# Patient Record
Sex: FEMALE | Race: WHITE | NOT HISPANIC OR LATINO | Employment: OTHER | URBAN - METROPOLITAN AREA
[De-identification: names, ages, dates, MRNs, and addresses within clinical notes are randomized per-mention and may not be internally consistent; named-entity substitution may affect disease eponyms.]

---

## 2017-02-08 ENCOUNTER — RADIANT APPOINTMENT (OUTPATIENT)
Dept: GENERAL RADIOLOGY | Facility: CLINIC | Age: 82
End: 2017-02-08
Attending: INTERNAL MEDICINE
Payer: COMMERCIAL

## 2017-02-08 ENCOUNTER — TELEPHONE (OUTPATIENT)
Dept: PEDIATRICS | Facility: CLINIC | Age: 82
End: 2017-02-08

## 2017-02-08 ENCOUNTER — OFFICE VISIT (OUTPATIENT)
Dept: PEDIATRICS | Facility: CLINIC | Age: 82
End: 2017-02-08
Payer: COMMERCIAL

## 2017-02-08 VITALS
HEART RATE: 62 BPM | WEIGHT: 232 LBS | HEIGHT: 60 IN | OXYGEN SATURATION: 92 % | SYSTOLIC BLOOD PRESSURE: 122 MMHG | BODY MASS INDEX: 45.55 KG/M2 | TEMPERATURE: 98.3 F | DIASTOLIC BLOOD PRESSURE: 64 MMHG

## 2017-02-08 DIAGNOSIS — R10.32 LLQ ABDOMINAL PAIN: ICD-10-CM

## 2017-02-08 DIAGNOSIS — S22.32XK CLOSED FRACTURE OF ONE RIB OF LEFT SIDE WITH NONUNION, SUBSEQUENT ENCOUNTER: ICD-10-CM

## 2017-02-08 DIAGNOSIS — G89.29 CHRONIC MIDLINE THORACIC BACK PAIN: ICD-10-CM

## 2017-02-08 DIAGNOSIS — M54.6 CHRONIC MIDLINE THORACIC BACK PAIN: ICD-10-CM

## 2017-02-08 DIAGNOSIS — R10.11 ABDOMINAL PAIN, RIGHT UPPER QUADRANT: Primary | ICD-10-CM

## 2017-02-08 LAB
ALBUMIN UR-MCNC: NEGATIVE MG/DL
APPEARANCE UR: ABNORMAL
BACTERIA #/AREA URNS HPF: ABNORMAL /HPF
BASOPHILS # BLD AUTO: 0 10E9/L (ref 0–0.2)
BASOPHILS NFR BLD AUTO: 0.5 %
BILIRUB UR QL STRIP: NEGATIVE
COLOR UR AUTO: YELLOW
DIFFERENTIAL METHOD BLD: NORMAL
EOSINOPHIL # BLD AUTO: 0.3 10E9/L (ref 0–0.7)
EOSINOPHIL NFR BLD AUTO: 3.4 %
ERYTHROCYTE [DISTWIDTH] IN BLOOD BY AUTOMATED COUNT: 12.9 % (ref 10–15)
GLUCOSE UR STRIP-MCNC: NEGATIVE MG/DL
HCT VFR BLD AUTO: 46.2 % (ref 35–47)
HGB BLD-MCNC: 14.7 G/DL (ref 11.7–15.7)
HGB UR QL STRIP: ABNORMAL
KETONES UR STRIP-MCNC: NEGATIVE MG/DL
LEUKOCYTE ESTERASE UR QL STRIP: ABNORMAL
LIPASE SERPL-CCNC: 102 U/L (ref 73–393)
LYMPHOCYTES # BLD AUTO: 1.7 10E9/L (ref 0.8–5.3)
LYMPHOCYTES NFR BLD AUTO: 19.4 %
MCH RBC QN AUTO: 30.8 PG (ref 26.5–33)
MCHC RBC AUTO-ENTMCNC: 31.8 G/DL (ref 31.5–36.5)
MCV RBC AUTO: 97 FL (ref 78–100)
MONOCYTES # BLD AUTO: 1 10E9/L (ref 0–1.3)
MONOCYTES NFR BLD AUTO: 11 %
NEUTROPHILS # BLD AUTO: 5.8 10E9/L (ref 1.6–8.3)
NEUTROPHILS NFR BLD AUTO: 65.7 %
NITRATE UR QL: NEGATIVE
NON-SQ EPI CELLS #/AREA URNS LPF: ABNORMAL /LPF
PH UR STRIP: 5.5 PH (ref 5–7)
PLATELET # BLD AUTO: 233 10E9/L (ref 150–450)
RBC # BLD AUTO: 4.77 10E12/L (ref 3.8–5.2)
RBC #/AREA URNS AUTO: ABNORMAL /HPF (ref 0–2)
SP GR UR STRIP: 1.02 (ref 1–1.03)
URN SPEC COLLECT METH UR: ABNORMAL
UROBILINOGEN UR STRIP-ACNC: 0.2 EU/DL (ref 0.2–1)
WBC # BLD AUTO: 8.8 10E9/L (ref 4–11)
WBC #/AREA URNS AUTO: ABNORMAL /HPF (ref 0–2)

## 2017-02-08 PROCEDURE — 36415 COLL VENOUS BLD VENIPUNCTURE: CPT | Performed by: INTERNAL MEDICINE

## 2017-02-08 PROCEDURE — 85025 COMPLETE CBC W/AUTO DIFF WBC: CPT | Performed by: INTERNAL MEDICINE

## 2017-02-08 PROCEDURE — 81001 URINALYSIS AUTO W/SCOPE: CPT | Performed by: INTERNAL MEDICINE

## 2017-02-08 PROCEDURE — 99214 OFFICE O/P EST MOD 30 MIN: CPT | Performed by: INTERNAL MEDICINE

## 2017-02-08 PROCEDURE — 72070 X-RAY EXAM THORAC SPINE 2VWS: CPT

## 2017-02-08 PROCEDURE — 87086 URINE CULTURE/COLONY COUNT: CPT | Performed by: INTERNAL MEDICINE

## 2017-02-08 PROCEDURE — 83690 ASSAY OF LIPASE: CPT | Performed by: INTERNAL MEDICINE

## 2017-02-08 PROCEDURE — 80053 COMPREHEN METABOLIC PANEL: CPT | Performed by: INTERNAL MEDICINE

## 2017-02-08 RX ORDER — TRAZODONE HYDROCHLORIDE 50 MG/1
TABLET, FILM COATED ORAL
COMMUNITY
Start: 2014-05-03 | End: 2017-10-11

## 2017-02-08 RX ORDER — LIDOCAINE 50 MG/G
1 PATCH TOPICAL EVERY 24 HOURS
Qty: 30 PATCH | Refills: 1 | Status: SHIPPED | OUTPATIENT
Start: 2017-02-08 | End: 2017-04-28

## 2017-02-08 NOTE — MR AVS SNAPSHOT
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 We can use lidocaine gel if needed.    2) Xray of the back today to evaluate for worsening fracture    3) CT scan of the abdomen area to be done at [facility], they will call to set this up    4) Labs today looking at white count, liver counts, and pancreatic markers today as well. Urine too    Ayaz Fernandez MD        Follow-ups after your visit        Future tests that were ordered for you today     Open Future Orders        Priority Expected Expires Ordered    CT Abdomen Pelvis w Contrast Routine  2/8/2018 2/8/2017            Who to contact     If you have questions or need follow up information about today's clinic visit or your schedule please contact [facility] directly at [phone].  Normal or non-critical lab and imaging results will be communicated to you by MyChart, letter or phone within 4 business days after the clinic has received the results. If you do not hear from us within 7 days, please contact the clinic through MyChart or phone. If you have a critical or abnormal lab result, we will notify you by phone as soon as possible.  Submit refill requests through [employer] or call your pharmacy and they will forward the refill request to us. Please allow 3 business days for your refill to be completed.          Additional Information About Your Visit        MyChart Information     [employer] gives you secure access to your  electronic health record. If you see a primary care provider, you can also send messages to your care team and make appointments. If you have questions, please call your primary care clinic.  If you do not have a primary care provider, please call [phone] and they will assist you.        Care EveryWhere ID     This is your Care EveryWhere ID. This could be used by other organizations to access your [place] medical records  [id number]        Your Vitals Were     Pulse Temperature Height BMI (Body Mass Index) Pulse Oximetry       62 98.3  F (36.8  C) (Oral) 5' (1.524 m) 45.31 kg/m2 92%        Blood Pressure from Last 3 Encounters:   02/08/17 122/64   10/21/16 145/69   10/07/16 130/78    Weight from Last 3 Encounters:   02/08/17 232 lb (105.235 kg)   10/07/16 233 lb (105.688 kg)   09/28/16 232 lb 11.2 oz (105.552 kg)              We Performed the Following     *UA reflex to Microscopic and Culture ([facility] [place] [facility] (except [place] and [place])     CBC with platelets differential     Comprehensive metabolic panel     Lipase          Today's Medication Changes          These changes are accurate as of: 2/8/17 11:54 AM.  If you have any questions, ask your nurse or doctor.               Start taking these medicines.        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        Dose/Directions    cholecalciferol 1000 UNIT tablet   Commonly known as:  vitamin D   This may have changed:  when to take this   Used for:  Unspecified vitamin D deficiency        Dose:  1000 Units   Take 1 tablet (1,000  Units) by mouth daily   Quantity:  100 tablet   Refills:  3            Where to get your medicines      These medications were sent to [id number] - [place] - [address] AT [facility] [address]  [address] [address]     Phone:  [phone]    - lidocaine 5 % Patch             Primary Care Provider Office Phone # Fax #    Ayaz Fernandez [phone]       [facility] [place] [address] [place] [id number]        Goals        Patient-Stated    Functional     Goal Comments - Note edited  1/27/2015 12:43 PM by Erica Bryant RN    Will have a one time in home fall risk assessment done to help in preventing further fall.     Fall risk assessment done. Home care now in home and providing services.         Thank you!     Thank you for choosing [facility]  for your care. Our goal is always to provide you with excellent care. Hearing back from our patients is one way we can continue to improve our services. Please take a few minutes to complete the written survey that you may receive in the mail after your visit with us. Thank you!             Your Updated Medication List - Protect others around you: Learn how to safely use, store and throw away your medicines at www.disposemymeds.org.          This list is accurate as of: 2/8/17 11:54 AM.  Always use your most recent med list.                   Brand Name Dispense Instructions for use    * albuterol 108 (90 BASE) MCG/ACT Inhaler   Generic drug:  albuterol      Inhale 2 puffs into the lungs every 6 hours       * albuterol 1.25 MG/3ML nebulizer solution    ACCUNEB    30 vial    Take 1 vial (1.25 mg) by nebulization every 4 hours as needed for shortness of breath / dyspnea or wheezing       alendronate 70 MG tablet    FOSAMAX    12 tablet    Take 1 tablet (70 mg) by mouth every 7 days Take with over 8 ounces water and stay upright for at least 30 minutes after dose.  Take at least 60 minutes  before breakfast       azelastine 0.1 % spray    ASTELIN    1 Bottle    Spray 1-2 sprays into both nostrils 2 times daily       cetirizine 10 MG tablet    zyrTEC    30 tablet    Take 1 tablet (10 mg) by mouth every evening       cholecalciferol 1000 UNIT tablet    vitamin D    100 tablet    Take 1 tablet (1,000 Units) by mouth daily       citalopram 20 MG tablet    celeXA    90 tablet    Take 1 tablet (20 mg) by mouth daily Take 20 mg daily for 10 days then increase to 40 mg daily       ferrous sulfate 325 (65 FE) MG tablet    IRON     Take 325 mg by mouth daily (with breakfast)       FLONASE 50 MCG/ACT spray   Generic drug:  fluticasone     3 Package    Spray 1 spray into both nostrils daily       furosemide 20 MG tablet    LASIX    30 tablet    Take 1 tablet (20 mg) by mouth daily as needed       lidocaine 5 % Patch    LIDODERM    30 patch    Place 1 patch onto the skin every 24 hours Sub if not covered for topical lidocaine gel       losartan 100 MG tablet    COZAAR    90 tablet    Take 1 tablet (100 mg) by mouth daily       metoprolol 25 MG 24 hr tablet    TOPROL-XL    45 tablet    Take 0.5 tablets (12.5 mg) by mouth daily       PREVACID PO      Take 30 mg by mouth every evening       SYMBICORT IN          traZODone 50 MG tablet    DESYREL         * Notice:  This list has 2 medication(s) that are the same as other medications prescribed for you. Read the directions carefully, and ask your doctor or other care provider to review them with you.

## 2017-02-08 NOTE — TELEPHONE ENCOUNTER
Pharmacy notifies that lidocaine 5 % ointment is covered by insurance, will dispense ointment for now.     Tiera, RN  Triage Nurse

## 2017-02-08 NOTE — PATIENT INSTRUCTIONS
1) Use lidocaine patches, if covered for the left side pain. We can use lidocaine gel if needed.    2) Xray of the back today to evaluate for worsening fracture    3) CT scan of the abdomen area to be done at [facility], they will call to set this up    4) Labs today looking at white count, liver counts, and pancreatic markers today as well. Urine too    Ayaz Fernandez MD

## 2017-02-08 NOTE — PROGRESS NOTES
SUBJECTIVE:                                                    Tatyana Metcalf is a 84 year old female who presents to clinic today for the following health issues:      Back Pain      Duration: Years, but worsening in the last 6 months or so.         Specific cause: Spinal Stenosis, but wondering why the pain has worsened.     Description:   Location of pain: All over the back.   Character of pain: dull ache  Pain radiation:none  New numbness or weakness in legs, not attributed to pain:  YES- sometimes, especially after sitting too long.     Intensity: moderate    History:   Pain interferes with job: Not applicable  History of back problems: previous spinal stenosis -   Any previous MRI or X-rays: Yes--at CT done at [facility].  Date 12/27/2016  Sees a specialist for back pain:  No  Therapies tried without relief: Aleve PM at night.    Alleviating factors:   Improved by: Aleve PM at night and asper cream on her sides.       Precipitating factors:  Worsened by: Sitting    Functional and Psychosocial Screen ([facility] Back):      Not performed today  Patient has also been experiencing Side pain with her back pain, mainly on the right side with nausea. She had an attack on 1/14/17, hasn't had one since however it has remained painful since but not has bad.      Accompanying Signs & Symptoms:  Risk of Fracture:  Age >64  Risk of Cauda Equina:  None  Risk of Infection:  None  Risk of Cancer:  History of cancer  Risk of Ankylosing Spondylitis:  Onset at age <35, male, AND morning back stiffness.                  Agree with medical student note.    Abdominal pain: Has been having RUQ abdominal pain following eating. She had cholecystitis in the past. This pain feels less significant than that pain but still fairly sharp. She also notes pain long the left lateral rib areas. No fevers. No falls, no other trauma.    She has a history of thoracic compression fracture as well as old rib fractures on the left side. No  rashes or fevers. No diarrhea. Does not feel constipated. No hematuria, hematochezia, melena. No urinary frequency or dysuria. History of endometrial cancer s/p therapy in the past.       Problem list and histories reviewed & adjusted, as indicated.  Additional history: as documented    Problem list, Medication list, Allergies, and Medical/Social/Surgical histories reviewed in [facility] and updated as appropriate.    ROS:  Constitutional, HEENT, cardiovascular, pulmonary, GI, , musculoskeletal, neuro, skin, endocrine and psych systems are negative, except as otherwise noted.    OBJECTIVE:                                                    /64 mmHg  Pulse 62  Temp(Src) 98.3  F (36.8  C) (Oral)  Ht 5' (1.524 m)  Wt 232 lb (105.235 kg)  BMI 45.31 kg/m2  SpO2 92%  Body mass index is 45.31 kg/(m^2).  GENERAL: healthy, alert and no distress  NECK: no adenopathy, no asymmetry, masses, or scars and thyroid normal to palpation  RESP: lungs clear to auscultation - no rales, rhonchi or wheezes  CV: regular rate and rhythm, normal S1 S2, no S3 or S4, no murmur, click or rub, no peripheral edema and peripheral pulses strong  ABDOMEN: tender to palpation along the LLQ into the LUQ area along the left lateral abdominal area- no rashes over this area, mild tenderness in RUQ area, no CVA tenderness, no masses  MS: no gross musculoskeletal defects noted, no edema  SKIN: no suspicious lesions or rashes  NEURO: walking well with walker, normal lower extremity sensation, normal great toe strength, no new focal deficit  PSYCH: mentation appears normal, affect normal/bright    Diagnostic Test Results:  Results for orders placed or performed in visit on 02/08/17   Comprehensive metabolic panel   Result Value Ref Range    Sodium 143 133 - 144 mmol/L    Potassium 4.3 3.4 - 5.3 mmol/L    Chloride 107 94 - 109 mmol/L    Carbon Dioxide 28 20 - 32 mmol/L    Anion Gap 8 3 - 14 mmol/L    Glucose 102 (H) 70 - 99 mg/dL    Urea Nitrogen 18 7  - 30 mg/dL    Creatinine 0.65 0.52 - 1.04 mg/dL    GFR Estimate 87 >60 mL/min/1.7m2    GFR Estimate If Black >90   GFR Calc   >60 mL/min/1.7m2    Calcium 9.7 8.5 - 10.1 mg/dL    Bilirubin Total 0.3 0.2 - 1.3 mg/dL    Albumin 4.0 3.4 - 5.0 g/dL    Protein Total 7.2 6.8 - 8.8 g/dL    Alkaline Phosphatase 88 40 - 150 U/L    ALT 16 0 - 50 U/L    AST 14 0 - 45 U/L   CBC with platelets differential   Result Value Ref Range    WBC 8.8 4.0 - 11.0 10e9/L    RBC Count 4.77 3.8 - 5.2 10e12/L    Hemoglobin 14.7 11.7 - 15.7 g/dL    Hematocrit 46.2 35.0 - 47.0 %    MCV 97 78 - 100 fl    MCH 30.8 26.5 - 33.0 pg    MCHC 31.8 31.5 - 36.5 g/dL    RDW 12.9 10.0 - 15.0 %    Platelet Count 233 150 - 450 10e9/L    Diff Method Automated Method     % Neutrophils 65.7 %    % Lymphocytes 19.4 %    % Monocytes 11.0 %    % Eosinophils 3.4 %    % Basophils 0.5 %    Absolute Neutrophil 5.8 1.6 - 8.3 10e9/L    Absolute Lymphocytes 1.7 0.8 - 5.3 10e9/L    Absolute Monocytes 1.0 0.0 - 1.3 10e9/L    Absolute Eosinophils 0.3 0.0 - 0.7 10e9/L    Absolute Basophils 0.0 0.0 - 0.2 10e9/L   Lipase   Result Value Ref Range    Lipase 102 73 - 393 U/L   *UA reflex to Microscopic and Culture ([facility] (except [place] and [place])   Result Value Ref Range    Color Urine Yellow     Appearance Urine Slightly Cloudy     Glucose Urine Negative NEG mg/dL    Bilirubin Urine Negative NEG    Ketones Urine Negative NEG mg/dL    Specific Gravity Urine 1.025 1.003 - 1.035    Blood Urine Small (A) NEG    pH Urine 5.5 5.0 - 7.0 pH    Protein Albumin Urine Negative NEG mg/dL    Urobilinogen Urine 0.2 0.2 - 1.0 EU/dL    Nitrite Urine Negative NEG    Leukocyte Esterase Urine Trace (A) NEG    Source Midstream Urine    Urine Microscopic   Result Value Ref Range    WBC Urine 2-5 (A) 0 - 2 /HPF    RBC Urine O - 2 0 - 2 /HPF    Squamous Epithelial /LPF Urine Many (A) FEW /LPF    Bacteria Urine Moderate (A) NEG /HPF   Urine Culture  Aerobic Bacterial   Result Value Ref Range    Specimen Description Midstream Urine     Culture Micro       10,000 to 50,000 colonies/mL mixed urogenital richardson    Micro Report Status FINAL 02/09/2017         ASSESSMENT/PLAN:                                                    1. Abdominal pain, right upper quadrant  No signs of pathology in this area on CT scan, sent mychart that will consider RUQ US to evaluation for gallstones if ongoing, may even consider starting H2 or PPI trial if still of concern  - Comprehensive metabolic panel  - CBC with platelets differential  - CT Abdomen Pelvis w Contrast; Future  - Lipase  - Urine Microscopic  - Urine Culture Aerobic Bacterial    2. LLQ abdominal pain  Significant constipation on CT scan, will start with Miralax and consider use of magnesium citrate if needed, consider repeat UA if still having symptoms as UA was not completely clear but not consistent with infection  - CT Abdomen Pelvis w Contrast; Future  - Lipase  - *UA reflex to Microscopic and Culture ([facility] (except [place] and [place])  - Urine Culture Aerobic Bacterial    3. Chronic midline thoracic back pain  No signs of new or worsening thoracic fracture on exam  - XR Thoracic Spine 2 Views; Future  - lidocaine (LIDODERM) 5 % Patch; Place 1 patch onto the skin every 24 hours Sub if not covered for topical lidocaine gel  Dispense: 30 patch; Refill: 1  - *UA reflex to Microscopic and Culture ([facility] (except [place] and [place])    4. 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 We can use  lidocaine gel if needed.    2) Xray of the back today to evaluate for worsening fracture    3) CT scan of the abdomen area to be done at [facility], they will call to set this up    4) Labs today looking at white count, liver counts, and pancreatic markers today as well. Urine too    MD Ayaz Alfaro MD, MD  [facility] [place]

## 2017-02-08 NOTE — TELEPHONE ENCOUNTER
PA requested for lidocaine patches, submitted via covermymeds. Await decision.    Tatyana Metcalf (Key: [id number]) - [id number]  Lidocaine 5% patches  Status: PA Request  Created: February 8th, 2017 [phone]  Sent: February 8th, 2017      Marilin Hyde MA

## 2017-02-08 NOTE — NURSING NOTE
Chief Complaint   Patient presents with     Back Pain       Initial /64 mmHg  Pulse 62  Temp(Src) 98.3  F (36.8  C) (Oral)  Ht 5' (1.524 m)  Wt 232 lb (105.235 kg)  BMI 45.31 kg/m2  SpO2 92% Estimated body mass index is 45.31 kg/(m^2) as calculated from the following:    Height as of this encounter: 5' (1.524 m).    Weight as of this encounter: 232 lb (105.235 kg).  Medication Reconciliation: complete   Marilin Hyde MA

## 2017-02-08 NOTE — PROGRESS NOTES
Progress Note  2/8/17    Reason for visit: back and RUQ pain    Subjective:  Tatyana Metcalf is a 84 year old female with a spinal stenosis with neurogenic claudication, history of metastatic endometrial cancer s/p KEELY-BSO, compression fractures, chronic rib fracture, COPD, and obesity who presents with worsening chronic left lumbar back pain and new RUQ pain for the past month. Back sometimes radiates towards groin and she cannot sit on her left side for long periods of time. She denies any tingling or numbness in her legs, no changes in bowel movements or urination. She denies any fever, unintentional weight loss, or associated rashes. She does not use any oral pain meds, but does like to use Aspercreme with minimal relief.     Her RUQ pain began about a month ago after eating a fatty meal. She has been on a low fat diet per suggestion of daughter (who is a nurse) and felt that has significantly helped the pain. Pain is in between sharp and dull and associated with decreased appetite. Has had gallbladder issues in the past, but does not feel as bad then. She has some intermittent nausea, no vomiting, no blood or mucus in stools.    Past Medical History   Diagnosis Date     Endometrial cancer (H) 2000     treated surgically, did not have chemo or XRT     Fox's esophagus 10/3/2014     last EGD in 2011     SNHL (sensorineural hearing loss) 9/15/2015     Objective:  /64 mmHg  Pulse 62  Temp(Src) 98.3  F (36.8  C) (Oral)  Ht 5' (1.524 m)  Wt 232 lb (105.235 kg)  BMI 45.31 kg/m2  SpO2 92%  General: Alert, oriented, no acute distress  HEENT: Anicteric sclera, EOMI, TM nonbulging, nares without drainage  Resp: CTAB  Cardiac: RRR, S1 S2+. No murmurs, gallops, or rubs.  Abd: Soft, tender at LUQ and RUQ, obese abdomen. No masses or hepatosplenomegaly. No guarding or rebound.  Skin: No rashes, bruises, or other lesions.  MSK: Tender at left lumbar paraspinal area. Symmetric spine without midline  tenderness  : No suprapubic pain.  Psych: Normal affect.    Assessment and Plan:  Tatyana Metcalf is a 84 year old female with a spinal stenosis with neurogenic claudication, history of metastatic endometrial cancer s/p KEELY-BSO, compression fractures, chronic rib fracture, COPD, and obesity who presents with worsening chronic left lumbar back pain and new RUQ pain for the past month.     1. Acute on chronic lumbar back pain  History significant for metastatic uterine cancer, osteoporotic compression fractures, prior rib fractures, spinal stenosis, and hypertension. There could be multiple causes for her worsening pain, discussed with patient that it would be best to do a CT of the abdomen and pelvis and a plain xray given multiple risk factors.    - CT A/P with contrast  - thoracic spine X-ray  - CBC, CMP, lipase, UA with reflex micro and culture  - lidocaine patch 5% ordered, can continue with Aspercreme    2. RUQ pain  History consistent with biliary colic or symptomatic cholelithiasis. Hard to completely distinguish from chronic back pain issues.    - continue low fat diet  - work up as above    3. COPD  Following with pulmonology, no acute issues today.    - continue with Symbicort daily, albuterol as needed    Written by Markos Lyon, MS4  Seen and discussed with Dr. Fernandez.

## 2017-02-09 LAB
ALBUMIN SERPL-MCNC: 4 G/DL (ref 3.4–5)
ALP SERPL-CCNC: 88 U/L (ref 40–150)
ALT SERPL W P-5'-P-CCNC: 16 U/L (ref 0–50)
ANION GAP SERPL CALCULATED.3IONS-SCNC: 8 MMOL/L (ref 3–14)
AST SERPL W P-5'-P-CCNC: 14 U/L (ref 0–45)
BACTERIA SPEC CULT: NORMAL
BILIRUB SERPL-MCNC: 0.3 MG/DL (ref 0.2–1.3)
BUN SERPL-MCNC: 18 MG/DL (ref 7–30)
CALCIUM SERPL-MCNC: 9.7 MG/DL (ref 8.5–10.1)
CHLORIDE SERPL-SCNC: 107 MMOL/L (ref 94–109)
CO2 SERPL-SCNC: 28 MMOL/L (ref 20–32)
CREAT SERPL-MCNC: 0.65 MG/DL (ref 0.52–1.04)
GFR SERPL CREATININE-BSD FRML MDRD: 87 ML/MIN/1.7M2
GLUCOSE SERPL-MCNC: 102 MG/DL (ref 70–99)
MICRO REPORT STATUS: NORMAL
POTASSIUM SERPL-SCNC: 4.3 MMOL/L (ref 3.4–5.3)
PROT SERPL-MCNC: 7.2 G/DL (ref 6.8–8.8)
SODIUM SERPL-SCNC: 143 MMOL/L (ref 133–144)
SPECIMEN SOURCE: NORMAL

## 2017-02-10 ENCOUNTER — HOSPITAL ENCOUNTER (OUTPATIENT)
Dept: CT IMAGING | Facility: CLINIC | Age: 82
Discharge: HOME | End: 2017-02-10
Attending: INTERNAL MEDICINE | Admitting: INTERNAL MEDICINE
Payer: COMMERCIAL

## 2017-02-10 DIAGNOSIS — R10.32 LLQ ABDOMINAL PAIN: ICD-10-CM

## 2017-02-10 DIAGNOSIS — R10.11 ABDOMINAL PAIN, RIGHT UPPER QUADRANT: ICD-10-CM

## 2017-02-10 DIAGNOSIS — K59.01 SLOW TRANSIT CONSTIPATION: Primary | ICD-10-CM

## 2017-02-10 PROCEDURE — 25500064 ZZH RX 255 OP 636: Performed by: RADIOLOGY

## 2017-02-10 PROCEDURE — 74177 CT ABD & PELVIS W/CONTRAST: CPT

## 2017-02-10 PROCEDURE — 25000128 H RX IP 250 OP 636: Performed by: RADIOLOGY

## 2017-02-10 RX ORDER — IOPAMIDOL 755 MG/ML
100 INJECTION, SOLUTION INTRAVASCULAR ONCE
Status: COMPLETED | OUTPATIENT
Start: 2017-02-10 | End: 2017-02-10

## 2017-02-10 RX ORDER — POLYETHYLENE GLYCOL 3350 17 G/17G
1 POWDER, FOR SOLUTION ORAL DAILY
Qty: 510 G | Refills: 1 | Status: SHIPPED | OUTPATIENT
Start: 2017-02-10 | End: 2017-04-28

## 2017-02-10 RX ORDER — IOPAMIDOL 755 MG/ML
500 INJECTION, SOLUTION INTRAVASCULAR ONCE
Status: DISCONTINUED | OUTPATIENT
Start: 2017-02-10 | End: 2017-02-10

## 2017-02-10 RX ADMIN — IOPAMIDOL 99 ML: 755 INJECTION, SOLUTION INTRAVENOUS at 15:02

## 2017-02-10 RX ADMIN — SODIUM CHLORIDE 55 ML: 9 INJECTION, SOLUTION INTRAVENOUS at 15:02

## 2017-02-13 ENCOUNTER — MYC MEDICAL ADVICE (OUTPATIENT)
Dept: PEDIATRICS | Facility: CLINIC | Age: 82
End: 2017-02-13

## 2017-02-14 ENCOUNTER — TRANSFERRED RECORDS (OUTPATIENT)
Dept: HEALTH INFORMATION MANAGEMENT | Facility: CLINIC | Age: 82
End: 2017-02-14

## 2017-03-20 DIAGNOSIS — H65.92 MIDDLE EAR EFFUSION, LEFT: ICD-10-CM

## 2017-03-20 DIAGNOSIS — J31.0 CHRONIC RHINITIS: ICD-10-CM

## 2017-03-20 NOTE — TELEPHONE ENCOUNTER
AZELSTINE 0.1% NASAL SP      Last Written Prescription Date: 2/27/2016  Last Fill Quantity: 1,  # refills: 1   Last Office Visit with G, [facility] or [facility] prescribing provider: 2/8/2017

## 2017-03-22 RX ORDER — AZELASTINE 1 MG/ML
1-2 SPRAY, METERED NASAL 2 TIMES DAILY
Qty: 3 BOTTLE | Refills: 3 | Status: SHIPPED | OUTPATIENT
Start: 2017-03-22 | End: 2018-11-13

## 2017-03-22 NOTE — TELEPHONE ENCOUNTER
Prescription approved per [facility] Refill Protocol.  Angelica Solo, RN  Triage Nurse

## 2017-04-04 DIAGNOSIS — F33.1 MODERATE RECURRENT MAJOR DEPRESSION (H): ICD-10-CM

## 2017-04-04 NOTE — TELEPHONE ENCOUNTER
Citalopram 20MG Tablets     Last Written Prescription Date: 09/28/2016  Last Fill Quantity: 90, # refills: 3  Last Office Visit with [facility] primary care provider:  02/08/2017        Last PHQ-9 score on record=   PHQ-9 SCORE 9/29/2016   Total Score -   Total Score 9

## 2017-04-06 RX ORDER — CITALOPRAM HYDROBROMIDE 20 MG/1
40 TABLET ORAL DAILY
Qty: 60 TABLET | Refills: 0 | Status: SHIPPED | OUTPATIENT
Start: 2017-04-06 | End: 2017-04-28

## 2017-04-06 NOTE — TELEPHONE ENCOUNTER
One month given.    She is due for follow up with Dr. Fernandez for depression, please help her schedule.    Jill Briscoe MD  Internal Medicine/Pediatrics  [facility]

## 2017-04-06 NOTE — TELEPHONE ENCOUNTER
Routing refill request to provider for review/approval because:  phq9 > 4, will forward to Dr. Briscoe as Dr. Fernandez is out of the office, also needs phq9 updated     Will forward to the station also, please call the pt to update her phq9

## 2017-04-10 ENCOUNTER — TRANSFERRED RECORDS (OUTPATIENT)
Dept: HEALTH INFORMATION MANAGEMENT | Facility: CLINIC | Age: 82
End: 2017-04-10

## 2017-04-13 NOTE — TELEPHONE ENCOUNTER
Patient scheduled:    Next 5 appointments (look out 90 days)     Apr 28, 2017  2:30 PM CDT   [facility]    [address] [place] [id number] [phone]                Nancy Dixon RN  Message handled by Nurse Triage.

## 2017-04-28 ENCOUNTER — OFFICE VISIT (OUTPATIENT)
Dept: PEDIATRICS | Facility: CLINIC | Age: 82
End: 2017-04-28
Payer: COMMERCIAL

## 2017-04-28 VITALS
OXYGEN SATURATION: 94 % | BODY MASS INDEX: 46.92 KG/M2 | HEART RATE: 55 BPM | TEMPERATURE: 98.2 F | HEIGHT: 60 IN | DIASTOLIC BLOOD PRESSURE: 70 MMHG | SYSTOLIC BLOOD PRESSURE: 126 MMHG | WEIGHT: 239 LBS

## 2017-04-28 DIAGNOSIS — R10.2 SUPRAPUBIC PAIN: ICD-10-CM

## 2017-04-28 DIAGNOSIS — J44.9 MODERATE CHRONIC OBSTRUCTIVE PULMONARY DISEASE (H): ICD-10-CM

## 2017-04-28 DIAGNOSIS — K59.01 SLOW TRANSIT CONSTIPATION: ICD-10-CM

## 2017-04-28 DIAGNOSIS — Z23 NEED FOR PNEUMOCOCCAL VACCINE: ICD-10-CM

## 2017-04-28 DIAGNOSIS — F33.1 MODERATE RECURRENT MAJOR DEPRESSION (H): Primary | ICD-10-CM

## 2017-04-28 LAB
ALBUMIN UR-MCNC: NEGATIVE MG/DL
APPEARANCE UR: ABNORMAL
BACTERIA #/AREA URNS HPF: ABNORMAL /HPF
BILIRUB UR QL STRIP: ABNORMAL
CAOX CRY #/AREA URNS HPF: ABNORMAL /HPF
COLOR UR AUTO: YELLOW
GLUCOSE UR STRIP-MCNC: NEGATIVE MG/DL
HGB UR QL STRIP: NEGATIVE
KETONES UR STRIP-MCNC: ABNORMAL MG/DL
LEUKOCYTE ESTERASE UR QL STRIP: ABNORMAL
MUCOUS THREADS #/AREA URNS LPF: PRESENT /LPF
NITRATE UR QL: NEGATIVE
NON-SQ EPI CELLS #/AREA URNS LPF: ABNORMAL /LPF
PH UR STRIP: 5 PH (ref 5–7)
RBC #/AREA URNS AUTO: ABNORMAL /HPF (ref 0–2)
SP GR UR STRIP: >1.03 (ref 1–1.03)
URN SPEC COLLECT METH UR: ABNORMAL
UROBILINOGEN UR STRIP-ACNC: 1 EU/DL (ref 0.2–1)
WBC #/AREA URNS AUTO: ABNORMAL /HPF (ref 0–2)

## 2017-04-28 PROCEDURE — 90670 PCV13 VACCINE IM: CPT | Performed by: INTERNAL MEDICINE

## 2017-04-28 PROCEDURE — 99214 OFFICE O/P EST MOD 30 MIN: CPT | Mod: 25 | Performed by: INTERNAL MEDICINE

## 2017-04-28 PROCEDURE — G0009 ADMIN PNEUMOCOCCAL VACCINE: HCPCS | Performed by: INTERNAL MEDICINE

## 2017-04-28 PROCEDURE — 81001 URINALYSIS AUTO W/SCOPE: CPT | Performed by: INTERNAL MEDICINE

## 2017-04-28 PROCEDURE — 87086 URINE CULTURE/COLONY COUNT: CPT | Performed by: INTERNAL MEDICINE

## 2017-04-28 RX ORDER — CITALOPRAM HYDROBROMIDE 20 MG/1
40 TABLET ORAL DAILY
Qty: 180 TABLET | Refills: 3 | Status: ON HOLD | OUTPATIENT
Start: 2017-04-28 | End: 2017-12-24

## 2017-04-28 RX ORDER — POLYETHYLENE GLYCOL 3350 17 G/17G
1 POWDER, FOR SOLUTION ORAL 2 TIMES DAILY
Qty: 510 G | Refills: 11 | Status: SHIPPED | OUTPATIENT
Start: 2017-04-28 | End: 2017-10-11

## 2017-04-28 RX ORDER — PREDNISONE 20 MG/1
40 TABLET ORAL DAILY
Qty: 10 TABLET | Refills: 0 | Status: SHIPPED | OUTPATIENT
Start: 2017-04-28 | End: 2017-05-03

## 2017-04-28 RX ORDER — ALBUTEROL SULFATE 90 UG/1
POWDER, METERED RESPIRATORY (INHALATION)
Refills: 12 | COMMUNITY
Start: 2017-02-18 | End: 2022-05-31

## 2017-04-28 ASSESSMENT — ANXIETY QUESTIONNAIRES
3. WORRYING TOO MUCH ABOUT DIFFERENT THINGS: NOT AT ALL
5. BEING SO RESTLESS THAT IT IS HARD TO SIT STILL: SEVERAL DAYS
IF YOU CHECKED OFF ANY PROBLEMS ON THIS QUESTIONNAIRE, HOW DIFFICULT HAVE THESE PROBLEMS MADE IT FOR YOU TO DO YOUR WORK, TAKE CARE OF THINGS AT HOME, OR GET ALONG WITH OTHER PEOPLE: NOT DIFFICULT AT ALL
7. FEELING AFRAID AS IF SOMETHING AWFUL MIGHT HAPPEN: NOT AT ALL
GAD7 TOTAL SCORE: 4
2. NOT BEING ABLE TO STOP OR CONTROL WORRYING: NOT AT ALL
6. BECOMING EASILY ANNOYED OR IRRITABLE: SEVERAL DAYS
1. FEELING NERVOUS, ANXIOUS, OR ON EDGE: SEVERAL DAYS

## 2017-04-28 ASSESSMENT — PATIENT HEALTH QUESTIONNAIRE - PHQ9: 5. POOR APPETITE OR OVEREATING: SEVERAL DAYS

## 2017-04-28 NOTE — MR AVS SNAPSHOT
After Visit Summary   4/28/2017    Tatyana Metcalf    MRN: [id number]           Patient Information     Date Of Birth          3/19/1932        Visit Information        Provider Department      4/28/2017 2:30 PM Ayaz Fernandez MD [facility]        Today's Diagnoses     Suprapubic pain    -  1    Slow transit constipation        Moderate recurrent major depression (H)        Moderate chronic obstructive pulmonary disease (H)          Care Instructions    1) Restart the furosemide, take this every day    2) Start the steroid- 40 mg per day for 5 days    3) Use the pro-air inhaler as well every 4-6 hours to help with breathing as well    4) We will check a urine today to rule out infection    5) Pneumonia shot today    Let me know if breathing getting worse. If it gets really bad- go to the ER    Ayaz Fernandez MD        Follow-ups after your visit        Who to contact     If you have questions or need follow up information about today's clinic visit or your schedule please contact [facility] directly at [phone].  Normal or non-critical lab and imaging results will be communicated to you by Jareehart, letter or phone within 4 business days after the clinic has received the results. If you do not hear from us within 7 days, please contact the clinic through SquaredOut or phone. If you have a critical or abnormal lab result, we will notify you by phone as soon as possible.  Submit refill requests through SquaredOut or call your pharmacy and they will forward the refill request to us. Please allow 3 business days for your refill to be completed.          Additional Information About Your Visit        JareeharVendorShop Information     SquaredOut gives you secure access to your electronic health record. If you see a primary care provider, you can also send messages to your care team and make appointments. If you have questions, please call your primary care clinic.  If you do not have a primary  care provider, please call [phone] and they will assist you.        Care EveryWhere ID     This is your Care EveryWhere ID. This could be used by other organizations to access your [facility] records  [id number]        Your Vitals Were     Pulse Temperature Height Pulse Oximetry BMI (Body Mass Index)       55 98.2  F (36.8  C) (Oral) 5' (1.524 m) 94% 46.68 kg/m2        Blood Pressure from Last 3 Encounters:   04/28/17 126/70   02/08/17 122/64   10/21/16 145/69    Weight from Last 3 Encounters:   04/28/17 239 lb (108.4 kg)   02/08/17 232 lb (105.2 kg)   10/07/16 233 lb (105.7 kg)              We Performed the Following     *UA reflex to Microscopic and Culture ([place] [facility] (except [place] and [place])          Today's Medication Changes          These changes are accurate as of: 4/28/17  3:14 PM.  If you have any questions, ask your nurse or doctor.               Start taking these medicines.        Dose/Directions    predniSONE 20 MG tablet   Commonly known as:  DELTASONE   Used for:  Moderate chronic obstructive pulmonary disease (H)   Started by:  Ayaz Fernandez MD        Dose:  40 mg   Take 2 tablets (40 mg) by mouth daily for 5 days   Quantity:  10 tablet   Refills:  0         These medicines have changed or have updated prescriptions.        Dose/Directions    cholecalciferol 1000 UNIT tablet   Commonly known as:  vitamin D   This may have changed:  when to take this   Used for:  Unspecified vitamin D deficiency        Dose:  1000 Units   Take 1 tablet (1,000 Units) by mouth daily   Quantity:  100 tablet   Refills:  3       polyethylene glycol powder   Commonly known as:  MIRALAX   This may have changed:  when to take this   Used for:  Slow transit constipation   Changed by:  Ayaz Fernandez MD        Dose:  1 capful   Take 17 g (1 capful) by mouth 2 times daily   Quantity:  510 g   Refills:  11         Stop taking these medicines if you haven't already. Please contact  your care team if you have questions.     lidocaine 5 % Patch   Commonly known as:  LIDODERM   Stopped by:  Ayaz Fernandez MD                Where to get your medicines      These medications were sent to [id number] - [place] - [address] AT [facility] [address]  [address] [address]     Phone:  [phone]     citalopram 20 MG tablet    polyethylene glycol powder    predniSONE 20 MG tablet                Primary Care Provider Office Phone # Fax #    Ayaz Fernandez [phone]       [facility] [place] [address] [place] [id number]        Goals        General    Functional (pt-stated)     Notes - Note edited  1/27/2015 12:43 PM by Erica Bryant RN    Will have a one time in home fall risk assessment done to help in preventing further fall.     Fall risk assessment done. Home care now in home and providing services.         Thank you!     Thank you for choosing [facility]  for your care. Our goal is always to provide you with excellent care. Hearing back from our patients is one way we can continue to improve our services. Please take a few minutes to complete the written survey that you may receive in the mail after your visit with us. Thank you!             Your Updated Medication List - Protect others around you: Learn how to safely use, store and throw away your medicines at www.disposemymeds.org.          This list is accurate as of: 4/28/17  3:14 PM.  Always use your most recent med list.                   Brand Name Dispense Instructions for use    * albuterol 108 (90 BASE) MCG/ACT Inhaler   Generic drug:  albuterol      Inhale 2 puffs into the lungs every 6 hours       * albuterol 1.25 MG/3ML nebulizer solution    ACCUNEB    30 vial    Take 1 vial (1.25 mg) by nebulization every 4 hours as needed for shortness of breath / dyspnea or wheezing       * PROAIR RESPICLICK 108 (90 BASE) MCG/ACT Aepb   Generic  drug:  Albuterol Sulfate      INL 2 PUFFS PO Q 4 TO 6 H PRN       alendronate 70 MG tablet    FOSAMAX    12 tablet    Take 1 tablet (70 mg) by mouth every 7 days Take with over 8 ounces water and stay upright for at least 30 minutes after dose.  Take at least 60 minutes before breakfast       azelastine 0.1 % spray    ASTELIN    3 Bottle    Spray 1-2 sprays into both nostrils 2 times daily       cetirizine 10 MG tablet    zyrTEC    30 tablet    Take 1 tablet (10 mg) by mouth every evening       cholecalciferol 1000 UNIT tablet    vitamin D    100 tablet    Take 1 tablet (1,000 Units) by mouth daily       citalopram 20 MG tablet    celeXA    180 tablet    Take 2 tablets (40 mg) by mouth daily       ferrous sulfate 325 (65 FE) MG tablet    IRON     Take 325 mg by mouth daily (with breakfast)       FLONASE 50 MCG/ACT spray   Generic drug:  fluticasone     3 Package    Spray 1 spray into both nostrils daily       furosemide 20 MG tablet    LASIX    30 tablet    Take 1 tablet (20 mg) by mouth daily as needed       losartan 100 MG tablet    COZAAR    90 tablet    Take 1 tablet (100 mg) by mouth daily       metoprolol 25 MG 24 hr tablet    TOPROL-XL    45 tablet    Take 0.5 tablets (12.5 mg) by mouth daily       polyethylene glycol powder    MIRALAX    510 g    Take 17 g (1 capful) by mouth 2 times daily       predniSONE 20 MG tablet    DELTASONE    10 tablet    Take 2 tablets (40 mg) by mouth daily for 5 days       PREVACID PO      Take 30 mg by mouth every evening       SYMBICORT IN          traZODone 50 MG tablet    DESYREL         * Notice:  This list has 3 medication(s) that are the same as other medications prescribed for you. Read the directions carefully, and ask your doctor or other care provider to review them with you.

## 2017-04-28 NOTE — PATIENT INSTRUCTIONS
1) Restart the furosemide, take this every day    2) Start the steroid- 40 mg per day for 5 days    3) Use the pro-air inhaler as well every 4-6 hours to help with breathing as well    4) We will check a urine today to rule out infection    5) Pneumonia shot today    Let me know if breathing getting worse. If it gets really bad- go to the ER    Ayaz Fernandez MD

## 2017-04-28 NOTE — NURSING NOTE
Chief Complaint   Patient presents with     Depression       Initial /70 (BP Location: Right arm, Cuff Size: Adult Large)  Pulse 55  Temp 98.2  F (36.8  C) (Oral)  Ht 5' (1.524 m)  Wt 239 lb (108.4 kg)  SpO2 94%  BMI 46.68 kg/m2 Estimated body mass index is 46.68 kg/(m^2) as calculated from the following:    Height as of this encounter: 5' (1.524 m).    Weight as of this encounter: 239 lb (108.4 kg).  Medication Reconciliation: complete   Marilin Hyde MA

## 2017-04-28 NOTE — PROGRESS NOTES
SUBJECTIVE:                                                    Tatyana Metcalf is a 85 year old female who presents to clinic today for the following health issues:      Depression Followup    Status since last visit: Improved    See PHQ-9 for current symptoms.  Other associated symptoms: None    Complicating factors:   Significant life event:  Yes-  Wedding Events   Current substance abuse:  None  Anxiety or Panic symptoms:  No    PHQ-9  English PHQ-9   Any Language        Citalopram 40 mg per day, working well for her without side effects.    Abdominal pain: worse with eating some spicy or greasy foods in the RLQ, did have improvement in LLQ pain with use of stool softener. No fevers. No dysuria, does have some urinary frequency.   No nausea or emesis. Ongoing without acute changes.    Dyspnea: has felt more short of breath over the last week or so, feels similar to when had to go to hospital before. Using controller medication BID, not using albuterol. Feels as though may need steroids again. No fevers. Having some clear sputum production. No orthopnea, PND, or LE edema. Has not been using diuretics as has been making urinate more      Amount of exercise or physical activity: 6-7 days/week for an average of 15-30 minutes    Problems taking medications regularly: No    Medication side effects: none    Diet: regular (no restrictions)      Problem list and histories reviewed & adjusted, as indicated.  Additional history: as documented    Patient Active Problem List   Diagnosis     Secondary malignant neoplasm of lung (H)     Benign essential hypertension     Vestibular neuronitis of both ears     Spinal stenosis of lumbar region with neurogenic claudication     Hiatal hernia     Esophageal reflux     Obstructive sleep apnea syndrome     Moderate recurrent major depression (H)     Chronic rhinitis     Iron deficiency anemia     Dermatographism     Bronchiectasis (H)     Vitamin D deficiency     Rib pain     Rib  fractures     Endometrial carcinoma (H)     Advanced directives, counseling/discussion     Osteoporosis     Neuropathy (H)     SNHL (sensorineural hearing loss)     Respiratory failure (H)     Lung cancer (H)     Moderate chronic obstructive pulmonary disease (H)     Hypoxia     Past Surgical History:   Procedure Laterality Date     APPENDECTOMY  1952     HYSTERECTOMY TOTAL ABDOMINAL, BILATERAL SALPINGO-OOPHORECTOMY, COMBINED  2000       Social History   Substance Use Topics     Smoking status: Never Smoker     Smokeless tobacco: Never Used     Alcohol use 0.0 oz/week     0 Standard drinks or equivalent per week      Comment: holidays     Family History   Problem Relation Age of Onset     HEART DISEASE Mother      HEART DISEASE Father            Reviewed and updated as needed this visit by clinical staff  Tobacco  Allergies  Meds  Med Hx  Surg Hx  Fam Hx  Soc Hx      Reviewed and updated as needed this visit by Provider         ROS:  Constitutional, HEENT, cardiovascular, pulmonary, GI, , musculoskeletal, neuro, skin, endocrine and psych systems are negative, except as otherwise noted.    OBJECTIVE:                                                    /70 (BP Location: Right arm, Cuff Size: Adult Large)  Pulse 55  Temp 98.2  F (36.8  C) (Oral)  Ht 5' (1.524 m)  Wt 239 lb (108.4 kg)  SpO2 94%  BMI 46.68 kg/m2  Body mass index is 46.68 kg/(m^2).  GENERAL: healthy, alert and no distress  NECK: no adenopathy, no asymmetry, masses, or scars and thyroid normal to palpation  RESP:diminished slightly at lower bases, no overt wheezing or crackles  CV: regular rate and rhythm, normal S1 S2, no S3 or S4, no murmur, click or rub, +1 peripheral edema and peripheral pulses strong  ABDOMEN: soft, slightly tender at lower suprapubic area, no guarding or rebounding, no hepatosplenomegaly, no masses and bowel sounds normal  MS: no gross musculoskeletal defects noted, no edema  SKIN: no suspicious lesions or  rashes  NEURO: walking with walker Normal strength and tone, mentation intact and speech normal  Psych: appropriate mood and affect    Diagnostic Test Results:  Results for orders placed or performed in visit on 04/28/17   *UA reflex to Microscopic and Culture ([place] and [facility] (except [place] and [place])   Result Value Ref Range    Color Urine Yellow     Appearance Urine Slightly Cloudy     Glucose Urine Negative NEG mg/dL    Bilirubin Urine (A) NEG     Small  This is an unconfirmed screening test result. A positive result may be false.      Ketones Urine Trace (A) NEG mg/dL    Specific Gravity Urine >1.030 1.003 - 1.035    Blood Urine Negative NEG    pH Urine 5.0 5.0 - 7.0 pH    Protein Albumin Urine Negative NEG mg/dL    Urobilinogen Urine 1.0 0.2 - 1.0 EU/dL    Nitrite Urine Negative NEG    Leukocyte Esterase Urine Small (A) NEG    Source Midstream Urine    Urine Microscopic   Result Value Ref Range    WBC Urine 5-10 (A) 0 - 2 /HPF    RBC Urine O - 2 0 - 2 /HPF    Squamous Epithelial /LPF Urine Few FEW /LPF    Bacteria Urine Few (A) NEG /HPF    Calcium Oxalate Moderate (A) NEG /HPF    Mucous Urine Present (A) NEG /LPF   Urine Culture Aerobic Bacterial   Result Value Ref Range    Specimen Description Midstream Urine     Culture Micro       50,000 to 100,000 colonies/mL mixed urogenital richardson Susceptibility testing not   routinely done      Micro Report Status FINAL 04/29/2017         ASSESSMENT/PLAN:                                                    1. Slow transit constipation  Will conintue therapy with miralax  - polyethylene glycol (MIRALAX) powder; Take 17 g (1 capful) by mouth 2 times daily  Dispense: 510 g; Refill: 11    2. Moderate recurrent major depression (H)  Well controlled, continue current therapy  - citalopram (CELEXA) 20 MG tablet; Take 2 tablets (40 mg) by mouth daily  Dispense: 180 tablet; Refill: 3    3. Suprapubic pain  Checking for UTI today, awaiting culture  - *UA reflex to  Microscopic and Culture ([place] and [facility] (except [place] and [place])  - Urine Microscopic  - Urine Culture Aerobic Bacterial    4. Moderate chronic obstructive pulmonary disease (H)  Will cover with steroid burst now, will also start diuretic therapy, stress test ok in 2016 but may consider repeat if having ongoing symptoms  - predniSONE (DELTASONE) 20 MG tablet; Take 2 tablets (40 mg) by mouth daily for 5 days  Dispense: 10 tablet; Refill: 0    5. Need for pneumococcal vaccine  - Pneumococcal vaccine 13 valent PCV13 IM (Prevnar) [11710]  - ADMIN: Vaccine, Initial (14555)      Patient Instructions   1) Restart the furosemide, take this every day    2) Start the steroid- 40 mg per day for 5 days    3) Use the pro-air inhaler as well every 4-6 hours to help with breathing as well    4) We will check a urine today to rule out infection    5) Pneumonia shot today    Let me know if breathing getting worse. If it gets really bad- go to the ER    MD Ayaz Alfaro MD, MD  [facility] [place]  Screening Questionnaire for Adult Immunization    Are you sick today?   No   Do you have allergies to medications, food, a vaccine component or latex?   No   Have you ever had a serious reaction after receiving a vaccination?   No   Do you have a long-term health problem with heart disease, lung disease, asthma, kidney disease, metabolic disease (e.g. diabetes), anemia, or other blood disorder?   No   Do you have cancer, leukemia, HIV/AIDS, or any other immune system problem?   No   In the past 3 months, have you taken medications that affect  your immune system, such as prednisone, other steroids, or anticancer drugs; drugs for the treatment of rheumatoid arthritis, Crohn s disease, or psoriasis; or have you had radiation treatments?   No   Have you had a seizure, or a brain or other nervous system problem?   No   During the past year, have you received a transfusion of blood or blood      products, or been given immune (gamma) globulin or antiviral drug?   No   For women: Are you pregnant or is there a chance you could become        pregnant during the next month?   No   Have you received any vaccinations in the past 4 weeks?   No     Immunization questionnaire answers were all negative.      MNVFC doesn't apply on this patient       Screening performed by Adele Álvarez on 4/28/2017 at 3:36 PM.

## 2017-04-29 LAB
BACTERIA SPEC CULT: NORMAL
MICRO REPORT STATUS: NORMAL
SPECIMEN SOURCE: NORMAL

## 2017-04-29 ASSESSMENT — PATIENT HEALTH QUESTIONNAIRE - PHQ9: SUM OF ALL RESPONSES TO PHQ QUESTIONS 1-9: 7

## 2017-04-29 ASSESSMENT — ANXIETY QUESTIONNAIRES: GAD7 TOTAL SCORE: 4

## 2017-04-30 DIAGNOSIS — I10 BENIGN ESSENTIAL HYPERTENSION: ICD-10-CM

## 2017-05-01 ENCOUNTER — AMBULATORY - HEALTHEAST (OUTPATIENT)
Dept: ONCOLOGY | Facility: CLINIC | Age: 82
End: 2017-05-01

## 2017-05-01 ENCOUNTER — TELEPHONE (OUTPATIENT)
Dept: PHARMACY | Facility: CLINIC | Age: 82
End: 2017-05-01

## 2017-05-01 ENCOUNTER — COMMUNICATION - HEALTHEAST (OUTPATIENT)
Dept: ONCOLOGY | Facility: CLINIC | Age: 82
End: 2017-05-01

## 2017-05-01 RX ORDER — METOPROLOL SUCCINATE 25 MG/1
TABLET, EXTENDED RELEASE ORAL
Qty: 45 TABLET | Refills: 3 | Status: ON HOLD | OUTPATIENT
Start: 2017-05-01 | End: 2017-12-24

## 2017-05-01 NOTE — TELEPHONE ENCOUNTER
METOPROLOL 25MG      Last Written Prescription Date: 11/15/2016  Last Fill Quantity: 45, # refills: 1    Last Office Visit with G, [facility] or [facility] prescribing provider:  4/28/2017   Future Office Visit:        BP Readings from Last 3 Encounters:   04/28/17 126/70   02/08/17 122/64   10/21/16 145/69

## 2017-05-01 NOTE — TELEPHONE ENCOUNTER
Prescription approved per [facility] Refill Protocol.  Angelica Solo, RN  Triage Nurse

## 2017-05-01 NOTE — TELEPHONE ENCOUNTER
This patient is due for MTM follow-up. I called the patient to schedule an appointment.     She is interested in scheduling an appointment, but is unsure what day she can get a ride. Provided with clinic number to call and schedule an appointment when she knows what day will work for her.     Prudence Auguste, PharmD  Medication Therapy Management Resident  Pager: [phone]

## 2017-05-08 ENCOUNTER — TRANSFERRED RECORDS (OUTPATIENT)
Dept: HEALTH INFORMATION MANAGEMENT | Facility: CLINIC | Age: 82
End: 2017-05-08

## 2017-07-05 ENCOUNTER — TELEPHONE (OUTPATIENT)
Dept: PHARMACY | Facility: CLINIC | Age: 82
End: 2017-07-05

## 2017-07-05 NOTE — PATIENT INSTRUCTIONS
We have attempted to contact this patient three times to set up a MTM follow up appointment and were unsuccessful. Contact attempts were made via phone, mychart, letter. We will no longer continue to contact this patient to schedule a visit at this time. Please refer back to MTM if you believe this patient would continue to benefit from our services.     Thank you!    Bonnie Gillespie, PharmD [facility]  Medication Therapy Management Practitioner   #[phone]

## 2017-09-18 ENCOUNTER — TELEPHONE (OUTPATIENT)
Dept: PEDIATRICS | Facility: CLINIC | Age: 82
End: 2017-09-18

## 2017-09-19 ENCOUNTER — TRANSFERRED RECORDS (OUTPATIENT)
Dept: HEALTH INFORMATION MANAGEMENT | Facility: CLINIC | Age: 82
End: 2017-09-19

## 2017-09-29 DIAGNOSIS — I10 BENIGN ESSENTIAL HYPERTENSION: ICD-10-CM

## 2017-09-30 NOTE — TELEPHONE ENCOUNTER
losartan (COZAAR) 100 MG tablet      Last Written Prescription Date: 12/29/2016  Last Fill Quantity: 90, # refills: 2  Last Office Visit with G, P or [facility] prescribing provider: 4/28/2017  Next 5 appointments (look out 90 days)     Oct 11, 2017 10:50 AM CDT   SHORT with Ayaz Fernandez MD   [facility]    [address]  [address]  [place] [address]   [phone]            Oct 11, 2017 11:00 AM CDT   [facility]    [address]  [address]  [place] [address] [phone]                   Potassium   Date Value Ref Range Status   02/08/2017 4.3 3.4 - 5.3 mmol/L Final     Creatinine   Date Value Ref Range Status   02/08/2017 0.65 0.52 - 1.04 mg/dL Final     BP Readings from Last 3 Encounters:   04/28/17 126/70   02/08/17 122/64   10/21/16 145/69

## 2017-10-02 RX ORDER — LOSARTAN POTASSIUM 100 MG/1
100 TABLET ORAL DAILY
Qty: 90 TABLET | Refills: 1 | Status: ON HOLD | OUTPATIENT
Start: 2017-10-02 | End: 2017-12-24

## 2017-10-10 ENCOUNTER — DOCUMENTATION ONLY (OUTPATIENT)
Dept: PEDIATRICS | Facility: CLINIC | Age: 82
End: 2017-10-10

## 2017-10-10 DIAGNOSIS — Z71.89 ADVANCED DIRECTIVES, COUNSELING/DISCUSSION: Primary | ICD-10-CM

## 2017-10-11 ENCOUNTER — OFFICE VISIT (OUTPATIENT)
Dept: PEDIATRICS | Facility: CLINIC | Age: 82
End: 2017-10-11
Payer: COMMERCIAL

## 2017-10-11 ENCOUNTER — OFFICE VISIT (OUTPATIENT)
Dept: PHARMACY | Facility: CLINIC | Age: 82
End: 2017-10-11
Payer: COMMERCIAL

## 2017-10-11 VITALS
OXYGEN SATURATION: 92 % | HEART RATE: 62 BPM | TEMPERATURE: 98.1 F | SYSTOLIC BLOOD PRESSURE: 110 MMHG | WEIGHT: 233 LBS | DIASTOLIC BLOOD PRESSURE: 62 MMHG | BODY MASS INDEX: 45.75 KG/M2 | HEIGHT: 60 IN

## 2017-10-11 DIAGNOSIS — R42 VERTIGO: ICD-10-CM

## 2017-10-11 DIAGNOSIS — F33.1 MODERATE RECURRENT MAJOR DEPRESSION (H): ICD-10-CM

## 2017-10-11 DIAGNOSIS — G47.00 INSOMNIA, UNSPECIFIED TYPE: ICD-10-CM

## 2017-10-11 DIAGNOSIS — K21.9 GASTROESOPHAGEAL REFLUX DISEASE, ESOPHAGITIS PRESENCE NOT SPECIFIED: ICD-10-CM

## 2017-10-11 DIAGNOSIS — I10 BENIGN ESSENTIAL HYPERTENSION: ICD-10-CM

## 2017-10-11 DIAGNOSIS — J30.89 CHRONIC NONSEASONAL ALLERGIC RHINITIS DUE TO OTHER ALLERGEN: ICD-10-CM

## 2017-10-11 DIAGNOSIS — R60.0 LOWER EXTREMITY EDEMA: ICD-10-CM

## 2017-10-11 DIAGNOSIS — R21 RASH: ICD-10-CM

## 2017-10-11 DIAGNOSIS — J44.9 MODERATE CHRONIC OBSTRUCTIVE PULMONARY DISEASE (H): Primary | ICD-10-CM

## 2017-10-11 DIAGNOSIS — K59.00 CONSTIPATION, UNSPECIFIED CONSTIPATION TYPE: ICD-10-CM

## 2017-10-11 DIAGNOSIS — Z23 NEED FOR PROPHYLACTIC VACCINATION AND INOCULATION AGAINST INFLUENZA: ICD-10-CM

## 2017-10-11 DIAGNOSIS — Z12.31 ENCOUNTER FOR SCREENING MAMMOGRAM FOR BREAST CANCER: ICD-10-CM

## 2017-10-11 DIAGNOSIS — K59.01 SLOW TRANSIT CONSTIPATION: ICD-10-CM

## 2017-10-11 DIAGNOSIS — D50.8 OTHER IRON DEFICIENCY ANEMIA: ICD-10-CM

## 2017-10-11 DIAGNOSIS — M48.062 SPINAL STENOSIS OF LUMBAR REGION WITH NEUROGENIC CLAUDICATION: ICD-10-CM

## 2017-10-11 DIAGNOSIS — R52 GENERALIZED PAIN: ICD-10-CM

## 2017-10-11 DIAGNOSIS — K44.9 HIATAL HERNIA: ICD-10-CM

## 2017-10-11 DIAGNOSIS — Z23 ENCOUNTER FOR IMMUNIZATION: ICD-10-CM

## 2017-10-11 DIAGNOSIS — M85.80 OSTEOPENIA, UNSPECIFIED LOCATION: ICD-10-CM

## 2017-10-11 DIAGNOSIS — B36.9 FUNGAL INFECTION OF SKIN: ICD-10-CM

## 2017-10-11 LAB
ERYTHROCYTE [DISTWIDTH] IN BLOOD BY AUTOMATED COUNT: 13.7 % (ref 10–15)
HCT VFR BLD AUTO: 41.8 % (ref 35–47)
HGB BLD-MCNC: 13.3 G/DL (ref 11.7–15.7)
MCH RBC QN AUTO: 31.3 PG (ref 26.5–33)
MCHC RBC AUTO-ENTMCNC: 31.8 G/DL (ref 31.5–36.5)
MCV RBC AUTO: 98 FL (ref 78–100)
PLATELET # BLD AUTO: 283 10E9/L (ref 150–450)
RBC # BLD AUTO: 4.25 10E12/L (ref 3.8–5.2)
WBC # BLD AUTO: 8.2 10E9/L (ref 4–11)

## 2017-10-11 PROCEDURE — G0008 ADMIN INFLUENZA VIRUS VAC: HCPCS | Performed by: INTERNAL MEDICINE

## 2017-10-11 PROCEDURE — 90662 IIV NO PRSV INCREASED AG IM: CPT | Performed by: INTERNAL MEDICINE

## 2017-10-11 PROCEDURE — 99607 MTMS BY PHARM ADDL 15 MIN: CPT | Performed by: PHARMACIST

## 2017-10-11 PROCEDURE — 85027 COMPLETE CBC AUTOMATED: CPT | Performed by: INTERNAL MEDICINE

## 2017-10-11 PROCEDURE — 80053 COMPREHEN METABOLIC PANEL: CPT | Performed by: INTERNAL MEDICINE

## 2017-10-11 PROCEDURE — 36415 COLL VENOUS BLD VENIPUNCTURE: CPT | Performed by: INTERNAL MEDICINE

## 2017-10-11 PROCEDURE — 99214 OFFICE O/P EST MOD 30 MIN: CPT | Mod: 25 | Performed by: INTERNAL MEDICINE

## 2017-10-11 PROCEDURE — 99605 MTMS BY PHARM NP 15 MIN: CPT | Performed by: PHARMACIST

## 2017-10-11 PROCEDURE — 99207 C PAF COMPLETED  NO CHARGE: CPT | Performed by: INTERNAL MEDICINE

## 2017-10-11 RX ORDER — POLYETHYLENE GLYCOL 3350 17 G/17G
1 POWDER, FOR SOLUTION ORAL 2 TIMES DAILY
Qty: 1020 G | Refills: 11 | Status: SHIPPED | OUTPATIENT
Start: 2017-10-11 | End: 2017-10-11

## 2017-10-11 RX ORDER — POLYETHYLENE GLYCOL 3350 17 G/17G
1 POWDER, FOR SOLUTION ORAL DAILY
Qty: 1020 G | Refills: 11 | Status: ON HOLD
Start: 2017-10-11 | End: 2017-12-24

## 2017-10-11 RX ORDER — NYSTATIN 100000 [USP'U]/G
POWDER TOPICAL 3 TIMES DAILY PRN
Qty: 60 G | Refills: 1 | Status: SHIPPED | OUTPATIENT
Start: 2017-10-11 | End: 2019-11-25

## 2017-10-11 RX ORDER — NAPROXEN SODIUM 220 MG
220 TABLET ORAL AT BEDTIME
Qty: 60 TABLET | Status: ON HOLD
Start: 2017-10-11 | End: 2017-12-24

## 2017-10-11 RX ORDER — LIDOCAINE 40 MG/G
CREAM TOPICAL
COMMUNITY
End: 2019-11-25

## 2017-10-11 RX ORDER — BUDESONIDE AND FORMOTEROL FUMARATE DIHYDRATE 160; 4.5 UG/1; UG/1
2 AEROSOL RESPIRATORY (INHALATION) 2 TIMES DAILY
COMMUNITY
End: 2022-05-31

## 2017-10-11 RX ORDER — BENZOCAINE/MENTHOL 6 MG-10 MG
LOZENGE MUCOUS MEMBRANE DAILY PRN
COMMUNITY
End: 2017-10-11

## 2017-10-11 ASSESSMENT — ANXIETY QUESTIONNAIRES
7. FEELING AFRAID AS IF SOMETHING AWFUL MIGHT HAPPEN: NOT AT ALL
3. WORRYING TOO MUCH ABOUT DIFFERENT THINGS: NOT AT ALL
1. FEELING NERVOUS, ANXIOUS, OR ON EDGE: NOT AT ALL
2. NOT BEING ABLE TO STOP OR CONTROL WORRYING: NOT AT ALL
IF YOU CHECKED OFF ANY PROBLEMS ON THIS QUESTIONNAIRE, HOW DIFFICULT HAVE THESE PROBLEMS MADE IT FOR YOU TO DO YOUR WORK, TAKE CARE OF THINGS AT HOME, OR GET ALONG WITH OTHER PEOPLE: NOT DIFFICULT AT ALL
5. BEING SO RESTLESS THAT IT IS HARD TO SIT STILL: SEVERAL DAYS
GAD7 TOTAL SCORE: 2
6. BECOMING EASILY ANNOYED OR IRRITABLE: SEVERAL DAYS

## 2017-10-11 ASSESSMENT — PATIENT HEALTH QUESTIONNAIRE - PHQ9
SUM OF ALL RESPONSES TO PHQ QUESTIONS 1-9: 7
5. POOR APPETITE OR OVEREATING: NOT AT ALL

## 2017-10-11 NOTE — PATIENT INSTRUCTIONS
Recommendations from today's MTM visit:                                                    MTM (medication therapy management) is a service provided by a clinical pharmacist designed to help you get the most of out of your medicines.     Check labs today    Rash:  Stop hydrocortisone cream and start nystatin powder    Pain:  Stop the Aleve PM.  Start Aleve/naproxen 220mg 1 tablet at night.  Continue Aspercreme lidocaine cream at night.    Sleep:  Can try the melatonin 3-6mg 1 hour before bedtime.  You can buy this over the counter    Constipation:  Decrease the Miralax/polytheylene glycol to 1 capful every day.    Mammogram-schedule for this    Billing for today service:  [facility] may send you a denial of benefits for this service today. This visit was covered today, but charges were submitted to [facility] in order for [employer] to then write them off.  You will not be charged for this visit.        Next MTM/pharmacist visit: 6 months See Dr. Fernandez in 6 months    To schedule another MTM appointment, please call the clinic directly or you may call the MTM scheduling line at [phone] or toll-free at [phone].     My Clinical Pharmacist's contact information:                                                      It was a pleasure seeing you today!  Please feel free to contact me with any questions or concerns you have.      Bonnie Gillespie, PharmD [facility]  Medication Therapy Management Practitioner   #[phone]    Daphne Simpson PharmD  Pharmaceutical Care Resident   Pager: [phone]    You may receive a survey about the MTM services you received.  I would appreciate your feedback to help me serve you better in the future. Please fill it out and return it when you can. Your comments will be anonymous.      And Dr. Fernandez      High-Fiber Diet  Fiber is in fruits, vegetables, cereals, and grains. Fiber passes through your body undigested. A high-fiber diet helps food move through your intestinal tract.  The added bulk is helpful in preventing constipation. In people with diverticulosis, fiber helps clean out the pouches along the colon wall. It also prevents new pouches from forming. A high-fiber diet reduces the risk of colon cancer. It also lowers blood cholesterol and prevents high blood sugar in people with diabetes.    The fiber-rich foods listed below should be part of your diet. If you are not used to high-fiber foods, start with 1 or 2 foods from this list. Every 3 to 4 days add a new one to your diet. Do this until you are eating 4 high-fiber foods per day. This should give you 20 to 35 grams of fiber a day. It is also important to drink a lot of water when you are on this diet. You should have 6 to 8 glasses of water a day. Water makes the fiber swell and increases the benefit.  Foods high in dietary fiber  The following foods are high in dietary fiber:    Breads. Breads made with 100% whole-wheat flour; devon, wheat, or rye crackers; whole-grain tortillas, bran muffins.    Cereals. Whole-grain and bran cereals with bran (shredded wheat, wheat flakes, raisin bran, corn bran); oatmeal, rolled oats, granola, and brown rice.    Fruits. Fresh fruits and their edible skins (pears, prunes, raisins, berries, apples, and apricots); bananas, citrus fruit, mangoes, pineapple; and prune juice.    Nuts. Any nuts and seeds.    Vegetables. Best served raw or lightly cooked. All types, especially: green peas, celery, eggplant, potatoes, spinach, broccoli, [place] sprouts, winter squash, carrots, cauliflower, soybeans, lentils, and fresh and dried beans of all kinds.    Other. Popcorn, any spices.  Date Last Reviewed: 8/1/2016 2000-2017 [employer]. [address] [place] [address], [place] [address]. All rights reserved. This information is not intended as a substitute for professional medical care. Always follow your healthcare professional's instructions.

## 2017-10-11 NOTE — PROGRESS NOTES
SUBJECTIVE/OBJECTIVE:                           Tatyana Metcalf is a 85 year old female coming in for an initial visit for Medication Therapy Management.  She was referred to me from Dr. Fernandez and seen as co-visit.     Chief Complaint: Rash and curious about which of her medications can cause constipation. Wants to ask Dr. Fernandez about a mammogram in future.     Personal Healthcare Goals: weight component, working on weight loss eating much heallthier and eliminating junk food, patient says she has lost 14 lbs - which is great!    Allergies/ADRs: Reviewed in Epic  Tobacco: No tobacco use  Alcohol: Less than 1 beverages / week  Activity: Uses a rolling walker to get around, really likes the stability it provides.   PMH: Reviewed in Epic    Medication Adherence: no issues reported Take medications at night. Skilled nursing helps to set up medications.    COPD: Current medications include Symbicort 160/4.5 2 puffs BID, Proair respiclik with activity and albuterol nebulizer as needed (Pt stated Pulmonologist wants her using 3x/day, but she generally uses 2x/day. The patient rinses out her mouth after using the steroid.  Uses her albuterol nebulizer QID at 8AM, 12PM, 4PM and 8PM. During the summer she found the humidity was causing breathing problems, but doing better now.  Saw Pulmonologist Dr. Mancini on 9/19/17 and at that visit patient's pulmonary function test was better than the previous encounter. Increase in symptoms and fatigued with grocery shopping, now gets [place] delivered. Has previously completed pulmonary rehab and continues to do the exercises she learned, which has helped her breathing a lot. Due for COPD Action Plan, did not get from Pulmonologist.     Hypertension: Current medications include metoprolol ER 12.5mg daily and losartan 100 mg daily.  Patient does self-monitor BP.  Patient reports current medication side effects: vertigo in the past, not using meclizine. Nurse comes to house and getting  same BP as she got today in clinic.    Edema:  Furosemide 20mg 2-3 times a week.  She has urinary frequency when she takes it and therefore does not take when has appointments or leaves the house. She is finding her edema under control when she fuses furosemide.   Potassium   Date Value Ref Range Status   02/08/2017 4.3 3.4 - 5.3 mmol/L Final     Vertigo: Current medications include meclizine 25mg as needed. She has not been using lately, takes every once in awhile.     Allergies: Current medications include cetirizine 10 mg in the evening, Azelastine 1-2 sprays in both nostrils BID. Primary triggers are dust mites. She has a lot of nasal symptoms - runny nose. Pt feels that current therapy is effective. She feels symptoms are controlled.     GERD/Hiatal Hernia: Current medications include: Prevacid (lansoprazole) 30 mg in AM. Patient feels that current regimen is effective. When she has stopped previously she has had symptom recurrence.      Depression:  Current medications include: Citalopram 40mg once daily. Pt reports that depression symptoms are stable. PHQ9 Score of 7 today. No side effects. October 2016 EKG  QTc 447 (patient was on 40 mg dose at that time).  PHQ-9 SCORE 9/29/2016 4/28/2017 10/11/2017   Total Score - - -   Total Score 9 7 7     KYRIE-7 SCORE 10/6/2016 4/28/2017 10/11/2017   Total Score 3 (minimal anxiety) - -   Total Score - 4 2     Osteopenia with hx of fracture: Current therapy includes:  Vitamin D 1000 units QD and alendronate (Fosamax) 70mg weekly (Pt has been on current therapy for 2.5 years). She reports taking the alendronate one empty stomach, with full glass of water, and remaining upright for one hour after taking it. Pt reports no side effects.  Pt is getting the following sources of dietary calcium: 2-3 glasses of milk, yogurt, cheese, cottage cheese every day  Last vitamin D level:         Lab Results   Component Value Date     VITDT 28* 02/09/2015     VITDT 16* 10/03/2014     DEXA  History: 6/22/15  FINDINGS from 6/22/15: Lumbar Spine (L1 and L4) T-score: 0.8, marked degenerative changes present, most marked at L2,3, so only L1 and 4 are evaluated.   Left Femoral Neck T-score: -1.6  Right Femoral Neck T-score: -1.6  Lumbar (L1 and L4) BMD: 1.283  Total Hip Mean BMD: 0.824   IMPRESSION: Osteopenia., Degenerative changes of the lumbar spine which may falsely elevate results.    Risk factors: post-menopausal; recent fracture ribs; chronic PPI use     Pain/Insomnia: Current medications include Aleve PM 1 tablet almost nightly and Aspercream at night. Back pain on right side.  Tylenol was not effective in the past. She started taking Aleve PM because she had issues sleeping and was having pain from spinal stenosis mostly at night. Off trazodone. She has not tried melatonin.      Immunizations: Influenza vaccine administered this morning before our visit.    Constipation:  Current medication is Miralax 17 grams BID.  Reports normal BM--2-3 times a day, but loose stool.  Having to go to the pharmacy every 2 weeks to get this filled which is inconvenient. In the past has had several issues with constipation such as stomach pain.     Anemia: Current medication is ferrous sulfate 325mg QD.  Recently moved to AM administration. She states that she so far has not had issues remembering to take in AM even though most other medications are taken in evening. CBC from today WNL.   Hemoglobin   Date Value Ref Range Status   10/11/2017 13.3 11.7 - 15.7 g/dL Final     Rash: Current medication is hydrocortisone cream as needed.  In the folds underneath her breast and at panty lining. She finds the rash to be very red and hot, not itchy. She uses the hydrocortisone cream, found OTC, which helps for a short period of time but then returns.     Current labs include:  BP Readings from Last 3 Encounters:   10/11/17 110/62   04/28/17 126/70   02/08/17 122/64     Today's Vitals: /62 (BP Location: Right arm, Cuff  Size: Adult Large)  Pulse 62  Temp 98.1  F (36.7  C) (Oral)  Ht 5' (1.524 m)  Wt 233 lb (105.7 kg)  SpO2 92%  BMI 45.5 kg/m2  No results found for: A1C.  Lab Results   Component Value Date    CHOL 110 03/05/2015     Lab Results   Component Value Date    TRIG 81 03/05/2015     Lab Results   Component Value Date    HDL 38 03/05/2015     Lab Results   Component Value Date    LDL 56 03/05/2015     Liver Function Studies -   Recent Labs   Lab Test  02/08/17   1157   PROTTOTAL  7.2   ALBUMIN  4.0   BILITOTAL  0.3   ALKPHOS  88   AST  14   ALT  16     Last Basic Metabolic Panel:  Lab Results   Component Value Date     02/08/2017      Lab Results   Component Value Date    POTASSIUM 4.3 02/08/2017     Lab Results   Component Value Date    CHLORIDE 107 02/08/2017     Lab Results   Component Value Date    BUN 18 02/08/2017     Lab Results   Component Value Date    CR 0.65 02/08/2017     GFR Estimate   Date Value Ref Range Status   02/08/2017 87 >60 mL/min/1.7m2 Final     Comment:     Non  GFR Calc   09/28/2016 >90  Non  GFR Calc   >60 mL/min/1.7m2 Final   03/18/2016 69 ml/min/1.73m2 Final     TSH   Date Value Ref Range Status   09/28/2016 2.44 0.40 - 4.00 mU/L Final     Most Recent Immunizations   Administered Date(s) Administered     Influenza (High Dose) 3 valent vaccine 09/28/2016     Pneumococcal (PCV 13) 04/28/2017     Pneumococcal 23 valent 03/03/2016     TD (ADULT, 7+) 10/31/2012     Zoster vaccine, live 07/03/2015     ASSESSMENT:                             Current medications were reviewed today.     Medication Adherence: no issues identified    COPD: Stable. Patient would benefit from no changes at this time to medications. Completed COPD Action Plan.    Hypertension: Stable. Patient is meeting BP goal of < 140/90mmHg.     Edema: Stable.    Vertigo: Stable     Allergies: Stable.     GERD/Hiatal Hernia: Stable.  Current treatment is effective.    Depression:  Stable.    Osteopenia with hx of fracture: Stable. Pt is meeting RDI of calcium 1200mg/day. Pt is meeting RDI of Vitamin D 1000 IU/day. Last DEXA was >2 years ago, could discuss repeat in future.      Pain/Insomnia: Stable. Although pain/sleep is being managed effectively with AlevePM (Naproxen + Diphenhydramine), due to pt's age and diphenhydramine on [employer], would suggest discontinuation of AlevePM. Would recommend melatonin as an alternative to help with sleep. Since AlevePM was doing a sufficient job alleviating pain at night, would recommend solely OTC Aleve to take nightly as needed. Pt to check CMP today to verify Scr WNL.     Immunizations: Stable, Immunizations up-to-date.       Constipation: Needs improvement, due to increased loose stools, would recommend decreasing Miralax to 17 grams daily.     Anemia: Stable.    Rash: Needs improvement. Pt would benefit from stopping hydrocortisone and trialing Nystatin powder to alleviate rash underneath breast and panty line.      PLAN:                            Checking labs (CBC and CMP) today per Dr. Fernandez.     Rash:  Pt to stop hydrocortisone cream and start nystatin powder per Dr. Mack recommendation    Pain:  Pt to stop the Aleve PM, start Aleve 220mg 1 tablet at night as needed for pain, and continue Aspercreme lidocaine cream at night.    Insomnia:  Pt to trial melatonin 3-6mg 1 hour before bedtime, starting at 3 mg nightly.    Constipation:  Pt to decrease Miralax to 1 capful every day to alleviate loose stools.     COPD:  COPD action plan completed and given to patient.    Next MTM/pharmacist visit: 6 months See Dr. Fernandez in 6 months    I spent 60 minutes with this patient today. All changes were made via verbal approval with Ayaz Fernandez. A copy of the visit note was provided to the patient's primary care provider.    The patient was given a summary of these recommendations as an after visit summary.     Bonnie Gillespie, PharmD BCPS  Medication  Therapy Management Practitioner   #[phone]    Daphne Simpson, PharmD  Pharmaceutical Care Resident   Pager: [phone]

## 2017-10-11 NOTE — LETTER
My COPD Action Plan     Name: Tatyana Metcalf    YOB: 1932   Date: 10/11/2017    My doctor: Ayaz Fernandez MD, MD   My clinic: [address]  [address]  [facility] [address] [phone]  My Controller Medicine: Formoterol/Budesonide (Symbicort)   Dose: 2 puffs twice daily     My Rescue Medicine: Albuterol (Proair/Ventolin/Proventil) inhaler and nebulizer   Dose: 1 neb or 2 puffs four times a day     My COPD Severity: Moderate = FeV1 < 79% -50%      Use of Oxygen: Oxygen Not Prescribed      Make sure you've had your pneumonia   vaccines.          GREEN ZONE       Doing well today      Usual level of activity and exercise    Usual amount of cough and mucus    No shortness of breath    Usual level of health (thinking clearly, sleeping well, feel like eating) Actions:      Take daily medicines    Use oxygen as prescribed    Follow regular exercise and diet plan    Avoid cigarette smoke and other irritants that harm the lungs           YELLOW ZONE          Having a bad day or flare up      Short of breath more than usual    A lot more sputum (mucus) than usual    Sputum looks yellow, green, tan, brown or bloody    More coughing or wheezing    Fever or chills    Less energy; trouble completing activities    Trouble thinking or focusing    Using quick relief inhaler or nebulizer more often    Poor sleep; symptoms wake me up    Do not feel like eating Actions:      Get plenty of rest    Take daily medicines    Use quick relief inhaler every 4 hours    If you use oxygen, call you doctor to see if you should adjust your oxygen    Do breathing exercises or other things to help you relax    Let a loved one, friend or neighbor know you are feeling worse    Call your care team if you have 2 or more symptoms.  Start taking steroids or antibiotics if directed by your care team           RED ZONE       Need medical care now      Severe shortness of breath (feel you  can't breathe)    Fever, chills    Not enough breath to do any activity    Trouble coughing up mucus, walking or talking    Blood in mucus    Frequent coughing   Rescue medicines are not working    Not able to sleep because of breathing    Feel confused or drowsy    Chest pain    Actions:      Call your health care team.  If you cannot reach your care team, call 911 or go to the emergency room.        Electronically signed by: Bonnie Gillespie, October 11, 2017  Annual Reminders:  Meet with Care Team, Flu Shot every Fall  [id number] [place] - [address] AT [facility]

## 2017-10-11 NOTE — NURSING NOTE
Chief Complaint   Patient presents with     Medication Therapy Management     RECHECK       Initial /62 (BP Location: Right arm, Cuff Size: Adult Large)  Pulse 62  Temp 98.1  F (36.7  C) (Oral)  Ht 5' (1.524 m)  Wt 233 lb (105.7 kg)  SpO2 92%  BMI 45.5 kg/m2 Estimated body mass index is 45.5 kg/(m^2) as calculated from the following:    Height as of this encounter: 5' (1.524 m).    Weight as of this encounter: 233 lb (105.7 kg).  Medication Reconciliation: complete   Marilin Hyde MA

## 2017-10-11 NOTE — MR AVS SNAPSHOT
After Visit Summary   10/11/2017    Tatyana Metcalf    MRN: [id number]           Patient Information     Date Of Birth          3/19/1932        Visit Information        Provider Department      10/11/2017 10:50 AM Ayaz Fernandez MD [facility]        Today's Diagnoses     Other iron deficiency anemia    -  1    Need for prophylactic vaccination and inoculation against influenza        Slow transit constipation        Spinal stenosis of lumbar region with neurogenic claudication        Fungal infection of skin        Benign essential hypertension          Care Instructions    Recommendations from today's MTM visit:                                                    MTM (medication therapy management) is a service provided by a clinical pharmacist designed to help you get the most of out of your medicines.     Check labs today    Rash:  Stop hydrocortisone cream and start nystatin powder    Pain:  Stop the Aleve PM.  Start Aleve/naproxen 220mg 1 tablet at night.  Continue Aspercreme lidocaine cream at night.    Sleep:  Can try the melatonin 3-6mg 1 hour before bedtime.  You can buy this over the counter    Constipation:  Decrease the Miralax/polytheylene glycol to 1 capful every day.    Mammogram-schedule for this    Billing for today service:  [facility] may send you a denial of benefits for this service today. This visit was covered today, but charges were submitted to [facility] in order for [facility] to then write them off.  You will not be charged for this visit.        Next MTM/pharmacist visit: 6 months See Dr. Fernandez in 6 months    To schedule another MTM appointment, please call the clinic directly or you may call the MTM scheduling line at [phone] or toll-free at [phone].     My Clinical Pharmacist's contact information:                                                      It was a pleasure seeing you today!  Please feel free to contact me with any questions or  concerns you have.      Bonnie Gillespie, PharmD BCPS  Medication Therapy Management Practitioner   #[phone]    Daphne Simpson, Zak  Pharmaceutical Care Resident   Pager: [phone]    You may receive a survey about the [facility] services you received.  I would appreciate your feedback to help me serve you better in the future. Please fill it out and return it when you can. Your comments will be anonymous.      And Dr. Fernandez      High-Fiber Diet  Fiber is in fruits, vegetables, cereals, and grains. Fiber passes through your body undigested. A high-fiber diet helps food move through your intestinal tract. The added bulk is helpful in preventing constipation. In people with diverticulosis, fiber helps clean out the pouches along the colon wall. It also prevents new pouches from forming. A high-fiber diet reduces the risk of colon cancer. It also lowers blood cholesterol and prevents high blood sugar in people with diabetes.    The fiber-rich foods listed below should be part of your diet. If you are not used to high-fiber foods, start with 1 or 2 foods from this list. Every 3 to 4 days add a new one to your diet. Do this until you are eating 4 high-fiber foods per day. This should give you 20 to 35 grams of fiber a day. It is also important to drink a lot of water when you are on this diet. You should have 6 to 8 glasses of water a day. Water makes the fiber swell and increases the benefit.  Foods high in dietary fiber  The following foods are high in dietary fiber:    Breads. Breads made with 100% whole-wheat flour; devon, wheat, or rye crackers; whole-grain tortillas, bran muffins.    Cereals. Whole-grain and bran cereals with bran (shredded wheat, wheat flakes, raisin bran, corn bran); oatmeal, rolled oats, granola, and brown rice.    Fruits. Fresh fruits and their edible skins (pears, prunes, raisins, berries, apples, and apricots); bananas, citrus fruit, mangoes, pineapple; and prune juice.    Nuts. Any  nuts and seeds.    Vegetables. Best served raw or lightly cooked. All types, especially: green peas, celery, eggplant, potatoes, spinach, broccoli, [place] sprouts, winter squash, carrots, cauliflower, soybeans, lentils, and fresh and dried beans of all kinds.    Other. Popcorn, any spices.  Date Last Reviewed: 8/1/2016 2000-2017 [employer]. [address] [place] [address]. All rights reserved. This information is not intended as a substitute for professional medical care. Always follow your healthcare professional's instructions.                Follow-ups after your visit        Who to contact     If you have questions or need follow up information about today's clinic visit or your schedule please contact [facility] directly at [phone].  Normal or non-critical lab and imaging results will be communicated to you by MyChart, letter or phone within 4 business days after the clinic has received the results. If you do not hear from us within 7 days, please contact the clinic through [email or web address] or phone. If you have a critical or abnormal lab result, we will notify you by phone as soon as possible.  Submit refill requests through [employer] or call your pharmacy and they will forward the refill request to us. Please allow 3 business days for your refill to be completed.          Additional Information About Your Visit        MyChart Information     [employer] gives you secure access to your electronic health record. If you see a primary care provider, you can also send messages to your care team and make appointments. If you have questions, please call your primary care clinic.  If you do not have a primary care provider, please call [phone] and they will assist you.        Care EveryWhere ID     This is your Care EveryWhere ID. This could be used by other organizations to access your [place] medical records  [id number]        Your Vitals Were     Pulse Temperature Height  Pulse Oximetry BMI (Body Mass Index)       62 98.1  F (36.7  C) (Oral) 5' (1.524 m) 92% 45.5 kg/m2        Blood Pressure from Last 3 Encounters:   10/11/17 110/62   04/28/17 126/70   02/08/17 122/64    Weight from Last 3 Encounters:   10/11/17 233 lb (105.7 kg)   04/28/17 239 lb (108.4 kg)   02/08/17 232 lb (105.2 kg)              We Performed the Following     CBC with platelets     Comprehensive metabolic panel     DEPRESSION ACTION PLAN (DAP)     FLU VACCINE, INCREASED ANTIGEN, PRESV FREE, AGE 65+ [40488]     Vaccine Administration, Initial [28566]          Today's Medication Changes          These changes are accurate as of: 10/11/17 11:52 AM.  If you have any questions, ask your nurse or doctor.               Start taking these medicines.        Dose/Directions    naproxen sodium 220 MG tablet   Commonly known as:  ANAPROX   Used for:  Spinal stenosis of lumbar region with neurogenic claudication   Started by:  Ayaz Fernandez MD        Dose:  220 mg   Take 1 tablet (220 mg) by mouth At Bedtime   Quantity:  60 tablet   Refills:  0       nystatin 610631 UNIT/GM Powd   Commonly known as:  MYCOSTATIN   Used for:  Fungal infection of skin   Started by:  Ayaz Fernandez MD        Apply topically 3 times daily as needed   Quantity:  60 g   Refills:  1       polyethylene glycol powder   Commonly known as:  MIRALAX   Used for:  Slow transit constipation   Started by:  Ayaz Fernandez MD        Dose:  1 capful   Take 17 g (1 capful) by mouth daily   Quantity:  1020 g   Refills:  11         These medicines have changed or have updated prescriptions.        Dose/Directions    cholecalciferol 1000 UNIT tablet   Commonly known as:  vitamin D   This may have changed:  when to take this   Used for:  Unspecified vitamin D deficiency        Dose:  1000 Units   Take 1 tablet (1,000 Units) by mouth daily   Quantity:  100 tablet   Refills:  3         Stop taking these medicines if you haven't already. Please contact  your care team if you have questions.     ALEVE -25 MG Tabs   Generic drug:  Naproxen Sod-Diphenhydramine   Stopped by:  Ayaz Fernandez MD                Where to get your medicines      These medications were sent to [facility] [address] - [address] AT [facility] [address]  [address] [place] [address]     Phone:  [phone]     nystatin 983473 UNIT/GM Powd         Some of these will need a paper prescription and others can be bought over the counter.  Ask your nurse if you have questions.     You don't need a prescription for these medications     naproxen sodium 220 MG tablet    polyethylene glycol powder                Primary Care Provider Office Phone # Fax #    Ayaz Fernandez [phone]       [address] [place] [address]        Goals        General    Functional (pt-stated)     Notes - Note edited  1/27/2015 12:43 PM by Erica Bryant RN    Will have a one time in home fall risk assessment done to help in preventing further fall.     Fall risk assessment done. Home care now in home and providing services.         Equal Access to Services     [facility] AH: Hadii alvina alexander hadasho Sostanley, waaxda luqadaha, qaybta kaalmada salvatore, elisa cuevas . So [facility] [phone].    ATENCIÓN: Si habla español, tiene a malhotra disposición servicios gratuitos de asistencia lingüística. [facility] [phone].    We comply with applicable federal civil rights laws and [place] laws. We do not discriminate on the basis of race, color, national origin, age, disability, sex, sexual orientation, or gender identity.            Thank you!     Thank you for choosing [facility]  for your care. Our goal is always to provide you with excellent care. Hearing back from our patients is one way we can continue to improve our services. Please take a few minutes to complete the written survey that you may receive in  the mail after your visit with us. Thank you!             Your Updated Medication List - Protect others around you: Learn how to safely use, store and throw away your medicines at www.disposemymeds.org.          This list is accurate as of: 10/11/17 11:52 AM.  Always use your most recent med list.                   Brand Name Dispense Instructions for use Diagnosis    * albuterol 1.25 MG/3ML nebulizer solution    ACCUNEB    30 vial    Take 1 vial (1.25 mg) by nebulization every 4 hours as needed for shortness of breath / dyspnea or wheezing    Bronchiectasis with acute exacerbation (H)       * PROAIR RESPICLICK 108 (90 BASE) MCG/ACT Aepb   Generic drug:  Albuterol Sulfate      INL 2 PUFFS PO Q 4 TO 6 H PRN        alendronate 70 MG tablet    FOSAMAX    12 tablet    Take 1 tablet (70 mg) by mouth every 7 days Take with over 8 ounces water and stay upright for at least 30 minutes after dose.  Take at least 60 minutes before breakfast    Osteoporosis       ASPERCREME W/LIDOCAINE 4 % Crea cream   Generic drug:  lidocaine      Apply topically once as needed for mild pain        azelastine 0.1 % spray    ASTELIN    3 Bottle    Spray 1-2 sprays into both nostrils 2 times daily    Middle ear effusion, left, Chronic rhinitis       budesonide-formoterol 160-4.5 MCG/ACT Inhaler    SYMBICORT     Inhale 2 puffs into the lungs 2 times daily        cetirizine 10 MG tablet    zyrTEC    30 tablet    Take 1 tablet (10 mg) by mouth every evening        cholecalciferol 1000 UNIT tablet    vitamin D    100 tablet    Take 1 tablet (1,000 Units) by mouth daily    Unspecified vitamin D deficiency       citalopram 20 MG tablet    celeXA    180 tablet    Take 2 tablets (40 mg) by mouth daily    Moderate recurrent major depression (H)       ferrous sulfate 325 (65 FE) MG tablet    IRON     Take 325 mg by mouth daily (with breakfast)        furosemide 20 MG tablet    LASIX    30 tablet    Take 1 tablet (20 mg) by mouth daily as needed     Bilateral lower extremity edema       hydrocortisone 1 % cream    CORTAID     Apply topically daily as needed        losartan 100 MG tablet    COZAAR    90 tablet    Take 1 tablet (100 mg) by mouth daily    Benign essential hypertension       MECLIZINE HCL PO      Take 25 mg by mouth 2 times daily as needed for dizziness        metoprolol 25 MG 24 hr tablet    TOPROL-XL    45 tablet    TAKE 1/2 TABLET(12.5 MG) BY MOUTH DAILY    Benign essential hypertension       naproxen sodium 220 MG tablet    ANAPROX    60 tablet    Take 1 tablet (220 mg) by mouth At Bedtime    Spinal stenosis of lumbar region with neurogenic claudication       nystatin 199390 UNIT/GM Powd    MYCOSTATIN    60 g    Apply topically 3 times daily as needed    Fungal infection of skin       polyethylene glycol powder    MIRALAX    1020 g    Take 17 g (1 capful) by mouth daily    Slow transit constipation       PREVACID PO      Take 30 mg by mouth every evening        * Notice:  This list has 2 medication(s) that are the same as other medications prescribed for you. Read the directions carefully, and ask your doctor or other care provider to review them with you.

## 2017-10-11 NOTE — LETTER
My Depression Action Plan  Name: Tatyana Metcalf   Date of Birth 3/19/1932  Date: 10/11/2017    My doctor: Ayaz Fernandez   My clinic: [address]  [address]  [facility] [address] [phone]          GREEN    ZONE   Good Control    What it looks like:     Things are going generally well. You have normal up s and down s. You may even feel depressed from time to time, but bad moods usually last less than a day.   What you need to do:  1. Continue to care for yourself (see self care plan)  2. Check your depression survival kit and update it as needed  3. Follow your physician s recommendations including any medication.  4. Do not stop taking medication unless you consult with your physician first.           YELLOW         ZONE Getting Worse    What it looks like:     Depression is starting to interfere with your life.     It may be hard to get out of bed; you may be starting to isolate yourself from others.    Symptoms of depression are starting to last most all day and this has happened for several days.     You may have suicidal thoughts but they are not constant.   What you need to do:     1. Call your care team, your response to treatment will improve if you keep your care team informed of your progress. Yellow periods are signs an adjustment may need to be made.     2. Continue your self-care, even if you have to fake it!    3. Talk to someone in your support network    4. Open up your depression survival kit           RED    ZONE Medical Alert - Get Help    What it looks like:     Depression is seriously interfering with your life.     You may experience these or other symptoms: You can t get out of bed most days, can t work or engage in other necessary activities, you have trouble taking care of basic hygiene, or basic responsibilities, thoughts of suicide or death that will not go away, self-injurious behavior.     What you need to do:  1. Call  your care team and request a same-day appointment. If they are not available (weekends or after hours) call your local crisis line, emergency room or 911.      Electronically signed by: Marilin Hyde, October 11, 2017    Depression Self Care Plan / Survival Kit    Self-Care for Depression  Here s the deal. Your body and mind are really not as separate as most people think.  What you do and think affects how you feel and how you feel influences what you do and think. This means if you do things that people who feel good do, it will help you feel better.  Sometimes this is all it takes.  There is also a place for medication and therapy depending on how severe your depression is, so be sure to consult with your medical provider and/ or Behavioral Health Consultant if your symptoms are worsening or not improving.     In order to better manage my stress, I will:    Exercise  Get some form of exercise, every day. This will help reduce pain and release endorphins, the  feel good  chemicals in your brain. This is almost as good as taking antidepressants!  This is not the same as joining a gym and then never going! (they count on that by the way ) It can be as simple as just going for a walk or doing some gardening, anything that will get you moving.      Hygiene   Maintain good hygiene (Get out of bed in the morning, Make your bed, Brush your teeth, Take a shower, and Get dressed like you were going to work, even if you are unemployed).  If your clothes don't fit try to get ones that do.    Diet  I will strive to eat foods that are good for me, drink plenty of water, and avoid excessive sugar, caffeine, alcohol, and other mood-altering substances.  Some foods that are helpful in depression are: complex carbohydrates, B vitamins, flaxseed, fish or fish oil, fresh fruits and vegetables.    Psychotherapy  I agree to participate in Individual Therapy (if recommended).    Medication  If prescribed medications, I agree to  take them.  Missing doses can result in serious side effects.  I understand that drinking alcohol, or other illicit drug use, may cause potential side effects.  I will not stop my medication abruptly without first discussing it with my provider.    Staying Connected With Others  I will stay in touch with my friends, family members, and my primary care provider/team.    Use your imagination  Be creative.  We all have a creative side; it doesn t matter if it s oil painting, sand castles, or mud pies! This will also kick up the endorphins.    Witness Beauty  (AKA stop and smell the roses) Take a look outside, even in mid-winter. Notice colors, textures. Watch the squirrels and birds.     Service to others  Be of service to others.  There is always someone else in need.  By helping others we can  get out of ourselves  and remember the really important things.  This also provides opportunities for practicing all the other parts of the program.    Humor  Laugh and be silly!  Adjust your TV habits for less news and crime-drama and more comedy.    Control your stress  Try breathing deep, massage therapy, biofeedback, and meditation. Find time to relax each day.     My support system    Clinic Contact:  Phone number:    Contact 1:  Phone number:    Contact 2:  Phone number:    Shinto/:  Phone number:    Therapist:  Phone number:    Local crisis center:    Phone number:    Other community support:  Phone number:

## 2017-10-11 NOTE — PROGRESS NOTES
SUBJECTIVE:   Tatyana Metcalf is a 85 year old female who presents to clinic today for the following health issues:      MTM Co-Visit     Abdominal pain: much improved after using miralax to help with stools. She is having some looser stools and has been using miralax twice per day. No nausea or vomiting. Feels better.    Mood: has been going well. No concerns. QTc ok.   PHQ-9 SCORE 9/29/2016 4/28/2017 10/11/2017   Total Score - - -   Total Score 9 7 7     KYRIE-7 SCORE 10/6/2016 4/28/2017 10/11/2017   Total Score 3 (minimal anxiety) - -   Total Score - 4 2       HCM: patient would like to have a mammogram and understands what this means for if area found, feels that this is still beneficial.     Other health concerns reviewed and documented in MTM note.      Problem list and histories reviewed & adjusted, as indicated.  Additional history: as documented    Patient Active Problem List   Diagnosis     Secondary malignant neoplasm of lung (H)     Benign essential hypertension     Vestibular neuronitis of both ears     Spinal stenosis of lumbar region with neurogenic claudication     Hiatal hernia     Esophageal reflux     Obstructive sleep apnea syndrome     Moderate recurrent major depression (H)     Chronic rhinitis     Iron deficiency anemia     Dermatographism     Bronchiectasis (H)     Vitamin D deficiency     Rib pain     Rib fractures     Endometrial carcinoma (H)     Advanced directives, counseling/discussion     Osteoporosis     Neuropathy     SNHL (sensorineural hearing loss)     Respiratory failure (H)     Lung cancer (H)     Moderate chronic obstructive pulmonary disease (H)     Hypoxia     Past Surgical History:   Procedure Laterality Date     APPENDECTOMY  1952     HYSTERECTOMY TOTAL ABDOMINAL, BILATERAL SALPINGO-OOPHORECTOMY, COMBINED  2000       Social History   Substance Use Topics     Smoking status: Never Smoker     Smokeless tobacco: Never Used     Alcohol use 0.0 oz/week     0 Standard drinks or  equivalent per week      Comment: holidays     Family History   Problem Relation Age of Onset     HEART DISEASE Mother      HEART DISEASE Father              Reviewed and updated as needed this visit by clinical staffTobacco  Allergies  Meds  Med Hx  Surg Hx  Fam Hx  Soc Hx      Reviewed and updated as needed this visit by Provider         ROS:  Constitutional, HEENT, cardiovascular, pulmonary, GI, , musculoskeletal, neuro, skin, endocrine and psych systems are negative, except as otherwise noted.      OBJECTIVE:   /62 (BP Location: Right arm, Cuff Size: Adult Large)  Pulse 62  Temp 98.1  F (36.7  C) (Oral)  Ht 5' (1.524 m)  Wt 233 lb (105.7 kg)  SpO2 92%  BMI 45.5 kg/m2  Body mass index is 45.5 kg/(m^2).  GENERAL: healthy, alert and no distress  NECK: no adenopathy, no asymmetry, masses, or scars and thyroid normal to palpation  RESP: lungs clear to auscultation - no rales, rhonchi or wheezes  CV: regular rate and rhythm, normal S1 S2, no S3 or S4, no murmur, click or rub, no peripheral edema and peripheral pulses strong  ABDOMEN: soft, nontender, no hepatosplenomegaly, no masses and bowel sounds normal  MS: walking with walker  SKIN: noted pink satellite papules under bilateral breasts  PSYCH: mentation appears normal, affect normal/bright    Diagnostic Test Results:  Results for orders placed or performed in visit on 10/11/17   CBC with platelets   Result Value Ref Range    WBC 8.2 4.0 - 11.0 10e9/L    RBC Count 4.25 3.8 - 5.2 10e12/L    Hemoglobin 13.3 11.7 - 15.7 g/dL    Hematocrit 41.8 35.0 - 47.0 %    MCV 98 78 - 100 fl    MCH 31.3 26.5 - 33.0 pg    MCHC 31.8 31.5 - 36.5 g/dL    RDW 13.7 10.0 - 15.0 %    Platelet Count 283 150 - 450 10e9/L       ASSESSMENT/PLAN:     1. BMI 45.0-49.9, adult (H)  Patient has been having improvement in weight, has been cutting back on diet, doing better since off of steroids for lung issues    2. Slow transit constipation  Will decrease to daily can increase  to BID if worsening again  - polyethylene glycol (MIRALAX) powder; Take 17 g (1 capful) by mouth daily  Dispense: 1020 g; Refill: 11    3. Spinal stenosis of lumbar region with neurogenic claudication  Will continue with naproxen once per day, continue on PPI  - naproxen sodium (ANAPROX) 220 MG tablet; Take 1 tablet (220 mg) by mouth At Bedtime  Dispense: 60 tablet  - Comprehensive metabolic panel  - CBC with platelets    4. Fungal infection of skin  Use nystatin for treatment  - nystatin (MYCOSTATIN) 326222 UNIT/GM POWD; Apply topically 3 times daily as needed  Dispense: 60 g; Refill: 1    5. Benign essential hypertension  Well controlled  - CBC with platelets    6. Other iron deficiency anemia  Due for repeat labs  - CBC with platelets    7. Encounter for screening mammogram for breast cancer  Ordered and discussed risks and benefits  - *MA Screening Digital Bilateral; Future    8. Need for prophylactic vaccination and inoculation against influenza  - FLU VACCINE, INCREASED ANTIGEN, PRESV FREE, AGE 65+ [32074]  - Vaccine Administration, Initial [17145]    9. Depression: stable, continue current therapy.    Ayaz Fernandez MD, MD  [facility]  Injectable Influenza Immunization Documentation    1.  Is the person to be vaccinated sick today?   No    2. Does the person to be vaccinated have an allergy to a component   of the vaccine?   No    3. Has the person to be vaccinated ever had a serious reaction   to influenza vaccine in the past?   No    4. Has the person to be vaccinated ever had Guillain-Barré syndrome?   No    Form completed by Marilin Hyde MA

## 2017-10-11 NOTE — PROGRESS NOTES
SUBJECTIVE/OBJECTIVE:                           Tatyana Metcalf is a 85 year old female coming in for an initial visit for Medication Therapy Management.  She was referred to me from Dr. Fernandez and seen as co-visit.     Chief Complaint: Rash and curious about which of her medications can cause constipation. Wants to ask Dr. Fernandez about a mammogram in future.     Personal Healthcare Goals: weight component, working on weight loss eating much heallthier and eliminating junk food, patient says she has lost 14 lbs - which is great!    Allergies/ADRs: Reviewed in Epic  Tobacco: No tobacco use  Alcohol: Less than 1 beverages / week  Activity: Uses a rolling walker to get around, really likes the stability it provides.   PMH: Reviewed in Epic    Medication Adherence: no issues reported Take medications at night. Skilled nursing helps to set up medications.    COPD: Current medications include Symbicort 160/4.5 2 puffs BID, Proair respiclik with activity and albuterol nebulizer as needed (Pt stated Pulmonologist wants her using 3x/day, but she generally uses 2x/day. The patient rinses out her mouth after using the steroid.  Uses her albuterol nebulizer QID at 8AM, 12PM, 4PM and 8PM. During the summer she found the humidity was causing breathing problems, but doing better now.  Saw Pulmonologist Dr. Mancini on 9/19/17 and at that visit patient's pulmonary function test was better than the previous encounter. Increase in symptoms and fatigued with grocery shopping, now gets [place] delivered. Has previously completed pulmonary rehab and continues to do the exercises she learned, which has helped her breathing a lot. Due for COPD Action Plan, did not get from Pulmonologist.     Hypertension: Current medications include metoprolol ER 12.5mg daily and losartan 100 mg daily.  Patient does self-monitor BP.  Patient reports current medication side effects: vertigo in the past, not using meclizine. Nurse comes to house and getting  same BP as she got today in clinic.    Edema:  Furosemide 20mg 2-3 times a week.  She has urinary frequency when she takes it and therefore does not take when has appointments or leaves the house. She is finding her edema under control when she fuses furosemide.   Potassium   Date Value Ref Range Status   02/08/2017 4.3 3.4 - 5.3 mmol/L Final     Vertigo: Current medications include meclizine 25mg as needed. She has not been using lately, takes every once in awhile.     Allergies: Current medications include cetirizine 10 mg in the evening, Azelastine 1-2 sprays in both nostrils BID. Primary triggers are dust mites. She has a lot of nasal symptoms - runny nose. Pt feels that current therapy is effective. She feels symptoms are controlled.     GERD/Hiatal Hernia: Current medications include: Prevacid (lansoprazole) 30 mg in AM. Patient feels that current regimen is effective. When she has stopped previously she has had symptom recurrence.      Depression:  Current medications include: Citalopram 40mg once daily. Pt reports that depression symptoms are stable. PHQ9 Score of 7 today. No side effects. October 2016 EKG  QTc 447 (patient was on 40 mg dose at that time).  PHQ-9 SCORE 9/29/2016 4/28/2017 10/11/2017   Total Score - - -   Total Score 9 7 7     KYRIE-7 SCORE 10/6/2016 4/28/2017 10/11/2017   Total Score 3 (minimal anxiety) - -   Total Score - 4 2     Osteopenia with hx of fracture: Current therapy includes:  Vitamin D 1000 units QD and alendronate (Fosamax) 70mg weekly (Pt has been on current therapy for 2.5 years). She reports taking the alendronate one empty stomach, with full glass of water, and remaining upright for one hour after taking it. Pt reports no side effects.  Pt is getting the following sources of dietary calcium: 2-3 glasses of milk, yogurt, cheese, cottage cheese every day  Last vitamin D level:         Lab Results   Component Value Date     VITDT 28* 02/09/2015     VITDT 16* 10/03/2014     DEXA  History: 6/22/15  FINDINGS from 6/22/15: Lumbar Spine (L1 and L4) T-score: 0.8, marked degenerative changes present, most marked at L2,3, so only L1 and 4 are evaluated.   Left Femoral Neck T-score: -1.6  Right Femoral Neck T-score: -1.6  Lumbar (L1 and L4) BMD: 1.283  Total Hip Mean BMD: 0.824   IMPRESSION: Osteopenia., Degenerative changes of the lumbar spine which may falsely elevate results.    Risk factors: post-menopausal; recent fracture ribs; chronic PPI use     Pain/Insomnia: Current medications include Aleve PM 1 tablet almost nightly and Aspercream at night. Back pain on right side.  Tylenol was not effective in the past. She started taking Aleve PM because she had issues sleeping and was having pain from spinal stenosis mostly at night. Off trazodone. She has not tried melatonin.      Immunizations: Influenza vaccine administered this morning before our visit.    Constipation:  Current medication is Miralax 17 grams BID.  Reports normal BM--2-3 times a day, but loose stool.  Having to go to the pharmacy every 2 weeks to get this filled which is inconvenient. In the past has had several issues with constipation such as stomach pain.     Anemia: Current medication is ferrous sulfate 325mg QD.  Recently moved to AM administration. She states that she so far has not had issues remembering to take in AM even though most other medications are taken in evening. CBC from today WNL.   Hemoglobin   Date Value Ref Range Status   10/11/2017 13.3 11.7 - 15.7 g/dL Final     Rash: Current medication is hydrocortisone cream as needed.  In the folds underneath her breast and at panty lining. She finds the rash to be very red and hot, not itchy. She uses the hydrocortisone cream, found OTC, which helps for a short period of time but then returns.     Current labs include:  BP Readings from Last 3 Encounters:   10/11/17 110/62   04/28/17 126/70   02/08/17 122/64     Today's Vitals: /62 (BP Location: Right arm, Cuff  Size: Adult Large)  Pulse 62  Temp 98.1  F (36.7  C) (Oral)  Ht 5' (1.524 m)  Wt 233 lb (105.7 kg)  SpO2 92%  BMI 45.5 kg/m2  No results found for: A1C.  Lab Results   Component Value Date    CHOL 110 03/05/2015     Lab Results   Component Value Date    TRIG 81 03/05/2015     Lab Results   Component Value Date    HDL 38 03/05/2015     Lab Results   Component Value Date    LDL 56 03/05/2015     Liver Function Studies -   Recent Labs   Lab Test  02/08/17   1157   PROTTOTAL  7.2   ALBUMIN  4.0   BILITOTAL  0.3   ALKPHOS  88   AST  14   ALT  16     Last Basic Metabolic Panel:  Lab Results   Component Value Date     02/08/2017      Lab Results   Component Value Date    POTASSIUM 4.3 02/08/2017     Lab Results   Component Value Date    CHLORIDE 107 02/08/2017     Lab Results   Component Value Date    BUN 18 02/08/2017     Lab Results   Component Value Date    CR 0.65 02/08/2017     GFR Estimate   Date Value Ref Range Status   02/08/2017 87 >60 mL/min/1.7m2 Final     Comment:     Non  GFR Calc   09/28/2016 >90  Non  GFR Calc   >60 mL/min/1.7m2 Final   03/18/2016 69 ml/min/1.73m2 Final     TSH   Date Value Ref Range Status   09/28/2016 2.44 0.40 - 4.00 mU/L Final     Most Recent Immunizations   Administered Date(s) Administered     Influenza (High Dose) 3 valent vaccine 09/28/2016     Pneumococcal (PCV 13) 04/28/2017     Pneumococcal 23 valent 03/03/2016     TD (ADULT, 7+) 10/31/2012     Zoster vaccine, live 07/03/2015     ASSESSMENT:                             Current medications were reviewed today.     Medication Adherence: no issues identified    COPD: Stable. Patient would benefit from no changes at this time to medications. Completed COPD Action Plan.    Hypertension: Stable. Patient is meeting BP goal of < 140/90mmHg.     Edema: Stable.    Vertigo: Stable     Allergies: Stable.     GERD/Hiatal Hernia: Stable.  Current treatment is effective.    Depression:  Stable.    Osteopenia with hx of fracture: Stable. Pt is meeting RDI of calcium 1200mg/day. Pt is meeting RDI of Vitamin D 1000 IU/day. Last DEXA was >2 years ago, could discuss repeat in future.      Pain/Insomnia: Stable. Although pain/sleep is being managed effectively with AlevePM (Naproxen + Diphenhydramine), due to pt's age and diphenhydramine on [place] [place], would suggest discontinuation of AlevePM. Would recommend melatonin as an alternative to help with sleep. Since AlevePM was doing a sufficient job alleviating pain at night, would recommend solely OTC Aleve to take nightly as needed. Pt to check CMP today to verify Scr WNL.     Immunizations: Stable, Immunizations up-to-date.       Constipation: Needs improvement, due to increased loose stools, would recommend decreasing Miralax to 17 grams daily.     Anemia: Stable.    Rash: Needs improvement. Pt would benefit from stopping hydrocortisone and trialing Nystatin powder to alleviate rash underneath breast and panty line.      PLAN:                            Checking labs (CBC and CMP) today per Dr. Fernandez.     Rash:  Pt to stop hydrocortisone cream and start nystatin powder per Dr. Fernandez recommendation    Pain:  Pt to stop the Aleve PM, start Aleve 220mg 1 tablet at night as needed for pain, and continue Aspercreme lidocaine cream at night.    Insomnia:  Pt to trial melatonin 3-6mg 1 hour before bedtime, starting at 3 mg nightly.    Constipation:  Pt to decrease Miralax to 1 capful every day to alleviate loose stools.     COPD:  COPD action plan completed and given to patient.    Next MTM/pharmacist visit: 6 months See Dr. Fernandez in 6 months    I spent 60 minutes with this patient today. All changes were made via verbal approval with Ayaz Fernandez. A copy of the visit note was provided to the patient's primary care provider.    The patient was given a summary of these recommendations as an after visit summary.     Bonnie Gillespie, PharmD BCPS  Medication  Therapy Management Practitioner   #[phone]    Daphne Simpson, PharmD  Pharmaceutical Care Resident   Pager: [phone]

## 2017-10-11 NOTE — MR AVS SNAPSHOT
After Visit Summary   10/11/2017    Tatyana Metcalf    MRN: [id number]           Patient Information     Date Of Birth          3/19/1932        Visit Information        Provider Department      10/11/2017 11:00 AM Bonnie Gillespie [facility]        Today's Diagnoses     Moderate chronic obstructive pulmonary disease (H)    -  1    Benign essential hypertension        Lower extremity edema        Vertigo        Chronic nonseasonal allergic rhinitis due to other allergen        Gastroesophageal reflux disease, esophagitis presence not specified        Hiatal hernia        Moderate recurrent major depression (H)        Osteopenia, unspecified location        Generalized pain        Insomnia, unspecified type        Encounter for immunization        Constipation, unspecified constipation type        Other iron deficiency anemia        Rash          Care Instructions    Recommendations from today's MTM visit:                                                    MTM (medication therapy management) is a service provided by a clinical pharmacist designed to help you get the most of out of your medicines.     Check labs today    Rash:  Stop hydrocortisone cream and start nystatin powder    Pain:  Stop the Aleve PM.  Start Aleve/naproxen 220mg 1 tablet at night.  Continue Aspercreme lidocaine cream at night.    Sleep:  Can try the melatonin 3-6mg 1 hour before bedtime.  You can buy this over the counter    Constipation:  Decrease the Miralax/polytheylene glycol to 1 capful every day.    Mammogram-schedule for this    Billing for today service:  [facility] may send you a denial of benefits for this service today. This visit was covered today, but charges were submitted to [facility] in order for [facility] to then write them off.  You will not be charged for this visit.        Next MTM/pharmacist visit: 6 months See Dr. Fernandez in 6 months    To schedule another MTM appointment, please  call the clinic directly or you may call the [facility] scheduling line at [phone] or toll-free at [phone].     My Clinical Pharmacist's contact information:                                                      It was a pleasure seeing you today!  Please feel free to contact me with any questions or concerns you have.      Bonnie Gillespie, PharmD BCPS  Medication Therapy Management Practitioner   #[phone]    Daphne Simpson PharmD  Pharmaceutical Care Resident   Pager: [phone]    You may receive a survey about the [facility] services you received.  I would appreciate your feedback to help me serve you better in the future. Please fill it out and return it when you can. Your comments will be anonymous.      And Dr. Fernandez      High-Fiber Diet  Fiber is in fruits, vegetables, cereals, and grains. Fiber passes through your body undigested. A high-fiber diet helps food move through your intestinal tract. The added bulk is helpful in preventing constipation. In people with diverticulosis, fiber helps clean out the pouches along the colon wall. It also prevents new pouches from forming. A high-fiber diet reduces the risk of colon cancer. It also lowers blood cholesterol and prevents high blood sugar in people with diabetes.    The fiber-rich foods listed below should be part of your diet. If you are not used to high-fiber foods, start with 1 or 2 foods from this list. Every 3 to 4 days add a new one to your diet. Do this until you are eating 4 high-fiber foods per day. This should give you 20 to 35 grams of fiber a day. It is also important to drink a lot of water when you are on this diet. You should have 6 to 8 glasses of water a day. Water makes the fiber swell and increases the benefit.  Foods high in dietary fiber  The following foods are high in dietary fiber:    Breads. Breads made with 100% whole-wheat flour; devon, wheat, or rye crackers; whole-grain tortillas, bran muffins.    Cereals. Whole-grain and  bran cereals with bran (shredded wheat, wheat flakes, raisin bran, corn bran); oatmeal, rolled oats, granola, and brown rice.    Fruits. Fresh fruits and their edible skins (pears, prunes, raisins, berries, apples, and apricots); bananas, citrus fruit, mangoes, pineapple; and prune juice.    Nuts. Any nuts and seeds.    Vegetables. Best served raw or lightly cooked. All types, especially: green peas, celery, eggplant, potatoes, spinach, broccoli, [place] sprouts, winter squash, carrots, cauliflower, soybeans, lentils, and fresh and dried beans of all kinds.    Other. Popcorn, any spices.  Date Last Reviewed: 8/1/2016 2000-2017 [employer]. [address] [place] [address]. All rights reserved. This information is not intended as a substitute for professional medical care. Always follow your healthcare professional's instructions.                  Follow-ups after your visit        Your next 10 appointments already scheduled     Oct 12, 2017  1:45 PM CDT   MAMMOGRAM with EAMA1   [facility] ([facility]    [address] ,[address]  [place] [address] [phone]           Do not wear any body powder, lotions, deodorant or perfume the day of the exam. Bring a list of all medications, especially hormones.  If your last mammogram was not done at [place], please bring your mammogram films. We will need the name of your MD/PA to send a copy of your report.              Future tests that were ordered for you today     Open Future Orders        Priority Expected Expires Ordered    *MA Screening Digital Bilateral Routine  10/11/2018 10/11/2017            Who to contact     If you have questions or need follow up information about today's clinic visit or your schedule please contact [facility] directly at [phone].  Normal or non-critical lab and imaging results will be communicated to you by MyChart, letter or phone within 4 business  days after the clinic has received the results. If you do not hear from us within 7 days, please contact the clinic through [employer] or phone. If you have a critical or abnormal lab result, we will notify you by phone as soon as possible.  Submit refill requests through [employer] or call your pharmacy and they will forward the refill request to us. Please allow 3 business days for your refill to be completed.          Additional Information About Your Visit        [employer] Information     [employer] gives you secure access to your electronic health record. If you see a primary care provider, you can also send messages to your care team and make appointments. If you have questions, please call your primary care clinic.  If you do not have a primary care provider, please call [phone] and they will assist you.        Care EveryWhere ID     This is your Care EveryWhere ID. This could be used by other organizations to access your [place] medical records  [id number]         Blood Pressure from Last 3 Encounters:   10/11/17 110/62   04/28/17 126/70   02/08/17 122/64    Weight from Last 3 Encounters:   10/11/17 233 lb (105.7 kg)   04/28/17 239 lb (108.4 kg)   02/08/17 232 lb (105.2 kg)              Today, you had the following     No orders found for display         Today's Medication Changes          These changes are accurate as of: 10/11/17  2:25 PM.  If you have any questions, ask your nurse or doctor.               Start taking these medicines.        Dose/Directions    naproxen sodium 220 MG tablet   Commonly known as:  ANAPROX   Used for:  Spinal stenosis of lumbar region with neurogenic claudication   Started by:  Ayaz Fernandez MD        Dose:  220 mg   Take 1 tablet (220 mg) by mouth At Bedtime   Quantity:  60 tablet   Refills:  0       nystatin 311489 UNIT/GM Powd   Commonly known as:  MYCOSTATIN   Used for:  Fungal infection of skin   Started by:  Ayaz Fernandez MD        Apply topically 3 times daily  as needed   Quantity:  60 g   Refills:  1       polyethylene glycol powder   Commonly known as:  MIRALAX   Used for:  Slow transit constipation   Started by:  Ayaz Fernandez MD        Dose:  1 capful   Take 17 g (1 capful) by mouth daily   Quantity:  1020 g   Refills:  11         These medicines have changed or have updated prescriptions.        Dose/Directions    cholecalciferol 1000 UNIT tablet   Commonly known as:  vitamin D   This may have changed:  when to take this   Used for:  Unspecified vitamin D deficiency        Dose:  1000 Units   Take 1 tablet (1,000 Units) by mouth daily   Quantity:  100 tablet   Refills:  3         Stop taking these medicines if you haven't already. Please contact your care team if you have questions.     ALEVE -25 MG Tabs   Generic drug:  Naproxen Sod-Diphenhydramine   Stopped by:  Ayaz Fernandez MD                Where to get your medicines      These medications were sent to [id number]  [facility] - [address] [facility]  [address] [address]     Phone:  [phone]     nystatin 375460 UNIT/GM Powd         Some of these will need a paper prescription and others can be bought over the counter.  Ask your nurse if you have questions.     You don't need a prescription for these medications     naproxen sodium 220 MG tablet    polyethylene glycol powder                Primary Care Provider Office Phone # Fax #    Ayaz Fernandez [phone] [address] [place] [id number]        Goals        General    Functional (pt-stated)     Notes - Note edited  1/27/2015 12:43 PM by Erica Bryant, RN    Will have a one time in home fall risk assessment done to help in preventing further fall.     Fall risk assessment done. Home care now in home and providing services.         Equal Access to Services     [facility]: robert Gutierrez qaybta kaalmada  elisa chasechristine branchaayesica ah. [facility] [place] [phone].    ATENCIÓN: Si roseyla enrico, tiene a malhotra disposición servicios gratuitos de asistencia lingüística. Dinesh al [phone].    We comply with applicable federal civil rights laws and [place] laws. We do not discriminate on the basis of race, color, national origin, age, disability, sex, sexual orientation, or gender identity.            Thank you!     Thank you for choosing [facility]  for your care. Our goal is always to provide you with excellent care. Hearing back from our patients is one way we can continue to improve our services. Please take a few minutes to complete the written survey that you may receive in the mail after your visit with us. Thank you!             Your Updated Medication List - Protect others around you: Learn how to safely use, store and throw away your medicines at www.disposemymeds.org.          This list is accurate as of: 10/11/17  2:25 PM.  Always use your most recent med list.                   Brand Name Dispense Instructions for use Diagnosis    * albuterol 1.25 MG/3ML nebulizer solution    ACCUNEB    30 vial    Take 1 vial (1.25 mg) by nebulization every 4 hours as needed for shortness of breath / dyspnea or wheezing    Bronchiectasis with acute exacerbation (H)       * PROAIR RESPICLICK 108 (90 BASE) MCG/ACT Aepb   Generic drug:  Albuterol Sulfate      INL 2 PUFFS PO Q 4 TO 6 H PRN        alendronate 70 MG tablet    FOSAMAX    12 tablet    Take 1 tablet (70 mg) by mouth every 7 days Take with over 8 ounces water and stay upright for at least 30 minutes after dose.  Take at least 60 minutes before breakfast    Osteoporosis       ASPERCREME W/LIDOCAINE 4 % Crea cream   Generic drug:  lidocaine      Apply topically once as needed for mild pain        azelastine 0.1 % spray    ASTELIN    3 Bottle    Spray 1-2 sprays into both nostrils 2 times daily    Middle ear effusion, left, Chronic  rhinitis       budesonide-formoterol 160-4.5 MCG/ACT Inhaler    SYMBICORT     Inhale 2 puffs into the lungs 2 times daily        cetirizine 10 MG tablet    zyrTEC    30 tablet    Take 1 tablet (10 mg) by mouth every evening        cholecalciferol 1000 UNIT tablet    vitamin D    100 tablet    Take 1 tablet (1,000 Units) by mouth daily    Unspecified vitamin D deficiency       citalopram 20 MG tablet    celeXA    180 tablet    Take 2 tablets (40 mg) by mouth daily    Moderate recurrent major depression (H)       ferrous sulfate 325 (65 FE) MG tablet    IRON     Take 325 mg by mouth daily (with breakfast)        furosemide 20 MG tablet    LASIX    30 tablet    Take 1 tablet (20 mg) by mouth daily as needed    Bilateral lower extremity edema       losartan 100 MG tablet    COZAAR    90 tablet    Take 1 tablet (100 mg) by mouth daily    Benign essential hypertension       MECLIZINE HCL PO      Take 25 mg by mouth 2 times daily as needed for dizziness        metoprolol 25 MG 24 hr tablet    TOPROL-XL    45 tablet    TAKE 1/2 TABLET(12.5 MG) BY MOUTH DAILY    Benign essential hypertension       naproxen sodium 220 MG tablet    ANAPROX    60 tablet    Take 1 tablet (220 mg) by mouth At Bedtime    Spinal stenosis of lumbar region with neurogenic claudication       nystatin [id number] UNIT/GM Powd    MYCOSTATIN    60 g    Apply topically 3 times daily as needed    Fungal infection of skin       polyethylene glycol powder    MIRALAX    1020 g    Take 17 g (1 capful) by mouth daily    Slow transit constipation       PREVACID PO      Take 30 mg by mouth every evening        * Notice:  This list has 2 medication(s) that are the same as other medications prescribed for you. Read the directions carefully, and ask your doctor or other care provider to review them with you.

## 2017-10-12 DIAGNOSIS — Z12.31 ENCOUNTER FOR SCREENING MAMMOGRAM FOR BREAST CANCER: ICD-10-CM

## 2017-10-12 LAB
ALBUMIN SERPL-MCNC: 4 G/DL (ref 3.4–5)
ALP SERPL-CCNC: 89 U/L (ref 40–150)
ALT SERPL W P-5'-P-CCNC: 17 U/L (ref 0–50)
ANION GAP SERPL CALCULATED.3IONS-SCNC: 9 MMOL/L (ref 3–14)
AST SERPL W P-5'-P-CCNC: 7 U/L (ref 0–45)
BILIRUB SERPL-MCNC: 0.3 MG/DL (ref 0.2–1.3)
BUN SERPL-MCNC: 23 MG/DL (ref 7–30)
CALCIUM SERPL-MCNC: 9.5 MG/DL (ref 8.5–10.1)
CHLORIDE SERPL-SCNC: 105 MMOL/L (ref 94–109)
CO2 SERPL-SCNC: 27 MMOL/L (ref 20–32)
CREAT SERPL-MCNC: 0.64 MG/DL (ref 0.52–1.04)
GFR SERPL CREATININE-BSD FRML MDRD: 88 ML/MIN/1.7M2
GLUCOSE SERPL-MCNC: 95 MG/DL (ref 70–99)
POTASSIUM SERPL-SCNC: 3.9 MMOL/L (ref 3.4–5.3)
PROT SERPL-MCNC: 7.4 G/DL (ref 6.8–8.8)
SODIUM SERPL-SCNC: 141 MMOL/L (ref 133–144)

## 2017-10-12 PROCEDURE — G0202 SCR MAMMO BI INCL CAD: HCPCS | Mod: TC

## 2017-10-12 ASSESSMENT — ANXIETY QUESTIONNAIRES: GAD7 TOTAL SCORE: 2

## 2017-11-15 ENCOUNTER — COMMUNICATION - HEALTHEAST (OUTPATIENT)
Dept: RADIATION ONCOLOGY | Age: 82
End: 2017-11-15

## 2017-12-05 DIAGNOSIS — Z53.9 DIAGNOSIS NOT YET DEFINED: Primary | ICD-10-CM

## 2017-12-05 PROCEDURE — G0179 MD RECERTIFICATION HHA PT: HCPCS | Performed by: PEDIATRICS

## 2017-12-24 ENCOUNTER — APPOINTMENT (OUTPATIENT)
Dept: GENERAL RADIOLOGY | Facility: CLINIC | Age: 82
DRG: 189 | End: 2017-12-24
Attending: EMERGENCY MEDICINE
Payer: COMMERCIAL

## 2017-12-24 ENCOUNTER — APPOINTMENT (OUTPATIENT)
Dept: ULTRASOUND IMAGING | Facility: CLINIC | Age: 82
DRG: 189 | End: 2017-12-24
Attending: EMERGENCY MEDICINE
Payer: COMMERCIAL

## 2017-12-24 ENCOUNTER — HOSPITAL ENCOUNTER (INPATIENT)
Facility: CLINIC | Age: 82
LOS: 2 days | Discharge: HOME HEALTH | DRG: 189 | End: 2017-12-27
Attending: EMERGENCY MEDICINE | Admitting: INTERNAL MEDICINE
Payer: COMMERCIAL

## 2017-12-24 DIAGNOSIS — R26.2 INABILITY TO AMBULATE DUE TO KNEE: ICD-10-CM

## 2017-12-24 DIAGNOSIS — M25.562 ACUTE PAIN OF LEFT KNEE: ICD-10-CM

## 2017-12-24 DIAGNOSIS — J96.01 ACUTE RESPIRATORY FAILURE WITH HYPOXIA (H): Primary | ICD-10-CM

## 2017-12-24 PROBLEM — M79.605 LEFT LEG PAIN: Status: ACTIVE | Noted: 2017-12-24

## 2017-12-24 LAB
ANION GAP SERPL CALCULATED.3IONS-SCNC: 3 MMOL/L (ref 3–14)
APTT PPP: 34 SEC (ref 22–37)
BASOPHILS # BLD AUTO: 0.1 10E9/L (ref 0–0.2)
BASOPHILS NFR BLD AUTO: 0.8 %
BUN SERPL-MCNC: 18 MG/DL (ref 7–30)
CALCIUM SERPL-MCNC: 9.3 MG/DL (ref 8.5–10.1)
CHLORIDE SERPL-SCNC: 104 MMOL/L (ref 94–109)
CO2 SERPL-SCNC: 33 MMOL/L (ref 20–32)
CREAT SERPL-MCNC: 0.63 MG/DL (ref 0.52–1.04)
DIFFERENTIAL METHOD BLD: NORMAL
EOSINOPHIL # BLD AUTO: 0.2 10E9/L (ref 0–0.7)
EOSINOPHIL NFR BLD AUTO: 3.1 %
ERYTHROCYTE [DISTWIDTH] IN BLOOD BY AUTOMATED COUNT: 13.4 % (ref 10–15)
GFR SERPL CREATININE-BSD FRML MDRD: 90 ML/MIN/1.7M2
GLUCOSE SERPL-MCNC: 103 MG/DL (ref 70–99)
HCT VFR BLD AUTO: 43.1 % (ref 35–47)
HGB BLD-MCNC: 13.8 G/DL (ref 11.7–15.7)
IMM GRANULOCYTES # BLD: 0 10E9/L (ref 0–0.4)
IMM GRANULOCYTES NFR BLD: 0.4 %
INR PPP: 1.05 (ref 0.86–1.14)
LYMPHOCYTES # BLD AUTO: 1.4 10E9/L (ref 0.8–5.3)
LYMPHOCYTES NFR BLD AUTO: 17.5 %
MCH RBC QN AUTO: 30.8 PG (ref 26.5–33)
MCHC RBC AUTO-ENTMCNC: 32 G/DL (ref 31.5–36.5)
MCV RBC AUTO: 96 FL (ref 78–100)
MONOCYTES # BLD AUTO: 0.7 10E9/L (ref 0–1.3)
MONOCYTES NFR BLD AUTO: 8.8 %
NEUTROPHILS # BLD AUTO: 5.4 10E9/L (ref 1.6–8.3)
NEUTROPHILS NFR BLD AUTO: 69.4 %
NRBC # BLD AUTO: 0 10*3/UL
NRBC BLD AUTO-RTO: 0 /100
PLATELET # BLD AUTO: 264 10E9/L (ref 150–450)
POTASSIUM SERPL-SCNC: 4.2 MMOL/L (ref 3.4–5.3)
RBC # BLD AUTO: 4.48 10E12/L (ref 3.8–5.2)
SODIUM SERPL-SCNC: 140 MMOL/L (ref 133–144)
WBC # BLD AUTO: 7.8 10E9/L (ref 4–11)

## 2017-12-24 PROCEDURE — 99285 EMERGENCY DEPT VISIT HI MDM: CPT | Mod: 25

## 2017-12-24 PROCEDURE — 99220 ZZC INITIAL OBSERVATION CARE,LEVL III: CPT | Performed by: PHYSICIAN ASSISTANT

## 2017-12-24 PROCEDURE — 96375 TX/PRO/DX INJ NEW DRUG ADDON: CPT

## 2017-12-24 PROCEDURE — 96374 THER/PROPH/DIAG INJ IV PUSH: CPT

## 2017-12-24 PROCEDURE — 25000128 H RX IP 250 OP 636: Performed by: EMERGENCY MEDICINE

## 2017-12-24 PROCEDURE — 73552 X-RAY EXAM OF FEMUR 2/>: CPT | Mod: LT

## 2017-12-24 PROCEDURE — 85730 THROMBOPLASTIN TIME PARTIAL: CPT | Performed by: EMERGENCY MEDICINE

## 2017-12-24 PROCEDURE — 12000000 ZZH R&B MED SURG/OB

## 2017-12-24 PROCEDURE — 25000132 ZZH RX MED GY IP 250 OP 250 PS 637: Performed by: PHYSICIAN ASSISTANT

## 2017-12-24 PROCEDURE — 25000132 ZZH RX MED GY IP 250 OP 250 PS 637: Performed by: HOSPITALIST

## 2017-12-24 PROCEDURE — 85025 COMPLETE CBC W/AUTO DIFF WBC: CPT | Performed by: EMERGENCY MEDICINE

## 2017-12-24 PROCEDURE — 93971 EXTREMITY STUDY: CPT | Mod: LT

## 2017-12-24 PROCEDURE — 80048 BASIC METABOLIC PNL TOTAL CA: CPT | Performed by: EMERGENCY MEDICINE

## 2017-12-24 PROCEDURE — 85610 PROTHROMBIN TIME: CPT | Performed by: EMERGENCY MEDICINE

## 2017-12-24 PROCEDURE — G0378 HOSPITAL OBSERVATION PER HR: HCPCS

## 2017-12-24 PROCEDURE — 29505 APPLICATION LONG LEG SPLINT: CPT

## 2017-12-24 PROCEDURE — 73590 X-RAY EXAM OF LOWER LEG: CPT | Mod: LT

## 2017-12-24 RX ORDER — CETIRIZINE HYDROCHLORIDE 10 MG/1
10 TABLET ORAL EVERY EVENING
Status: DISCONTINUED | OUTPATIENT
Start: 2017-12-24 | End: 2017-12-27 | Stop reason: HOSPADM

## 2017-12-24 RX ORDER — HYDROXYZINE HYDROCHLORIDE 25 MG/1
25 TABLET, FILM COATED ORAL EVERY 6 HOURS PRN
Status: DISCONTINUED | OUTPATIENT
Start: 2017-12-24 | End: 2017-12-25

## 2017-12-24 RX ORDER — IBUPROFEN 400 MG/1
400 TABLET, FILM COATED ORAL EVERY 6 HOURS
Status: DISCONTINUED | OUTPATIENT
Start: 2017-12-24 | End: 2017-12-26

## 2017-12-24 RX ORDER — AMOXICILLIN 250 MG
2 CAPSULE ORAL 2 TIMES DAILY PRN
Status: DISCONTINUED | OUTPATIENT
Start: 2017-12-24 | End: 2017-12-27 | Stop reason: HOSPADM

## 2017-12-24 RX ORDER — POLYETHYLENE GLYCOL 3350 17 G/17G
17 POWDER, FOR SOLUTION ORAL DAILY PRN
Status: DISCONTINUED | OUTPATIENT
Start: 2017-12-24 | End: 2017-12-27 | Stop reason: HOSPADM

## 2017-12-24 RX ORDER — FLUTICASONE PROPIONATE 50 MCG
1 SPRAY, SUSPENSION (ML) NASAL DAILY
COMMUNITY
End: 2018-11-13

## 2017-12-24 RX ORDER — ONDANSETRON 4 MG/1
4 TABLET, ORALLY DISINTEGRATING ORAL EVERY 6 HOURS PRN
Status: DISCONTINUED | OUTPATIENT
Start: 2017-12-24 | End: 2017-12-27 | Stop reason: HOSPADM

## 2017-12-24 RX ORDER — NALOXONE HYDROCHLORIDE 0.4 MG/ML
.1-.4 INJECTION, SOLUTION INTRAMUSCULAR; INTRAVENOUS; SUBCUTANEOUS
Status: DISCONTINUED | OUTPATIENT
Start: 2017-12-24 | End: 2017-12-27 | Stop reason: HOSPADM

## 2017-12-24 RX ORDER — LIDOCAINE 40 MG/G
CREAM TOPICAL
Status: DISCONTINUED | OUTPATIENT
Start: 2017-12-24 | End: 2017-12-27 | Stop reason: HOSPADM

## 2017-12-24 RX ORDER — METOPROLOL SUCCINATE 25 MG/1
12.5 TABLET, EXTENDED RELEASE ORAL EVERY EVENING
COMMUNITY
End: 2018-11-13

## 2017-12-24 RX ORDER — DEXAMETHASONE SODIUM PHOSPHATE 10 MG/ML
10 INJECTION, SOLUTION INTRAMUSCULAR; INTRAVENOUS ONCE
Status: COMPLETED | OUTPATIENT
Start: 2017-12-24 | End: 2017-12-24

## 2017-12-24 RX ORDER — CITALOPRAM HYDROBROMIDE 20 MG/1
40 TABLET ORAL AT BEDTIME
Status: ON HOLD | COMMUNITY
End: 2017-12-27

## 2017-12-24 RX ORDER — POLYETHYLENE GLYCOL 3350 17 G/17G
17 POWDER, FOR SOLUTION ORAL 2 TIMES DAILY
COMMUNITY
End: 2019-11-25

## 2017-12-24 RX ORDER — OXYCODONE HYDROCHLORIDE 5 MG/1
5 TABLET ORAL
Status: DISCONTINUED | OUTPATIENT
Start: 2017-12-24 | End: 2017-12-26

## 2017-12-24 RX ORDER — NAPROXEN SODIUM 220 MG
220 TABLET ORAL DAILY PRN
Status: ON HOLD | COMMUNITY
End: 2017-12-27

## 2017-12-24 RX ORDER — ALBUTEROL SULFATE 0.83 MG/ML
1.25 SOLUTION RESPIRATORY (INHALATION) EVERY 4 HOURS PRN
Status: DISCONTINUED | OUTPATIENT
Start: 2017-12-24 | End: 2017-12-27 | Stop reason: HOSPADM

## 2017-12-24 RX ORDER — ACETAMINOPHEN 325 MG/1
975 TABLET ORAL EVERY 6 HOURS
Status: DISCONTINUED | OUTPATIENT
Start: 2017-12-24 | End: 2017-12-27 | Stop reason: HOSPADM

## 2017-12-24 RX ORDER — HYDROXYZINE HYDROCHLORIDE 25 MG/1
50 TABLET, FILM COATED ORAL EVERY 6 HOURS PRN
Status: DISCONTINUED | OUTPATIENT
Start: 2017-12-24 | End: 2017-12-25

## 2017-12-24 RX ORDER — CITALOPRAM HYDROBROMIDE 20 MG/1
40 TABLET ORAL DAILY
Status: DISCONTINUED | OUTPATIENT
Start: 2017-12-24 | End: 2017-12-27 | Stop reason: HOSPADM

## 2017-12-24 RX ORDER — ONDANSETRON 2 MG/ML
4 INJECTION INTRAMUSCULAR; INTRAVENOUS EVERY 6 HOURS PRN
Status: DISCONTINUED | OUTPATIENT
Start: 2017-12-24 | End: 2017-12-27 | Stop reason: HOSPADM

## 2017-12-24 RX ORDER — KETOROLAC TROMETHAMINE 15 MG/ML
15 INJECTION, SOLUTION INTRAMUSCULAR; INTRAVENOUS ONCE
Status: COMPLETED | OUTPATIENT
Start: 2017-12-24 | End: 2017-12-24

## 2017-12-24 RX ORDER — HYDROMORPHONE HYDROCHLORIDE 1 MG/ML
0.5 INJECTION, SOLUTION INTRAMUSCULAR; INTRAVENOUS; SUBCUTANEOUS
Status: COMPLETED | OUTPATIENT
Start: 2017-12-24 | End: 2017-12-24

## 2017-12-24 RX ORDER — HYDROMORPHONE HCL/0.9% NACL/PF 0.2MG/0.2
0.2 SYRINGE (ML) INTRAVENOUS
Status: DISCONTINUED | OUTPATIENT
Start: 2017-12-24 | End: 2017-12-27 | Stop reason: HOSPADM

## 2017-12-24 RX ORDER — LOSARTAN POTASSIUM 100 MG/1
100 TABLET ORAL EVERY EVENING
COMMUNITY
End: 2018-11-13

## 2017-12-24 RX ORDER — AMOXICILLIN 250 MG
1 CAPSULE ORAL 2 TIMES DAILY PRN
Status: DISCONTINUED | OUTPATIENT
Start: 2017-12-24 | End: 2017-12-27 | Stop reason: HOSPADM

## 2017-12-24 RX ORDER — LOSARTAN POTASSIUM 25 MG/1
100 TABLET ORAL DAILY
Status: DISCONTINUED | OUTPATIENT
Start: 2017-12-25 | End: 2017-12-27 | Stop reason: HOSPADM

## 2017-12-24 RX ORDER — ACETAMINOPHEN 650 MG/1
650 SUPPOSITORY RECTAL EVERY 4 HOURS PRN
Status: DISCONTINUED | OUTPATIENT
Start: 2017-12-24 | End: 2017-12-27 | Stop reason: HOSPADM

## 2017-12-24 RX ADMIN — Medication 0.5 MG: at 16:02

## 2017-12-24 RX ADMIN — CETIRIZINE HYDROCHLORIDE 10 MG: 10 TABLET, FILM COATED ORAL at 21:10

## 2017-12-24 RX ADMIN — OXYCODONE HYDROCHLORIDE 5 MG: 5 TABLET ORAL at 21:10

## 2017-12-24 RX ADMIN — METOPROLOL SUCCINATE 12.5 MG: 25 TABLET, EXTENDED RELEASE ORAL at 21:29

## 2017-12-24 RX ADMIN — IBUPROFEN 400 MG: 400 TABLET ORAL at 21:10

## 2017-12-24 RX ADMIN — ACETAMINOPHEN 975 MG: 325 TABLET, FILM COATED ORAL at 21:10

## 2017-12-24 RX ADMIN — KETOROLAC TROMETHAMINE 15 MG: 15 INJECTION, SOLUTION INTRAMUSCULAR; INTRAVENOUS at 16:02

## 2017-12-24 RX ADMIN — CITALOPRAM HYDROBROMIDE 40 MG: 20 TABLET ORAL at 21:10

## 2017-12-24 RX ADMIN — DEXAMETHASONE SODIUM PHOSPHATE 10 MG: 10 INJECTION, SOLUTION INTRAMUSCULAR; INTRAVENOUS at 17:38

## 2017-12-24 ASSESSMENT — PAIN DESCRIPTION - DESCRIPTORS: DESCRIPTORS: THROBBING

## 2017-12-24 ASSESSMENT — ENCOUNTER SYMPTOMS: NUMBNESS: 1

## 2017-12-24 NOTE — ED NOTES
Bed: ED02  Expected date: 12/24/17  Expected time: 2:43 PM  Means of arrival: Ambulance  Comments:  [facility]

## 2017-12-24 NOTE — IP AVS SNAPSHOT
[facility] Observation Department    [address]    [facility] [address]    Phone:  [phone]                                       After Visit Summary   12/24/2017    Tatyana Metcalf    MRN: [id number]           After Visit Summary Signature Page     I have received my discharge instructions, and my questions have been answered. I have discussed any challenges I see with this plan with the nurse or doctor.    ..........................................................................................................................................  Patient/Patient Representative Signature      ..........................................................................................................................................  Patient Representative Print Name and Relationship to Patient    ..................................................               ................................................  Date                                            Time    ..........................................................................................................................................  Reviewed by Signature/Title    ...................................................              ..............................................  Date                                                            Time

## 2017-12-24 NOTE — IP AVS SNAPSHOT
MRN:[id number]                      After Visit Summary   12/24/2017    Tatyana Metcalf    MRN: [id number]           Thank you!     Thank you for choosing [facility] for your care. Our goal is always to provide you with excellent care. Hearing back from our patients is one way we can continue to improve our services. Please take a few minutes to complete the written survey that you may receive in the mail after you visit. If you would like to speak to someone directly about your visit please contact Patient Relations at [phone]. Thank you!          Patient Information     Date Of Birth          3/19/1932        About your hospital stay     You were admitted on:  December 24, 2017 You last received care in the:  [facility] Observation Department    You were discharged on:  December 27, 2017       Who to Call     For medical emergencies, please call 911.  For non-urgent questions about your medical care, please call your primary care provider or clinic, [phone]          Attending Provider     Provider Specialty    Eb Martin MD Emergency Medicine    [facility]       Primary Care Provider Office Phone # Fax #    Ayaz Fernandez [phone]      After Care Instructions     Activity       Your activity upon discharge: activity as tolerated            Diet       Follow this diet upon discharge: Regular                  Follow-up Appointments     Follow-up and recommended labs and tests        Follow up with primary care provider, Ayaz Fernandez MD, within 5 days for hospital follow- up.  The following labs/tests are recommended: Basic metabolic panel in 5 days.  Chest X ray in 4 weeks.                  Additional Services     Home Care OT Referral for Hospital Discharge       OT to eval and treat    Your provider has ordered home care - occupational therapy. If you have not been contacted within 2  days of your discharge please call the department phone number listed on the top of this document.            Home Care PT Referral for Hospital Discharge       PT to eval and treat    Your provider has ordered home care - physical therapy. If you have not been contacted within 2 days of your discharge please call the department phone number listed on the top of this document.            Home care nursing referral       RN skilled nursing visit. RN to assess vital signs and weight.    Your provider has ordered home care nursing services. If you have not been contacted within 2 days of your discharge please call the inpatient department phone number at [phone] .                             Further instructions from your care team       Opioid Medication Information    You have been given a prescription for an opioid (narcotic) pain medicine and/or have received a pain medicine while here in the hospital. These medicines can make you drowsy or impaired. You must not drive, operate dangerous equipment, or engage in any other dangerous activities while taking these medications. If you drive while taking these medications, you could be arrested for DUI, or driving under the influence. Do not drink any alcohol while you are taking these medications.   Opioid pain medications can cause addiction. If you have a history of chemical dependency of any type, you are at a higher risk of becoming addicted to pain medications.  Only take these prescribed medications to treat your pain when all other options have been tried. Take it for as short a time and as few doses as possible. Store your pain pills in a secure place, as they are frequently stolen and provide a dangerous opportunity for children or visitors in your house to start abusing these powerful medications. We will not replace any lost or stolen medicine.  As soon as your pain is better, you should flush all your remaining medication.   Many prescription pain  medications contain Tylenol  (acetaminophen), including Vicodin , Tylenol #3 , Norco , Lortab , and Percocet .  You should not take any extra pills of Tylenol  if you are using these prescription medications or you can get very sick.  Do not ever take more than 4000 mg of acetaminophen in any 24 hour period.  All opioids tend to cause constipation. Drink plenty of water and eat foods that have a lot of fiber, such as fruits, vegetables, prune juice, apple juice and high fiber cereal.  Take a laxative if you don t move your bowels at least every other day. Miralax , Milk of Magnesia, Colace , or Senna  can be used to keep you regular.          Pending Results     Date and Time Order Name Status Description    12/25/2017 1818 Sputum Culture Aerobic Bacterial In process     12/25/2017 1050 MR Knee Left w/o Contrast Preliminary             Statement of Approval     Ordered          12/27/17 3464  I have reviewed and agree with all the recommendations and orders detailed in this document.  EFFECTIVE NOW     Approved and electronically signed by:  Jaylen Woods DO             Admission Information     Date & Time Provider Department Dept. Phone    12/24/2017 Shay Bauer MD [facility] Observation Department [phone]      Your Vitals Were     Blood Pressure Pulse Temperature Respirations Height Weight    153/63 (BP Location: Right arm) 66 97.6  F (36.4  C) (Oral) 16 1.524 m (5') 108.3 kg (238 lb 11.2 oz)    Pulse Oximetry BMI (Body Mass Index)                93% 46.62 kg/m2          [employer] Information     [employer] gives you secure access to your electronic health record. If you see a primary care provider, you can also send messages to your care team and make appointments. If you have questions, please call your primary care clinic.  If you do not have a primary care provider, please call [phone] and they will assist you.        Care EveryWhere ID     This is your Care EveryWhere ID.  This could be used by other organizations to access your [facility] medical records  [id number]        Equal Access to Services     JILLIAN MALDONADO : Hadii alvina Norton, robert herrera, parminderfredi britosussynilesh chase, elisa abbasipratimaerasto claire. So [facility] [phone].    ATENCIÓN: Si habla español, tiene a malhotra disposición servicios gratuitos de asistencia lingüística. Llame al [phone].    We comply with applicable federal civil rights laws and [place] laws. We do not discriminate on the basis of race, color, national origin, age, disability, sex, sexual orientation, or gender identity.               Review of your medicines      START taking        Dose / Directions    levofloxacin 750 MG tablet   Commonly known as:  LEVAQUIN        Dose:  750 mg   Take 1 tablet (750 mg) by mouth daily   Quantity:  5 tablet   Refills:  0       oxyCODONE IR 5 MG tablet   Commonly known as:  ROXICODONE   Used for:  Acute pain of left knee        Dose:  2.5-5 mg   Take 0.5-1 tablets (2.5-5 mg) by mouth every 3 hours as needed for moderate to severe pain   Quantity:  10 tablet   Refills:  0         CONTINUE these medicines which have NOT CHANGED        Dose / Directions    * albuterol 1.25 MG/3ML nebulizer solution   Commonly known as:  ACCUNEB   Used for:  Bronchiectasis with acute exacerbation (H)        Dose:  1 vial   Take 1 vial (1.25 mg) by nebulization every 4 hours as needed for shortness of breath / dyspnea or wheezing   Quantity:  30 vial   Refills:  11       * PROAIR RESPICLICK 108 (90 BASE) MCG/ACT Aepb   Generic drug:  Albuterol Sulfate        INL 2 PUFFS PO Q 4 TO 6 H PRN   Refills:  12       alendronate 70 MG tablet   Commonly known as:  FOSAMAX   Used for:  Osteoporosis        Dose:  70 mg   Take 1 tablet (70 mg) by mouth every 7 days Take with over 8 ounces water and stay upright for at least 30 minutes after dose.  Take at least 60 minutes before breakfast   Quantity:  12 tablet   Refills:  2        ASPERCREME W/LIDOCAINE 4 % Crea cream   Generic drug:  lidocaine        Apply topically once as needed for mild pain   Refills:  0       azelastine 0.1 % spray   Commonly known as:  ASTELIN   Used for:  Middle ear effusion, left, Chronic rhinitis        Dose:  1-2 spray   Spray 1-2 sprays into both nostrils 2 times daily   Quantity:  3 Bottle   Refills:  3       budesonide-formoterol 160-4.5 MCG/ACT Inhaler   Commonly known as:  SYMBICORT        Dose:  2 puff   Inhale 2 puffs into the lungs 2 times daily   Refills:  0       cetirizine 10 MG tablet   Commonly known as:  zyrTEC        Dose:  10 mg   Take 1 tablet (10 mg) by mouth every evening   Quantity:  30 tablet   Refills:  1       cholecalciferol 1000 UNIT tablet   Commonly known as:  vitamin D3        Dose:  1000 Units   Take 1,000 Units by mouth every evening   Refills:  0       ferrous sulfate 325 (65 FE) MG tablet   Commonly known as:  IRON        Dose:  325 mg   Take 325 mg by mouth every evening   Refills:  0       fluticasone 50 MCG/ACT spray   Commonly known as:  FLONASE        Dose:  1 spray   Spray 1 spray into both nostrils daily   Refills:  0       furosemide 20 MG tablet   Commonly known as:  LASIX   Used for:  Bilateral lower extremity edema        Dose:  20 mg   Take 1 tablet (20 mg) by mouth daily as needed   Quantity:  30 tablet   Refills:  3       losartan 100 MG tablet   Commonly known as:  COZAAR        Dose:  100 mg   Take 100 mg by mouth every evening   Refills:  0       metoprolol 25 MG 24 hr tablet   Commonly known as:  TOPROL-XL        Dose:  12.5 mg   Take 12.5 mg by mouth every evening   Refills:  0       nystatin 252431 UNIT/GM Powd   Commonly known as:  MYCOSTATIN   Used for:  Fungal infection of skin        Apply topically 3 times daily as needed   Quantity:  60 g   Refills:  1       polyethylene glycol powder   Commonly known as:  MIRALAX/GLYCOLAX        Dose:  17 g   Take 17 g by mouth 2 times daily   Refills:  0       PREVACID  PO        Dose:  30 mg   Take 30 mg by mouth every evening   Refills:  0       * Notice:  This list has 2 medication(s) that are the same as other medications prescribed for you. Read the directions carefully, and ask your doctor or other care provider to review them with you.      STOP taking     ALEVE 220 MG tablet   Generic drug:  naproxen sodium           celeXA 20 MG tablet   Generic drug:  citalopram                Where to get your medicines      These medications were sent to [place], [place] - [address] [facility]  [address] [facility] [address]     Phone:  [phone]     levofloxacin 750 MG tablet         Some of these will need a paper prescription and others can be bought over the counter. Ask your nurse if you have questions.     Bring a paper prescription for each of these medications     oxyCODONE IR 5 MG tablet               ANTIBIOTIC INSTRUCTION     You've Been Prescribed an Antibiotic - Now What?  Your healthcare team thinks that you or your loved one might have an infection. Some infections can be treated with antibiotics, which are powerful, life-saving drugs. Like all medications, antibiotics have side effects and should only be used when necessary. There are some important things you should know about your antibiotic treatment.      Your healthcare team may run tests before you start taking an antibiotic.    Your team may take samples (e.g., from your blood, urine or other areas) to run tests to look for bacteria. These test can be important to determine if you need an antibiotic at all and, if you do, which antibiotic will work best.      Within a few days, your healthcare team might change or even stop your antibiotic.    Your team may start you on an antibiotic while they are working to find out what is making you sick.    Your team might change your antibiotic because test results show that a different antibiotic would be better to treat your  infection.    In some cases, once your team has more information, they learn that you do not need an antibiotic at all. They may find out that you don't have an infection, or that the antibiotic you're taking won't work against your infection. For example, an infection caused by a virus can't be treated with antibiotics. Staying on an antibiotic when you don't need it is more likely to be harmful than helpful.      You may experience side effects from your antibiotic.    Like all medications, antibiotics have side effects. Some of these can be serious.    Let you healthcare team know if you have any known allergies when you are admitted to the hospital.    One significant side effect of nearly all antibiotics is the risk of severe and sometimes deadly diarrhea caused by Clostridium difficile (C. Difficile). This occurs when a person takes antibiotics because some good germs are destroyed. Antibiotic use allows C. diificile to take over, putting patients at high risk for this serious infection.    As a patient or caregiver, it is important to understand your or your loved one's antibiotic treatment. It is especially important for caregivers to speak up when patients can't speak for themselves. Here are some important questions to ask your healthcare team.    What infection is this antibiotic treating and how do you know I have that infection?    What side effects might occur from this antibiotic?    How long will I need to take this antibiotic?    Is it safe to take this antibiotic with other medications or supplements (e.g., vitamins) that I am taking?     Are there any special directions I need to know about taking this antibiotic? For example, should I take it with food?    How will I be monitored to know whether my infection is responding to the antibiotic?    What tests may help to make sure the right antibiotic is prescribed for me?      Information provided by:  www.cdc.gov/getsmart  U.S. Department of Health  and Human Services  Centers for disease Control and Prevention  National Center for Emerging and Zoonotic Infectious Diseases  Division of Healthcare Quality Promotion         Protect others around you: Learn how to safely use, store and throw away your medicines at www.disposemymeds.org.             Medication List: This is a list of all your medications and when to take them. Check marks below indicate your daily home schedule. Keep this list as a reference.      Medications           Morning Afternoon Evening Bedtime As Needed    * albuterol 1.25 MG/3ML nebulizer solution   Commonly known as:  ACCUNEB   Take 1 vial (1.25 mg) by nebulization every 4 hours as needed for shortness of breath / dyspnea or wheezing                                * PROAIR RESPICLICK 108 (90 BASE) MCG/ACT Aepb   INL 2 PUFFS PO Q 4 TO 6 H PRN   Generic drug:  Albuterol Sulfate                                alendronate 70 MG tablet   Commonly known as:  FOSAMAX   Take 1 tablet (70 mg) by mouth every 7 days Take with over 8 ounces water and stay upright for at least 30 minutes after dose.  Take at least 60 minutes before breakfast                                ASPERCREME W/LIDOCAINE 4 % Crea cream   Apply topically once as needed for mild pain   Generic drug:  lidocaine                                azelastine 0.1 % spray   Commonly known as:  ASTELIN   Spray 1-2 sprays into both nostrils 2 times daily                                budesonide-formoterol 160-4.5 MCG/ACT Inhaler   Commonly known as:  SYMBICORT   Inhale 2 puffs into the lungs 2 times daily   Last time this was given:  2 puffs on 12/27/2017  7:15 AM                                cetirizine 10 MG tablet   Commonly known as:  zyrTEC   Take 1 tablet (10 mg) by mouth every evening   Last time this was given:  10 mg on 12/26/2017  9:06 PM                                cholecalciferol 1000 UNIT tablet   Commonly known as:  vitamin D3   Take 1,000 Units by mouth every evening                                 ferrous sulfate 325 (65 FE) MG tablet   Commonly known as:  IRON   Take 325 mg by mouth every evening   Last time this was given:  325 mg on 12/26/2017  9:05 PM                                fluticasone 50 MCG/ACT spray   Commonly known as:  FLONASE   Spray 1 spray into both nostrils daily                                furosemide 20 MG tablet   Commonly known as:  LASIX   Take 1 tablet (20 mg) by mouth daily as needed                                levofloxacin 750 MG tablet   Commonly known as:  LEVAQUIN   Take 1 tablet (750 mg) by mouth daily                                losartan 100 MG tablet   Commonly known as:  COZAAR   Take 100 mg by mouth every evening   Last time this was given:  100 mg on 12/27/2017  8:22 AM                                metoprolol 25 MG 24 hr tablet   Commonly known as:  TOPROL-XL   Take 12.5 mg by mouth every evening   Last time this was given:  12.5 mg on 12/26/2017  9:06 PM                                nystatin [id number] UNIT/GM Powd   Commonly known as:  MYCOSTATIN   Apply topically 3 times daily as needed                                oxyCODONE IR 5 MG tablet   Commonly known as:  ROXICODONE   Take 0.5-1 tablets (2.5-5 mg) by mouth every 3 hours as needed for moderate to severe pain   Last time this was given:  5 mg on 12/25/2017 11:48 AM                                polyethylene glycol powder   Commonly known as:  MIRALAX/GLYCOLAX   Take 17 g by mouth 2 times daily                                PREVACID PO   Take 30 mg by mouth every evening   Last time this was given:  30 mg on 12/27/2017  8:22 AM                                * Notice:  This list has 2 medication(s) that are the same as other medications prescribed for you. Read the directions carefully, and ask your doctor or other care provider to review them with you.

## 2017-12-24 NOTE — ED PROVIDER NOTES
History     Chief Complaint:  Left leg pain    HPI   Tatyana Metcalf is a 85 year old female with a history of spinal stenosis, endometrial cancer, Fox's esophagus, and hearing loss who presents to the emergency department with her family for evaluation of left leg pain. Of note, the patient has a history of spinal stenosis with chronic low back pain. Approximately 5 days ago, the patient reports the onset of slight left leg pain. Then today, the patient reports she was feeling fine when she woke up and was able to ambulate normally. Then this afternoon when trying to get up out of her chair, she reports a sudden, significant increase in this left leg pain that went from the upper thigh to the toes. This pain prompted the patient to seek evaluation here in the emergency department. Here, the patient also reports tinging in her left toes. She denies a history of blood clots.       Allergies:  Penicillins  Sulfa drugs     Medications:    budesonide-formoterol   lidocaine  MECLIZINE HCL  polyethylene glycol   naproxen  nystatin  losartan   metoprolol   citalopram   azelastine   furosemide   alendronate   albuterol   Lansoprazole   cetirizine     Past Medical History:    Fox's esophagus   Endometrial cancer   SNHL   Neuropathy  Spinal stenosis    Past Surgical History:    Appendectomy  Hysterectomy  Bilateral salpingo-oophorectomy    Family History:    Heart disease - mother, father    Social History:  Smoking status: Negative  Alcohol use: Negative  Patient presents with her family  Marital Status:        Review of Systems   Musculoskeletal:        Positive for left leg pain   Neurological: Positive for numbness.   All other systems reviewed and are negative.    Physical Exam   First Vitals:  BP: 166/76  Pulse: 66  Temp: 97.8  F (36.6  C)  Resp: 18  Weight: 105.7 kg (233 lb 0.4 oz)  SpO2: 98 %    Physical Exam   Constitutional: She is oriented to person, place, and time. She appears well-developed.    HENT:   Head: Normocephalic and atraumatic.   Right Ear: External ear normal.   Mouth/Throat: Oropharynx is clear and moist.   Eyes: Conjunctivae and EOM are normal. Pupils are equal, round, and reactive to light.   Neck: Normal range of motion. Neck supple. No JVD present.   Cardiovascular: Normal rate, regular rhythm and normal heart sounds.    Pulmonary/Chest: Effort normal and breath sounds normal.   Abdominal: Soft. Bowel sounds are normal. She exhibits no distension. There is no tenderness. There is no rebound.   Musculoskeletal: Normal range of motion.        Left knee: She exhibits no effusion, no ecchymosis, no deformity and no erythema. Tenderness found. Medial joint line and lateral joint line tenderness noted.        Left ankle: Normal.        Feet:    Lymphadenopathy:     She has no cervical adenopathy.   Neurological: She is alert and oriented to person, place, and time. She displays normal reflexes. No cranial nerve deficit. She exhibits normal muscle tone. Coordination normal.   Skin: Skin is warm and dry.   Psychiatric: She has a normal mood and affect. Her behavior is normal. Judgment normal.   Nursing note and vitals reviewed.      Emergency Department Course   Imaging:  Radiographic findings were communicated with the patient who voiced understanding of the findings.    XR Tib/Fib, left, G/E 3 views:   Two views of the left tibia and fibula are performed.  Medial, lateral and patellofemoral joint compartment osteoarthritis is  noted in the left knee. No fracture or dislocation is otherwise  appreciated. As per radiology.     XR femur, left, G/E 3 views:   Two views of the left hip demonstrate no fracture or  dislocation. Note that the bones are osteopenic and study is slightly  limited due to patient body habitus. If there is high clinical  suspicion for hip fracture a follow-up MRI could be performed for  further assessment. As per radiology.     US Lower Extremity Venous Duplex, Left,  limited:  No evidence of deep venous thrombosis. As per radiology.     Laboratory:  CBC: WBC: 7.8, HGB: 13.8, PLT: 264  BMP: Glucose 103 (H), Carbon Dioxide 33 (H), o/w WNL (Creatinine: 0.63)  INR: 1.05  PTT: 34    Interventions:  1602 Toradol 15 mg IV   Dilaudid, 0.5 mg, IV injection  1738 Decadron 10 mg IV    Emergency Department Course:  1545  Nursing notes and vitals reviewed.  I performed an exam of the patient as documented above.     IV inserted. Medicine administered as documented above. Blood drawn. This was sent to the lab for further testing, results above.    The patient was sent for a tib/fib xray, femur xray, and lower extremity ultrasound while in the emergency department, findings above.     1733 I rechecked the patient and discussed the results of her workup thus far. The patient reports she is still in significant pain and is unable to lift her left leg secondary to the pain.    1809  I consulted with VICTORIA Denny accepts admit for Dr Bauer  of the hospitalist services. They are in agreement to accept the patient for admission.    Findings and plan explained to the Patient who consents to admission. Discussed the patient with VICTORIA Denny accepts admit for Dr Bauer, who will admit the patient to a observation bed for further monitoring, evaluation, and treatment.  Impression & Plan    Medical Decision Making:  Tatyana Metcalf is a 85 year old female who presents with leg pain. There is no signs of DVT by ultrasound and her pulse in tact and palpable. There is no clinical concern for ischemia. There is no history of trauma and xrays are normal. Pain seems to be focused around the knee with palpation, though there is no large effusion. Patient continues to be unable to bear weight of lift her leg without pain. Ultimately, the decision was made to admit her on observation due to inability to disposition home. I suspect osteoarthritis in the knee. I cannot rule out occult nontraumatic fracture from  osteopenia but doubt this and her hip is not pain. Plan to admit to medical observations.     Disposition:  1. Intractable left knee pain with inability to ambulate.    Diagnosis:    ICD-10-CM    1. Acute pain of left knee M25.562    2. Inability to ambulate due to knee R26.2        Disposition:  Admitted to [facility] accepts admit for Dr Juancarlos WESTBROOK, Torrie Nino, am serving as a scribe on 12/24/2017 at 4:07 PM to personally document services performed by Eb Martin MD based on my observations and the provider's statements to me.     Torrie Nino  12/24/2017   [facility] EMERGENCY DEPARTMENT       Eb Martin MD  12/29/17 0746

## 2017-12-24 NOTE — ED NOTES
Sudden onset of left leg pain from the upper thigh to the toes.  Started when trying to get up from a chair.  Was feeling fine when she got up, did not have any pain at that time.  Was able to ambulate around the house before the pain started.  No falls.  History of spinal stenosis with chronic low back pain for a few years.  Toes tingling.  ABCDs intact.

## 2017-12-25 ENCOUNTER — APPOINTMENT (OUTPATIENT)
Dept: MRI IMAGING | Facility: CLINIC | Age: 82
DRG: 189 | End: 2017-12-25
Attending: ORTHOPAEDIC SURGERY
Payer: COMMERCIAL

## 2017-12-25 ENCOUNTER — APPOINTMENT (OUTPATIENT)
Dept: GENERAL RADIOLOGY | Facility: CLINIC | Age: 82
DRG: 189 | End: 2017-12-25
Attending: PHYSICIAN ASSISTANT
Payer: COMMERCIAL

## 2017-12-25 ENCOUNTER — APPOINTMENT (OUTPATIENT)
Dept: ULTRASOUND IMAGING | Facility: CLINIC | Age: 82
DRG: 189 | End: 2017-12-25
Attending: PHYSICIAN ASSISTANT
Payer: COMMERCIAL

## 2017-12-25 PROBLEM — J96.91 RESPIRATORY FAILURE WITH HYPOXIA (H): Status: ACTIVE | Noted: 2017-12-25

## 2017-12-25 LAB
ALBUMIN SERPL-MCNC: 3.4 G/DL (ref 3.4–5)
ALBUMIN UR-MCNC: NEGATIVE MG/DL
ALP SERPL-CCNC: 89 U/L (ref 40–150)
ALT SERPL W P-5'-P-CCNC: 15 U/L (ref 0–50)
ANION GAP SERPL CALCULATED.3IONS-SCNC: 5 MMOL/L (ref 3–14)
APPEARANCE UR: ABNORMAL
AST SERPL W P-5'-P-CCNC: 13 U/L (ref 0–45)
BACTERIA #/AREA URNS HPF: ABNORMAL /HPF
BILIRUB DIRECT SERPL-MCNC: <0.1 MG/DL (ref 0–0.2)
BILIRUB SERPL-MCNC: 0.3 MG/DL (ref 0.2–1.3)
BILIRUB UR QL STRIP: NEGATIVE
BUN SERPL-MCNC: 26 MG/DL (ref 7–30)
CALCIUM SERPL-MCNC: 8.8 MG/DL (ref 8.5–10.1)
CHLORIDE SERPL-SCNC: 105 MMOL/L (ref 94–109)
CO2 SERPL-SCNC: 28 MMOL/L (ref 20–32)
COLOR UR AUTO: YELLOW
CREAT SERPL-MCNC: 0.67 MG/DL (ref 0.52–1.04)
ERYTHROCYTE [DISTWIDTH] IN BLOOD BY AUTOMATED COUNT: 13.5 % (ref 10–15)
GFR SERPL CREATININE-BSD FRML MDRD: 84 ML/MIN/1.7M2
GLUCOSE SERPL-MCNC: 179 MG/DL (ref 70–99)
GLUCOSE UR STRIP-MCNC: NEGATIVE MG/DL
HCT VFR BLD AUTO: 40.5 % (ref 35–47)
HGB BLD-MCNC: 12.9 G/DL (ref 11.7–15.7)
HGB UR QL STRIP: NEGATIVE
KETONES UR STRIP-MCNC: NEGATIVE MG/DL
LEUKOCYTE ESTERASE UR QL STRIP: ABNORMAL
LIPASE SERPL-CCNC: 68 U/L (ref 73–393)
MCH RBC QN AUTO: 30.4 PG (ref 26.5–33)
MCHC RBC AUTO-ENTMCNC: 31.9 G/DL (ref 31.5–36.5)
MCV RBC AUTO: 96 FL (ref 78–100)
MUCOUS THREADS #/AREA URNS LPF: PRESENT /LPF
NITRATE UR QL: NEGATIVE
NT-PROBNP SERPL-MCNC: 314 PG/ML (ref 0–1800)
PH UR STRIP: 5 PH (ref 5–7)
PLATELET # BLD AUTO: 254 10E9/L (ref 150–450)
POTASSIUM SERPL-SCNC: 4.4 MMOL/L (ref 3.4–5.3)
PROT SERPL-MCNC: 6.9 G/DL (ref 6.8–8.8)
RBC # BLD AUTO: 4.24 10E12/L (ref 3.8–5.2)
RBC #/AREA URNS AUTO: 6 /HPF (ref 0–2)
SODIUM SERPL-SCNC: 138 MMOL/L (ref 133–144)
SOURCE: ABNORMAL
SP GR UR STRIP: 1.03 (ref 1–1.03)
SQUAMOUS #/AREA URNS AUTO: 26 /HPF (ref 0–1)
UROBILINOGEN UR STRIP-MCNC: 2 MG/DL (ref 0–2)
WBC # BLD AUTO: 7.3 10E9/L (ref 4–11)
WBC #/AREA URNS AUTO: 5 /HPF (ref 0–2)

## 2017-12-25 PROCEDURE — 81001 URINALYSIS AUTO W/SCOPE: CPT | Performed by: PHYSICIAN ASSISTANT

## 2017-12-25 PROCEDURE — 40000275 ZZH STATISTIC RCP TIME EA 10 MIN

## 2017-12-25 PROCEDURE — 76705 ECHO EXAM OF ABDOMEN: CPT

## 2017-12-25 PROCEDURE — 85027 COMPLETE CBC AUTOMATED: CPT | Performed by: PHYSICIAN ASSISTANT

## 2017-12-25 PROCEDURE — 96376 TX/PRO/DX INJ SAME DRUG ADON: CPT

## 2017-12-25 PROCEDURE — 71020 XR CHEST 2 VW: CPT

## 2017-12-25 PROCEDURE — 80048 BASIC METABOLIC PNL TOTAL CA: CPT | Performed by: PHYSICIAN ASSISTANT

## 2017-12-25 PROCEDURE — 80076 HEPATIC FUNCTION PANEL: CPT | Performed by: PHYSICIAN ASSISTANT

## 2017-12-25 PROCEDURE — 87086 URINE CULTURE/COLONY COUNT: CPT | Performed by: PHYSICIAN ASSISTANT

## 2017-12-25 PROCEDURE — 83690 ASSAY OF LIPASE: CPT | Performed by: PHYSICIAN ASSISTANT

## 2017-12-25 PROCEDURE — G0378 HOSPITAL OBSERVATION PER HR: HCPCS

## 2017-12-25 PROCEDURE — 25000132 ZZH RX MED GY IP 250 OP 250 PS 637: Performed by: HOSPITALIST

## 2017-12-25 PROCEDURE — 40000274 ZZH STATISTIC RCP CONSULT EA 30 MIN

## 2017-12-25 PROCEDURE — 99233 SBSQ HOSP IP/OBS HIGH 50: CPT | Performed by: PHYSICIAN ASSISTANT

## 2017-12-25 PROCEDURE — 25000128 H RX IP 250 OP 636: Performed by: PHYSICIAN ASSISTANT

## 2017-12-25 PROCEDURE — 73721 MRI JNT OF LWR EXTRE W/O DYE: CPT | Mod: LT

## 2017-12-25 PROCEDURE — 25000132 ZZH RX MED GY IP 250 OP 250 PS 637: Performed by: PHYSICIAN ASSISTANT

## 2017-12-25 PROCEDURE — 12000000 ZZH R&B MED SURG/OB

## 2017-12-25 PROCEDURE — 25000125 ZZHC RX 250: Performed by: PHYSICIAN ASSISTANT

## 2017-12-25 PROCEDURE — 94640 AIRWAY INHALATION TREATMENT: CPT

## 2017-12-25 PROCEDURE — 94640 AIRWAY INHALATION TREATMENT: CPT | Mod: 76

## 2017-12-25 PROCEDURE — 36415 COLL VENOUS BLD VENIPUNCTURE: CPT | Performed by: PHYSICIAN ASSISTANT

## 2017-12-25 PROCEDURE — 83880 ASSAY OF NATRIURETIC PEPTIDE: CPT | Performed by: PHYSICIAN ASSISTANT

## 2017-12-25 RX ORDER — IPRATROPIUM BROMIDE AND ALBUTEROL SULFATE 2.5; .5 MG/3ML; MG/3ML
3 SOLUTION RESPIRATORY (INHALATION) 4 TIMES DAILY
Status: DISCONTINUED | OUTPATIENT
Start: 2017-12-25 | End: 2017-12-27 | Stop reason: HOSPADM

## 2017-12-25 RX ORDER — BUDESONIDE AND FORMOTEROL FUMARATE DIHYDRATE 160; 4.5 UG/1; UG/1
2 AEROSOL RESPIRATORY (INHALATION) 2 TIMES DAILY
Status: DISCONTINUED | OUTPATIENT
Start: 2017-12-25 | End: 2017-12-27 | Stop reason: HOSPADM

## 2017-12-25 RX ORDER — MECOBALAMIN 5000 MCG
30 TABLET,DISINTEGRATING ORAL EVERY EVENING
Status: DISCONTINUED | OUTPATIENT
Start: 2017-12-26 | End: 2017-12-27 | Stop reason: HOSPADM

## 2017-12-25 RX ORDER — ALUMINA, MAGNESIA, AND SIMETHICONE 2400; 2400; 240 MG/30ML; MG/30ML; MG/30ML
30 SUSPENSION ORAL EVERY 4 HOURS PRN
Status: DISCONTINUED | OUTPATIENT
Start: 2017-12-25 | End: 2017-12-27 | Stop reason: HOSPADM

## 2017-12-25 RX ORDER — DOXYCYCLINE 100 MG/10ML
100 INJECTION, POWDER, LYOPHILIZED, FOR SOLUTION INTRAVENOUS EVERY 12 HOURS
Status: DISCONTINUED | OUTPATIENT
Start: 2017-12-25 | End: 2017-12-26

## 2017-12-25 RX ORDER — POLYETHYLENE GLYCOL 3350 17 G/17G
17 POWDER, FOR SOLUTION ORAL 2 TIMES DAILY
Status: DISCONTINUED | OUTPATIENT
Start: 2017-12-25 | End: 2017-12-27 | Stop reason: HOSPADM

## 2017-12-25 RX ORDER — IPRATROPIUM BROMIDE AND ALBUTEROL SULFATE 2.5; .5 MG/3ML; MG/3ML
3 SOLUTION RESPIRATORY (INHALATION) EVERY 4 HOURS PRN
Status: DISCONTINUED | OUTPATIENT
Start: 2017-12-25 | End: 2017-12-25

## 2017-12-25 RX ORDER — FERROUS SULFATE 325(65) MG
325 TABLET ORAL EVERY EVENING
Status: DISCONTINUED | OUTPATIENT
Start: 2017-12-25 | End: 2017-12-27 | Stop reason: HOSPADM

## 2017-12-25 RX ADMIN — LANSOPRAZOLE 30 MG: 15 CAPSULE, DELAYED RELEASE ORAL at 14:27

## 2017-12-25 RX ADMIN — OXYCODONE HYDROCHLORIDE 5 MG: 5 TABLET ORAL at 00:12

## 2017-12-25 RX ADMIN — ACETAMINOPHEN 975 MG: 325 TABLET, FILM COATED ORAL at 08:27

## 2017-12-25 RX ADMIN — LOSARTAN POTASSIUM 100 MG: 25 TABLET, FILM COATED ORAL at 20:47

## 2017-12-25 RX ADMIN — ONDANSETRON 4 MG: 2 INJECTION INTRAMUSCULAR; INTRAVENOUS at 11:07

## 2017-12-25 RX ADMIN — OXYCODONE HYDROCHLORIDE 5 MG: 5 TABLET ORAL at 11:48

## 2017-12-25 RX ADMIN — BUDESONIDE AND FORMOTEROL FUMARATE DIHYDRATE 2 PUFF: 160; 4.5 AEROSOL RESPIRATORY (INHALATION) at 20:33

## 2017-12-25 RX ADMIN — IPRATROPIUM BROMIDE AND ALBUTEROL SULFATE 3 ML: .5; 3 SOLUTION RESPIRATORY (INHALATION) at 08:37

## 2017-12-25 RX ADMIN — IPRATROPIUM BROMIDE AND ALBUTEROL SULFATE 3 ML: .5; 3 SOLUTION RESPIRATORY (INHALATION) at 20:33

## 2017-12-25 RX ADMIN — FERROUS SULFATE TAB 325 MG (65 MG ELEMENTAL FE) 325 MG: 325 (65 FE) TAB at 20:23

## 2017-12-25 RX ADMIN — IBUPROFEN 400 MG: 400 TABLET ORAL at 16:59

## 2017-12-25 RX ADMIN — IBUPROFEN 400 MG: 400 TABLET ORAL at 09:58

## 2017-12-25 RX ADMIN — HYDROXYZINE HYDROCHLORIDE 25 MG: 25 TABLET ORAL at 00:13

## 2017-12-25 RX ADMIN — CETIRIZINE HYDROCHLORIDE 10 MG: 10 TABLET, FILM COATED ORAL at 20:24

## 2017-12-25 RX ADMIN — DOXYCYCLINE 100 MG: 100 INJECTION, POWDER, LYOPHILIZED, FOR SOLUTION INTRAVENOUS at 20:24

## 2017-12-25 RX ADMIN — Medication 1 MG: at 23:47

## 2017-12-25 RX ADMIN — ACETAMINOPHEN 975 MG: 325 TABLET, FILM COATED ORAL at 20:23

## 2017-12-25 RX ADMIN — ACETAMINOPHEN 975 MG: 325 TABLET, FILM COATED ORAL at 14:27

## 2017-12-25 RX ADMIN — IPRATROPIUM BROMIDE AND ALBUTEROL SULFATE 3 ML: .5; 3 SOLUTION RESPIRATORY (INHALATION) at 16:39

## 2017-12-25 RX ADMIN — CEFTRIAXONE 2 G: 2 INJECTION, POWDER, FOR SOLUTION INTRAMUSCULAR; INTRAVENOUS at 20:22

## 2017-12-25 RX ADMIN — METOPROLOL SUCCINATE 12.5 MG: 25 TABLET, EXTENDED RELEASE ORAL at 20:47

## 2017-12-25 RX ADMIN — CITALOPRAM HYDROBROMIDE 40 MG: 20 TABLET ORAL at 21:58

## 2017-12-25 RX ADMIN — IBUPROFEN 400 MG: 400 TABLET ORAL at 21:58

## 2017-12-25 ASSESSMENT — PAIN DESCRIPTION - DESCRIPTORS
DESCRIPTORS: SHARP
DESCRIPTORS: SHARP;CONSTANT
DESCRIPTORS: ACHING
DESCRIPTORS: SHARP
DESCRIPTORS: SHARP
DESCRIPTORS: CONSTANT
DESCRIPTORS: ACHING;CONSTANT
DESCRIPTORS: DULL

## 2017-12-25 NOTE — PLAN OF CARE
Problem: Patient Care Overview  Goal: Plan of Care/Patient Progress Review  PRIMARY DIAGNOSIS: LEFT LEG/KNEE PAIN  OUTPATIENT/OBSERVATION GOALS TO BE MET BEFORE DISCHARGE:  1. Pain Status: reports 5/10 left leg pain     2. Return to near baseline physical activity: No     3. Cleared for discharge by consultants (if involved): No     Discharge Planner Nurse   Safe discharge environment identified: Yes  Barriers to discharge: Yes       Entered by: Ioana Wei 12/24/2017 7:59 PM     Please review provider order for any additional goals.   Nurse to notify provider when observation goals have been met and patient is ready for discharge.     VSS.  A&Ox4, reports 5/10 left leg pain.  Oxy and atarax given.  LS diminished with exp wheezing, was 88% on room air, 92-93% 2LPM NC, LLE in immobilizer, LLE numbness, no discoloration to LLE, warm to the touch, family at bedside, will continue to monitor and provide supportive cares.

## 2017-12-25 NOTE — PROGRESS NOTES
"[facility] RCAT     Date: 12/25/17  Admission Dx: Left lower leg pain  Pulmonary History: COPD hx  Home Nebulizer/MDI Use: Symbicort BID, Albuterol Q4 prn  Home Oxygen: 2 LPM bleed in with CPAP at night  Acuity Level (RCAT flow sheet): 3  Aerosol Therapy initiated: Albuterol Q4 prn, Symbicort BID (home med), and Duoneb QID      Pulmonary Hygiene initiated: Coughing techniques      Volume Expansion initiated: Incentive Spirometry      Current Oxygen Requirements: 2 LPM nasal cannula and with CPAP at night  Current SpO2: 93%    Re-evaluation date: 12/28/17    Patient Education: Discussed use and benefits of respiratory medications.      See \"RT Assessments\" flow sheet for patient assessment scoring and Acuity Level Details.             "

## 2017-12-25 NOTE — PLAN OF CARE
Problem: Patient Care Overview  Goal: Plan of Care/Patient Progress Review  Acute LLE Pain     1.  Pain controlled with oral analgesia: No      2.  Vital signs stable: Yes      3.  Diagnotic testing complete: Yes      4.  Cleared from consultants (if applicable): Ortho consult pending      5. Return to near baseline physical activity: No      A&O x 4.  Rates LLE pain from knee to foot 5/10 at rest.  Pain increases to 8/10 with activity.  Has difficulty bearing weight on LLE.  A x 1 with walker to bedside commode.  Scheduled Tylenol given.  No edema of LLE noted, but difficult to assess given patient's stature.  Has significant DALLAS and expiratory wheezes with activity.  Per patient, this is baseline for her.  PRN neb treatment given.

## 2017-12-25 NOTE — PLAN OF CARE
Problem: Patient Care Overview  Goal: Plan of Care/Patient Progress Review  PRIMARY DIAGNOSIS: LEFT LEG/KNEE PAIN  OUTPATIENT/OBSERVATION GOALS TO BE MET BEFORE DISCHARGE:  1. Pain Status: reports 8/10 left leg pain    2. Return to near baseline physical activity: No    3. Cleared for discharge by consultants (if involved): No    Discharge Planner Nurse   Safe discharge environment identified: Yes  Barriers to discharge: Yes       Entered by: Ioana Wei 12/24/2017 7:59 PM     Please review provider order for any additional goals.   Nurse to notify provider when observation goals have been met and patient is ready for discharge.    BP elevated but improving, A&Ox4, reports 8/10 left leg pain, awaiting meds to be verified and will medicate with oxycodone, LS diminished with exp wheezing, was 88% on room air, 92-93% 2LPM NC, LLE in immobilizer, LLE numbness, no discoloration to LLE, warm to the touch, family at bedside, will continue to monitor and provide supportive cares.

## 2017-12-25 NOTE — CONSULTS
ORTHOPEDIC CONSULTATION      CHIEF COMPLAINT:  Left knee pain.      HISTORY OF PRESENT ILLNESS:  Bonnie Denny PA-C with the hospitalist group at [facility] requests orthopedic consultation for Tatyana Metcalf's left knee pain.  This patient is an 85-year-old female with history of endometrial cancer, COPD and obesity, with also a history of spinal stenosis and intermittent left knee pain who presents with inability to weightbear and left knee pain which came on after she went out for errands yesterday.  She states she has had knee pain in the past which has resolved and has had previous treatment in the past with cortisone shots for knee osteoarthritis.  She states, however, her symptoms always resolved, but there became a point where she could not mobilize in the house and was brought to the hospital via the ambulance for further evaluation.  In the emergency room, x-rays were obtained which showed no evidence for fracture around the knee, tibia or proximal femur or hip area.  She did have noted osteoarthritis on the x-rays.  She denies fevers, chills.  She denies hip or groin pain.  She states she has been able to move her hip without problems, but has had issues in the past spinal stenosis but states this feels somewhat different.  She again denies trauma, denies any twisting injury.  She normally uses a walker to ambulate in the community.      PAST MEDICAL HISTORY:  Including Fox's esophagus, endometrial cancer and hearing loss.      MEDICATIONS:  Appendectomy and hysterectomy.      SOCIAL HISTORY:  She is  and lives in an apartment nearby.  She is a nonsmoker, very rare alcohol user.      FAMILY HISTORY:  Heart disease.      ALLERGIES:  Penicillin and sulfa drugs.        MEDICATIONS INCLUDE:  Symbicort, naproxen, nystatin, metoprolol, Celexa, Lasix, Fosamax, Prevacid, vitamin D and Zyrtec.        REVIEW OF SYSTEMS:  The patient denies fevers, chills, denies any loss of consciousness and  again does report a remote trauma month ago but states had no pain in her knees at that time.      PHYSICAL EXAMINATION:   GENERAL:  The patient is alert and oriented x3, very pleasant, answers questions appropriately.  She appears comfortable.  She is lying in bed.  She is breathing comfortably.     ABDOMEN:  Her abdomen is obese, but soft.     EXTREMITIES:  She is able to perform a straight leg raise without pain on the left hip, and has no pain with gentle left hip range of motion.  Examination of the knee reveals tenderness with palpation along the medial more the lateral side.  I am only able to range her from roughly 0 to 30 degrees, when she gets pain.  She is, however, stable to varus and valgus stress at 0 and 30 degrees.  Lachman's is grade 1.  No skin breakdown, no redness or erythema, and minimal palpable effusion.  She has minimal edema in her legs.  Distal pulses are 2+.  She has good sensation to light touch.  EHL, tibia and gastroc strength 5/5.      X-rays reviewed of her hip and knee and tibia/fibula which show some osteoarthritis in the knee, no obvious fracture.      LABORATORY DATA:  A white count 7.8, potassium is 140.      IMPRESSION:  Atraumatic left knee, possible exacerbation of underlying osteoarthritis versus occult stress fracture.      PLAN:  I discussed with Adrianna today that I believe further imaging is necessary to make the diagnosis.  I am recommending a noncontrast MRI scan of the left knee to evaluate for stress fracture and/or possible other internal derangement of the knee.  Based on imaging, this may be something where a corticosteroid injection may help her symptoms or possibly she will need pain control and supportive care of physical therapy.  Again, based on clinical exam, I do not feel this is related to her spinal stenosis or any hip fracture or occult hip fracture.  She agrees with this plan.  We will follow up once the MRI has been completed.         JUVENAL FRIEDMAN MD              D: 2017 11:01   T: 2017 13:29   MT: [id number]      Name:     HERNANDEZ DAVIES   MRN:      [id number]        Account:       [id number]   :      1932           Consult Date:  2017      Document: [id number]       cc: Bonnie Denny PA-C       [facility]

## 2017-12-25 NOTE — PLAN OF CARE
Problem: Patient Care Overview  Goal: Plan of Care/Patient Progress Review  PT: Attempted to see pt for PT evaluation.  Per discussion with RN, still awaiting ortho consult and having fairly high pain levels.  Will return later today after better pain management and ortho consult      PT: Per discussion with medical team, pt now awaiting MRI to assess for possible stress fx and abdominal US d/t pain.  RN in agreement to hold PT evaluation until tomorrow when medical work up is complete.

## 2017-12-25 NOTE — PLAN OF CARE
Problem: Patient Care Overview  Goal: Plan of Care/Patient Progress Review  PRIMARY DIAGNOSIS: LEFT LEG/KNEE PAIN  OUTPATIENT/OBSERVATION GOALS TO BE MET BEFORE DISCHARGE:  1. Pain Status: pain decreased, decline intervention at this time      2. Return to near baseline physical activity: No      3. Cleared for discharge by consultants (if involved): No      Discharge Planner Nurse   Safe discharge environment identified: Yes  Barriers to discharge: Yes       Entered by: Ioana Wei 12/24/2017 7:59 PM     Please review provider order for any additional goals.   Nurse to notify provider when observation goals have been met and patient is ready for discharge.      VSS.  A&Ox4.  LS diminished with exp wheezing, was 88% on room air, 92-93% 2LPM NC, LLE knee pain has immobilizer for when OOB, LLE numbness, no discoloration to LLE, warm to the touch, family at bedside, will continue to monitor and provide supportive cares.

## 2017-12-25 NOTE — ED NOTES
[facility]  ED Nurse Handoff Report    Tatyana Metcalf is a 85 year old female   ED Chief complaint: left leg pain  . ED Diagnosis:   Final diagnoses:   Acute pain of left knee   Inability to ambulate due to knee     Allergies:   Allergies   Allergen Reactions     Penicillins      hives     Sulfa Drugs      Rash         Code Status: Full Code  Activity level - Baseline/Home:  Independent. Activity Level - Current:   Stand with Assist. Lift room needed: No. Bariatric: No   Needed: No   Isolation: No. Infection: Not Applicable.     Vital Signs:   Vitals:    12/24/17 1615 12/24/17 1620 12/24/17 1715 12/24/17 1730   BP:    (!) 190/109   Pulse:       Resp:       Temp:       TempSrc:       SpO2: 90% 94% 90% 91%   Weight:           Cardiac Rhythm:  ,      Pain level: 0-10 Pain Scale: 7  Patient confused: No. Patient Falls Risk: Yes.   Elimination Status:    Patient Report - Initial Complaint: L Leg Pain. Focused Assessment: Sudden onset of left leg pain from the upper thigh to the toes.  Started when trying to get up from a chair.  Was feeling fine when she got up, did not have any pain at that time.  Was able to ambulate around the house before the pain started.  No falls.  History of spinal stenosis with chronic low back pain for a few years.  Toes tingling.  ABCDs intact.    Tests Performed: labs, xr. Abnormal Results:   Labs Ordered and Resulted from Time of ED Arrival Up to the Time of Departure from the ED   BASIC METABOLIC PANEL - Abnormal; Notable for the following:        Result Value    Carbon Dioxide 33 (*)     Glucose 103 (*)     All other components within normal limits   CBC WITH PLATELETS DIFFERENTIAL   INR   PARTIAL THROMBOPLASTIN TIME   KNEE IMMOBILIZER     .   Treatments provided: Knee Immobilizer  Family Comments:   OBS brochure/video discussed/provided to patient:  Yes  ED Medications:   Medications   ketorolac (TORADOL) injection 15 mg (15 mg Intravenous Given 12/24/17 1602)    HYDROmorphone (PF) (DILAUDID) injection 0.5 mg (0.5 mg Intravenous Given 12/24/17 6374)   dexamethasone (DECADRON) injection 10 mg (10 mg Intravenous Given 12/24/17 8548)     Drips infusing:  No  For the majority of the shift, the patient's behavior Green. Interventions performed were NA.     Severe Sepsis OR Septic Shock Diagnosis Present: No      ED Nurse Name/Phone Number: Yue Agarwal,   6:51 PM    RECEIVING UNIT ED HANDOFF REVIEW    Above ED Nurse Handoff Report was reviewed: Yes  Reviewed by: Ioana Wei on December 24, 2017 at 6:56 PM

## 2017-12-25 NOTE — PLAN OF CARE
Problem: Patient Care Overview  Goal: Plan of Care/Patient Progress Review  Acute LLE Pain       1.  Pain controlled with oral analgesia: Yes      2.  Vital signs stable: Desating on RA      3.  Diagnotic testing complete: No, needs LLE MRI and Abdominal US      4.  Cleared from consultants (if applicable): Ortho following      5. Return to near baseline physical activity: No    A&O x 4. Up with A x 1 and walker to bedside commode. Continues to have LLE pain worse with activity.  Improved after Tylenol Ibuprofen, and oxycodone, but is 7/10 when bearing weight.  PT deferred until tomorroww pending MRI results.  O2 saturation 87% on RA while at rest.  Patient wears 2 L home O2 at night, but not during the day.  Placed on 2L oxygen, sating 92%. Per family 91-92% on RA is baseline.  Continues to have significant DALLAS.  Complained of sharp 7/10 R sided abdominal/flank pain and nausea after breakfast.  PA notified.  Patient changed to NPO and abdominal US ordered for 1600.

## 2017-12-25 NOTE — PHARMACY-ADMISSION MEDICATION HISTORY
Admission medication history interview status for this patient is complete. See [facility] admission navigator for allergy information, prior to admission medications and immunization status.     Medication history interview source(s):Patient  Medication history resources (including written lists, pill bottles, clinic record): Home med list on phone  Primary pharmacy:[facility] [place], on [address]    Changes made to PTA medication list:  Added: flonase  Deleted: meclizine   Changed: naproxen changed to prn, miralax changed from 1x/day to 2x/day, changed timing of meds to evening     Actions taken by pharmacist (provider contacted, etc):None     Additional medication history information:None    Medication reconciliation/reorder completed by provider prior to medication history? Yes    Do you take OTC medications (eg tylenol, ibuprofen, fish oil, eye/ear drops, etc)? Y (Y/N)    For patients on insulin therapy: N (Y/N)      Prior to Admission medications    Medication Sig Last Dose Taking? Auth Provider   cholecalciferol (VITAMIN D3) 1000 UNIT tablet Take 1,000 Units by mouth every evening 12/23/2017 at pm Yes Unknown, Entered By History   citalopram (CELEXA) 20 MG tablet Take 40 mg by mouth At Bedtime 12/23/2017 at pm Yes Unknown, Entered By History   losartan (COZAAR) 100 MG tablet Take 100 mg by mouth every evening 12/23/2017 at pm Yes Unknown, Entered By History   metoprolol (TOPROL-XL) 25 MG 24 hr tablet Take 12.5 mg by mouth every evening 12/23/2017 at pm Yes Unknown, Entered By History   fluticasone (FLONASE) 50 MCG/ACT spray Spray 1 spray into both nostrils daily 12/24/2017 at am Yes Unknown, Entered By History   naproxen sodium (ALEVE) 220 MG tablet Take 220 mg by mouth daily as needed for moderate pain 12/24/2017 at am Yes Unknown, Entered By History   polyethylene glycol (MIRALAX/GLYCOLAX) powder Take 17 g by mouth 2 times daily 12/24/2017 at am Yes Unknown, Entered By History   budesonide-formoterol (SYMBICORT)  160-4.5 MCG/ACT Inhaler Inhale 2 puffs into the lungs 2 times daily 12/24/2017 at am Yes Reported, Patient   lidocaine (ASPERCREME W/LIDOCAINE) 4 % CREA cream Apply topically once as needed for mild pain 12/24/2017 at am Yes Reported, Patient   PROAIR RESPICLICK 108 (90 BASE) MCG/ACT AEPB INL 2 PUFFS PO Q 4 TO 6 H PRN 12/24/2017 at Unknown time Yes Reported, Patient   azelastine (ASTELIN) 0.1 % spray Spray 1-2 sprays into both nostrils 2 times daily 12/24/2017 at am Yes Ayaz Fernandez MD   furosemide (LASIX) 20 MG tablet Take 1 tablet (20 mg) by mouth daily as needed 12/23/2017 at am Yes Ayaz Fernandez MD   alendronate (FOSAMAX) 70 MG tablet Take 1 tablet (70 mg) by mouth every 7 days Take with over 8 ounces water and stay upright for at least 30 minutes after dose.  Take at least 60 minutes before breakfast 12/17/2017 at am Yes Ayaz Fernandez MD   ferrous sulfate (IRON) 325 (65 FE) MG tablet Take 325 mg by mouth every evening  12/23/2017 at pm Yes Reported, Patient   albuterol (ACCUNEB) 1.25 MG/3ML nebulizer solution Take 1 vial (1.25 mg) by nebulization every 4 hours as needed for shortness of breath / dyspnea or wheezing 12/24/2017 at am Yes Shay Bauer MD   Lansoprazole (PREVACID PO) Take 30 mg by mouth every evening  12/23/2017 at pm Yes Reported, Patient   cetirizine (ZYRTEC) 10 MG tablet Take 1 tablet (10 mg) by mouth every evening 12/23/2017 at pm Yes Lavelle Lara MD   nystatin (MYCOSTATIN) 252231 UNIT/GM POWD Apply topically 3 times daily as needed More than a month at Unknown time  Ayaz Fernandez MD

## 2017-12-25 NOTE — PROGRESS NOTES
Patient seen and examined  Atraumatic left knee pain  DJD on xray  No evidence for hip involvement  Possible stress fracture  plan mri today

## 2017-12-25 NOTE — CONSULTS
Care Transition Initial Assessment -   Reason For Consult: discharge planning  Met with: Patient and Family    Active Problems:    Left leg pain         DATA  Lives With: alone   Living Arrangements: apartment  Description of Support System: Supportive, Involved  Who is your support system?: Children (daughters)  Support Assessment: Adequate family and caregiver support.   Identified issues/concerns regarding health management:             Quality Of Family Relationships: supportive, involved  Transportation Available: family or friend will provide (changes could be made depending on what is wrong with pt's knee )      ASSESSMENT  Cognitive Status:  awake, alert and oriented  Concerns to be addressed:    Pt was admitted 12/24/17 . Pt lives alone and gets help from her daughters.Pt has an elderly Wavier. A nurse who comes in every other week and a  who also comes in once a week. Services are provided through [facility]. Equipments owned by pt are a walker (sitting) and a shower chair. Transportation can be provided by family for now and other arrangements could be made depending on what is wrong with pt's knee. If pt needs to go home with [facility] services pt will use [facility] home care and if pt needs to go to a TCU, a list of Skilled nursing facilities was given to pt and family for them to chose from.         PLAN  TBD based on diagnosis of knee

## 2017-12-25 NOTE — H&P
History and Physical     Tatyana Metcalf MRN# [id number]   YOB: 1932 Age: 85 year old      Date of Admission:  12/24/2017    Primary care provider: Ayaz Fernandez          Assessment and Plan:   Tatyana Metcalf is a 85 year old female with a PMH significant for endometrial cancer, Fox's esophagus, COPD and obesity who presents with left knee pain.     Patient was discussed with Dr. Martin, who was provider in ED. Chart review of ED work up was performed and is as follows: Vitals show temp of 97.8, pulse 66, and pressure 190/109 with spontaneous respirations and no hypoxia. Labs remarkable for Creatinine 0.63, normal electrolytes, glucose 103,  WBC 7.8, Hgb 13.8. Left lower extremity ultrasound negative. X ray left tibia/fibula negative but does show osteoarthritis of knee, x ray femur/hip is negative for fracture. Chart review of Care Everywhere, previous visits and admissions were also reviewed.    1. Atraumatic left lower leg pain  Patient has had intermittent left knee pains when bearing weight than resolve without intervention. Today she attempted to stand from her chair and had acute onset of left leg pain involving her entire lower leg from knee to toes. This is worse with movement and bearing weight. She has no hip or back pain. No radicular symptoms. No swelling, redness or pain of the superficial skin of the knee. X rays are negative and neurological exam is not worrisome for lower back process. Low suspicion for gout and for infection. Likely osteoarthritis flair.  -Schedule tylenol/ibuprofen  -Given Dexamethasone in ED, up to AM rounder if this should continue  -Add PRN atarax/Valium  -PRN Oxycodone/Dilaudid as needed  -T pump  -Orthopedic consult  -ED added knee immobilizer, but I don't think it's needed at this point unless ortho concurs  -Social work and PT consults    2. COPD  Resume home inhalers    3. HTN  Continue Metoprolol and Losartan    4. GERD  Continue  Prevacid              Social: Ambulates with walker  Code: Discussed with patient and they have chosen DNR/DNI  VTE prophylaxis: PCDs for now  Disposition: Observation                    Chief Complaint:   Left knee pain         History of Present Illness:   Tatyana Metcalf is a 85 year old female who presents with atraumatic left knee pain. She states she was ambulating normally this AM and sat in a chair. When she attempted to stand she had severe pain in her left leg and was unable to bear weight. The pain is worse with movement and standing. She has had small aches and pains in the leg that resolve without intervention. She has never had a joint replacement. She denies fever, nausea and vomiting. She has not been sick recently and normally ambulates with a walker. She fell about 1 month ago but was fine after the fall.              Past Medical History:     Past Medical History:   Diagnosis Date     Fox's esophagus 10/3/2014    last EGD in 2011     Endometrial cancer (H) 2000    treated surgically, did not have chemo or XRT     SNHL (sensorineural hearing loss) 9/15/2015               Past Surgical History:     Past Surgical History:   Procedure Laterality Date     APPENDECTOMY  1952     HYSTERECTOMY TOTAL ABDOMINAL, BILATERAL SALPINGO-OOPHORECTOMY, COMBINED  2000               Social History:     Social History     Social History     Marital status:      Spouse name: N/A     Number of children: N/A     Years of education: N/A     Occupational History     Not on file.     Social History Main Topics     Smoking status: Never Smoker     Smokeless tobacco: Never Used     Alcohol use 0.0 oz/week     0 Standard drinks or equivalent per week      Comment: holidays     Drug use: No     Sexual activity: No     Other Topics Concern     Parent/Sibling W/ Cabg, Mi Or Angioplasty Before 65f 55m? Yes     Social History Narrative               Family History:     Family History   Problem Relation Age of Onset      HEART DISEASE Mother      HEART DISEASE Father               Allergies:      Allergies   Allergen Reactions     Penicillins      hives     Sulfa Drugs      Rash                 Medications:     Prior to Admission medications    Medication Sig Last Dose Taking? Auth Provider   budesonide-formoterol (SYMBICORT) 160-4.5 MCG/ACT Inhaler Inhale 2 puffs into the lungs 2 times daily   Reported, Patient   lidocaine (ASPERCREME W/LIDOCAINE) 4 % CREA cream Apply topically once as needed for mild pain   Reported, Patient   MECLIZINE HCL PO Take 25 mg by mouth 2 times daily as needed for dizziness   Reported, Patient   polyethylene glycol (MIRALAX) powder Take 17 g (1 capful) by mouth daily   Ayaz Fernandez MD   naproxen sodium (ANAPROX) 220 MG tablet Take 1 tablet (220 mg) by mouth At Bedtime   Ayaz Fernandez MD   nystatin (MYCOSTATIN) 553097 UNIT/GM POWD Apply topically 3 times daily as needed   Ayaz Fernandez MD   losartan (COZAAR) 100 MG tablet Take 1 tablet (100 mg) by mouth daily   Ayaz Fernandez MD   metoprolol (TOPROL-XL) 25 MG 24 hr tablet TAKE 1/2 TABLET(12.5 MG) BY MOUTH DAILY   Ayaz Fernandez MD   PROAIR RESPICLICK 108 (90 BASE) MCG/ACT AEPB INL 2 PUFFS PO Q 4 TO 6 H PRN   Reported, Patient   citalopram (CELEXA) 20 MG tablet Take 2 tablets (40 mg) by mouth daily   Ayaz Fernandez MD   azelastine (ASTELIN) 0.1 % spray Spray 1-2 sprays into both nostrils 2 times daily   Ayaz Fernandez MD   furosemide (LASIX) 20 MG tablet Take 1 tablet (20 mg) by mouth daily as needed   Ayaz Fernandez MD   alendronate (FOSAMAX) 70 MG tablet Take 1 tablet (70 mg) by mouth every 7 days Take with over 8 ounces water and stay upright for at least 30 minutes after dose.  Take at least 60 minutes before breakfast   Ayaz Fernandez MD   ferrous sulfate (IRON) 325 (65 FE) MG tablet Take 325 mg by mouth daily (with breakfast)   Reported, Patient   albuterol (ACCUNEB) 1.25 MG/3ML nebulizer solution  Take 1 vial (1.25 mg) by nebulization every 4 hours as needed for shortness of breath / dyspnea or wheezing   Shay Bauer MD   Lansoprazole (PREVACID PO) Take 30 mg by mouth every evening    Reported, Patient   cholecalciferol (VITAMIN D) 1000 UNIT tablet Take 1 tablet (1,000 Units) by mouth daily  Patient taking differently: Take 1,000 Units by mouth every evening    Lavelle Lara MD   cetirizine (ZYRTEC) 10 MG tablet Take 1 tablet (10 mg) by mouth every evening   Lavelle Lara MD              Review of Systems:   A Comprehensive greater than 10 system review of systems was carried out.  Pertinent positives and negatives are noted above.  Otherwise negative for contributory information.            Physical Exam:   Blood pressure (!) 190/109, pulse 66, temperature 97.8  F (36.6  C), temperature source Oral, resp. rate 18, weight 105.7 kg (233 lb 0.4 oz), SpO2 91 %, not currently breastfeeding.  Exam:  GENERAL:  Comfortable.  PSYCH: pleasant, oriented, No acute distress.  HEENT:  PERRLA. Normal conjunctiva, normal hearing, nasal mucosa and Oropharynx are normal.  NECK:  Supple, no neck vein distention, adenopathy or bruits, normal thyroid.  HEART:  Normal S1, S2 with no murmur, no pericardial rub, gallops or S3 or S4.  LUNGS:  Clear to auscultation, normal Respiratory effort. No wheezing, rales or ronchi.  ABDOMEN:  Soft, no hepatosplenomegaly, normal bowel sounds. Non-tender, non distended.   EXTREMITIES:  No pedal edema, +2 pulses bilateral and equal. Significant pain with movement of knee. Unable to lift leg off bed due to pain. No sensory deficits and full strength with plantar flexion and dorsiflexion.  SKIN:  Dry to touch, No rash, wound or ulcerations.  NEUROLOGIC:  CN 2-12 intact,  sensation is intact with no focal deficits.               Data:       Recent Labs  Lab 12/24/17  1556   WBC 7.8   HGB 13.8   HCT 43.1   MCV 96          Recent Labs  Lab 12/24/17  1556      POTASSIUM  4.2   CHLORIDE 104   CO2 33*   ANIONGAP 3   *   BUN 18   CR 0.63   GFRESTIMATED 90   GFRESTBLACK >90   ISRAEL 9.3         Recent Results (from the past 24 hour(s))   US Lower Extremity Venous Duplex Left    Narrative    VENOUS ULTRASOUND LEFT LEG  12/24/2017 4:36 PM     HISTORY: Left leg pain, evaluate for DVT.      COMPARISON: 9/28/2016.    FINDINGS:  Examination of the deep veins with graded compression and  color flow Doppler with spectral wave form analysis shows no evidence  of thrombus in the common femoral vein, femoral vein, popliteal vein  or calf veins.       Impression    IMPRESSION: No evidence of deep venous thrombosis.       BENJAMIN DAO MD   Tib/Fib XR, left    Narrative    LEFT TIBIA AND FIBULA TWO VIEWS  12/24/2017 5:30 PM     HISTORY: Evaluate nontraumatic leg pain.        Impression    IMPRESSION: Two views of the left tibia and fibula are performed.  Medial, lateral and patellofemoral joint compartment osteoarthritis is  noted in the left knee. No fracture or dislocation is otherwise  appreciated.         AMY MENON MD   Femur XR,  2 views, left    Narrative    LEFT FEMUR TWO VIEWS  12/24/2017 5:31 PM     HISTORY: Evaluate nontraumatic leg pain.        Impression    IMPRESSION: Two views of the left hip demonstrate no fracture or  dislocation. Note that the bones are osteopenic and study is slightly  limited due to patient body habitus. If there is high clinical  suspicion for hip fracture a follow-up MRI could be performed for  further assessment.         MD Bonnie WILLIS PA-C

## 2017-12-25 NOTE — PLAN OF CARE
Problem: Patient Care Overview  Goal: Plan of Care/Patient Progress Review  ROOM # 213-2    Living Situation (if not independent, order SW consult): alone - independent living  Facility name: [facility]  : Dee (daughter)    Activity level at baseline: independent with walker  Activity level on admit: A-2 with walker      Patient registered to observation; given Patient Bill of Rights; given the opportunity to ask questions about observation status and their plan of care.  Patient has been oriented to the observation room, bathroom and call light is in place.    Discussed discharge goals and expectations with patient/family.

## 2017-12-25 NOTE — PLAN OF CARE
Problem: Patient Care Overview  Goal: Plan of Care/Patient Progress Review  PRIMARY DIAGNOSIS: LEFT LEG/KNEE PAIN  OUTPATIENT/OBSERVATION GOALS TO BE MET BEFORE DISCHARGE:  1. Pain Status: denies pain at this time      2. Return to near baseline physical activity: No, Ax1/commode      3. Cleared for discharge by consultants (if involved): No      Discharge Planner Nurse   Safe discharge environment identified: Yes  Barriers to discharge: Yes       Entered by: Ioana Wei 12/24/2017 7:59 PM     Please review provider order for any additional goals.   Nurse to notify provider when observation goals have been met and patient is ready for discharge.      VSS.  A&Ox4, denies pain at this time.  LS diminished with exp wheezing, CPAP with 2L O2 through noc, LLE numbness, no discoloration to LLE, warm to the touch, will continue to monitor and provide supportive cares.

## 2017-12-25 NOTE — PROGRESS NOTES
[facility]  Internal Medicine  Progress Note    Date of Service: 12/25/2017    Patient: Tatyana Metcalf  MRN: [id number]  Admission Date: 12/24/2017  Hospital Day # 2    Assessment & Plan: Tatyana Metcalf is a 85 year old female with a PMH significant for endometrial cancer, Fox's esophagus, COPD and obesity who presented to the ED on 12/24 with left knee pain.    Acute on Chronic Left Knee Pain - no recent trauma.  Hx of intermittent pain.  Xrays with no acute fracture, but revealed OA of the left knee.  No overlying erythema or effusion noted.  Still significant pain with movement and bearing weight.  - appreciate Orthopedic Surgery evaluation.  Plan for MRI of the left knee today.  - continue pain control with scheduled Tylenol and Ibuprofen with prn Oxycodone  - PT and SW consult    RUQ pain - acute pain after eating a breakfast of marcos and hashbrowns.  Pain with radiation to the back with associated nausea and increased reflux symptoms.  Reports similar intermittent symptoms in the past.    - check LFTs, lipase, UA and npo until abdominal US obtained.     Fox's Esophagus, Hiatel Hernia - increased reflux today.  Continue home Lansoprazole and start Maalox prn.     Chronic COPD - reports a cough over the past 3 weeks.  Wheezing earlier today which resolved after nebs.  Uses 2 liters of oxygen with home cpap at night.  - resume home Symbicort and Zyrtec  - Duoneb and Albuterol nebs prn    HTN - stable.  Continue home Losartan and Metoprolol XL.  Takes Lasix as needed around once per week for LE edema.  BNP negative.     Chronic Constipation - reports well formed BM yesterday.  Continue home Miralax daily.    Depression - continue home Citalopram    Chronic Anemia - continue home Ferrous Sulfate    CODE: DNR/DNI  DVT: SCD  Diet/fluids: npo    Addendum 1900:  RUQ Pain - no further pain while npo.  LFTs, lipase wnl and UA with no signs of infection.  Abd US with thickened GB with no stones  visualized.  HIDA scan ordered for tomorrow am    Hypoxic Respiratory Failure - patient requiring increased oxygen throughout the day.  Home Symbicort started.  Started on scheduled duonebs.  No significant wheezing on repeat exams.  BNP wnl.  Has had 3 weeks of a productive cough.  CXR obtained revealing a new patchy RLL opacity with new small pleural effusion.    - started on Ceftriaxone and Doxycycline (no Azithromycin due to SSRI).   - continue scheduled duonebs.  - consider steroids if wheezing persists, however no significant wheezing on subsequent exams today.  - per CXR - consider CT chest in am as right lung mass could not be excluded.        Patient discussed with night hospitalist and transferred to inpatient status.      Torrie Lagos MS, PA-C  Hospitalist Physician Assistant  [facility]  Pager: [phone]      Subjective & Interval Hx:    Patient reports left knee pain with radiation down to her foot.  No recent trauma.  Reports chronic back pain has been at baseline.  Hx of spinal surgery in the past.  Denies hip pain.  No chest pain or shortness of breath.  Has a chronic cough and intermittent wheezing at baseline.  Uses home oxygen 2 liters at night.    Reasessed after morning breakfast with patient complaining of RUQ pain with radiation around to the back with nausea.  Had eaten marcos and hashbrowns.  Has had intermittent pain similar to this before.  Has her gallbladder.  Also has had increased reflux today.  Known antonio's and hiatel hernia.  Had a good BM yesterday.  No dysuria.      Last 24 hr care team notes reviewed.   ROS:  4 point ROS including Respiratory, CV, GI and , other than that noted in the HPI, is negative.    Physical Exam:    Blood pressure 139/67, pulse 66, temperature 96.7  F (35.9  C), temperature source Oral, resp. rate 16, height 1.524 m (5'), weight 106.5 kg (234 lb 11.2 oz), SpO2 91 %, not currently breastfeeding.  General: Alert, interactive, NAD  HEENT:  AT/NC, sclera anicteric, PERRL, EOMI, OP clear with MMM  Neck: Supple, no JVD or cervical LAD  Resp: decreased breath sounds at the bases, no crackles or wheezes  Cardiac: regular rate and rhythm, no murmur  Abdomen: Soft, mild RUQ tenderness, nondistended. +BS.    Extremities: Right knee pain with minimal movement.  No overlying effusion.  Has a small bruise superior to the knee.  1+ edema equal bilaterally.  Skin: Warm and dry, no erythema.  Neuro: Alert & oriented x 3, Cns 2-12 intact, moves all extremities equally    Labs & Images:  Reviewed in Epic   Medications:    Current Facility-Administered Medications   Medication     LANsoprazole (PREVACID) CR capsule 30 mg     ferrous sulfate (IRON) tablet 325 mg     budesonide-formoterol (SYMBICORT) 160-4.5 MCG/ACT Inhaler 2 puff     polyethylene glycol (MIRALAX/GLYCOLAX) Packet 17 g     ipratropium - albuterol 0.5 mg/2.5 mg/3 mL (DUONEB) neb solution 3 mL     alum & mag hydroxide-simethicone (MYLANTA ES/MAALOX  ES) suspension 30 mL     acetaminophen (TYLENOL) tablet 975 mg     acetaminophen (TYLENOL) Suppository 650 mg     naloxone (NARCAN) injection 0.1-0.4 mg     ondansetron (ZOFRAN-ODT) ODT tab 4 mg    Or     ondansetron (ZOFRAN) injection 4 mg     lidocaine 1 % 1 mL     lidocaine (LMX4) kit     sodium chloride (PF) 0.9% PF flush 3 mL     sodium chloride (PF) 0.9% PF flush 3 mL     senna-docusate (SENOKOT-S;PERICOLACE) 8.6-50 MG per tablet 1 tablet    Or     senna-docusate (SENOKOT-S;PERICOLACE) 8.6-50 MG per tablet 2 tablet     polyethylene glycol (MIRALAX/GLYCOLAX) Packet 17 g     ibuprofen (ADVIL/MOTRIN) tablet 400 mg     oxyCODONE IR (ROXICODONE) tablet 5 mg     HYDROmorphone (DILAUDID) injection 0.2 mg     hydrOXYzine (ATARAX) tablet 25 mg    Or     hydrOXYzine (ATARAX) tablet 50 mg     albuterol neb solution 1.25 mg     cetirizine (zyrTEC) tablet 10 mg     citalopram (celeXA) tablet 40 mg     losartan (COZAAR) tablet 100 mg     metoprolol (TOPROL-XL) half-tab  12.5 mg     melatonin tablet 1 mg

## 2017-12-26 ENCOUNTER — APPOINTMENT (OUTPATIENT)
Dept: NUCLEAR MEDICINE | Facility: CLINIC | Age: 82
DRG: 189 | End: 2017-12-26
Attending: PHYSICIAN ASSISTANT
Payer: COMMERCIAL

## 2017-12-26 ENCOUNTER — APPOINTMENT (OUTPATIENT)
Dept: PHYSICAL THERAPY | Facility: CLINIC | Age: 82
DRG: 189 | End: 2017-12-26
Attending: PHYSICIAN ASSISTANT
Payer: COMMERCIAL

## 2017-12-26 LAB
FLUAV+FLUBV RNA SPEC QL NAA+PROBE: NEGATIVE
FLUAV+FLUBV RNA SPEC QL NAA+PROBE: NEGATIVE
GLUCOSE BLDC GLUCOMTR-MCNC: 320 MG/DL (ref 70–99)
GLUCOSE BLDC GLUCOMTR-MCNC: 333 MG/DL (ref 70–99)
HBA1C MFR BLD: 5.8 % (ref 4.3–6)
PROCALCITONIN SERPL-MCNC: <0.05 NG/ML
RSV RNA SPEC NAA+PROBE: NEGATIVE
SPECIMEN SOURCE: NORMAL

## 2017-12-26 PROCEDURE — 25000128 H RX IP 250 OP 636: Performed by: INTERNAL MEDICINE

## 2017-12-26 PROCEDURE — 78227 HEPATOBIL SYST IMAGE W/DRUG: CPT

## 2017-12-26 PROCEDURE — 25000125 ZZHC RX 250: Performed by: INTERNAL MEDICINE

## 2017-12-26 PROCEDURE — 84145 PROCALCITONIN (PCT): CPT | Performed by: INTERNAL MEDICINE

## 2017-12-26 PROCEDURE — 34300033 ZZH RX 343: Performed by: INTERNAL MEDICINE

## 2017-12-26 PROCEDURE — 83036 HEMOGLOBIN GLYCOSYLATED A1C: CPT | Performed by: INTERNAL MEDICINE

## 2017-12-26 PROCEDURE — 00000146 ZZHCL STATISTIC GLUCOSE BY METER IP

## 2017-12-26 PROCEDURE — 25000131 ZZH RX MED GY IP 250 OP 636 PS 637: Performed by: INTERNAL MEDICINE

## 2017-12-26 PROCEDURE — 25000132 ZZH RX MED GY IP 250 OP 250 PS 637: Performed by: PHYSICIAN ASSISTANT

## 2017-12-26 PROCEDURE — 25000132 ZZH RX MED GY IP 250 OP 250 PS 637: Performed by: HOSPITALIST

## 2017-12-26 PROCEDURE — A9537 TC99M MEBROFENIN: HCPCS | Performed by: INTERNAL MEDICINE

## 2017-12-26 PROCEDURE — 25000125 ZZHC RX 250: Performed by: PHYSICIAN ASSISTANT

## 2017-12-26 PROCEDURE — 36415 COLL VENOUS BLD VENIPUNCTURE: CPT | Performed by: INTERNAL MEDICINE

## 2017-12-26 PROCEDURE — 99221 1ST HOSP IP/OBS SF/LOW 40: CPT | Performed by: CLINICAL NURSE SPECIALIST

## 2017-12-26 PROCEDURE — 40000275 ZZH STATISTIC RCP TIME EA 10 MIN

## 2017-12-26 PROCEDURE — 25000132 ZZH RX MED GY IP 250 OP 250 PS 637: Performed by: INTERNAL MEDICINE

## 2017-12-26 PROCEDURE — 97161 PT EVAL LOW COMPLEX 20 MIN: CPT | Mod: GP

## 2017-12-26 PROCEDURE — 25000128 H RX IP 250 OP 636: Performed by: PHYSICIAN ASSISTANT

## 2017-12-26 PROCEDURE — 94640 AIRWAY INHALATION TREATMENT: CPT

## 2017-12-26 PROCEDURE — 3E0U33Z INTRODUCTION OF ANTI-INFLAMMATORY INTO JOINTS, PERCUTANEOUS APPROACH: ICD-10-PCS | Performed by: PHYSICIAN ASSISTANT

## 2017-12-26 PROCEDURE — 87631 RESP VIRUS 3-5 TARGETS: CPT | Performed by: INTERNAL MEDICINE

## 2017-12-26 PROCEDURE — 40000193 ZZH STATISTIC PT WARD VISIT

## 2017-12-26 PROCEDURE — 97116 GAIT TRAINING THERAPY: CPT | Mod: GP

## 2017-12-26 PROCEDURE — 94640 AIRWAY INHALATION TREATMENT: CPT | Mod: 76

## 2017-12-26 PROCEDURE — 12000000 ZZH R&B MED SURG/OB

## 2017-12-26 PROCEDURE — 99233 SBSQ HOSP IP/OBS HIGH 50: CPT | Performed by: INTERNAL MEDICINE

## 2017-12-26 RX ORDER — PREDNISONE 20 MG/1
40 TABLET ORAL DAILY
Status: DISCONTINUED | OUTPATIENT
Start: 2017-12-26 | End: 2017-12-27 | Stop reason: HOSPADM

## 2017-12-26 RX ORDER — DOXYCYCLINE HYCLATE 100 MG
100 TABLET ORAL EVERY 12 HOURS SCHEDULED
Status: DISCONTINUED | OUTPATIENT
Start: 2017-12-26 | End: 2017-12-27

## 2017-12-26 RX ORDER — KIT FOR THE PREPARATION OF TECHNETIUM TC 99M MEBROFENIN 45 MG/10ML
6 INJECTION, POWDER, LYOPHILIZED, FOR SOLUTION INTRAVENOUS ONCE
Status: COMPLETED | OUTPATIENT
Start: 2017-12-26 | End: 2017-12-26

## 2017-12-26 RX ORDER — NICOTINE POLACRILEX 4 MG
15-30 LOZENGE BUCCAL
Status: DISCONTINUED | OUTPATIENT
Start: 2017-12-26 | End: 2017-12-27 | Stop reason: HOSPADM

## 2017-12-26 RX ORDER — SINCALIDE 5 UG/5ML
0.02 INJECTION, POWDER, LYOPHILIZED, FOR SOLUTION INTRAVENOUS ONCE
Status: COMPLETED | OUTPATIENT
Start: 2017-12-26 | End: 2017-12-26

## 2017-12-26 RX ORDER — DEXTROSE MONOHYDRATE 25 G/50ML
25-50 INJECTION, SOLUTION INTRAVENOUS
Status: DISCONTINUED | OUTPATIENT
Start: 2017-12-26 | End: 2017-12-27 | Stop reason: HOSPADM

## 2017-12-26 RX ADMIN — ACETAMINOPHEN 975 MG: 325 TABLET, FILM COATED ORAL at 15:35

## 2017-12-26 RX ADMIN — CETIRIZINE HYDROCHLORIDE 10 MG: 10 TABLET, FILM COATED ORAL at 21:06

## 2017-12-26 RX ADMIN — ACETAMINOPHEN 975 MG: 325 TABLET, FILM COATED ORAL at 02:34

## 2017-12-26 RX ADMIN — BUDESONIDE AND FORMOTEROL FUMARATE DIHYDRATE 2 PUFF: 160; 4.5 AEROSOL RESPIRATORY (INHALATION) at 07:51

## 2017-12-26 RX ADMIN — MEBROFENIN 6 MILLICURIE: 45 INJECTION, POWDER, LYOPHILIZED, FOR SOLUTION INTRAVENOUS at 12:04

## 2017-12-26 RX ADMIN — PREDNISONE 40 MG: 20 TABLET ORAL at 15:35

## 2017-12-26 RX ADMIN — FERROUS SULFATE TAB 325 MG (65 MG ELEMENTAL FE) 325 MG: 325 (65 FE) TAB at 21:05

## 2017-12-26 RX ADMIN — LANSOPRAZOLE 30 MG: 15 CAPSULE, DELAYED RELEASE ORAL at 08:34

## 2017-12-26 RX ADMIN — POLYETHYLENE GLYCOL 3350 17 G: 17 POWDER, FOR SOLUTION ORAL at 21:05

## 2017-12-26 RX ADMIN — SINCALIDE 2.2 MCG: 5 INJECTION, POWDER, LYOPHILIZED, FOR SOLUTION INTRAVENOUS at 12:50

## 2017-12-26 RX ADMIN — CEFTRIAXONE 2 G: 2 INJECTION, POWDER, FOR SOLUTION INTRAMUSCULAR; INTRAVENOUS at 18:22

## 2017-12-26 RX ADMIN — BUDESONIDE AND FORMOTEROL FUMARATE DIHYDRATE 2 PUFF: 160; 4.5 AEROSOL RESPIRATORY (INHALATION) at 19:19

## 2017-12-26 RX ADMIN — ACETAMINOPHEN 975 MG: 325 TABLET, FILM COATED ORAL at 21:05

## 2017-12-26 RX ADMIN — LOSARTAN POTASSIUM 100 MG: 25 TABLET, FILM COATED ORAL at 08:34

## 2017-12-26 RX ADMIN — DOXYCYCLINE 100 MG: 100 INJECTION, POWDER, LYOPHILIZED, FOR SOLUTION INTRAVENOUS at 08:35

## 2017-12-26 RX ADMIN — ACETAMINOPHEN 975 MG: 325 TABLET, FILM COATED ORAL at 08:34

## 2017-12-26 RX ADMIN — CITALOPRAM HYDROBROMIDE 40 MG: 20 TABLET ORAL at 21:06

## 2017-12-26 RX ADMIN — METOPROLOL SUCCINATE 12.5 MG: 25 TABLET, EXTENDED RELEASE ORAL at 21:06

## 2017-12-26 RX ADMIN — IBUPROFEN 400 MG: 400 TABLET ORAL at 03:58

## 2017-12-26 RX ADMIN — POLYETHYLENE GLYCOL 3350 17 G: 17 POWDER, FOR SOLUTION ORAL at 13:57

## 2017-12-26 RX ADMIN — IPRATROPIUM BROMIDE AND ALBUTEROL SULFATE 3 ML: .5; 3 SOLUTION RESPIRATORY (INHALATION) at 07:44

## 2017-12-26 RX ADMIN — IPRATROPIUM BROMIDE AND ALBUTEROL SULFATE 3 ML: .5; 3 SOLUTION RESPIRATORY (INHALATION) at 19:15

## 2017-12-26 RX ADMIN — IPRATROPIUM BROMIDE AND ALBUTEROL SULFATE 3 ML: .5; 3 SOLUTION RESPIRATORY (INHALATION) at 15:43

## 2017-12-26 RX ADMIN — DOXYCYCLINE HYCLATE 100 MG: 100 TABLET, FILM COATED ORAL at 21:06

## 2017-12-26 RX ADMIN — Medication 1 MG: at 21:05

## 2017-12-26 RX ADMIN — IBUPROFEN 400 MG: 400 TABLET ORAL at 14:22

## 2017-12-26 RX ADMIN — INSULIN ASPART 4 UNITS: 100 INJECTION, SOLUTION INTRAVENOUS; SUBCUTANEOUS at 17:47

## 2017-12-26 NOTE — PLAN OF CARE
Problem: Patient Care Overview  Goal: Discharge Needs Assessment  Outcome: No Change  [id number]: Pt resting comfortably. VSS on 2L O2- using CPAP with 2L at night. Pain managed with scheduled tylenol and ibuprofen. On IV abx. NPO since 0400 for HIDA scan today. UC pending. Pt not able to give sputum sample. Transfers with Ax1 with walker, DALLAS. Will continue to monitor.

## 2017-12-26 NOTE — PROGRESS NOTES
ORTHOPEDICS PROGRESS NOTE    The patient was seen and examined and a left knee intra-articular corticosteroid injection discussed in detail. We discussed the potential risks and benefits of the injection with regard to the patient's left knee degenerative joint disease and degenerative meniscal tearing. After a full discussion she would like to proceed with the left knee intra-articular corticosteroid injection, and after appropriate betadine prepping, the injection was performed without complication. The patient will continue to monitor her symptoms and will follow-up with orthopedics as needed on an outpatient basis.     Jignesh Linn PA-C

## 2017-12-26 NOTE — PLAN OF CARE
Problem: Pain, Acute (Adult)  Goal: Identify Related Risk Factors and Signs and Symptoms  Related risk factors and signs and symptoms are identified upon initiation of Human Response Clinical Practice Guideline (CPG).    A&O x 4.  Denies pain in L knee.  Complains of 1/10 pain in L ankle.  PRN Tylenol given.  Able to ambulate with SBA and walker after injection.  Continues to have intermittent 2/10 R side abdominal pain.  HIDA scan results pending.  Denies nausea.  Needing 2L oxygen with activity. Has significant D[place] and wheezes with activity.  Lungs diminished throughout and breaths shallow.  Patient encouraged to take deep breaths and given IS.  Sating 90-92% on RA at rest. This is baseline per family.

## 2017-12-26 NOTE — CONSULTS
"[facility]  Pain Service Consultation   Text Page    Date of Admission:  12/24/2017    Assessment & Plan   Tatyana Metcalf is a 85 year old female who was admitted on 12/24/2017. I was asked by Hospitalist Jaylen Woods Do to see the patient for left knee pain.    I met Adrianna as she was walking from the bathroom to the bed without discomfort. She indicated \"It's all mind over matter\". She explained that her knee pain has resolved since the steroid injection earlier today. We did discuss her history of endometrial cancer with lung mets as he had Cyber Knife treatment at [facility] per Johanne Pace MD. Her goal \"is to have 10 years to finish writing my life story\". She has excellent support from her family. She explained that she is a stubborn woman who didn't want to come to the hospital, but \"the good Lord had a plan because they found I had a gallbladder attach and pneumonia\".        1) Acute left leg pain due to osteoarthritis and 12/25/2017 MR knee demonstrates no evidence of fracture  Steroid injection per ortho earlier today     2)  Patient does not take opiates  [facility] database review: No prescriptions for opiates in the past year  0 mg Daily Morphine Equivalent  Patient is opioid naive     Patient's opioid use thus far: 15 mg Oxycodone during the first day = 22.5 mg Daily Morphine Equivalent    3)  Opioid induced side-effects:  -Constipation - chronic problem  -Nausea/Vomit - No  -Sedation - No    4) Other/Related:    Obesity  KAITLIN - CPAP  COPD  Fox's esophagus  Endometrial cancer metastasis to the lungs diagnosed 2013         PLAN:   1) Tylenol 975 mg every 6 hours     2) Multimodal Medication Therapy  Topical: Voltaran topically prn  NSAIDS: Stopped due to initiation of Prednisone 40 mg daily   Muscle Relaxants: No  Adjuvants: No  Antidepressants/anxiolytics: Chronic Celexa 40 mg daily   Opioids: Oxycodone 2.5 to 5 mg as needed for pain    2)  Non-medication interventions  Ice or heat " "for comfort  Distraction  Physical therapy - ambulates with walker     3)  Constipation Prophylaxis  Chronic Trish lax BID; Senna-S prn    4) DC safety  -Doubt she will need opioid prescription for dismissal as pain is resolved with steroid injection. If she should need a small prescription I have placed the Opioid Safety information in After Visit Summary (AVS)    Time Spent on this Encounter   I spent  25 minutes in assessment and discussion with the patient.  Another 20 minutes in review of chart, documentation and discussion with the health care team.    Jill Farrell MS, RN, CNS, APRN, ACHPN, FAACVPR  Pain and Palliative Care  Pager [phone]  Office [phone]         Reason for Consult   Reason for consult: I was asked by Hospitalist Jaylen Woods Do to see the patient for left knee pain.      Primary Care Physician   Primary Care Physician:Ayaz Fernandez MD  Pain Specialist: None    Chief Complaint   left leg pain      History is obtained from the patient and electronic health record    History of Present Illness   Tatyana Metcalf is a 85 year old female who presents with right knee pain. He history is significant for endometrial cancer, Fox's esophagus, COPD and obesity    CURRENT PAIN:  Her pain is located in the right knee  It is described as Miserable, Sharp and Throbbing  She rates it as ranging between 0/10 and 10/10  The average is 5/10 on a scale of 0-10  Currently it is rated as 1 or 2/10  It improves by \"that shot in the leg was perfect\"  It worsens by walking too far  She has been compliant with the recommendations while in the hospital.      Past Medical History   I have reviewed this patient's medical history and updated it with pertinent information if needed.   Past Medical History:   Diagnosis Date     Fox's esophagus 10/3/2014    last EGD in 2011     Endometrial cancer (H) 2000    treated surgically, did not have chemo or XRT     SNHL (sensorineural hearing loss) " 9/15/2015       Past Surgical History   I have reviewed this patient's surgical history and updated it with pertinent information if needed.  Past Surgical History:   Procedure Laterality Date     APPENDECTOMY  1952     HYSTERECTOMY TOTAL ABDOMINAL, BILATERAL SALPINGO-OOPHORECTOMY, COMBINED  2000         Prior to Admission Medications   Prior to Admission Medications   Prescriptions Last Dose Informant Patient Reported? Taking?   Lansoprazole (PREVACID PO) 12/23/2017 at pm Self Yes Yes   Sig: Take 30 mg by mouth every evening    PROAIR RESPICLICK 108 (90 BASE) MCG/ACT AEPB 12/24/2017 at Unknown time  Yes Yes   Sig: INL 2 PUFFS PO Q 4 TO 6 H PRN   albuterol (ACCUNEB) 1.25 MG/3ML nebulizer solution 12/24/2017 at am  No Yes   Sig: Take 1 vial (1.25 mg) by nebulization every 4 hours as needed for shortness of breath / dyspnea or wheezing   alendronate (FOSAMAX) 70 MG tablet 12/17/2017 at am  No Yes   Sig: Take 1 tablet (70 mg) by mouth every 7 days Take with over 8 ounces water and stay upright for at least 30 minutes after dose.  Take at least 60 minutes before breakfast   azelastine (ASTELIN) 0.1 % spray 12/24/2017 at am  No Yes   Sig: Spray 1-2 sprays into both nostrils 2 times daily   budesonide-formoterol (SYMBICORT) 160-4.5 MCG/ACT Inhaler 12/24/2017 at am  Yes Yes   Sig: Inhale 2 puffs into the lungs 2 times daily   cetirizine (ZYRTEC) 10 MG tablet 12/23/2017 at pm Self Yes Yes   Sig: Take 1 tablet (10 mg) by mouth every evening   cholecalciferol (VITAMIN D3) 1000 UNIT tablet 12/23/2017 at pm  Yes Yes   Sig: Take 1,000 Units by mouth every evening   citalopram (CELEXA) 20 MG tablet 12/23/2017 at pm  Yes Yes   Sig: Take 40 mg by mouth At Bedtime   ferrous sulfate (IRON) 325 (65 FE) MG tablet 12/23/2017 at pm  Yes Yes   Sig: Take 325 mg by mouth every evening    fluticasone (FLONASE) 50 MCG/ACT spray 12/24/2017 at am  Yes Yes   Sig: Spray 1 spray into both nostrils daily   furosemide (LASIX) 20 MG tablet 12/23/2017  at am  No Yes   Sig: Take 1 tablet (20 mg) by mouth daily as needed   lidocaine (ASPERCREME W/LIDOCAINE) 4 % CREA cream 12/24/2017 at am  Yes Yes   Sig: Apply topically once as needed for mild pain   losartan (COZAAR) 100 MG tablet 12/23/2017 at pm  Yes Yes   Sig: Take 100 mg by mouth every evening   metoprolol (TOPROL-XL) 25 MG 24 hr tablet 12/23/2017 at pm  Yes Yes   Sig: Take 12.5 mg by mouth every evening   naproxen sodium (ALEVE) 220 MG tablet 12/24/2017 at am  Yes Yes   Sig: Take 220 mg by mouth daily as needed for moderate pain   nystatin (MYCOSTATIN) 340696 UNIT/GM POWD More than a month at Unknown time  No No   Sig: Apply topically 3 times daily as needed   polyethylene glycol (MIRALAX/GLYCOLAX) powder 12/24/2017 at am  Yes Yes   Sig: Take 17 g by mouth 2 times daily      Facility-Administered Medications: None     Allergies   Allergies   Allergen Reactions     Penicillins      hives     Sulfa Drugs      Rash         Social History   I have reviewed this patient's social history and updated it with pertinent information if needed. Tatyana Metcalf  reports that she has never smoked. She has never used smokeless tobacco. She reports that she drinks alcohol. She reports that she does not use illicit drugs.    Family History   I have reviewed this patient's family history and updated it with pertinent information if needed.   Family History   Problem Relation Age of Onset     HEART DISEASE Mother      HEART DISEASE Father        Family history of addiction No    Review of Systems   The 10 point Review of Systems is negative other than noted in the HPI or here.    Denies Bowel or bladder dysfunction (does have chronic infrequent bladder incontinence)    Physical Exam   Temp:  [97  F (36.1  C)-97.7  F (36.5  C)] 97.7  F (36.5  C)  Heart Rate:  [58-78] 64  Resp:  [16-22] 22  BP: (119-143)/(46-62) 119/46  SpO2:  [91 %-94 %] 91 %  237 lbs 11.2 oz  GEN:  Alert, oriented x 3, appears comfortable, NAD.  HEENT:   Normocephalic/atraumatic, no scleral icterus, no nasal discharge, mouth moist.  CV:  RRR, S1, S2; no murmurs or other irregularities noted.  +3 DP/PT pulses bilaterally; no edema BLE.  RESP:  Clear to auscultation bilaterally without rales/rhonchi/wheezing/retractions.  Symmetric chest rise on inhalation noted.  Normal respiratory effort.  ABD:  Rounded, soft, non-tender/non-distended.  +BS  EXT:  CMS intact x 4.     M/S:   Right knee is slightly tender to palpation.    SKIN:  Warm and dry to touch, no exanthems noted in the visualized areas.    NEURO: Symmetric strength +5/5.  Sensation to touch intact all extremities.   There is no area of allodynia or hyperesthesia.  PAIN BEHAVIOR: Cooperative  Psych:  Normal affect.  Calm, cooperative, conversant appropriately.       Data   Results for orders placed or performed during the hospital encounter of 12/24/17   Tib/Fib XR, left    Narrative    LEFT TIBIA AND FIBULA TWO VIEWS  12/24/2017 5:30 PM     HISTORY: Evaluate nontraumatic leg pain.        Impression    IMPRESSION: Two views of the left tibia and fibula are performed.  Medial, lateral and patellofemoral joint compartment osteoarthritis is  noted in the left knee. No fracture or dislocation is otherwise  appreciated.         AMY MENON MD   Femur XR,  2 views, left    Narrative    LEFT FEMUR TWO VIEWS  12/24/2017 5:31 PM     HISTORY: Evaluate nontraumatic leg pain.        Impression    IMPRESSION: Two views of the left hip demonstrate no fracture or  dislocation. Note that the bones are osteopenic and study is slightly  limited due to patient body habitus. If there is high clinical  suspicion for hip fracture a follow-up MRI could be performed for  further assessment.         AMY MENON MD   US Lower Extremity Venous Duplex Left    Narrative    VENOUS ULTRASOUND LEFT LEG  12/24/2017 4:36 PM     HISTORY: Left leg pain, evaluate for DVT.      COMPARISON: 9/28/2016.    FINDINGS:  Examination of the deep veins with  graded compression and  color flow Doppler with spectral wave form analysis shows no evidence  of thrombus in the common femoral vein, femoral vein, popliteal vein  or calf veins.       Impression    IMPRESSION: No evidence of deep venous thrombosis.       BENJAMIN DAO MD   MR Knee Left w/o Contrast    Narrative    MR KNEE LEFT WITHOUT CONTRAST 12/25/2017 3:50 PM    HISTORY: Atraumatic left knee pain for one day. Evaluate for stress  fracture.     TECHNIQUE: Axial and coronal T2 with fat suppression. Coronal T1.  Sagittal dual echo T2.    COMPARISON: None.    FINDINGS:   Medial Meniscus: Marked degeneration, maceration and complex tearing  throughout the posterior horn and mid body with peripheral extrusion  of the mid body. The anterior horn is unremarkable. No displaced  meniscal fragments or flaps.       Lateral Meniscus: Intrasubstance myxoid degenerative change throughout  the lateral meniscus. Possible small longitudinal tear at the free  edge in the mid body (image 21 of series 6). Mild blunting of the free  edge in the posterior horn, likely from degenerative fraying. No  displaced meniscal fragments or flaps.       Anterior Cruciate Ligament: Unremarkable.     Posterior Cruciate Ligament: Unremarkable.     Medial Collateral Ligament: Unremarkable.    Lateral Collateral Ligament Complex, Popliteus Tendon: The iliotibial  band, fibular collateral ligament, biceps femoris tendon, and  popliteus tendon are unremarkable.    Osseous and Cartilaginous Structures: No acute-appearing bony  abnormality. In particular, there is no evidence for stress injury.  Diffuse grade IV chondromalacia weightbearing medial compartment,  associated with prominent degenerative marginal bony spurring.  Articular cartilages in the lateral compartment show no significant  thinning or irregularity, but there is prominent degenerative marginal  bony spurring. Diffuse grade III and grade IV chondromalacia related  to the lateral  patellar facet and lateral aspect of the trochlear  groove, with associated subchondral cystic changes and bone marrow  edema. Articular cartilages at the medial aspect of the patellofemoral  joint appear normal.    Extensor Mechanism: The quadriceps and patellar tendons are  unremarkable. The medial and lateral patellar retinacula are normal.    Joint Space: Small joint effusion. No definite loose bodies  appreciated.    Additional Findings: No popliteal cyst. No semimembranosus-tibial  collateral ligament or pes anserine bursitis.      Impression    IMPRESSION:   1. No evidence for bony stress injury.  2. Degeneration, maceration and complex tearing throughout the  posterior horn and mid body of the medial meniscus, with peripheral  extrusion of the mid body.  3. Degenerative changes lateral meniscus with possible small  longitudinal tear at the free edge in the mid body.  4. Advanced chondromalacia weightbearing medial compartment and  lateral aspect of the patellofemoral compartment.  5. Small joint space effusion.     US Abdomen Limited    Narrative    US ABDOMEN LIMITED 12/25/2017 4:27 PM     HISTORY: Right upper quadrant pain after eating. Last ate today around  8:00 a.m.      COMPARISON: CT chest, abdomen and pelvis 2/10/2017.    FINDINGS: Liver is increased in echogenicity without focal lesions.  The gallbladder demonstrates mild gallbladder wall thickening at  nearly 4 mm, but no gallstones or sludge is appreciated. No  pericholecystic fluid. This may be due to incomplete distention.  Common bile duct is normal in diameter. Pancreas obscured by overlying  bowel gas. Examination of the right kidney is unremarkable, but there  is a small cyst in the lateral right kidney measuring 2.4 cm,  unchanged since prior CT.      Impression    IMPRESSION: Fatty infiltration of the liver without obvious mass. Mild  gallbladder wall thickening without evidence of gallstones or bile  duct dilatation. Follow-up  hepatobiliary scan if clinically warranted  for further assessment. Simple cyst right kidney unchanged since prior  CT.    AMY MENON MD   XR Chest 2 Views    Narrative    CHEST TWO VIEWS    12/25/2017 4:05 PM     HISTORY: Hypoxia.    COMPARISON: 2/23/2016.      Impression    IMPRESSION: Postoperative changes in the lower lungs are seen again  bilaterally. There is a new patchy airspace opacity at the right lower  lobe that could represent pneumonia, although a lung mass cannot be  excluded. New small right pleural effusion. Linear scarring or  atelectasis at the left lower lobe is unchanged. No pneumothorax.  Cardiac silhouette within normal limits.    BENJAMIN DAO MD   NM Hepatobiliary Scan w GB EF    Narrative    NUCLEAR MEDICINE HEPATOBILIARY SCAN WITH GB EF December 26, 2017 2:06  PM     HISTORY: Right upper quadrant pain after eating. Ultrasound with  thickened GB.    TECHNIQUE: 6.0 mCi 99mTc-MEBROFENIN,  2.2 mcg CCK.    COMPARISON:   Nuclear Study: None.  Other Relevant Studies: Ultrasound 12/25/2017.    FINDINGS: There is normal excretion of activity from the hepatic  parenchyma into the biliary tree, reaching the gallbladder in 10  minutes indicating patency of the cystic duct. There is normal  accumulation in the gallbladder but absence of activity in the  duodenum through 60 minutes. Activity enters the duodenum after  cholecystokinin was administered. Gallbladder ejection fraction is  30%.      Impression    IMPRESSION:  1. Patent cystic duct.  2. Spasm of the sphincter of Oddi relieved by cholecystokinin  administration.  3. Slightly decreased gallbladder ejection fraction of 30%. Clinical  significance of this finding is unclear. Low normal range is 35%.         FAB WILLIS MD   CBC with platelets differential   Result Value Ref Range    WBC 7.8 4.0 - 11.0 10e9/L    RBC Count 4.48 3.8 - 5.2 10e12/L    Hemoglobin 13.8 11.7 - 15.7 g/dL    Hematocrit 43.1 35.0 - 47.0 %    MCV 96 78 - 100 fl    MCH  30.8 26.5 - 33.0 pg    MCHC 32.0 31.5 - 36.5 g/dL    RDW 13.4 10.0 - 15.0 %    Platelet Count 264 150 - 450 10e9/L    Diff Method Automated Method     % Neutrophils 69.4 %    % Lymphocytes 17.5 %    % Monocytes 8.8 %    % Eosinophils 3.1 %    % Basophils 0.8 %    % Immature Granulocytes 0.4 %    Nucleated RBCs 0 0 /100    Absolute Neutrophil 5.4 1.6 - 8.3 10e9/L    Absolute Lymphocytes 1.4 0.8 - 5.3 10e9/L    Absolute Monocytes 0.7 0.0 - 1.3 10e9/L    Absolute Eosinophils 0.2 0.0 - 0.7 10e9/L    Absolute Basophils 0.1 0.0 - 0.2 10e9/L    Abs Immature Granulocytes 0.0 0 - 0.4 10e9/L    Absolute Nucleated RBC 0.0    Basic metabolic panel   Result Value Ref Range    Sodium 140 133 - 144 mmol/L    Potassium 4.2 3.4 - 5.3 mmol/L    Chloride 104 94 - 109 mmol/L    Carbon Dioxide 33 (H) 20 - 32 mmol/L    Anion Gap 3 3 - 14 mmol/L    Glucose 103 (H) 70 - 99 mg/dL    Urea Nitrogen 18 7 - 30 mg/dL    Creatinine 0.63 0.52 - 1.04 mg/dL    GFR Estimate 90 >60 mL/min/1.7m2    GFR Estimate If Black >90 >60 mL/min/1.7m2    Calcium 9.3 8.5 - 10.1 mg/dL   INR   Result Value Ref Range    INR 1.05 0.86 - 1.14   Partial thromboplastin time   Result Value Ref Range    PTT 34 22 - 37 sec   Basic metabolic panel   Result Value Ref Range    Sodium 138 133 - 144 mmol/L    Potassium 4.4 3.4 - 5.3 mmol/L    Chloride 105 94 - 109 mmol/L    Carbon Dioxide 28 20 - 32 mmol/L    Anion Gap 5 3 - 14 mmol/L    Glucose 179 (H) 70 - 99 mg/dL    Urea Nitrogen 26 7 - 30 mg/dL    Creatinine 0.67 0.52 - 1.04 mg/dL    GFR Estimate 84 >60 mL/min/1.7m2    GFR Estimate If Black >90 >60 mL/min/1.7m2    Calcium 8.8 8.5 - 10.1 mg/dL   CBC with platelets   Result Value Ref Range    WBC 7.3 4.0 - 11.0 10e9/L    RBC Count 4.24 3.8 - 5.2 10e12/L    Hemoglobin 12.9 11.7 - 15.7 g/dL    Hematocrit 40.5 35.0 - 47.0 %    MCV 96 78 - 100 fl    MCH 30.4 26.5 - 33.0 pg    MCHC 31.9 31.5 - 36.5 g/dL    RDW 13.5 10.0 - 15.0 %    Platelet Count 254 150 - 450 10e9/L   UA with  Microscopic   Result Value Ref Range    Color Urine Yellow     Appearance Urine Cloudy     Glucose Urine Negative NEG^Negative mg/dL    Bilirubin Urine Negative NEG^Negative    Ketones Urine Negative NEG^Negative mg/dL    Specific Gravity Urine 1.034 1.003 - 1.035    Blood Urine Negative NEG^Negative    pH Urine 5.0 5.0 - 7.0 pH    Protein Albumin Urine Negative NEG^Negative mg/dL    Urobilinogen mg/dL 2.0 0.0 - 2.0 mg/dL    Nitrite Urine Negative NEG^Negative    Leukocyte Esterase Urine Trace (A) NEG^Negative    Source Midstream Urine     WBC Urine 5 (H) 0 - 2 /HPF    RBC Urine 6 (H) 0 - 2 /HPF    Bacteria Urine Moderate (A) NEG^Negative /HPF    Squamous Epithelial /HPF Urine 26 (H) 0 - 1 /HPF    Mucous Urine Present (A) NEG^Negative /LPF   Hepatic panel   Result Value Ref Range    Bilirubin Direct <0.1 0.0 - 0.2 mg/dL    Bilirubin Total 0.3 0.2 - 1.3 mg/dL    Albumin 3.4 3.4 - 5.0 g/dL    Protein Total 6.9 6.8 - 8.8 g/dL    Alkaline Phosphatase 89 40 - 150 U/L    ALT 15 0 - 50 U/L    AST 13 0 - 45 U/L   Lipase   Result Value Ref Range    Lipase 68 (L) 73 - 393 U/L   Nt probnp inpatient   Result Value Ref Range    N-Terminal Pro BNP Inpatient 314 0 - 1800 pg/mL   Social Work IP Consult    Narrative    Ana Schmitt, LGSW     12/25/2017  2:38 PM  Care Transition Initial Assessment -   Reason For Consult: discharge planning  Met with: Patient and Family    Active Problems:    Left leg pain         DATA  Lives With: alone   Living Arrangements: apartment  Description of Support System: Supportive, Involved  Who is your support system?: Children (daughters)  Support Assessment: Adequate family and caregiver support.   Identified issues/concerns regarding health management:             Quality Of Family Relationships: supportive, involved  Transportation Available: family or friend will provide (changes   could be made depending on what is wrong with pt's knee )      ASSESSMENT  Cognitive Status:  awake, alert and  oriented  Concerns to be addressed:    Pt was admitted 12/24/17 . Pt lives alone and gets help from her   daughters.Pt has an elderly [place]. A nurse who comes in every   other week and a  who also comes in once a week.   Services are provided through [facility]. Equipments owned by   pt are a walker (sitting) and a shower chair. Transportation can   be provided by family for now and other arrangements could be   made depending on what is wrong with pt's knee. If pt needs to go   home with [facility] services pt will use [facility] home care and if   pt needs to go to a TCU, a list of Skilled nursing facilities was   given to pt and family for them to chose from.         PLAN  TBD based on diagnosis of knee      Urine Culture Aerobic Bacterial   Result Value Ref Range    Specimen Description Midstream Urine     Special Requests Specimen received in preservative     Culture Micro PENDING

## 2017-12-26 NOTE — PROGRESS NOTES
[facility]  Hospitalist Progress Note  Jaylen Woods, DO 12/26/2017    Reason for Stay (Diagnosis): Acute hypoxic respiratory failure         Assessment and Plan:      Summary of Stay: Tatyana Metcalf is a 85 year old female admitted on 12/24/2017 with hypoxia    Problem List:   1. Acute hypoxic respiratory failure.  Likely due to pneumonia and COPD exacerbation.  Continue Ceftriaxone and Doxycycline for now.  Check Influenza and procalcitonin.  Continue Symbicort and Duonebs.  Supplemental oxygen as needed.  2. Pneumonia.  Check Influenza and RSV PCR.  Check Procalcitonin.  Continue Ceftriaxone and Doxycycline for now.  3. COPD exacerbation.  Continue Symbicort, Duonebs, and Abx as above.  Start Prednisone 40 mg/day.  4. Left knee pain.  Pain meds PRN.  Appreciate Ortho input.  Had steroid injection earlier today.  Pain team consult.  5. HTN.  Continue Metoprolol, Cozaar.  6. Depression.  Celexa.  7. Abdominal pain.  No pain today.  US with mild gallbladder wall thickness.  HIDA scan results pending.  8. GERD.  Prevacid.  Would not recommend ongoing NSAID use.  Stop scheduled Ibuprofen.  9. Hyperglycemia.  Start Novolog SSI.  DVT Prophylaxis: Pneumatic Compression Devices  Code Status: DNR/DNI.  Discharge Dispo: TCU vs Home with home PT.  Estimated Disch Date / # of Days until Disch: 1-2        Interval History (Subjective):      Short of breath at times.  Occasional cough.  No abdominal pain today.  Left knee pain a little better after steroid injection.  No CP, F/C, N/V, or diarrhea.                  Physical Exam:      Last Vital Signs:  /59 (BP Location: Left arm)  Pulse 66  Temp 97.2  F (36.2  C) (Oral)  Resp 20  Ht 1.524 m (5')  Wt 107.8 kg (237 lb 11.2 oz)  SpO2 94%  BMI 46.42 kg/m2    Gen:  NAD, A&Ox3.  Eyes:  PERRL, sclera anicteric.  OP:  MMM, no lesions.  Neck:  Supple.  CV:  Regular, no murmurs.  Lung:  CTA b/l, normal effort.  Ab:  +BS, soft.  Skin:  Warm, dry to  touch.  No rash.  Ext:  Mild non pitting edema LE b/l.           Medications:      All current medications were reviewed with changes reflected in problem list.         Data:      All new lab and imaging data was reviewed.   Labs:    Recent Labs  Lab 12/25/17  0547      POTASSIUM 4.4   CHLORIDE 105   CO2 28   ANIONGAP 5   *   BUN 26   CR 0.67   GFRESTIMATED 84   GFRESTBLACK >90   [place] 8.8       Recent Labs  Lab 12/25/17  0547   WBC 7.3   HGB 12.9   HCT 40.5   MCV 96         Imaging:   Recent Results (from the past 24 hour(s))   MR Knee Left w/o Contrast    Narrative    MR KNEE LEFT WITHOUT CONTRAST 12/25/2017 3:50 PM    HISTORY: Atraumatic left knee pain for one day. Evaluate for stress  fracture.     TECHNIQUE: Axial and coronal T2 with fat suppression. Coronal T1.  Sagittal dual echo T2.    COMPARISON: None.    FINDINGS:   Medial Meniscus: Marked degeneration, maceration and complex tearing  throughout the posterior horn and mid body with peripheral extrusion  of the mid body. The anterior horn is unremarkable. No displaced  meniscal fragments or flaps.       Lateral Meniscus: Intrasubstance myxoid degenerative change throughout  the lateral meniscus. Possible small longitudinal tear at the free  edge in the mid body (image 21 of series 6). Mild blunting of the free  edge in the posterior horn, likely from degenerative fraying. No  displaced meniscal fragments or flaps.       Anterior Cruciate Ligament: Unremarkable.     Posterior Cruciate Ligament: Unremarkable.     Medial Collateral Ligament: Unremarkable.    Lateral Collateral Ligament Complex, Popliteus Tendon: The iliotibial  band, fibular collateral ligament, biceps femoris tendon, and  popliteus tendon are unremarkable.    Osseous and Cartilaginous Structures: No acute-appearing bony  abnormality. In particular, there is no evidence for stress injury.  Diffuse grade IV chondromalacia weightbearing medial compartment,  associated with  prominent degenerative marginal bony spurring.  Articular cartilages in the lateral compartment show no significant  thinning or irregularity, but there is prominent degenerative marginal  bony spurring. Diffuse grade III and grade IV chondromalacia related  to the lateral patellar facet and lateral aspect of the trochlear  groove, with associated subchondral cystic changes and bone marrow  edema. Articular cartilages at the medial aspect of the patellofemoral  joint appear normal.    Extensor Mechanism: The quadriceps and patellar tendons are  unremarkable. The medial and lateral patellar retinacula are normal.    Joint Space: Small joint effusion. No definite loose bodies  appreciated.    Additional Findings: No popliteal cyst. No semimembranosus-tibial  collateral ligament or pes anserine bursitis.      Impression    IMPRESSION:   1. No evidence for bony stress injury.  2. Degeneration, maceration and complex tearing throughout the  posterior horn and mid body of the medial meniscus, with peripheral  extrusion of the mid body.  3. Degenerative changes lateral meniscus with possible small  longitudinal tear at the free edge in the mid body.  4. Advanced chondromalacia weightbearing medial compartment and  lateral aspect of the patellofemoral compartment.  5. Small joint space effusion.     XR Chest 2 Views    Narrative    CHEST TWO VIEWS    12/25/2017 4:05 PM     HISTORY: Hypoxia.    COMPARISON: 2/23/2016.      Impression    IMPRESSION: Postoperative changes in the lower lungs are seen again  bilaterally. There is a new patchy airspace opacity at the right lower  lobe that could represent pneumonia, although a lung mass cannot be  excluded. New small right pleural effusion. Linear scarring or  atelectasis at the left lower lobe is unchanged. No pneumothorax.  Cardiac silhouette within normal limits.    BENJAMIN DAO MD   US Abdomen Limited    Narrative    US ABDOMEN LIMITED 12/25/2017 4:27 PM     HISTORY: Right  upper quadrant pain after eating. Last ate today around  8:00 a.m.      COMPARISON: CT chest, abdomen and pelvis 2/10/2017.    FINDINGS: Liver is increased in echogenicity without focal lesions.  The gallbladder demonstrates mild gallbladder wall thickening at  nearly 4 mm, but no gallstones or sludge is appreciated. No  pericholecystic fluid. This may be due to incomplete distention.  Common bile duct is normal in diameter. Pancreas obscured by overlying  bowel gas. Examination of the right kidney is unremarkable, but there  is a small cyst in the lateral right kidney measuring 2.4 cm,  unchanged since prior CT.      Impression    IMPRESSION: Fatty infiltration of the liver without obvious mass. Mild  gallbladder wall thickening without evidence of gallstones or bile  duct dilatation. Follow-up hepatobiliary scan if clinically warranted  for further assessment. Simple cyst right kidney unchanged since prior  CT.    AMY MENON MD

## 2017-12-26 NOTE — PROGRESS NOTES
Doing better today  Mri reviewed  Meniscus tear and djd noted  Study discussed with patient  Plan steriod injection today

## 2017-12-26 NOTE — PLAN OF CARE
Problem: Patient Care Overview  Goal: Plan of Care/Patient Progress Review  Outcome: Improving  Requiring 2L O2 NC to keep O2 sats >92%. Other vital signs stable  Pain significantly improved from admission. Ortho did steroid injection into left knee at the bedside today. Had tylenol and ibuprofen and rating pain 1-2/10 in left calf/leg.  LS ex wheezes, nonproductive cough, dyspnea upon exertion- pt states she feels at her baseline resp status. On IV rocephin and PO doxycycline for PNA. PO prednisone ordered. Influenza A/B pending.  Denies nausea or abd pain. Tolerating regular diet after HIDA scan. HIDA scan results pending.  Up with SBA and walker. Able to bear weight on LLE with only minimal pain noted.  Anticipated D/C tomorrow TCU vs home with HHA and PT/OT.

## 2017-12-26 NOTE — PLAN OF CARE
Problem: Patient Care Overview  Goal: Plan of Care/Patient Progress Review  PT: Patient seen by physical therapy for evaluation.  Patient with PMH significant for endometrial cancer, Fox s esophagus, COPD and obesity, now admitted secondary to acute on chronic left knee pain.  Patient reports that she is on 2L NC supplemental O2 at night.  Patient has a 2WW (usually kept in the car, uses it at the store), 4WW (uses within apartment for all mobility), and shower chair.  Patient reports that she is independent with mobility with her walker at baseline, however reports limited activity tolerance at baseline (reported that she will sometimes need to sit on her 4WW within apartment while fixing a meal).    PT will follow patient daily as patient would prefer to discharge to home and demonstrates gait abnormalities.  Discharge Planner PT   Patient plan for discharge: patient would prefer to discharge to home with home PT/OT   Current status: Patient supine upon arrival.  Transferred to sitting at EOB independently.  Patient reporting increased left LE pain (primarily in calf) in dependent position and with weight bearing.  Patient transferred to standing with 4WW and CGA.  Patient able to demonstrate weight bearing through left LE prior to ambulation.  Patient amb 40 feet with 4WW prior to steroid injection with CGA, significant antalgic gait noted, patient reported improved since admission.  Following steriod injection, patient amb 60 feet (trialled 2WW, but preferred 4WW) with improved antalgic gait pattern.  Patient with increased SOB following ambulation with O2 sats at 79-80% on room air; improved to 91-93% with 2L NC.  RN aware.   Barriers to return to prior living situation: decreased activity tolerance with SOB with short distance ambulation, lives alone  Recommendations for discharge: TCU vs Home with home PT/OT pending medical appropriateness.  Rationale for recommendations: Patient with improved ambulation  tolerance following steriod injection, however demonstrates decreased activity tolerance, need for O2 during the day (uses supplemental O2 at night only at baseline) limiting ability to care for self including meal prep, bathing, etc.       Entered by: Melissa Schilling 12/26/2017 10:29 AM

## 2017-12-26 NOTE — PROGRESS NOTES
12/26/17 0956   Quick Adds   Type of Visit Initial PT Evaluation   Living Environment   Lives With alone   Living Arrangements independent living facility   Home Accessibility no concerns   Number of Stairs to Enter Home 0   Number of Stairs Within Home 0   Transportation Available family or friend will provide   Living Environment Comment Patient lives in independent living, receives assist with cleaning, has a nurse that comes in every other week.  Reports she receives [employer] delivery for food, daughter lives nearby and also brings left overs for patient to eat.   Self-Care   Usual Activity Tolerance moderate   Current Activity Tolerance fair   Activity/Exercise/Self-Care Comment Mod I with mobility around apartment, has cleaning services but is otherwise independent with ADLs.  Patient drives to the grocery story   Functional Level Prior   Ambulation 1-->assistive equipment   Transferring 1-->assistive equipment   Toileting 0-->independent   Bathing 1-->assistive equipment   Dressing 0-->independent   Eating 0-->independent   Communication 0-->understands/communicates without difficulty   Swallowing 0-->swallows foods/liquids without difficulty   Cognition 0 - no cognition issues reported   Fall history within last six months no   Which of the above functional risks had a recent onset or change? ambulation   Prior Functional Level Comment Patient reported fatigue with ambulation, utilizes 4WW seat for rest breaks   General Information   Onset of Illness/Injury or Date of Surgery - Date 12/24/17   Patient/Family Goals Statement be able t walk, return to independent living   Pertinent History of Current Problem (include personal factors and/or comorbidities that impact the POC)  Patient with PMH significant for endometrial cancer, Fox's esophagus, COPD and obesity, now admitted secondary to acute on chronic left knee pain.     Precautions/Limitations no known precautions/limitations   Weight-Bearing Status -  LLE weight-bearing as tolerated   Cognitive Status Examination   Orientation orientation to person, place and time   Personal Safety and Judgment intact   Memory intact   Pain Assessment   Patient Currently in Pain (reports pain at left knee/calf with weight bearing)   Integumentary/Edema   Integumentary/Edema Comments mild edema present at left knee   Posture    Posture Forward head position;Protracted shoulders   Range of Motion (ROM)   ROM Comment WFL   Strength   Strength Comments Global strength deficits present, however strength functional during transfers   Bed Mobility   Bed Mobility Comments indpendent   Transfer Skills   Transfer Comments CGA with 4WW sit <>stand   Gait   Gait Comments Following steriod injection, patient amb 60 feet (trialled 2WW, but preferred 4WW) with improved antalgic gait pattern.  Patient with increased SOB following ambulation with O2 sats at 79-80% on room air; improved to 91-93% with 2L NC   Balance   Balance Comments no LOB noted, however patient with antalgic gait and use of UE support at walker   Sensory Examination   Sensory Perception no deficits were identified   Coordination   Coordination no deficits were identified   Muscle Tone   Muscle Tone no deficits were identified   General Therapy Interventions   Planned Therapy Interventions balance training;gait training;strengthening;transfer training;home program guidelines;progressive activity/exercise   Clinical Impression   Criteria for Skilled Therapeutic Intervention yes, treatment indicated   PT Diagnosis decreased activity tolerance, decreased independence with ambulation   Influenced by the following impairments pain   Clinical Presentation Stable/Uncomplicated   Clinical Presentation Rationale clinical judgement   Clinical Decision Making (Complexity) Low complexity   Therapy Frequency` daily   Predicted Duration of Therapy Intervention (days/wks) 5 days   Anticipated Discharge Disposition Home with Home  "Therapy;Transitional Care Facility  (pending progress with therapy, medical appropriateness)   Risk & Benefits of therapy have been explained Yes   Patient, Family & other staff in agreement with plan of care Yes   [facility] TM \"6 Clicks\"   2016, Trustees of [facility], under license to [employer].  All rights reserved.   6 Clicks Short Forms Basic Mobility Inpatient Short Form   [facility] AM-PAC  \"6 Clicks\" V.2 Basic Mobility Inpatient Short Form   1. Turning from your back to your side while in a flat bed without using bedrails? 4 - None   2. Moving from lying on your back to sitting on the side of a flat bed without using bedrails? 4 - None   3. Moving to and from a bed to a chair (including a wheelchair)? 3 - A Little   4. Standing up from a chair using your arms (e.g., wheelchair, or bedside chair)? 3 - A Little   5. To walk in hospital room? 3 - A Little   6. Climbing 3-5 steps with a railing? 2 - A Lot   Basic Mobility Raw Score (Score out of 24.Lower scores equate to lower levels of function) 19   Total Evaluation Time   Total Evaluation Time (Minutes) 5     "

## 2017-12-27 ENCOUNTER — APPOINTMENT (OUTPATIENT)
Dept: PHYSICAL THERAPY | Facility: CLINIC | Age: 82
DRG: 189 | End: 2017-12-27
Payer: COMMERCIAL

## 2017-12-27 VITALS
HEART RATE: 66 BPM | HEIGHT: 60 IN | RESPIRATION RATE: 16 BRPM | OXYGEN SATURATION: 93 % | SYSTOLIC BLOOD PRESSURE: 153 MMHG | WEIGHT: 238.7 LBS | TEMPERATURE: 97.6 F | BODY MASS INDEX: 46.86 KG/M2 | DIASTOLIC BLOOD PRESSURE: 63 MMHG

## 2017-12-27 LAB
ANION GAP SERPL CALCULATED.3IONS-SCNC: 4 MMOL/L (ref 3–14)
BACTERIA SPEC CULT: NORMAL
BUN SERPL-MCNC: 26 MG/DL (ref 7–30)
CALCIUM SERPL-MCNC: 8.9 MG/DL (ref 8.5–10.1)
CHLORIDE SERPL-SCNC: 107 MMOL/L (ref 94–109)
CO2 SERPL-SCNC: 27 MMOL/L (ref 20–32)
CREAT SERPL-MCNC: 0.63 MG/DL (ref 0.52–1.04)
GFR SERPL CREATININE-BSD FRML MDRD: >90 ML/MIN/1.7M2
GLUCOSE BLDC GLUCOMTR-MCNC: 177 MG/DL (ref 70–99)
GLUCOSE BLDC GLUCOMTR-MCNC: 182 MG/DL (ref 70–99)
GLUCOSE SERPL-MCNC: 173 MG/DL (ref 70–99)
GRAM STN SPEC: ABNORMAL
Lab: ABNORMAL
Lab: NORMAL
POTASSIUM SERPL-SCNC: 4.5 MMOL/L (ref 3.4–5.3)
SODIUM SERPL-SCNC: 138 MMOL/L (ref 133–144)
SPECIMEN SOURCE: ABNORMAL
SPECIMEN SOURCE: NORMAL

## 2017-12-27 PROCEDURE — 25000132 ZZH RX MED GY IP 250 OP 250 PS 637: Performed by: PHYSICIAN ASSISTANT

## 2017-12-27 PROCEDURE — 25000125 ZZHC RX 250: Performed by: PHYSICIAN ASSISTANT

## 2017-12-27 PROCEDURE — 87070 CULTURE OTHR SPECIMN AEROBIC: CPT | Performed by: PHYSICIAN ASSISTANT

## 2017-12-27 PROCEDURE — 94640 AIRWAY INHALATION TREATMENT: CPT

## 2017-12-27 PROCEDURE — 25000125 ZZHC RX 250: Performed by: INTERNAL MEDICINE

## 2017-12-27 PROCEDURE — 80048 BASIC METABOLIC PNL TOTAL CA: CPT | Performed by: INTERNAL MEDICINE

## 2017-12-27 PROCEDURE — 94640 AIRWAY INHALATION TREATMENT: CPT | Mod: 76

## 2017-12-27 PROCEDURE — 87205 SMEAR GRAM STAIN: CPT | Performed by: PHYSICIAN ASSISTANT

## 2017-12-27 PROCEDURE — 40000275 ZZH STATISTIC RCP TIME EA 10 MIN

## 2017-12-27 PROCEDURE — 25000132 ZZH RX MED GY IP 250 OP 250 PS 637: Performed by: HOSPITALIST

## 2017-12-27 PROCEDURE — 99239 HOSP IP/OBS DSCHRG MGMT >30: CPT | Performed by: INTERNAL MEDICINE

## 2017-12-27 PROCEDURE — 40000193 ZZH STATISTIC PT WARD VISIT

## 2017-12-27 PROCEDURE — 36415 COLL VENOUS BLD VENIPUNCTURE: CPT | Performed by: INTERNAL MEDICINE

## 2017-12-27 PROCEDURE — 25000132 ZZH RX MED GY IP 250 OP 250 PS 637: Performed by: INTERNAL MEDICINE

## 2017-12-27 PROCEDURE — 97116 GAIT TRAINING THERAPY: CPT | Mod: GP

## 2017-12-27 PROCEDURE — 00000146 ZZHCL STATISTIC GLUCOSE BY METER IP

## 2017-12-27 RX ORDER — OXYCODONE HYDROCHLORIDE 5 MG/1
2.5-5 TABLET ORAL
Qty: 10 TABLET | Refills: 0 | Status: SHIPPED | OUTPATIENT
Start: 2017-12-27 | End: 2018-11-13

## 2017-12-27 RX ORDER — CEFDINIR 300 MG/1
300 CAPSULE ORAL EVERY 12 HOURS SCHEDULED
Status: DISCONTINUED | OUTPATIENT
Start: 2017-12-27 | End: 2017-12-27

## 2017-12-27 RX ORDER — LEVOFLOXACIN 750 MG/1
750 TABLET, FILM COATED ORAL DAILY
Qty: 5 TABLET | Refills: 0 | Status: SHIPPED | OUTPATIENT
Start: 2017-12-27 | End: 2018-01-01

## 2017-12-27 RX ORDER — LEVOFLOXACIN 750 MG/1
750 TABLET, FILM COATED ORAL DAILY
Qty: 5 TABLET | Refills: 0 | Status: SHIPPED | OUTPATIENT
Start: 2017-12-27 | End: 2017-12-27

## 2017-12-27 RX ADMIN — POLYETHYLENE GLYCOL 3350 17 G: 17 POWDER, FOR SOLUTION ORAL at 08:22

## 2017-12-27 RX ADMIN — BUDESONIDE AND FORMOTEROL FUMARATE DIHYDRATE 2 PUFF: 160; 4.5 AEROSOL RESPIRATORY (INHALATION) at 07:15

## 2017-12-27 RX ADMIN — IPRATROPIUM BROMIDE AND ALBUTEROL SULFATE 3 ML: .5; 3 SOLUTION RESPIRATORY (INHALATION) at 07:12

## 2017-12-27 RX ADMIN — ACETAMINOPHEN 975 MG: 325 TABLET, FILM COATED ORAL at 10:28

## 2017-12-27 RX ADMIN — LOSARTAN POTASSIUM 100 MG: 25 TABLET, FILM COATED ORAL at 08:22

## 2017-12-27 RX ADMIN — PREDNISONE 40 MG: 20 TABLET ORAL at 08:22

## 2017-12-27 RX ADMIN — LANSOPRAZOLE 30 MG: 15 CAPSULE, DELAYED RELEASE ORAL at 08:22

## 2017-12-27 RX ADMIN — IPRATROPIUM BROMIDE AND ALBUTEROL SULFATE 3 ML: .5; 3 SOLUTION RESPIRATORY (INHALATION) at 11:15

## 2017-12-27 RX ADMIN — DOXYCYCLINE HYCLATE 100 MG: 100 TABLET, FILM COATED ORAL at 08:22

## 2017-12-27 RX ADMIN — ACETAMINOPHEN 975 MG: 325 TABLET, FILM COATED ORAL at 03:23

## 2017-12-27 RX ADMIN — INSULIN ASPART 1 UNITS: 100 INJECTION, SOLUTION INTRAVENOUS; SUBCUTANEOUS at 08:30

## 2017-12-27 RX ADMIN — INSULIN ASPART 1 UNITS: 100 INJECTION, SOLUTION INTRAVENOUS; SUBCUTANEOUS at 12:36

## 2017-12-27 NOTE — PLAN OF CARE
Problem: Patient Care Overview  Goal: Plan of Care/Patient Progress Review  Outcome: No Change  PRIMARY DIAGNOSIS: ACUTE LLE PAIN  OUTPATIENT/OBSERVATION GOALS TO BE MET BEFORE DISCHARGE:  1. Pain Status: Improved-controlled with oral pain medications.    2. Return to near baseline physical activity: Yes    3. Cleared for discharge by consultants (if involved): No    Discharge Planner Nurse   Safe discharge environment identified: Yes  Barriers to discharge: No       Entered by: Noah Lawrence 12/27/2017 10:11 AM     Please review provider order for any additional goals.   Nurse to notify provider when observation goals have been met and patient is ready for discharge.    A&O x4. SBA w walker. HEENT WNL ex use of glasses. Cardio WNL. Lung sounds exp wheezes with posterior lobes clear on auscultation. Dyspnea on exertion. Pt tolerating diet. Pt voiding with out difficulty. Plan is to eval after today's PT appointment and manage pain further with tylenol. Pt up in chair.

## 2017-12-27 NOTE — PLAN OF CARE
Problem: Patient Care Overview  Goal: Plan of Care/Patient Progress Review  Outcome: No Change  Pt A&Ox4.  VSS.  Reported 2/10 left knee pain overnight, only wanted scheduled tylenol.  On 2L NC with cpap, [place] with ambulation.  Last BM 12/24/17.  Tolerating regular diet.  Blood sugar was 182.  Up SBA with walker.  Collected sputum sample, sent to lab.  Plan:  Pain and ortho following, PT working with pt.  PO prednisone, await hida scan and sputum results. Will be dc to TCU vs home with PT.

## 2017-12-27 NOTE — DISCHARGE SUMMARY
Discharge Summary  Hospitalist Service    Tatyana Metcalf MRN# [id number]   YOB: 1932 Age: 85 year old     Date of Admission:  12/24/2017  Date of Discharge:  12/27/2017  Admitting Physician:  Shay Bauer MD  Discharge Physician: Jaylen Woods DO  Discharging Service: Hospitalist Service     Primary Provider: Ayaz Fernandez  Primary Care Physician Phone Number: [phone]         Discharge Diagnoses/Problem Oriented Hospital Course (Providers):    Tatyana Metcalf was admitted on 12/24/2017 by Shay Bauer MD and I would refer you to their history and physical.  The following problems were addressed during her hospitalization:  1. Acute hypoxic respiratory failure.  Likely due to pneumonia and COPD exacerbation.  Improved by time of discharge.  2. Pneumonia.  Sputum with gram negative rods.  Procalcitonin low.  Change antibiotics to Levaquin 750 mg/day for 5 days.  Hold Celexa until she follows up with primary care provider after Levaquin course finishes.  CXR could not completely rule out underlying mass.  Needs repeat CXR in 4 weeks when current pneumonia has resolved.  3. COPD exacerbation.  Much improved by discharge.  Levaquin as above.  Resume home inhalers.  4. Left knee pain.  Pain meds PRN.  Appreciate Ortho input.  Had steroid injection on 12/26/17.    5. HTN.  Continue Metoprolol, Cozaar.  6. Depression.  Hold Celexa until finished with Levaquin.  7. Abdominal pain.  HIDA shows sphincter of oddi spasm.  Follow up with Surgery as outpatient if this continues to be problematic for her in the future.  8. GERD.  Prevacid.  Would not recommend ongoing NSAID use.            Code Status:      DNR / DNI          Pending Results:        Unresulted Labs Ordered in the Past 30 Days of this Admission     Date and Time Order Name Status Description    12/25/2017 1818 Sputum Culture Aerobic Bacterial In process             Discharge Instructions and Follow-Up:       Follow-up Appointments     Follow-up and recommended labs and tests        Follow up with primary care provider, Ayaz Fernandez MD, within 5   days for hospital follow- up.  The following labs/tests are recommended:   Basic metabolic panel in 5 days.  Chest X ray in 4 weeks.                      Discharge Disposition:      Discharged to home         Discharge Medications:        Current Discharge Medication List      START taking these medications    Details   oxyCODONE IR (ROXICODONE) 5 MG tablet Take 0.5-1 tablets (2.5-5 mg) by mouth every 3 hours as needed for moderate to severe pain  Qty: 10 tablet, Refills: 0    Associated Diagnoses: Acute pain of left knee      levofloxacin (LEVAQUIN) 750 MG tablet Take 1 tablet (750 mg) by mouth daily  Qty: 5 tablet, Refills: 0    Associated Diagnoses: Acute respiratory failure with hypoxia (H)         CONTINUE these medications which have NOT CHANGED    Details   cholecalciferol (VITAMIN D3) 1000 UNIT tablet Take 1,000 Units by mouth every evening      losartan (COZAAR) 100 MG tablet Take 100 mg by mouth every evening      metoprolol (TOPROL-XL) 25 MG 24 hr tablet Take 12.5 mg by mouth every evening      fluticasone (FLONASE) 50 MCG/ACT spray Spray 1 spray into both nostrils daily      polyethylene glycol (MIRALAX/GLYCOLAX) powder Take 17 g by mouth 2 times daily      budesonide-formoterol (SYMBICORT) 160-4.5 MCG/ACT Inhaler Inhale 2 puffs into the lungs 2 times daily      lidocaine (ASPERCREME W/LIDOCAINE) 4 % CREA cream Apply topically once as needed for mild pain      PROAIR RESPICLICK 108 (90 BASE) MCG/ACT AEPB INL 2 PUFFS PO Q 4 TO 6 H PRN  Refills: 12      azelastine (ASTELIN) 0.1 % spray Spray 1-2 sprays into both nostrils 2 times daily  Qty: 3 Bottle, Refills: 3    Associated Diagnoses: Middle ear effusion, left; Chronic rhinitis      furosemide (LASIX) 20 MG tablet Take 1 tablet (20 mg) by mouth daily as needed  Qty: 30 tablet, Refills: 3    Associated  Diagnoses: Bilateral lower extremity edema      alendronate (FOSAMAX) 70 MG tablet Take 1 tablet (70 mg) by mouth every 7 days Take with over 8 ounces water and stay upright for at least 30 minutes after dose.  Take at least 60 minutes before breakfast  Qty: 12 tablet, Refills: 2    Associated Diagnoses: Osteoporosis      ferrous sulfate (IRON) 325 (65 FE) MG tablet Take 325 mg by mouth every evening       albuterol (ACCUNEB) 1.25 MG/3ML nebulizer solution Take 1 vial (1.25 mg) by nebulization every 4 hours as needed for shortness of breath / dyspnea or wheezing  Qty: 30 vial, Refills: 11    Associated Diagnoses: Bronchiectasis with acute exacerbation (H)      Lansoprazole (PREVACID PO) Take 30 mg by mouth every evening       cetirizine (ZYRTEC) 10 MG tablet Take 1 tablet (10 mg) by mouth every evening  Qty: 30 tablet, Refills: 1      nystatin (MYCOSTATIN) 709136 UNIT/GM POWD Apply topically 3 times daily as needed  Qty: 60 g, Refills: 1    Associated Diagnoses: Fungal infection of skin         STOP taking these medications       citalopram (CELEXA) 20 MG tablet Comments:   Reason for Stopping:         naproxen sodium (ALEVE) 220 MG tablet Comments:   Reason for Stopping:         citalopram (CELEXA) 20 MG tablet Comments:   Reason for Stopping:                 Allergies:         Allergies   Allergen Reactions     Penicillins      hives     Sulfa Drugs      Rash             Condition and Physical on Discharge:      Discharge condition: Stable   Vitals: Blood pressure 140/50, pulse 66, temperature 97.2  F (36.2  C), temperature source Oral, resp. rate 18, height 1.524 m (5'), weight 108.3 kg (238 lb 11.2 oz), SpO2 94 %, not currently breastfeeding.   Gen:  NAD, A&Ox3.  Eyes:  PERRL, sclera anicteric.  OP:  MMM, no lesions.  Neck:  Supple.  CV:  Regular, no murmurs.  Lung:  CTA b/l, normal effort.  Ab:  +BS, soft.  Skin:  Warm, dry to touch.  No rash.  Ext:  No pitting edema LE b/l.        Discharge Time:      I spent  40 minutes with Ms. Metcalf and working on discharge on 12/27/17.        Image Results From This Hospital Stay (For Non-EPIC Providers):        Results for orders placed or performed during the hospital encounter of 12/24/17   Tib/Fib XR, left    Narrative    LEFT TIBIA AND FIBULA TWO VIEWS  12/24/2017 5:30 PM     HISTORY: Evaluate nontraumatic leg pain.        Impression    IMPRESSION: Two views of the left tibia and fibula are performed.  Medial, lateral and patellofemoral joint compartment osteoarthritis is  noted in the left knee. No fracture or dislocation is otherwise  appreciated.         AMY MENON MD   Femur XR,  2 views, left    Narrative    LEFT FEMUR TWO VIEWS  12/24/2017 5:31 PM     HISTORY: Evaluate nontraumatic leg pain.        Impression    IMPRESSION: Two views of the left hip demonstrate no fracture or  dislocation. Note that the bones are osteopenic and study is slightly  limited due to patient body habitus. If there is high clinical  suspicion for hip fracture a follow-up MRI could be performed for  further assessment.         AMY MENON MD   US Lower Extremity Venous Duplex Left    Narrative    VENOUS ULTRASOUND LEFT LEG  12/24/2017 4:36 PM     HISTORY: Left leg pain, evaluate for DVT.      COMPARISON: 9/28/2016.    FINDINGS:  Examination of the deep veins with graded compression and  color flow Doppler with spectral wave form analysis shows no evidence  of thrombus in the common femoral vein, femoral vein, popliteal vein  or calf veins.       Impression    IMPRESSION: No evidence of deep venous thrombosis.       BENJAMIN DAO MD   MR Knee Left w/o Contrast    Narrative    MR KNEE LEFT WITHOUT CONTRAST 12/25/2017 3:50 PM    HISTORY: Atraumatic left knee pain for one day. Evaluate for stress  fracture.     TECHNIQUE: Axial and coronal T2 with fat suppression. Coronal T1.  Sagittal dual echo T2.    COMPARISON: None.    FINDINGS:   Medial Meniscus: Marked degeneration, maceration and complex  tearing  throughout the posterior horn and mid body with peripheral extrusion  of the mid body. The anterior horn is unremarkable. No displaced  meniscal fragments or flaps.       Lateral Meniscus: Intrasubstance myxoid degenerative change throughout  the lateral meniscus. Possible small longitudinal tear at the free  edge in the mid body (image 21 of series 6). Mild blunting of the free  edge in the posterior horn, likely from degenerative fraying. No  displaced meniscal fragments or flaps.       Anterior Cruciate Ligament: Unremarkable.     Posterior Cruciate Ligament: Unremarkable.     Medial Collateral Ligament: Unremarkable.    Lateral Collateral Ligament Complex, Popliteus Tendon: The iliotibial  band, fibular collateral ligament, biceps femoris tendon, and  popliteus tendon are unremarkable.    Osseous and Cartilaginous Structures: No acute-appearing bony  abnormality. In particular, there is no evidence for stress injury.  Diffuse grade IV chondromalacia weightbearing medial compartment,  associated with prominent degenerative marginal bony spurring.  Articular cartilages in the lateral compartment show no significant  thinning or irregularity, but there is prominent degenerative marginal  bony spurring. Diffuse grade III and grade IV chondromalacia related  to the lateral patellar facet and lateral aspect of the trochlear  groove, with associated subchondral cystic changes and bone marrow  edema. Articular cartilages at the medial aspect of the patellofemoral  joint appear normal.    Extensor Mechanism: The quadriceps and patellar tendons are  unremarkable. The medial and lateral patellar retinacula are normal.    Joint Space: Small joint effusion. No definite loose bodies  appreciated.    Additional Findings: No popliteal cyst. No semimembranosus-tibial  collateral ligament or pes anserine bursitis.      Impression    IMPRESSION:   1. No evidence for bony stress injury.  2. Degeneration, maceration and  complex tearing throughout the  posterior horn and mid body of the medial meniscus, with peripheral  extrusion of the mid body.  3. Degenerative changes lateral meniscus with possible small  longitudinal tear at the free edge in the mid body.  4. Advanced chondromalacia weightbearing medial compartment and  lateral aspect of the patellofemoral compartment.  5. Small joint space effusion.     US Abdomen Limited    Narrative    US ABDOMEN LIMITED 12/25/2017 4:27 PM     HISTORY: Right upper quadrant pain after eating. Last ate today around  8:00 a.m.      COMPARISON: CT chest, abdomen and pelvis 2/10/2017.    FINDINGS: Liver is increased in echogenicity without focal lesions.  The gallbladder demonstrates mild gallbladder wall thickening at  nearly 4 mm, but no gallstones or sludge is appreciated. No  pericholecystic fluid. This may be due to incomplete distention.  Common bile duct is normal in diameter. Pancreas obscured by overlying  bowel gas. Examination of the right kidney is unremarkable, but there  is a small cyst in the lateral right kidney measuring 2.4 cm,  unchanged since prior CT.      Impression    IMPRESSION: Fatty infiltration of the liver without obvious mass. Mild  gallbladder wall thickening without evidence of gallstones or bile  duct dilatation. Follow-up hepatobiliary scan if clinically warranted  for further assessment. Simple cyst right kidney unchanged since prior  CT.    AMY MENON MD   XR Chest 2 Views    Narrative    CHEST TWO VIEWS    12/25/2017 4:05 PM     HISTORY: Hypoxia.    COMPARISON: 2/23/2016.      Impression    IMPRESSION: Postoperative changes in the lower lungs are seen again  bilaterally. There is a new patchy airspace opacity at the right lower  lobe that could represent pneumonia, although a lung mass cannot be  excluded. New small right pleural effusion. Linear scarring or  atelectasis at the left lower lobe is unchanged. No pneumothorax.  Cardiac silhouette within normal  limits.    BENJAMIN DAO MD   NM Hepatobiliary Scan w GB EF    Narrative    NUCLEAR MEDICINE HEPATOBILIARY SCAN WITH GB EF December 26, 2017 2:06  PM     HISTORY: Right upper quadrant pain after eating. Ultrasound with  thickened GB.    TECHNIQUE: 6.0 mCi 99mTc-MEBROFENIN,  2.2 mcg CCK.    COMPARISON:   Nuclear Study: None.  Other Relevant Studies: Ultrasound 12/25/2017.    FINDINGS: There is normal excretion of activity from the hepatic  parenchyma into the biliary tree, reaching the gallbladder in 10  minutes indicating patency of the cystic duct. There is normal  accumulation in the gallbladder but absence of activity in the  duodenum through 60 minutes. Activity enters the duodenum after  cholecystokinin was administered. Gallbladder ejection fraction is  30%.      Impression    IMPRESSION:  1. Patent cystic duct.  2. Spasm of the sphincter of Oddi relieved by cholecystokinin  administration.  3. Slightly decreased gallbladder ejection fraction of 30%. Clinical  significance of this finding is unclear. Low normal range is 35%.         FAB WILLIS MD           Most Recent Lab Results In EPIC (For Non-EPIC Providers):    Most Recent 3 CBC's:  Recent Labs   Lab Test  12/25/17   0547  12/24/17   1556  10/11/17   1200   WBC  7.3  7.8  8.2   HGB  12.9  13.8  13.3   MCV  96  96  98   PLT  254  264  283      Most Recent 3 BMP's:  Recent Labs   Lab Test  12/27/17   0614  12/25/17   0547  12/24/17   1556   NA  138  138  140   POTASSIUM  4.5  4.4  4.2   CHLORIDE  107  105  104   CO2  27  28  33*   BUN  26  26  18   CR  0.63  0.67  0.63   ANIONGAP  4  5  3   ISRAEL  8.9  8.8  9.3   GLC  173*  179*  103*     Most Recent 3 Troponin's:No lab results found.  Most Recent 3 INR's:  Recent Labs   Lab Test  12/24/17   1556   INR  1.05     Most Recent 2 LFT's:  Recent Labs   Lab Test  12/25/17   0547  10/11/17   1200   AST  13  7   ALT  15  17   ALKPHOS  89  89   BILITOTAL  0.3  0.3     Most Recent Cholesterol Panel:  Recent Labs    Lab Test 03/05/15   CHOL  110*   LDL  56*   HDL  38*   TRIG  81     Most Recent 6 Bacteria Isolates From Any Culture (See EPIC Reports for Culture Details):  Recent Labs   Lab Test  12/25/17   1435  04/28/17   1510  02/08/17   1303   CULT  >100,000 colonies/mL  mixed urogenital richardson    50,000 to 100,000 colonies/mL mixed urogenital richardson Susceptibility testing not   routinely done    10,000 to 50,000 colonies/mL mixed urogenital richardson     Most Recent TSH, T4 and HgbA1c:   Recent Labs   Lab Test  12/26/17   1556  09/28/16   1256   TSH   --   2.44   A1C  5.8   --

## 2017-12-27 NOTE — CONSULTS
Care Coordinator- Discharge Planning     Admission Date/Time:  12/24/2017  Attending MD:  Shay Bauer MD     Data  Chart reviewed, discussed with interdisciplinary team.   Patient was admitted for:   Resp failure, likely due to PNA and COPD exacerbation     Assessment  Consulted for DC planning.  Notified pt is ready for DC.  PT is recommending home PT/OT/HHA     Patient is already open to [facility].  Spoke with SANGEETA Martinez at [facility], pt is currently receiving RN visit Q 2 weeks and home making services.  Per Michelle they will be able to add the above services.  They will need face to face completed to comply with medicare guidelines.    Coordination of Care and Referrals: Provided patient/family with options for Home Care.      Plan  Anticipated Discharge Date:  12/27/17  Anticipated Discharge Plan:  Home with increased home care services    CTS Handoff completed:  JADA Salmeron, RN   Heike Salmeron, RN,BSN, CTS  [facility]  Care Coordination  [phone]

## 2017-12-27 NOTE — PLAN OF CARE
Problem: Patient Care Overview  Goal: Plan of Care/Patient Progress Review    Discharge Planner PT   Patient plan for discharge: patient would prefer to discharge to home with home PT/OT   Current status: Patient seated upon arrival.  Reported significant improvement in left LE pain.  Patient amb 150 feet with 4WW independently with 1 seated rest break required secondary to SOB.  Patient maintain O2 sats above 87-88% with all activity on RA; daughter reports that this is patient's baseline.  Recommended purchase of bath bench (vs shower chair) to avoid patient having to step over bathtub during bathing; daughter in agreement.   Barriers to return to prior living situation: none at this time  Recommendations for discharge: Home with referral for Home PT/OT and home health aide (for assist with bathing)  Rationale for recommendations: Patient demonstrating improved activity tolerance and independence with ambulation.  Patient would benefit from home PT/OT for strengthening, to increase activity tolerance and to practice using O2 during the day (tubing management).       Entered by: Melissa Schilling 12/27/2017 10:36 AM       Physical Therapy Discharge Summary    Reason for therapy discharge:    Anticipate DC to home today    Progress towards therapy goal(s). See goals on Care Plan in [facility] electronic health record for goal details.  Goals met    Therapy recommendation(s):    Referral to Home PT/OT and home health aide

## 2017-12-27 NOTE — PLAN OF CARE
Problem: Patient Care Overview  Goal: Plan of Care/Patient Progress Review  Outcome: Adequate for Discharge Date Met: 12/27/17  Problem: Patient Care Overview  Goal: Plan of Care/Patient Progress Review  Outcome: No Change  PRIMARY DIAGNOSIS: ACUTE LLE PAIN  OUTPATIENT/OBSERVATION GOALS TO BE MET BEFORE DISCHARGE:  1. Pain Status: Improved-controlled with oral pain medications.     2. Return to near baseline physical activity: Yes     3. Cleared for discharge by consultants (if involved): Yes     Discharge Planner Nurse   Safe discharge environment identified: Yes  Barriers to discharge: No       Entered by: Noah Lawrence 12/27/2017 10:11 AM     Please review provider order for any additional goals.   Nurse to notify provider when observation goals have been met and patient is ready for discharge.     Pt up in chair most of shift. Pt able to ambulate SBA w walker. Pt A&Ox4. Daughter in room most of shift. Pt LS clear on posterior lobes with exp wheezing on anterior lobes. Pt received insulin coverage due to prednisone. Plan is to dc later this shift. Dc order not in yet.

## 2017-12-28 ENCOUNTER — TELEPHONE (OUTPATIENT)
Dept: PEDIATRICS | Facility: CLINIC | Age: 82
End: 2017-12-28

## 2017-12-28 NOTE — PROGRESS NOTES
LATE NOTE    Received call from Jaida at [facility]. They received referral for resumption of care for patient. They never received the discharge orders. Faxed [facility] Physician Orders to [phone] per her request.     Letitia Vital RN, BSN, [facility]  [phone]

## 2017-12-28 NOTE — TELEPHONE ENCOUNTER
Please contact patient for In-patient follow up.  [phone] (home) none (work)    Visit date: 12/27/17  Diagnosis listed:Acute Respiratory Failure With Hypoxia (H), Acute Pain Of Left Knee  Number of visits in past 12 months:1

## 2017-12-28 NOTE — TELEPHONE ENCOUNTER
"ED / Discharge Outreach Protocol    Patient Contact    Attempt # 1    Was call answered?  Yes.  \"May I please speak with <patient name>\"  Is patient available?   Yes    Hospital/TCU/ED for chronic condition Discharge Protocol    \"Hi, my name is Nancy Dixon, a registered nurse, and I am calling from [facility].  I am calling to follow up and see how things are going for you after your recent emergency visit/hospital/TCU stay.\"    Tell me how you are doing now that you are home?\" doing pretty well, breathing is so much better-patient states her O2 sat on RA is  94%      Discharge Instructions    \"Let's review your discharge instructions.  What is/are the follow-up recommendations?  Pt. Response: appointment with PCP within 5 days.  Does not take oxycodone, not needed    \"Has an appointment with your primary care provider been scheduled?\"   No (schedule appointment)  Next 5 appointments (look out 90 days)     Jan 03, 2018 11:30 AM CST   SHORT with Ayaz Fernandez MD   [facility]    [address] [place] [address]  [address] [place] [id number] [phone]                  \"When you see the provider, I would recommend that you bring your medications with you.\"    Medications    \"Tell me what changed about your medicines when you discharged?\"    Changes to chronic meds?    0-1    \"What questions do you have about your medications?\"    None     New diagnoses of heart failure, COPD, diabetes, or MI?    No                  Medication reconciliation completed? Yes  Was MTM referral placed (*Make sure to put transitions as reason for referral)?   No    Call Summary    \"What questions or concerns do you have about your recent visit and your follow-up care?\"     none    \"If you have questions or things don't continue to improve, we encourage you contact us through the main clinic number (give number).  Even if the clinic is not open, triage nurses are available 24/7 to " "help you.     We would like you to know that our clinic has extended hours (provide information).  We also have urgent care (provide details on closest location and hours/contact info)\"      \"Thank you for your time and take care!\"             "

## 2017-12-29 LAB
BACTERIA SPEC CULT: NORMAL
SPECIMEN SOURCE: NORMAL

## 2018-01-03 ENCOUNTER — OFFICE VISIT (OUTPATIENT)
Dept: PEDIATRICS | Facility: CLINIC | Age: 83
End: 2018-01-03
Payer: COMMERCIAL

## 2018-01-03 VITALS
WEIGHT: 231 LBS | HEIGHT: 60 IN | TEMPERATURE: 98.5 F | SYSTOLIC BLOOD PRESSURE: 124 MMHG | OXYGEN SATURATION: 94 % | BODY MASS INDEX: 45.35 KG/M2 | DIASTOLIC BLOOD PRESSURE: 60 MMHG | HEART RATE: 66 BPM

## 2018-01-03 DIAGNOSIS — S83.282A ACUTE LATERAL MENISCUS TEAR OF LEFT KNEE, INITIAL ENCOUNTER: Primary | ICD-10-CM

## 2018-01-03 DIAGNOSIS — M25.561 ACUTE PAIN OF RIGHT KNEE: ICD-10-CM

## 2018-01-03 DIAGNOSIS — J44.1 COPD EXACERBATION (H): ICD-10-CM

## 2018-01-03 DIAGNOSIS — C78.00 MALIGNANT NEOPLASM METASTATIC TO LUNG, UNSPECIFIED LATERALITY (H): ICD-10-CM

## 2018-01-03 PROCEDURE — 99496 TRANSJ CARE MGMT HIGH F2F 7D: CPT | Performed by: INTERNAL MEDICINE

## 2018-01-03 NOTE — PROGRESS NOTES
SUBJECTIVE:   Tatyana Metcalf is a 85 year old female who presents to clinic today for the following health issues:          Hospital Follow-up Visit:    Hospital/Nursing Home/IP Rehab Facility: [facility]  Date of Admission: 12/24/2017  Date of Discharge: 12/27/2017  Reason(s) for Admission: Respiratory Failure            Problems taking medications regularly:  None       Medication changes since discharge: Oxycodone PRN        Problems adhering to non-medication therapy:  None    Summary of hospitalization:  [facility] discharge summary reviewed  Diagnostic Tests/Treatments reviewed.  Follow up needed: none  Other Healthcare Providers Involved in Patient s Care:         Orthopedics  Update since discharge: improved.     Post Discharge Medication Reconciliation: discharge medications reconciled, continue medications without change.  Plan of care communicated with patient     Coding guidelines for this visit:  Type of Medical   Decision Making Face-to-Face Visit       within 7 Days of discharge Face-to-Face Visit        within 14 days of discharge   Moderate Complexity [id number]   High Complexity [id number]          Patient presents with daughter for hospital follow-up today.  Patient was hospitalized at [facility] from 12/24 until 12/27.  She was noted to have concern for pneumonia as well as COPD exacerbation.  She was treated with levofloxacin and continued on home inhalers.  She did receive steroids while she was inpatient as well.  She had a chest x-ray that could not rule out underlying mass.  She feels that her breathing is back at her baseline now with some baseline wheezing.  She is continuing to use her baseline inhalers.    Patient was hospitalized mostly due to left knee pain.  Patient noted that it gave out on her and she could not bear weight.  She had significant discomfort and was evaluated by orthopedics.  She had a cortisone shot which significantly helped in her  symptoms.  She had an MRI done notable for significant chondromalacia as well as bilateral meniscal tears.  She now is noticing some right knee pain as well with some swelling.  This is been starting more since leaving the hospital.  She has been able to ambulate better no leg swelling noted.  No chest pain or palpitations.  Breathing is currently at baseline with some intermittent wheezing.    Patient has a history of lung cancer treated with [employer].  She is following up with [place] [facility] this next week with Dr. Simental.      Problem list and histories reviewed & adjusted, as indicated.  Additional history: as documented    Patient Active Problem List   Diagnosis     Secondary malignant neoplasm of lung (H)     Benign essential hypertension     Vestibular neuronitis of both ears     Spinal stenosis of lumbar region with neurogenic claudication     Hiatal hernia     Esophageal reflux     Obstructive sleep apnea syndrome     Moderate recurrent major depression (H)     Chronic rhinitis     Iron deficiency anemia     Dermatographism     Bronchiectasis (H)     Vitamin D deficiency     Rib pain     Rib fractures     Endometrial carcinoma (H)     Advanced directives, counseling/discussion     Osteoporosis     Neuropathy     SNHL (sensorineural hearing loss)     Respiratory failure (H)     Lung cancer (H)     Moderate chronic obstructive pulmonary disease (H)     Hypoxia     BMI 45.0-49.9, adult (H)     Left leg pain     Respiratory failure with hypoxia (H)     Past Surgical History:   Procedure Laterality Date     APPENDECTOMY  1952     HYSTERECTOMY TOTAL ABDOMINAL, BILATERAL SALPINGO-OOPHORECTOMY, COMBINED  2000       Social History   Substance Use Topics     Smoking status: Never Smoker     Smokeless tobacco: Never Used     Alcohol use 0.0 oz/week     0 Standard drinks or equivalent per week      Comment: holidays     Family History   Problem Relation Age of Onset     HEART DISEASE Mother      HEART DISEASE  Father              Reviewed and updated as needed this visit by clinical staff       Reviewed and updated as needed this visit by Provider         ROS:  Constitutional, HEENT, cardiovascular, pulmonary, GI, , musculoskeletal, neuro, skin, endocrine and psych systems are negative, except as otherwise noted.      OBJECTIVE:   /60 (BP Location: Right arm, Cuff Size: Adult Large)  Pulse 66  Temp 98.5  F (36.9  C) (Oral)  Ht 5' (1.524 m)  Wt 231 lb (104.8 kg)  SpO2 94%  BMI 45.11 kg/m2  Body mass index is 45.11 kg/(m^2).  General patient is alert and interactive no acute distress, HEENT sclerae are clear no icterus, mouth with moist pink oral mucosa without lesions.  Neck supple without lymphadenopathy or JVD.  Respiratory noted intermittent fine wheezing with forced expiration but good air entry bilaterally without crackles or rhonchi.  Cardiovascular regular rate and rhythm no loud murmurs.  Abdomen positive bowel sounds nontender nondistended.  Extremities warm and well-perfused left knee with mild erythema and mild medial lateral tenderness.  Right knee with noted effusion worse on the medial area of the knee with tenderness no warmth or erythema noted.  Ambulating well with walker.  Neurologic appropriate and interactive psych normal mood and affect.    Diagnostic Test Results:  Reviewed from hospital stay    ASSESSMENT/PLAN:       ICD-10-CM    1. Acute lateral meniscus tear of left knee, initial encounter S83.282A ORTHOPEDICS ADULT REFERRAL   2. Acute pain of right knee M25.561    3. COPD exacerbation (H) J44.1    4. Malignant neoplasm metastatic to lung, unspecified laterality (H) C78.00       patient is clinically improved from her COPD exacerbation is currently at baseline.  She will follow-up with oncology for follow-up of her lung cancer.  She is due for repeat scans and will let hematology oncology order these for her.  Will set up follow-up with [facility] orthopedics as patient was seen by  Dr. Denny from their clinic.  Will discuss whether or not appropriate for arthroscopic surgery or continuing with injections is appropriate for patient.    Ayaz Fernandez MD, MD  [facility]        Documentation of Face-to-Face and Certification for Home Health Services     Patient: Tatyana Metcalf   YOB: 1932  MR Number: [id number]  Today's Date: 2/20/2018    I certify that patient: Tatyana Metcalf is under my care and that I, or a nurse practitioner or physician's assistant working with me, had a face-to-face encounter that meets the physician face-to-face encounter requirements with this patient on: 1/3/2018.    This encounter with the patient was in whole, or in part, for the following medical condition, which is the primary reason for home health care: meniscal tear/knee injury.    I certify that, based on my findings, the following services are medically necessary home health services: Nursing, Occupational Therapy and Physical Therapy.    My clinical findings support the need for the above services because: Occupational Therapy Services are needed to assess and treat cognitive ability and address ADL safety due to impairment in knee function. and Physical Therapy Services are needed to assess and treat the following functional impairments: walking, mobility .    Further, I certify that my clinical findings support that this patient is homebound (i.e. absences from home require considerable and taxing effort and are for medical reasons or [employer] services or infrequently or of short duration when for other reasons) because: Requires assistance of another person or specialized equipment to access medical services because patient: Has prohibitive pain during ambulation...    Based on the above findings. I certify that this patient is confined to the home and needs intermittent skilled nursing care, physical therapy and/or speech therapy.  The patient is under my care, and I  have initiated the establishment of the plan of care.  This patient will be followed by a physician who will periodically review the plan of care.  Physician/Provider to provide follow up care: Ayaz Fernandez    Responsible Medicare certified PECOS Physician: Ayaz Fernandez MD  Physician Signature: See electronic signature associated with these discharge orders.  Date: 2/20/2018

## 2018-01-03 NOTE — MR AVS SNAPSHOT
After Visit Summary   1/3/2018    Tatyana Metcalf    MRN: [id number]           Patient Information     Date Of Birth          3/19/1932        Visit Information        Provider Department      1/3/2018 11:30 AM Ayaz Fernandez MD [facility]        Today's Diagnoses     Acute lateral meniscus tear of left knee, initial encounter    -  1      Care Instructions    1) Call to set up with Dr. Denny for orthopedics    2) Let me know if breathing is worse over the next several days, we can repeat the steroids if needed    3) Labs otherwise looked good    4) We will see what the CT scan shows of the area with oncology.    Ayaz Fernandez MD          Follow-ups after your visit        Additional Services     ORTHOPEDICS ADULT REFERRAL       Your provider has referred you to: [facility] [place] [phone]   [email or web address] Dr. Doc Denny    Please be aware that coverage of these services is subject to the terms and limitations of your health insurance plan.  Call member services at your health plan with any benefit or coverage questions.      Please bring the following to your appointment:    >>   Any x-rays, CTs or MRIs which have been performed.  Contact the facility where they were done to arrange for  prior to your scheduled appointment.    >>   List of current medications   >>   This referral request   >>   Any documents/labs given to you for this referral                  Who to contact     If you have questions or need follow up information about today's clinic visit or your schedule please contact [facility] directly at [phone].  Normal or non-critical lab and imaging results will be communicated to you by MyChart, letter or phone within 4 business days after the clinic has received the results. If you do not hear from us within 7 days, please contact the clinic through MyChart or phone. If you have a critical or  abnormal lab result, we will notify you by phone as soon as possible.  Submit refill requests through [employer] or call your pharmacy and they will forward the refill request to us. Please allow 3 business days for your refill to be completed.          Additional Information About Your Visit        [employer] Information     [employer]ed gives you secure access to your electronic health record. If you see a primary care provider, you can also send messages to your care team and make appointments. If you have questions, please call your primary care clinic.  If you do not have a primary care provider, please call [phone] and they will assist you.        Care EveryWhere ID     This is your Care EveryWhere ID. This could be used by other organizations to access your [place] medical records  [id number]        Your Vitals Were     Pulse Temperature Height Pulse Oximetry BMI (Body Mass Index)       66 98.5  F (36.9  C) (Oral) 5' (1.524 m) 94% 45.11 kg/m2        Blood Pressure from Last 3 Encounters:   01/03/18 124/60   12/27/17 153/63   10/11/17 110/62    Weight from Last 3 Encounters:   01/03/18 231 lb (104.8 kg)   12/27/17 238 lb 11.2 oz (108.3 kg)   10/11/17 233 lb (105.7 kg)              We Performed the Following     ORTHOPEDICS ADULT REFERRAL        Primary Care Provider Office Phone # Fax #    Ayaz Camilo Fernandez [phone] [address] [place] [address]        Goals        General    Functional (pt-stated)     Notes - Note edited  1/27/2015 12:43 PM by Erica Bryant, RN    Will have a one time in home fall risk assessment done to help in preventing further fall.     Fall risk assessment done. Home care now in home and providing services.         Equal Access to Services     [facility] ERICA : robert Gutierrez, elisa kumar. [facility] [phone].    ATENCIÓN: Si rickie kerns malhotra  disposición servicios gratuitos de asistencia lingüística. Dinseh whitten [phone].    We comply with applicable federal civil rights laws and [place] laws. We do not discriminate on the basis of race, color, national origin, age, disability, sex, sexual orientation, or gender identity.            Thank you!     Thank you for choosing [facility]  for your care. Our goal is always to provide you with excellent care. Hearing back from our patients is one way we can continue to improve our services. Please take a few minutes to complete the written survey that you may receive in the mail after your visit with us. Thank you!             Your Updated Medication List - Protect others around you: Learn how to safely use, store and throw away your medicines at www.disposemymeds.org.          This list is accurate as of: 1/3/18 12:13 PM.  Always use your most recent med list.                   Brand Name Dispense Instructions for use Diagnosis    * albuterol 1.25 MG/3ML nebulizer solution    ACCUNEB    30 vial    Take 1 vial (1.25 mg) by nebulization every 4 hours as needed for shortness of breath / dyspnea or wheezing    Bronchiectasis with acute exacerbation (H)       * PROAIR RESPICLICK 108 (90 BASE) MCG/ACT Aepb   Generic drug:  Albuterol Sulfate      INL 2 PUFFS PO Q 4 TO 6 H PRN        alendronate 70 MG tablet    FOSAMAX    12 tablet    Take 1 tablet (70 mg) by mouth every 7 days Take with over 8 ounces water and stay upright for at least 30 minutes after dose.  Take at least 60 minutes before breakfast    Osteoporosis       ASPERCREME W/LIDOCAINE 4 % Crea cream   Generic drug:  lidocaine      Apply topically once as needed for mild pain        azelastine 0.1 % spray    ASTELIN    3 Bottle    Spray 1-2 sprays into both nostrils 2 times daily    Middle ear effusion, left, Chronic rhinitis       budesonide-formoterol 160-4.5 MCG/ACT Inhaler    SYMBICORT     Inhale 2 puffs into the lungs 2 times daily         cetirizine 10 MG tablet    zyrTEC    30 tablet    Take 1 tablet (10 mg) by mouth every evening        cholecalciferol 1000 UNIT tablet    vitamin D3     Take 1,000 Units by mouth every evening        ferrous sulfate 325 (65 FE) MG tablet    IRON     Take 325 mg by mouth every evening        fluticasone 50 MCG/ACT spray    FLONASE     Spray 1 spray into both nostrils daily        furosemide 20 MG tablet    LASIX    30 tablet    Take 1 tablet (20 mg) by mouth daily as needed    Bilateral lower extremity edema       losartan 100 MG tablet    COZAAR     Take 100 mg by mouth every evening        metoprolol 25 MG 24 hr tablet    TOPROL-XL     Take 12.5 mg by mouth every evening        nystatin 191663 UNIT/GM Powd    MYCOSTATIN    60 g    Apply topically 3 times daily as needed    Fungal infection of skin       oxyCODONE IR 5 MG tablet    ROXICODONE    10 tablet    Take 0.5-1 tablets (2.5-5 mg) by mouth every 3 hours as needed for moderate to severe pain    Acute pain of left knee       polyethylene glycol powder    MIRALAX/GLYCOLAX     Take 17 g by mouth 2 times daily        PREVACID PO      Take 30 mg by mouth every evening        * Notice:  This list has 2 medication(s) that are the same as other medications prescribed for you. Read the directions carefully, and ask your doctor or other care provider to review them with you.

## 2018-01-03 NOTE — NURSING NOTE
Chief Complaint   Patient presents with     Hospital F/U       Initial /60 (BP Location: Right arm, Cuff Size: Adult Large)  Pulse 66  Temp 98.5  F (36.9  C) (Oral)  Ht 5' (1.524 m)  Wt 231 lb (104.8 kg)  SpO2 94%  BMI 45.11 kg/m2 Estimated body mass index is 45.11 kg/(m^2) as calculated from the following:    Height as of this encounter: 5' (1.524 m).    Weight as of this encounter: 231 lb (104.8 kg).  Medication Reconciliation: complete   Marilin Hyde MA

## 2018-01-03 NOTE — PATIENT INSTRUCTIONS
1) Call to set up with Dr. Denny for orthopedics    2) Let me know if breathing is worse over the next several days, we can repeat the steroids if needed    3) Labs otherwise looked good    4) We will see what the CT scan shows of the area with oncology.    Ayaz Fernandez MD

## 2018-01-08 ENCOUNTER — TELEPHONE (OUTPATIENT)
Dept: PEDIATRICS | Facility: CLINIC | Age: 83
End: 2018-01-08

## 2018-01-08 NOTE — TELEPHONE ENCOUNTER
Reason for Call:  Form, our goal is to have forms completed with 72 hours, however, some forms may require a visit or additional information.    Type of letter, form or note:  medical    Who is the form from?: Home care    Where did the form come from: form was mailed in    What clinic location was the form placed at?: [facility]    Where the form was placed: 's Box    What number is listed as a contact on the form?: [phone]       Additional comments: Mail - envelope included    Call taken on 1/8/2018 at 1:35 PM by China Lebron

## 2018-01-10 ENCOUNTER — TRANSFERRED RECORDS (OUTPATIENT)
Dept: HEALTH INFORMATION MANAGEMENT | Facility: CLINIC | Age: 83
End: 2018-01-10

## 2018-01-11 ENCOUNTER — TRANSFERRED RECORDS (OUTPATIENT)
Dept: HEALTH INFORMATION MANAGEMENT | Facility: CLINIC | Age: 83
End: 2018-01-11

## 2018-01-17 ENCOUNTER — HOSPITAL ENCOUNTER (OUTPATIENT)
Dept: CT IMAGING | Facility: CLINIC | Age: 83
Discharge: HOME | End: 2018-01-17
Attending: INTERNAL MEDICINE | Admitting: INTERNAL MEDICINE
Payer: COMMERCIAL

## 2018-01-17 DIAGNOSIS — C54.1 ENDOMETRIAL CANCER (H): ICD-10-CM

## 2018-01-17 DIAGNOSIS — R91.1 LUNG NODULE: ICD-10-CM

## 2018-01-17 PROCEDURE — 25000128 H RX IP 250 OP 636: Performed by: RADIOLOGY

## 2018-01-17 PROCEDURE — 71260 CT THORAX DX C+: CPT

## 2018-01-17 RX ORDER — IOPAMIDOL 755 MG/ML
500 INJECTION, SOLUTION INTRAVASCULAR ONCE
Status: COMPLETED | OUTPATIENT
Start: 2018-01-17 | End: 2018-01-17

## 2018-01-17 RX ADMIN — IOPAMIDOL 80 ML: 755 INJECTION, SOLUTION INTRAVENOUS at 11:25

## 2018-01-17 RX ADMIN — SODIUM CHLORIDE 60 ML: 9 INJECTION, SOLUTION INTRAVENOUS at 11:25

## 2018-01-26 ENCOUNTER — TRANSFERRED RECORDS (OUTPATIENT)
Dept: HEALTH INFORMATION MANAGEMENT | Facility: CLINIC | Age: 83
End: 2018-01-26

## 2018-01-30 ENCOUNTER — HOSPITAL ENCOUNTER (OUTPATIENT)
Dept: ULTRASOUND IMAGING | Facility: CLINIC | Age: 83
Discharge: HOME | End: 2018-01-30
Attending: INTERNAL MEDICINE | Admitting: INTERNAL MEDICINE
Payer: COMMERCIAL

## 2018-01-30 ENCOUNTER — HOSPITAL ENCOUNTER (OUTPATIENT)
Dept: GENERAL RADIOLOGY | Facility: CLINIC | Age: 83
End: 2018-01-30
Attending: RADIOLOGY
Payer: COMMERCIAL

## 2018-01-30 VITALS
HEART RATE: 57 BPM | DIASTOLIC BLOOD PRESSURE: 83 MMHG | OXYGEN SATURATION: 93 % | SYSTOLIC BLOOD PRESSURE: 134 MMHG | RESPIRATION RATE: 22 BRPM

## 2018-01-30 DIAGNOSIS — C54.1 ENDOMETRIAL SARCOMA (H): ICD-10-CM

## 2018-01-30 PROCEDURE — 88305 TISSUE EXAM BY PATHOLOGIST: CPT | Mod: 26 | Performed by: RADIOLOGY

## 2018-01-30 PROCEDURE — 88112 CYTOPATH CELL ENHANCE TECH: CPT | Mod: 91 | Performed by: RADIOLOGY

## 2018-01-30 PROCEDURE — 25000125 ZZHC RX 250: Performed by: RADIOLOGY

## 2018-01-30 PROCEDURE — 88305 TISSUE EXAM BY PATHOLOGIST: CPT | Performed by: RADIOLOGY

## 2018-01-30 PROCEDURE — 27210190 US THORACENTESIS

## 2018-01-30 PROCEDURE — 88112 CYTOPATH CELL ENHANCE TECH: CPT | Mod: 26 | Performed by: RADIOLOGY

## 2018-01-30 PROCEDURE — 00000102 ZZHCL STATISTIC CYTO WRIGHT STAIN TC: Performed by: RADIOLOGY

## 2018-01-30 PROCEDURE — 40000986 XR CHEST 1 VW

## 2018-01-30 PROCEDURE — 00000155 ZZHCL STATISTIC H-CELL BLOCK W/STAIN: Performed by: RADIOLOGY

## 2018-01-30 RX ADMIN — LIDOCAINE HYDROCHLORIDE 10 ML: 10 INJECTION, SOLUTION EPIDURAL; INFILTRATION; INTRACAUDAL; PERINEURAL at 09:55

## 2018-01-30 NOTE — PROGRESS NOTES
Right thoracentesis consent obtained by Dr. Galarza.  Pt tolerated 100 cc's yellow fluid removal.  Slight pain on completion of tap.  Post CXR reviewed and negative.  Specimen to lab for cytology as ordered.  Sterile dressing to site.  Post site care instructions reviewed with daughter and patient.  DC per wheelchair to home with all concerns addressed.

## 2018-01-31 LAB — COPATH REPORT: NORMAL

## 2018-02-12 ENCOUNTER — TELEPHONE (OUTPATIENT)
Dept: PEDIATRICS | Facility: CLINIC | Age: 83
End: 2018-02-12

## 2018-02-12 NOTE — TELEPHONE ENCOUNTER
Jill from [facility] LM at 1:19 pm requesting us to fax pt's recent face-to-face OV notes for Medicare purpose. Can fax the notes to [phone]. With any questions, she can be reached at [phone].    Hand faxed OV notes from 01/03/18.    Tiera, RN  Triage Nurse

## 2018-03-22 NOTE — TELEPHONE ENCOUNTER
Call received from Jaida Nolan from [facility] at [phone]:    She received the face-to-face OV notes from 01/03/18. States that its not in the regular format that she she usually receives from any [facility] provider. So, she can't use that. She will fax us a format which is acceptable(normally how [facility] providers send her the notes) for  to review.     Will await for her fax.     Tiera, RN  Triage Nurse

## 2018-03-31 DIAGNOSIS — I10 BENIGN ESSENTIAL HYPERTENSION: ICD-10-CM

## 2018-03-31 NOTE — TELEPHONE ENCOUNTER
"Requested Prescriptions   Pending Prescriptions Disp Refills     metoprolol succinate (TOPROL-XL) 25 MG 24 hr tablet [Pharmacy Med Name: METOPROLOL ER SUCCINATE 25MG TABS]  Last Written Prescription Date:  historical  Last Fill Quantity: ?,  # refills: ?   Last office visit: 1/3/2018 with prescribing provider:  1/3/18   Future Office Visit:     45 tablet 0     Sig: TAKE 1/2 TABLET(12.5 MG) BY MOUTH DAILY    Beta-Blockers Protocol Passed    3/31/2018  3:29 AM       Passed - Blood pressure under 140/90 in past 12 months    BP Readings from Last 3 Encounters:   01/30/18 134/83   01/03/18 124/60   12/27/17 153/63                Passed - Patient is age 6 or older       Passed - Recent (12 mo) or future (30 days) visit within the authorizing provider's specialty    Patient had office visit in the last 12 months or has a visit in the next 30 days with authorizing provider or within the authorizing provider's specialty.  See \"Patient Info\" tab in inbasket, or \"Choose Columns\" in Meds & Orders section of the refill encounter.              "

## 2018-04-03 RX ORDER — METOPROLOL SUCCINATE 25 MG/1
TABLET, EXTENDED RELEASE ORAL
Qty: 45 TABLET | Refills: 0 | Status: SHIPPED | OUTPATIENT
Start: 2018-04-03 | End: 2018-07-02

## 2018-04-03 NOTE — TELEPHONE ENCOUNTER
Routing refill request to provider for review/approval because:  Medication is reported/historical    Arina Hearn RN -- [facility]

## 2018-04-07 DIAGNOSIS — I10 BENIGN ESSENTIAL HYPERTENSION: ICD-10-CM

## 2018-04-08 NOTE — TELEPHONE ENCOUNTER
"Requested Prescriptions   Pending Prescriptions Disp Refills     losartan (COZAAR) 100 MG tablet [Pharmacy Med Name: LOSARTAN 100MG TABLETS]      Last Written Prescription Date:  n/a  Last Fill Quantity: n/a,   # refills: n/a  Last Office Visit: 1/3/2018  Future Office visit:       Routing refill request to provider for review/approval because:  Medication is reported/historical   90 tablet 0     Sig: TAKE 1 TABLET(100 MG) BY MOUTH DAILY    Angiotensin-II Receptors Passed    4/7/2018  3:13 PM       Passed - Blood pressure under 140/90 in past 12 months    BP Readings from Last 3 Encounters:   01/30/18 134/83   01/03/18 124/60   12/27/17 153/63          Passed - Recent (12 mo) or future (30 days) visit within the authorizing provider's specialty    Patient had office visit in the last 12 months or has a visit in the next 30 days with authorizing provider or within the authorizing provider's specialty.  See \"Patient Info\" tab in inbasket, or \"Choose Columns\" in Meds & Orders section of the refill encounter.         Passed - Patient is age 18 or older       Passed - No active pregnancy on record       Passed - Normal serum creatinine on file in past 12 months    Recent Labs   Lab Test  12/27/17   0614   CR  0.63          Passed - Normal serum potassium on file in past 12 months    Recent Labs   Lab Test  12/27/17   0614   POTASSIUM  4.5          Passed - No positive pregnancy test in past 12 months          "

## 2018-04-10 RX ORDER — LOSARTAN POTASSIUM 100 MG/1
TABLET ORAL
Qty: 90 TABLET | Refills: 0 | Status: SHIPPED | OUTPATIENT
Start: 2018-04-10 | End: 2018-07-14

## 2018-04-10 NOTE — TELEPHONE ENCOUNTER
Routing refill request to provider for review/approval because:  Medication is reported/historical    Renetta GONZALEZ RN, BSN, PHN  [place] [facility] RN

## 2018-04-12 ENCOUNTER — MYC MEDICAL ADVICE (OUTPATIENT)
Dept: PHARMACY | Facility: CLINIC | Age: 83
End: 2018-04-12

## 2018-05-07 DIAGNOSIS — F33.1 MODERATE RECURRENT MAJOR DEPRESSION (H): ICD-10-CM

## 2018-05-07 NOTE — TELEPHONE ENCOUNTER
"Requested Prescriptions   Pending Prescriptions Disp Refills     citalopram (CELEXA) 20 MG tablet [Pharmacy Med Name: CITALOPRAM 20MG TABLETS]    Last Written Prescription Date:  4/28/2017  Last Fill Quantity: 180,  # refills: 3   Last office visit: 1/3/2018 with prescribing provider:  Ayaz Fernandez     Future Office Visit:     180 tablet 0     Sig: TAKE 2 TABLETS BY MOUTH EVERY DAY    SSRIs Protocol Failed    5/7/2018 10:20 AM       Failed - PHQ-9 score less than 5 in past 6 months    Please review last PHQ-9 score.     PHQ-9 SCORE 9/29/2016 4/28/2017 10/11/2017   Total Score - - -   Total Score 9 7 7     KYRIE-7 SCORE 10/6/2016 4/28/2017 10/11/2017   Total Score 3 (minimal anxiety) - -   Total Score - 4 2                Passed - Patient is age 18 or older       Passed - No active pregnancy on record       Passed - No positive pregnancy test in last 12 months       Passed - Recent (6 mo) or future (30 days) visit within the authorizing provider's specialty    Patient had office visit in the last 6 months or has a visit in the next 30 days with authorizing provider or within the authorizing provider's specialty.  See \"Patient Info\" tab in inbasket, or \"Choose Columns\" in Meds & Orders section of the refill encounter.              "

## 2018-05-10 RX ORDER — CITALOPRAM HYDROBROMIDE 20 MG/1
TABLET ORAL
Qty: 180 TABLET | Refills: 0 | Status: SHIPPED | OUTPATIENT
Start: 2018-05-10 | End: 2018-08-07

## 2018-05-10 NOTE — TELEPHONE ENCOUNTER
PHQ-9 score:    PHQ-9 SCORE 10/11/2017   Total Score -   Total Score 7         Routing refill request to provider for review/approval because:  Drug not active on patient's medication list    Please advise if patient is still needing this?    Renetta GONZALEZ RN, BSN, PHN  [place] [facility] RN

## 2018-05-18 DIAGNOSIS — Z53.9 DIAGNOSIS NOT YET DEFINED: Primary | ICD-10-CM

## 2018-05-18 DIAGNOSIS — M81.0 OSTEOPOROSIS, UNSPECIFIED OSTEOPOROSIS TYPE, UNSPECIFIED PATHOLOGICAL FRACTURE PRESENCE: Primary | ICD-10-CM

## 2018-05-18 PROCEDURE — G0180 MD CERTIFICATION HHA PATIENT: HCPCS | Performed by: INTERNAL MEDICINE

## 2018-05-18 RX ORDER — ALENDRONATE SODIUM 70 MG/1
70 TABLET ORAL
Qty: 12 TABLET | Refills: 2 | Status: SHIPPED | OUTPATIENT
Start: 2018-05-18 | End: 2019-01-30

## 2018-05-18 NOTE — TELEPHONE ENCOUNTER
"Requested Prescriptions   Pending Prescriptions Disp Refills     alendronate (FOSAMAX) 70 MG tablet    Last Written Prescription Date:  9/9/2016  Last Fill Quantity: 12,  # refills: 2   Last office visit: 1/3/2018 with prescribing provider:  Ayaz Fernandez     Future Office Visit:     12 tablet 2     Sig: Take 1 tablet (70 mg) by mouth every 7 days Take with over 8 ounces water and stay upright for at least 30 minutes after dose.  Take at least 60 minutes before breakfast    Bisphosphonates Failed    5/18/2018  8:59 AM       Failed - Dexa on file within past 2 years    Please review last Dexa result.          Passed - Recent (12 mo) or future (30 days) visit within the authorizing provider's specialty    Patient had office visit in the last 12 months or has a visit in the next 30 days with authorizing provider or within the authorizing provider's specialty.  See \"Patient Info\" tab in inbasket, or \"Choose Columns\" in Meds & Orders section of the refill encounter.           Passed - Patient is age 18 or older       Passed - Normal serum creatinine on file within past 12 months    Recent Labs   Lab Test  12/27/17   0614   CR  0.63               "

## 2018-06-04 ENCOUNTER — TRANSFERRED RECORDS (OUTPATIENT)
Dept: HEALTH INFORMATION MANAGEMENT | Facility: CLINIC | Age: 83
End: 2018-06-04

## 2018-07-02 DIAGNOSIS — I10 BENIGN ESSENTIAL HYPERTENSION: ICD-10-CM

## 2018-07-02 NOTE — TELEPHONE ENCOUNTER
"Requested Prescriptions   Pending Prescriptions Disp Refills     metoprolol succinate (TOPROL-XL) 25 MG 24 hr tablet [Pharmacy Med Name: METOPROLOL ER SUCCINATE 25MG TABS]    Last Written Prescription Date:  4/3/2018  Last Fill Quantity: 45,  # refills: 0   Last office visit: 1/3/2018 with prescribing provider:  Ayaz Fernandez     Future Office Visit:     45 tablet 0     Sig: TAKE 1/2 TABLET(12.5 MG) BY MOUTH DAILY    Beta-Blockers Protocol Passed    7/2/2018  3:29 AM       Passed - Blood pressure under 140/90 in past 12 months    BP Readings from Last 3 Encounters:   01/30/18 134/83   01/03/18 124/60   12/27/17 153/63                Passed - Patient is age 6 or older       Passed - Recent (12 mo) or future (30 days) visit within the authorizing provider's specialty    Patient had office visit in the last 12 months or has a visit in the next 30 days with authorizing provider or within the authorizing provider's specialty.  See \"Patient Info\" tab in inbasket, or \"Choose Columns\" in Meds & Orders section of the refill encounter.              "

## 2018-07-05 RX ORDER — METOPROLOL SUCCINATE 25 MG/1
TABLET, EXTENDED RELEASE ORAL
Qty: 45 TABLET | Refills: 1 | Status: SHIPPED | OUTPATIENT
Start: 2018-07-05 | End: 2019-01-03

## 2018-07-05 NOTE — TELEPHONE ENCOUNTER
Prescription approved per [facility] Refill Protocol.  Darya Chen RN, BSN

## 2018-07-10 PROCEDURE — G0179 MD RECERTIFICATION HHA PT: HCPCS | Performed by: INTERNAL MEDICINE

## 2018-07-26 ENCOUNTER — TRANSFERRED RECORDS (OUTPATIENT)
Dept: HEALTH INFORMATION MANAGEMENT | Facility: CLINIC | Age: 83
End: 2018-07-26

## 2018-08-07 DIAGNOSIS — F33.1 MODERATE RECURRENT MAJOR DEPRESSION (H): ICD-10-CM

## 2018-08-07 NOTE — TELEPHONE ENCOUNTER
"Requested Prescriptions   Pending Prescriptions Disp Refills     citalopram (CELEXA) 20 MG tablet [Pharmacy Med Name: CITALOPRAM 20MG TABLETS]    Last Written Prescription Date:  5/10/2018  Last Fill Quantity: 180,  # refills: 0   Last office visit: 1/3/2018 with prescribing provider:  Ayaz Fernandez     Future Office Visit:     180 tablet 0     Sig: TAKE 2 TABLETS BY MOUTH EVERY DAY    SSRIs Protocol Failed    8/7/2018 10:16 AM       Failed - PHQ-9 score less than 5 in past 6 months    Please review last PHQ-9 score.     PHQ-9 SCORE 9/29/2016 4/28/2017 10/11/2017   Total Score - - -   Total Score 9 7 7     KYRIE-7 SCORE 10/6/2016 4/28/2017 10/11/2017   Total Score 3 (minimal anxiety) - -   Total Score - 4 2                Failed - Recent (6 mo) or future (30 days) visit within the authorizing provider's specialty    Patient had office visit in the last 6 months or has a visit in the next 30 days with authorizing provider or within the authorizing provider's specialty.  See \"Patient Info\" tab in inbasket, or \"Choose Columns\" in Meds & Orders section of the refill encounter.           Passed - Patient is age 18 or older       Passed - No active pregnancy on record       Passed - No positive pregnancy test in last 12 months          "

## 2018-08-09 NOTE — TELEPHONE ENCOUNTER
Due for phq9- sent via "[employer].    Cyndi Bartholomew,RN BSN  [facility]  [phone]

## 2018-08-10 RX ORDER — CITALOPRAM HYDROBROMIDE 20 MG/1
TABLET ORAL
Qty: 180 TABLET | Refills: 0 | Status: SHIPPED | OUTPATIENT
Start: 2018-08-10 | End: 2018-11-13

## 2018-08-10 NOTE — TELEPHONE ENCOUNTER
PHQ-9 SCORE 4/28/2017 10/11/2017 8/9/2018   Total Score - - -   Total Score MyChart - - 7 (Mild depression)   Total Score 7 7 7       Routing refill request to provider for review/approval because:  phq9 out of range.    Cyndi Bartholomew,RN BSN  [facility]  [phone]

## 2018-10-17 DIAGNOSIS — I10 BENIGN ESSENTIAL HYPERTENSION: ICD-10-CM

## 2018-10-17 NOTE — TELEPHONE ENCOUNTER
"Requested Prescriptions   Pending Prescriptions Disp Refills     losartan (COZAAR) 100 MG tablet [Pharmacy Med Name: LOSARTAN 100MG TABLETS]  Last Written Prescription Date:  07/17/2018  Last Fill Quantity: 90 tablet,  # refills: 0   Last office visit: 1/3/2018 with prescribing provider:  Ayaz Fernandez MD    Future Office Visit:   Next 5 appointments (look out 90 days)     Nov 13, 2018 10:30 AM CST   PHYSICAL with Ayaz Fernandez MD   [facility]    [address] [place] [address]  [address]  [facility] [address] [phone]                  90 tablet 0     Sig: TAKE 1 TABLET(100 MG) BY MOUTH DAILY    Angiotensin-II Receptors Passed    10/17/2018 10:14 AM       Passed - Blood pressure under 140/90 in past 12 months    BP Readings from Last 3 Encounters:   01/30/18 134/83   01/03/18 124/60   12/27/17 153/63                Passed - Recent (12 mo) or future (30 days) visit within the authorizing provider's specialty    Patient had office visit in the last 12 months or has a visit in the next 30 days with authorizing provider or within the authorizing provider's specialty.  See \"Patient Info\" tab in inbasket, or \"Choose Columns\" in Meds & Orders section of the refill encounter.             Passed - Patient is age 18 or older       Passed - No active pregnancy on record       Passed - Normal serum creatinine on file in past 12 months    Recent Labs   Lab Test  12/27/17   0614   05/11/15   1705   CR  0.63   < >   --    CREAT   --    --   0.7    < > = values in this interval not displayed.            Passed - Normal serum potassium on file in past 12 months    Recent Labs   Lab Test  12/27/17 0614   POTASSIUM  4.5                   Passed - No positive pregnancy test in past 12 months          "

## 2018-10-19 RX ORDER — LOSARTAN POTASSIUM 100 MG/1
TABLET ORAL
Qty: 90 TABLET | Refills: 0 | Status: SHIPPED | OUTPATIENT
Start: 2018-10-19 | End: 2019-01-18

## 2018-11-10 ASSESSMENT — ENCOUNTER SYMPTOMS
HEARTBURN: 1
DIARRHEA: 0
PALPITATIONS: 0
SORE THROAT: 0
BREAST MASS: 0
HEADACHES: 1
ABDOMINAL PAIN: 1
CONSTIPATION: 1
WEAKNESS: 0
CHILLS: 0
HEMATOCHEZIA: 0
FEVER: 0
NERVOUS/ANXIOUS: 0
DIZZINESS: 1
PARESTHESIAS: 0
JOINT SWELLING: 0
NAUSEA: 0
ARTHRALGIAS: 0
SHORTNESS OF BREATH: 1
EYE PAIN: 0
MYALGIAS: 0
FREQUENCY: 1
DYSURIA: 0
HEMATURIA: 0
COUGH: 1

## 2018-11-10 ASSESSMENT — ACTIVITIES OF DAILY LIVING (ADL): CURRENT_FUNCTION: NO ASSISTANCE NEEDED

## 2018-11-13 ENCOUNTER — OFFICE VISIT (OUTPATIENT)
Dept: PEDIATRICS | Facility: CLINIC | Age: 83
End: 2018-11-13
Payer: COMMERCIAL

## 2018-11-13 VITALS
HEART RATE: 61 BPM | WEIGHT: 233 LBS | DIASTOLIC BLOOD PRESSURE: 60 MMHG | OXYGEN SATURATION: 93 % | HEIGHT: 60 IN | TEMPERATURE: 98.1 F | SYSTOLIC BLOOD PRESSURE: 116 MMHG | BODY MASS INDEX: 45.75 KG/M2

## 2018-11-13 DIAGNOSIS — R10.32 ABDOMINAL PAIN, LEFT LOWER QUADRANT: ICD-10-CM

## 2018-11-13 DIAGNOSIS — F33.1 MODERATE RECURRENT MAJOR DEPRESSION (H): ICD-10-CM

## 2018-11-13 DIAGNOSIS — N64.4 PAIN IN BREAST: ICD-10-CM

## 2018-11-13 DIAGNOSIS — Z00.00 ROUTINE HISTORY AND PHYSICAL EXAMINATION OF ADULT: Primary | ICD-10-CM

## 2018-11-13 DIAGNOSIS — K29.50 CHRONIC GASTRITIS WITHOUT BLEEDING, UNSPECIFIED GASTRITIS TYPE: ICD-10-CM

## 2018-11-13 DIAGNOSIS — H65.92 MIDDLE EAR EFFUSION, LEFT: ICD-10-CM

## 2018-11-13 DIAGNOSIS — Z12.31 ENCOUNTER FOR SCREENING MAMMOGRAM FOR BREAST CANCER: ICD-10-CM

## 2018-11-13 DIAGNOSIS — R07.89 CHEST WALL PAIN: ICD-10-CM

## 2018-11-13 DIAGNOSIS — R60.0 BILATERAL LOWER EXTREMITY EDEMA: ICD-10-CM

## 2018-11-13 DIAGNOSIS — Z13.820 SCREENING FOR OSTEOPOROSIS: ICD-10-CM

## 2018-11-13 DIAGNOSIS — J31.0 CHRONIC RHINITIS: ICD-10-CM

## 2018-11-13 DIAGNOSIS — E55.9 VITAMIN D DEFICIENCY: ICD-10-CM

## 2018-11-13 LAB
BASOPHILS # BLD AUTO: 0 10E9/L (ref 0–0.2)
BASOPHILS NFR BLD AUTO: 0.3 %
DIFFERENTIAL METHOD BLD: ABNORMAL
EOSINOPHIL # BLD AUTO: 0.2 10E9/L (ref 0–0.7)
EOSINOPHIL NFR BLD AUTO: 1.9 %
ERYTHROCYTE [DISTWIDTH] IN BLOOD BY AUTOMATED COUNT: 14.4 % (ref 10–15)
HBA1C MFR BLD: 6 % (ref 0–5.6)
HCT VFR BLD AUTO: 43.6 % (ref 35–47)
HGB BLD-MCNC: 13.6 G/DL (ref 11.7–15.7)
LYMPHOCYTES # BLD AUTO: 1.3 10E9/L (ref 0.8–5.3)
LYMPHOCYTES NFR BLD AUTO: 13.2 %
MCH RBC QN AUTO: 30.5 PG (ref 26.5–33)
MCHC RBC AUTO-ENTMCNC: 31.2 G/DL (ref 31.5–36.5)
MCV RBC AUTO: 98 FL (ref 78–100)
MONOCYTES # BLD AUTO: 1.2 10E9/L (ref 0–1.3)
MONOCYTES NFR BLD AUTO: 11.6 %
NEUTROPHILS # BLD AUTO: 7.3 10E9/L (ref 1.6–8.3)
NEUTROPHILS NFR BLD AUTO: 73 %
PLATELET # BLD AUTO: 267 10E9/L (ref 150–450)
RBC # BLD AUTO: 4.46 10E12/L (ref 3.8–5.2)
VIT B12 SERPL-MCNC: 301 PG/ML (ref 193–986)
WBC # BLD AUTO: 10 10E9/L (ref 4–11)

## 2018-11-13 PROCEDURE — 36415 COLL VENOUS BLD VENIPUNCTURE: CPT | Performed by: INTERNAL MEDICINE

## 2018-11-13 PROCEDURE — 80053 COMPREHEN METABOLIC PANEL: CPT | Performed by: INTERNAL MEDICINE

## 2018-11-13 PROCEDURE — 93000 ELECTROCARDIOGRAM COMPLETE: CPT | Performed by: INTERNAL MEDICINE

## 2018-11-13 PROCEDURE — 99213 OFFICE O/P EST LOW 20 MIN: CPT | Mod: 25 | Performed by: INTERNAL MEDICINE

## 2018-11-13 PROCEDURE — 85025 COMPLETE CBC W/AUTO DIFF WBC: CPT | Performed by: INTERNAL MEDICINE

## 2018-11-13 PROCEDURE — 80061 LIPID PANEL: CPT | Performed by: INTERNAL MEDICINE

## 2018-11-13 PROCEDURE — 84443 ASSAY THYROID STIM HORMONE: CPT | Performed by: INTERNAL MEDICINE

## 2018-11-13 PROCEDURE — G0439 PPPS, SUBSEQ VISIT: HCPCS | Performed by: INTERNAL MEDICINE

## 2018-11-13 PROCEDURE — 82607 VITAMIN B-12: CPT | Performed by: INTERNAL MEDICINE

## 2018-11-13 PROCEDURE — 83036 HEMOGLOBIN GLYCOSYLATED A1C: CPT | Performed by: INTERNAL MEDICINE

## 2018-11-13 PROCEDURE — 82306 VITAMIN D 25 HYDROXY: CPT | Performed by: INTERNAL MEDICINE

## 2018-11-13 RX ORDER — AZELASTINE 1 MG/ML
1-2 SPRAY, METERED NASAL 2 TIMES DAILY
Qty: 3 BOTTLE | Refills: 3 | Status: SHIPPED | OUTPATIENT
Start: 2018-11-13 | End: 2019-12-16

## 2018-11-13 RX ORDER — FUROSEMIDE 20 MG
20 TABLET ORAL DAILY PRN
Qty: 30 TABLET | Refills: 3 | Status: SHIPPED | OUTPATIENT
Start: 2018-11-13 | End: 2019-12-16

## 2018-11-13 RX ORDER — CITALOPRAM HYDROBROMIDE 20 MG/1
TABLET ORAL
Qty: 180 TABLET | Refills: 0 | Status: SHIPPED | OUTPATIENT
Start: 2018-11-13 | End: 2019-02-06 | Stop reason: ALTCHOICE

## 2018-11-13 RX ORDER — LANSOPRAZOLE 30 MG/1
30 CAPSULE, DELAYED RELEASE ORAL DAILY
Qty: 90 CAPSULE | Refills: 3 | Status: SHIPPED | OUTPATIENT
Start: 2018-11-13 | End: 2019-09-18

## 2018-11-13 ASSESSMENT — ENCOUNTER SYMPTOMS
DYSURIA: 0
ABDOMINAL PAIN: 1
HEMATOCHEZIA: 0
CHILLS: 0
HEADACHES: 1
WEAKNESS: 0
DIZZINESS: 1
MYALGIAS: 0
JOINT SWELLING: 0
DIARRHEA: 0
CONSTIPATION: 1
FEVER: 0
BREAST MASS: 0
HEMATURIA: 0
HEARTBURN: 1
SORE THROAT: 0
PARESTHESIAS: 0
NAUSEA: 0
FREQUENCY: 1
COUGH: 1
NERVOUS/ANXIOUS: 0
ARTHRALGIAS: 0
PALPITATIONS: 0
EYE PAIN: 0
SHORTNESS OF BREATH: 1

## 2018-11-13 ASSESSMENT — ACTIVITIES OF DAILY LIVING (ADL): CURRENT_FUNCTION: NO ASSISTANCE NEEDED

## 2018-11-13 NOTE — LETTER
My Depression Action Plan  Name: Tatyana Metcalf   Date of Birth 3/19/1932  Date: 11/13/2018    My doctor: Ayaz Fernandez   My clinic: [address]  [address]  [facility] [address] [phone]          GREEN    ZONE   Good Control    What it looks like:     Things are going generally well. You have normal up s and down s. You may even feel depressed from time to time, but bad moods usually last less than a day.   What you need to do:  1. Continue to care for yourself (see self care plan)  2. Check your depression survival kit and update it as needed  3. Follow your physician s recommendations including any medication.  4. Do not stop taking medication unless you consult with your physician first.           YELLOW         ZONE Getting Worse    What it looks like:     Depression is starting to interfere with your life.     It may be hard to get out of bed; you may be starting to isolate yourself from others.    Symptoms of depression are starting to last most all day and this has happened for several days.     You may have suicidal thoughts but they are not constant.   What you need to do:     1. Call your care team, your response to treatment will improve if you keep your care team informed of your progress. Yellow periods are signs an adjustment may need to be made.     2. Continue your self-care, even if you have to fake it!    3. Talk to someone in your support network    4. Open up your depression survival kit           RED    ZONE Medical Alert - Get Help    What it looks like:     Depression is seriously interfering with your life.     You may experience these or other symptoms: You can t get out of bed most days, can t work or engage in other necessary activities, you have trouble taking care of basic hygiene, or basic responsibilities, thoughts of suicide or death that will not go away, self-injurious behavior.     What you need to do:  1. Call  your care team and request a same-day appointment. If they are not available (weekends or after hours) call your local crisis line, emergency room or 911.            Depression Self Care Plan / Survival Kit    Self-Care for Depression  Here s the deal. Your body and mind are really not as separate as most people think.  What you do and think affects how you feel and how you feel influences what you do and think. This means if you do things that people who feel good do, it will help you feel better.  Sometimes this is all it takes.  There is also a place for medication and therapy depending on how severe your depression is, so be sure to consult with your medical provider and/ or Behavioral Health Consultant if your symptoms are worsening or not improving.     In order to better manage my stress, I will:    Exercise  Get some form of exercise, every day. This will help reduce pain and release endorphins, the  feel good  chemicals in your brain. This is almost as good as taking antidepressants!  This is not the same as joining a gym and then never going! (they count on that by the way ) It can be as simple as just going for a walk or doing some gardening, anything that will get you moving.      Hygiene   Maintain good hygiene (Get out of bed in the morning, Make your bed, Brush your teeth, Take a shower, and Get dressed like you were going to work, even if you are unemployed).  If your clothes don't fit try to get ones that do.    Diet  I will strive to eat foods that are good for me, drink plenty of water, and avoid excessive sugar, caffeine, alcohol, and other mood-altering substances.  Some foods that are helpful in depression are: complex carbohydrates, B vitamins, flaxseed, fish or fish oil, fresh fruits and vegetables.    Psychotherapy  I agree to participate in Individual Therapy (if recommended).    Medication  If prescribed medications, I agree to take them.  Missing doses can result in serious side effects.   I understand that drinking alcohol, or other illicit drug use, may cause potential side effects.  I will not stop my medication abruptly without first discussing it with my provider.    Staying Connected With Others  I will stay in touch with my friends, family members, and my primary care provider/team.    Use your imagination  Be creative.  We all have a creative side; it doesn t matter if it s oil painting, sand castles, or mud pies! This will also kick up the endorphins.    Witness Beauty  (AKA stop and smell the roses) Take a look outside, even in mid-winter. Notice colors, textures. Watch the squirrels and birds.     Service to others  Be of service to others.  There is always someone else in need.  By helping others we can  get out of ourselves  and remember the really important things.  This also provides opportunities for practicing all the other parts of the program.    Humor  Laugh and be silly!  Adjust your TV habits for less news and crime-drama and more comedy.    Control your stress  Try breathing deep, massage therapy, biofeedback, and meditation. Find time to relax each day.     My support system    Clinic Contact:  Phone number:    Contact 1:  Phone number:    Contact 2:  Phone number:    Muslim/:  Phone number:    Therapist:  Phone number:    [facility] crisis center:    Phone number:    Other community support:  Phone number:

## 2018-11-13 NOTE — MR AVS SNAPSHOT
After Visit Summary   11/13/2018    Tatyana Metcalf    MRN: [id number]           Patient Information     Date Of Birth          3/19/1932        Visit Information        Provider Department      11/13/2018 10:30 AM Ayaz Fernandez MD [facility] (formerly [facility])        Today's Diagnoses     Abdominal pain, left lower quadrant    -  1    Middle ear effusion, left        Chronic rhinitis        Moderate recurrent major depression (H)        Encounter for screening mammogram for breast cancer        Chronic gastritis without bleeding, unspecified gastritis type        Bilateral lower extremity edema        Screening for osteoporosis        Chest wall pain        Vitamin D deficiency        BMI 45.0-49.9, adult (H)        Routine history and physical examination of adult          Care Instructions    1) Labs downstairs today    2) You can call to set up the bone density scan (dexa), the mammogram and ultrasound- Please call [facility] at [phone] to arrange your study.    3) Ordered CT scan of the abdomen as well- we can try to do this at the same time as the CT scan of the lungs that you are having done- call Please call [facility] at [phone] to arrange your study.    4) EKG of the heart looks good today. Try to continue the prevacid once per day. If not helping, hold the fosamax for 3-4 weeks to see if helps. Make sure that you take some food with the naproxen at night. We may consider a stress test of your heart if not improving.     5) May consider a light to help with fatigue- sun light can help with symptoms. You are on a good dose of the citalopram, we can consider changing or adding things if needed based on labs and how you are feeling.    Ayaz Fernandez MD    Preventive Health Recommendations    See your health care provider every year to    Review health changes.     Discuss preventive care.      Review your medicines if your doctor has prescribed any.      You  no longer need a yearly Pap test unless you've had an abnormal Pap test in the past 10 years. If you have vaginal symptoms, such as bleeding or discharge, be sure to talk with your provider about a Pap test.      Every 1 to 2 years, have a mammogram.  If you are over 69, talk with your health care provider about whether or not you want to continue having screening mammograms.      Every 10 years, have a colonoscopy. Or, have a yearly FIT test (stool test). These exams will check for colon cancer.       Have a cholesterol test every 5 years, or more often if your doctor advises it.       Have a diabetes test (fasting glucose) every three years. If you are at risk for diabetes, you should have this test more often.       At age 65, have a bone density scan (DEXA) to check for osteoporosis (brittle bone disease).    Shots:    Get a flu shot each year.    Get a tetanus shot every 10 years.    Talk to your doctor about your pneumonia vaccines. There are now two you should receive - Pneumovax (PPSV 23) and Prevnar (PCV 13).    Talk to your pharmacist about the shingles vaccine.    Talk to your doctor about the hepatitis B vaccine.    Nutrition:     Eat at least 5 servings of fruits and vegetables each day.      Eat whole-grain bread, whole-wheat pasta and brown rice instead of white grains and rice.      Get adequate Calcium and Vitamin D.     Lifestyle    Exercise at least 150 minutes a week (30 minutes a day, 5 days a week). This will help you control your weight and prevent disease.      Limit alcohol to one drink per day.      No smoking.       Wear sunscreen to prevent skin cancer.       See your dentist twice a year for an exam and cleaning.      See your eye doctor every 1 to 2 years to screen for conditions such as glaucoma, macular degeneration and cataracts.    Personalized Prevention Plan  You are due for the preventive services outlined below.  Your care team is available to assist you in scheduling these  services.  If you have already completed any of these items, please share that information with your care team to update in your medical record.  Health Maintenance Due   Topic Date Due     FALL RISK ASSESSMENT  04/28/2018     Flu Vaccine (1) 09/01/2018     COPD ACTION PLAN Q1 YR  10/11/2018     Depression Action Plan Review - yearly  10/11/2018             Follow-ups after your visit        Follow-up notes from your care team     Return in about 3 months (around 2/13/2019).      Your next 10 appointments already scheduled     Jan 04, 2019 10:30 AM CST   CT CHEST W CONTRAST with [address] [facility] [place] ([facility])    [address]  [facility] [address] [phone]           How do I prepare for my exam? (Food and drink instructions) **You will have contrast for this exam.** Do not eat or drink for 2 hours before your exam. If you need to take medicine, you may take it with small sips of water. (We may ask you to take liquid medicine as well.)  The day before your exam, drink extra fluids at least six 8-ounce glasses (unless your doctor tells you to restrict your fluids).  How do I prepare for my exam? (Other instructions) Patients over 70 or patients with diabetes or kidney problems: If you haven t had a blood test (creatinine test) within the last 30 days, the Cardiologist/Radiologist may require you to get this test prior to your exam.  What should I wear: Please wear loose clothing, such as a sweat suit or jogging clothes.  Avoid snaps, zippers and other metal. We may ask you to undress and put on a hospital gown.  How long does the exam take: Most scans take less than 20 minutes.  What should I bring: Please bring any scans or X-rays taken at other hospitals, if similar tests were done. Also bring a list of your medicines, including vitamins, minerals and over-the-counter drugs. It is safest to leave personal items at home.  Do I need a :   No  is needed.  What do I need to tell my doctor? Be sure to tell your doctor: * If you have any allergies. * If there s any chance you are pregnant. * If you are breastfeeding. * If you have diabetes as your medication may need to be adjusted for this exam.  What should I do after the exam: No restrictions, You may resume normal activities.  What is this test: A CT (computed tomography) scan is a series of pictures that allows us to look inside your body. The scanner creates images of the body in cross sections, much like slices of bread. This helps us see any problems more clearly. You may receive contrast (X-ray dye) before or during your scan. Contrast is given through an IV (small needle in your arm).  Who should I call with questions: If you have any questions, please call the Imaging Department where you will have your exam. Directions, parking instructions, and other information is available on our website, [email or web address].              Future tests that were ordered for you today     Open Future Orders        Priority Expected Expires Ordered    *MA Screening Digital Bilateral Routine  11/13/2019 11/13/2018    DX Hip/Pelvis/Spine Routine  11/13/2019 11/13/2018    US Breast Left Complete 4 Quadrants Routine  11/13/2019 11/13/2018    CT Abdomen Pelvis w Contrast Routine  11/13/2019 11/13/2018            Who to contact     If you have questions or need follow up information about today's clinic visit or your schedule please contact [facility] [place] directly at [phone].  Normal or non-critical lab and imaging results will be communicated to you by MyChart, letter or phone within 4 business days after the clinic has received the results. If you do not hear from us within 7 days, please contact the clinic through [employer] or phone. If you have a critical or abnormal lab result, we will notify you by phone as soon as possible.  Submit refill requests through [employer] or call your pharmacy and  they will forward the refill request to us. Please allow 3 business days for your refill to be completed.          Additional Information About Your Visit        [employer] Information     [employer] gives you secure access to your electronic health record. If you see a primary care provider, you can also send messages to your care team and make appointments. If you have questions, please call your primary care clinic.  If you do not have a primary care provider, please call [phone] and they will assist you.        Care EveryWhere ID     This is your Care EveryWhere ID. This could be used by other organizations to access your [facility] medical records  [id number]        Your Vitals Were     Pulse Temperature Height Pulse Oximetry BMI (Body Mass Index)       61 98.1  F (36.7  C) (Oral) 5' (1.524 m) 93% 45.5 kg/m2        Blood Pressure from Last 3 Encounters:   11/13/18 116/60   01/30/18 134/83   01/03/18 124/60    Weight from Last 3 Encounters:   11/13/18 233 lb (105.7 kg)   01/03/18 231 lb (104.8 kg)   12/27/17 238 lb 11.2 oz (108.3 kg)              We Performed the Following     CBC with platelets differential     Comprehensive metabolic panel     EKG 12-lead complete w/read - Clinics     Hemoglobin A1c     Lipid panel reflex to direct LDL Fasting     TSH with free T4 reflex     Vitamin B12     Vitamin D Deficiency          Today's Medication Changes          These changes are accurate as of 11/13/18 11:25 AM.  If you have any questions, ask your nurse or doctor.               These medicines have changed or have updated prescriptions.        Dose/Directions    furosemide 20 MG tablet   Commonly known as:  LASIX   This may have changed:  reasons to take this   Used for:  Bilateral lower extremity edema, Middle ear effusion, left, Chronic rhinitis, Moderate recurrent major depression (H), Abdominal pain, left lower quadrant, Encounter for screening mammogram for breast cancer, Chronic gastritis without bleeding,  unspecified gastritis type, Screening for osteoporosis, Chest wall pain, Vitamin D deficiency   Changed by:  Ayaz Fernandez MD        Dose:  20 mg   Take 1 tablet (20 mg) by mouth daily as needed (edema)   Quantity:  30 tablet   Refills:  3       * PREVACID PO   This may have changed:  Another medication with the same name was added. Make sure you understand how and when to take each.   Changed by:  Ayaz Fernandez MD        Dose:  30 mg   Take 30 mg by mouth every evening   Refills:  0       * LANsoprazole 30 MG CR capsule   Commonly known as:  PREVACID   This may have changed:  You were already taking a medication with the same name, and this prescription was added. Make sure you understand how and when to take each.   Used for:  Chronic gastritis without bleeding, unspecified gastritis type, Middle ear effusion, left, Chronic rhinitis, Moderate recurrent major depression (H), Abdominal pain, left lower quadrant, Encounter for screening mammogram for breast cancer, Bilateral lower extremity edema, Screening for osteoporosis, Chest wall pain, Vitamin D deficiency   Changed by:  Ayaz Fernandez MD        Dose:  30 mg   Take 1 capsule (30 mg) by mouth daily Take 30-60 minutes before a meal.   Quantity:  90 capsule   Refills:  3       * Notice:  This list has 2 medication(s) that are the same as other medications prescribed for you. Read the directions carefully, and ask your doctor or other care provider to review them with you.         Where to get your medicines      These medications [facility] to [id number]  [place], [place] - [address] [facility]  [address] [place] [address] [place] [address]     Phone:  [phone]     azelastine 0.1 % nasal spray    citalopram 20 MG tablet    furosemide 20 MG tablet    LANsoprazole 30 MG CR capsule                Primary Care Provider Office Phone # Fax #    Ayaz Fernandez [phone] [address]  [address] [place] [address]        Goals        General    Functional (pt-stated)     Notes - Note edited  1/27/2015 12:43 PM by Erica Bryant, RN    Will have a one time in home fall risk assessment done to help in preventing further fall.     Fall risk assessment done. Home care now in home and providing services.         Equal Access to Services     [facility]: Hadii aad ku hadasho Soomaali, waaxda luqadaha, qaybta kaalmada adeegyada, elisa munguia rebeccayesica rae nicerasto schafern . So [facility] [phone].    ATENCIÓN: Si habla español, tiene a malhotra disposición servicios gratuitos de asistencia lingüística. Llame al [phone].    We comply with applicable federal civil rights laws and [place] laws. We do not discriminate on the basis of race, color, national origin, age, disability, sex, sexual orientation, or gender identity.            Thank you!     Thank you for choosing [facility] [place]  for your care. Our goal is always to provide you with excellent care. Hearing back from our patients is one way we can continue to improve our services. Please take a few minutes to complete the written survey that you may receive in the mail after your visit with us. Thank you!             Your Updated Medication List - Protect others around you: Learn how to safely use, store and throw away your medicines at www.disposemymeds.org.          This list is accurate as of 11/13/18 11:25 AM.  Always use your most recent med list.                   Brand Name Dispense Instructions for use Diagnosis    * albuterol 1.25 MG/3ML nebulizer solution    ACCUNEB    30 vial    Take 1 vial (1.25 mg) by nebulization every 4 hours as needed for shortness of breath / dyspnea or wheezing    Bronchiectasis with acute exacerbation (H)       * PROAIR RESPICLICK 108 (90 Base) MCG/ACT Aepb inhaler   Generic drug:  albuterol      INL 2 PUFFS PO Q 4 TO 6 H PRN        alendronate 70 MG tablet    FOSAMAX    12 tablet    Take 1 tablet (70 mg) by  mouth every 7 days Take with over 8 ounces water and stay upright for at least 30 minutes after dose.  Take at least 60 minutes before breakfast    Osteoporosis, unspecified osteoporosis type, unspecified pathological fracture presence       ASPERCREME W/LIDOCAINE 4 % Crea cream   Generic drug:  lidocaine      Apply topically once as needed for mild pain        azelastine 0.1 % nasal spray    ASTELIN    3 Bottle    Spray 1-2 sprays into both nostrils 2 times daily    Middle ear effusion, left, Chronic rhinitis, Moderate recurrent major depression (H), Abdominal pain, left lower quadrant, Encounter for screening mammogram for breast cancer, Chronic gastritis without bleeding, unspecified gastritis type, Bilateral lower extremity edema, Screening for osteoporosis, Chest wall pain, Vitamin D deficiency       budesonide-formoterol 160-4.5 MCG/ACT Inhaler    SYMBICORT     Inhale 2 puffs into the lungs 2 times daily        cetirizine 10 MG tablet    zyrTEC    30 tablet    Take 1 tablet (10 mg) by mouth every evening        cholecalciferol 1000 UNIT tablet    vitamin D3     Take 1,000 Units by mouth every evening        citalopram 20 MG tablet    celeXA    180 tablet    TAKE 2 TABLETS BY MOUTH EVERY DAY    Moderate recurrent major depression (H), Middle ear effusion, left, Chronic rhinitis, Abdominal pain, left lower quadrant, Encounter for screening mammogram for breast cancer, Chronic gastritis without bleeding, unspecified gastritis type, Bilateral lower extremity edema, Screening for osteoporosis, Chest wall pain, Vitamin D deficiency       ferrous sulfate 325 (65 Fe) MG tablet    IRON     Take 325 mg by mouth every evening        furosemide 20 MG tablet    LASIX    30 tablet    Take 1 tablet (20 mg) by mouth daily as needed (edema)    Bilateral lower extremity edema, Middle ear effusion, left, Chronic rhinitis, Moderate recurrent major depression (H), Abdominal pain, left lower quadrant, Encounter for screening  mammogram for breast cancer, Chronic gastritis without bleeding, unspecified gastritis type, Screening for osteoporosis, Chest wall pain, Vitamin D deficiency       losartan 100 MG tablet    COZAAR    90 tablet    TAKE 1 TABLET(100 MG) BY MOUTH DAILY    Benign essential hypertension       metoprolol succinate 25 MG 24 hr tablet    TOPROL-XL    45 tablet    TAKE 1/2 TABLET(12.5 MG) BY MOUTH DAILY    Benign essential hypertension       nystatin 339562 UNIT/GM Powd    MYCOSTATIN    60 g    Apply topically 3 times daily as needed    Fungal infection of skin       polyethylene glycol powder    MIRALAX/GLYCOLAX     Take 17 g by mouth 2 times daily        * PREVACID PO      Take 30 mg by mouth every evening        * LANsoprazole 30 MG CR capsule    PREVACID    90 capsule    Take 1 capsule (30 mg) by mouth daily Take 30-60 minutes before a meal.    Chronic gastritis without bleeding, unspecified gastritis type, Middle ear effusion, left, Chronic rhinitis, Moderate recurrent major depression (H), Abdominal pain, left lower quadrant, Encounter for screening mammogram for breast cancer, Bilateral lower extremity edema, Screening for osteoporosis, Chest wall pain, Vitamin D deficiency       * Notice:  This list has 4 medication(s) that are the same as other medications prescribed for you. Read the directions carefully, and ask your doctor or other care provider to review them with you.

## 2018-11-13 NOTE — PATIENT INSTRUCTIONS
1) Labs downstairs today    2) You can call to set up the bone density scan (dexa), the mammogram and ultrasound- Please call [facility] at [phone] to arrange your study.    3) Ordered CT scan of the abdomen as well- we can try to do this at the same time as the CT scan of the lungs that you are having done- call Please call [facility] at [phone] to arrange your study.    4) EKG of the heart looks good today. Try to continue the prevacid once per day. If not helping, hold the fosamax for 3-4 weeks to see if helps. Make sure that you take some food with the naproxen at night. We may consider a stress test of your heart if not improving.     5) May consider a light to help with fatigue- sun light can help with symptoms. You are on a good dose of the citalopram, we can consider changing or adding things if needed based on labs and how you are feeling.    Ayaz Fernandez MD    Preventive Health Recommendations    See your health care provider every year to    Review health changes.     Discuss preventive care.      Review your medicines if your doctor has prescribed any.      You no longer need a yearly Pap test unless you've had an abnormal Pap test in the past 10 years. If you have vaginal symptoms, such as bleeding or discharge, be sure to talk with your provider about a Pap test.      Every 1 to 2 years, have a mammogram.  If you are over 69, talk with your health care provider about whether or not you want to continue having screening mammograms.      Every 10 years, have a colonoscopy. Or, have a yearly FIT test (stool test). These exams will check for colon cancer.       Have a cholesterol test every 5 years, or more often if your doctor advises it.       Have a diabetes test (fasting glucose) every three years. If you are at risk for diabetes, you should have this test more often.       At age 65, have a bone density scan (DEXA) to check for osteoporosis (brittle bone  disease).    Shots:    Get a flu shot each year.    Get a tetanus shot every 10 years.    Talk to your doctor about your pneumonia vaccines. There are now two you should receive - Pneumovax (PPSV 23) and Prevnar (PCV 13).    Talk to your pharmacist about the shingles vaccine.    Talk to your doctor about the hepatitis B vaccine.    Nutrition:     Eat at least 5 servings of fruits and vegetables each day.      Eat whole-grain bread, whole-wheat pasta and brown rice instead of white grains and rice.      Get adequate Calcium and Vitamin D.     Lifestyle    Exercise at least 150 minutes a week (30 minutes a day, 5 days a week). This will help you control your weight and prevent disease.      Limit alcohol to one drink per day.      No smoking.       Wear sunscreen to prevent skin cancer.       See your dentist twice a year for an exam and cleaning.      See your eye doctor every 1 to 2 years to screen for conditions such as glaucoma, macular degeneration and cataracts.    Personalized Prevention Plan  You are due for the preventive services outlined below.  Your care team is available to assist you in scheduling these services.  If you have already completed any of these items, please share that information with your care team to update in your medical record.  Health Maintenance Due   Topic Date Due     FALL RISK ASSESSMENT  04/28/2018     Flu Vaccine (1) 09/01/2018     COPD ACTION PLAN Q1 YR  10/11/2018     Depression Action Plan Review - yearly  10/11/2018

## 2018-11-13 NOTE — PROGRESS NOTES
"SUBJECTIVE:   Tatyana Metcalf is a 86 year old female who presents for Preventive Visit.    Are you in the first 12 months of your Medicare coverage?  No    Annual Wellness Visit     In general, how would you rate your overall health?  Good    Frequency of exercise:  2-3 days/week    Do you usually eat at least 4 servings of fruit and vegetables a day, include whole grains    & fiber and avoid regularly eating high fat or \"junk\" foods?  No    Taking medications regularly:  Yes    Ability to successfully perform activities of daily living:  No assistance needed    Home Safety:  No safety concerns identified    Hearing Impairment:  Difficulty following a conversation in a noisy restaurant or crowded room, need to ask people to speak up or repeat themselves and find that men's voices are easier to understand than woman's    In the past 6 months, have you been bothered by leaking of urine? Yes    In general, how would you rate your overall mental or emotional health?  Good    PHQ-2 Total Score: 2    Additional concerns today:  Yes    Osteoporsis: due for Dexa scan, has been on fosamax for 3 years.     Diffuse pain: notes pain in the joints of arms- elbows, hands, notes bilateral leg pain. Getting knee injections by orthopedics. Mood has been slightly worse, feeling more fatigued. Sleeping more, less interest in things. Daughter notes that this fits with past when mood has been worse.  PHQ-9 SCORE 4/28/2017 10/11/2017 8/9/2018   Total Score - - -   Total Score MyChart - - 7 (Mild depression)   Total Score 7 7 7     KYRIE-7 SCORE 10/6/2016 4/28/2017 10/11/2017   Total Score 3 (minimal anxiety) - -   Total Score - 4 2   No SI or HI.    Needs astelin for nasal spray.     Epigastric abdominal pain- notes worsening pain along the epigastric region. Had been on prevacid in the past but stopped over the summer, restarted 1 week ago. Is having CT scan for follow up on history of cancers. Had significant constipation in the past, " better now     Fall risk:  Fallen 2 or more times in the past year?: No  Any fall with injury in the past year?: No    COGNITIVE SCREEN  1) Repeat 3 items (Leader, Season, Table)    2) Clock draw: NORMAL  3) 3 item recall: Recalls 2 objects   Results: NORMAL clock, 1-2 items recalled: COGNITIVE IMPAIRMENT LESS LIKELY    Mini-CogTM Copyright JUAN Higgins. Licensed by the author for use in [facility]; reprinted with permission ([email or web address]). All rights reserved.        Reviewed and updated as needed this visit by clinical staff  Tobacco  Allergies  Meds  Med Hx  Surg Hx  Fam Hx  Soc Hx        Reviewed and updated as needed this visit by Provider        Social History   Substance Use Topics     Smoking status: Never Smoker     Smokeless tobacco: Never Used     Alcohol use 0.0 oz/week     0 Standard drinks or equivalent per week      Comment: holidays       Alcohol Use 11/10/2018   If you drink alcohol do you typically have greater than 3 drinks per day OR greater than 7 drinks per week? Not Applicable   No flowsheet data found.          Do you feel safe in your environment - Yes    Do you have a Health Care Directive?: Yes: Advance Directive has been received and scanned.    Current providers sharing in care for this patient include:   Patient Care Team:  Ayaz Fernandez MD as PCP - General (Internal Medicine)    The following health maintenance items are reviewed in Epic and correct as of today:  Health Maintenance   Topic Date Due     FALL RISK ASSESSMENT  04/28/2018     INFLUENZA VACCINE (1) 09/01/2018     COPD ACTION PLAN Q1 YR  10/11/2018     DEPRESSION ACTION PLAN Q1 YR  10/11/2018     PHQ-9 Q6 MONTHS  02/09/2019     ADVANCE DIRECTIVE PLANNING Q5 YRS  10/10/2022     TETANUS IMMUNIZATION (SYSTEM ASSIGNED)  10/31/2022     SPIROMETRY ONETIME  Completed     PNEUMOCOCCAL  Completed     Labs reviewed in EPIC    Pneumonia Vaccine:Adults age 65+ who received Pneumovax (PPSV23) at 65 years or older:  Should be given PCV13 > 1 year after their most recent PPSV23    Review of Systems   Constitutional: Negative for chills and fever.   HENT: Positive for congestion and hearing loss. Negative for ear pain and sore throat.    Eyes: Positive for visual disturbance. Negative for pain.   Respiratory: Positive for cough and shortness of breath.    Cardiovascular: Positive for chest pain and peripheral edema. Negative for palpitations.   Gastrointestinal: Positive for abdominal pain, constipation and heartburn. Negative for diarrhea, hematochezia and nausea.   Breasts:  Negative for tenderness, breast mass and discharge.   Genitourinary: Positive for frequency and urgency. Negative for dysuria, genital sores, hematuria, pelvic pain, vaginal bleeding and vaginal discharge.   Musculoskeletal: Negative for arthralgias, joint swelling and myalgias.   Skin: Negative for rash.   Neurological: Positive for dizziness and headaches. Negative for weakness and paresthesias.   Psychiatric/Behavioral: Negative for mood changes. The patient is not nervous/anxious.      Constitutional, HEENT, cardiovascular, pulmonary, GI, , musculoskeletal, neuro, skin, endocrine and psych systems are negative, except as otherwise noted.    OBJECTIVE:   /60 (BP Location: Right arm, Cuff Size: Adult Large)  Pulse 61  Temp 98.1  F (36.7  C) (Oral)  Ht 5' (1.524 m)  Wt 233 lb (105.7 kg)  SpO2 93%  BMI 45.5 kg/m2 Estimated body mass index is 45.5 kg/(m^2) as calculated from the following:    Height as of this encounter: 5' (1.524 m).    Weight as of this encounter: 233 lb (105.7 kg).  Physical Exam  GENERAL: alert, no distress and elderly  HENT: ear canals and TM's normal, nose and mouth without ulcers or lesions  NECK: no adenopathy, no asymmetry, masses, or scars and thyroid normal to palpation  RESP: lungs clear to auscultation - no rales, rhonchi or wheezes  CV: regular rate and rhythm, normal S1 S2, no S3 or S4, no murmur, click or rub,  no peripheral edema and peripheral pulses strong  ABDOMEN: tender to palpation in the epigastric region, no guarding, no masses noted, no rashes, mild chest wall tenderness as well and tender over central chest area  MS: walking with wheeled walker, noted ostearthritic changes of the bilateral hands, knees  SKIN: no suspicious lesions or rashes  PSYCH: mentation appears normal, affect normal/bright    Diagnostic Test Results:  Results for orders placed or performed in visit on 11/13/18   Vitamin D Deficiency   Result Value Ref Range    Vitamin D Deficiency screening 43 20 - 75 ug/L   Vitamin B12   Result Value Ref Range    Vitamin B12 301 193 - 986 pg/mL   CBC with platelets differential   Result Value Ref Range    WBC 10.0 4.0 - 11.0 10e9/L    RBC Count 4.46 3.8 - 5.2 10e12/L    Hemoglobin 13.6 11.7 - 15.7 g/dL    Hematocrit 43.6 35.0 - 47.0 %    MCV 98 78 - 100 fl    MCH 30.5 26.5 - 33.0 pg    MCHC 31.2 (L) 31.5 - 36.5 g/dL    RDW 14.4 10.0 - 15.0 %    Platelet Count 267 150 - 450 10e9/L    % Neutrophils 73.0 %    % Lymphocytes 13.2 %    % Monocytes 11.6 %    % Eosinophils 1.9 %    % Basophils 0.3 %    Absolute Neutrophil 7.3 1.6 - 8.3 10e9/L    Absolute Lymphocytes 1.3 0.8 - 5.3 10e9/L    Absolute Monocytes 1.2 0.0 - 1.3 10e9/L    Absolute Eosinophils 0.2 0.0 - 0.7 10e9/L    Absolute Basophils 0.0 0.0 - 0.2 10e9/L    Diff Method Automated Method    Comprehensive metabolic panel   Result Value Ref Range    Sodium 142 133 - 144 mmol/L    Potassium 4.2 3.4 - 5.3 mmol/L    Chloride 107 94 - 109 mmol/L    Carbon Dioxide 28 20 - 32 mmol/L    Anion Gap 7 3 - 14 mmol/L    Glucose 96 70 - 99 mg/dL    Urea Nitrogen 24 7 - 30 mg/dL    Creatinine 0.72 0.52 - 1.04 mg/dL    GFR Estimate 77 >60 mL/min/1.7m2    GFR Estimate If Black >90 >60 mL/min/1.7m2    Calcium 9.4 8.5 - 10.1 mg/dL    Bilirubin Total 0.4 0.2 - 1.3 mg/dL    Albumin 3.7 3.4 - 5.0 g/dL    Protein Total 7.1 6.8 - 8.8 g/dL    Alkaline Phosphatase 87 40 - 150 U/L     ALT 19 0 - 50 U/L    AST 15 0 - 45 U/L   TSH with free T4 reflex   Result Value Ref Range    TSH 1.71 0.40 - 4.00 mU/L   Hemoglobin A1c   Result Value Ref Range    Hemoglobin A1C 6.0 (H) 0 - 5.6 %   Lipid panel reflex to direct LDL Fasting   Result Value Ref Range    Cholesterol 157 <200 mg/dL    Triglycerides 114 <150 mg/dL    HDL Cholesterol 48 (L) >49 mg/dL    LDL Cholesterol Calculated 86 <100 mg/dL    Non HDL Cholesterol 109 <130 mg/dL       ASSESSMENT / PLAN:       ICD-10-CM    1. Routine history and physical examination of adult Z00.00 Lipid panel reflex to direct LDL Fasting   2. Middle ear effusion, left H65.92 azelastine (ASTELIN) 0.1 % nasal spray     citalopram (CELEXA) 20 MG tablet     CT Abdomen Pelvis w Contrast     DX Hip/Pelvis/Spine     US Breast Left Complete 4 Quadrants     *MA Screening Digital Bilateral     LANsoprazole (PREVACID) 30 MG CR capsule     furosemide (LASIX) 20 MG tablet     EKG 12-lead complete w/read - Clinics     Vitamin D Deficiency     Vitamin B12     CBC with platelets differential     Comprehensive metabolic panel     TSH with free T4 reflex     Hemoglobin A1c     OFFICE/OUTPT VISIT,EST,LEVL III   3. Chronic rhinitis J31.0 azelastine (ASTELIN) 0.1 % nasal spray     citalopram (CELEXA) 20 MG tablet     CT Abdomen Pelvis w Contrast     DX Hip/Pelvis/Spine     US Breast Left Complete 4 Quadrants     *MA Screening Digital Bilateral     LANsoprazole (PREVACID) 30 MG CR capsule     furosemide (LASIX) 20 MG tablet     EKG 12-lead complete w/read - Clinics     Vitamin D Deficiency     Vitamin B12     CBC with platelets differential     Comprehensive metabolic panel     TSH with free T4 reflex     Hemoglobin A1c     OFFICE/OUTPT VISIT,EST,LEVL III   4. Moderate recurrent major depression (H) F33.1 azelastine (ASTELIN) 0.1 % nasal spray     citalopram (CELEXA) 20 MG tablet     CT Abdomen Pelvis w Contrast     DX Hip/Pelvis/Spine     US Breast Left Complete 4 Quadrants     *MA  Screening Digital Bilateral     LANsoprazole (PREVACID) 30 MG CR capsule     furosemide (LASIX) 20 MG tablet     EKG 12-lead complete w/read - Clinics     Vitamin D Deficiency     Vitamin B12     CBC with platelets differential     Comprehensive metabolic panel     TSH with free T4 reflex     Hemoglobin A1c     OFFICE/OUTPT VISIT,EST,LEVL III   5. Abdominal pain, left lower quadrant R10.32 azelastine (ASTELIN) 0.1 % nasal spray     citalopram (CELEXA) 20 MG tablet     CT Abdomen Pelvis w Contrast     DX Hip/Pelvis/Spine     US Breast Left Complete 4 Quadrants     *MA Screening Digital Bilateral     LANsoprazole (PREVACID) 30 MG CR capsule     furosemide (LASIX) 20 MG tablet     EKG 12-lead complete w/read - Clinics     Vitamin D Deficiency     Vitamin B12     CBC with platelets differential     Comprehensive metabolic panel     TSH with free T4 reflex     Hemoglobin A1c     OFFICE/OUTPT VISIT,EST,LEVL III   6. Encounter for screening mammogram for breast cancer Z12.31 azelastine (ASTELIN) 0.1 % nasal spray     citalopram (CELEXA) 20 MG tablet     CT Abdomen Pelvis w Contrast     DX Hip/Pelvis/Spine     US Breast Left Complete 4 Quadrants     *MA Screening Digital Bilateral     LANsoprazole (PREVACID) 30 MG CR capsule     furosemide (LASIX) 20 MG tablet     EKG 12-lead complete w/read - Clinics     Vitamin D Deficiency     Vitamin B12     CBC with platelets differential     Comprehensive metabolic panel     TSH with free T4 reflex     Hemoglobin A1c   7. Chronic gastritis without bleeding, unspecified gastritis type K29.50 azelastine (ASTELIN) 0.1 % nasal spray     citalopram (CELEXA) 20 MG tablet     CT Abdomen Pelvis w Contrast   - will get CT scan of abdomen with new pain, worsening depression and history of cancer. Restart prevacid, discussed trial of holding fosamax with hx of hiatal hernia and symptoms.   DX Hip/Pelvis/Spine     US Breast Left Complete 4 Quadrants     *MA Screening Digital Bilateral      LANsoprazole (PREVACID) 30 MG CR capsule     furosemide (LASIX) 20 MG tablet     EKG 12-lead complete w/read - Clinics     Vitamin D Deficiency     Vitamin B12     CBC with platelets differential     Comprehensive metabolic panel     TSH with free T4 reflex     Hemoglobin A1c     OFFICE/OUTPT VISIT,EST,LEVL III   8. Bilateral lower extremity edema R60.0 azelastine (ASTELIN) 0.1 % nasal spray     citalopram (CELEXA) 20 MG tablet     CT Abdomen Pelvis w Contrast   No acute concerns, improved with lasix still  DX Hip/Pelvis/Spine     US Breast Left Complete 4 Quadrants     *MA Screening Digital Bilateral     LANsoprazole (PREVACID) 30 MG CR capsule     furosemide (LASIX) 20 MG tablet     EKG 12-lead complete w/read - Clinics     Vitamin D Deficiency     Vitamin B12     CBC with platelets differential     Comprehensive metabolic panel     TSH with free T4 reflex     Hemoglobin A1c     OFFICE/OUTPT VISIT,EST,LEVL III   9. Screening for osteoporosis Z13.820 azelastine (ASTELIN) 0.1 % nasal spray     citalopram (CELEXA) 20 MG tablet     CT Abdomen Pelvis w Contrast     DX Hip/Pelvis/Spine     US Breast Left Complete 4 Quadrants     *MA Screening Digital Bilateral     LANsoprazole (PREVACID) 30 MG CR capsule     furosemide (LASIX) 20 MG tablet     EKG 12-lead complete w/read - Clinics     Vitamin D Deficiency     Vitamin B12     CBC with platelets differential     Comprehensive metabolic panel     TSH with free T4 reflex     Hemoglobin A1c   10. Chest wall pain R07.89 azelastine (ASTELIN) 0.1 % nasal spray     citalopram (CELEXA) 20 MG tablet     CT Abdomen Pelvis w Contrast   Reproducible on exam, stress test done in 2016, offered stress testing, EKG unchanged today. No signs of DVT or PE  DX Hip/Pelvis/Spine     US Breast Left Complete 4 Quadrants     *MA Screening Digital Bilateral     LANsoprazole (PREVACID) 30 MG CR capsule     furosemide (LASIX) 20 MG tablet     EKG 12-lead complete w/read - Clinics     Vitamin D  Deficiency     Vitamin B12     CBC with platelets differential     Comprehensive metabolic panel     TSH with free T4 reflex     Hemoglobin A1c     OFFICE/OUTPT VISIT,EST,LEVL III   11. Vitamin D deficiency E55.9 azelastine (ASTELIN) 0.1 % nasal spray     citalopram (CELEXA) 20 MG tablet     CT Abdomen Pelvis w Contrast     DX Hip/Pelvis/Spine     US Breast Left Complete 4 Quadrants     *MA Screening Digital Bilateral     LANsoprazole (PREVACID) 30 MG CR capsule     furosemide (LASIX) 20 MG tablet     EKG 12-lead complete w/read - Clinics     Vitamin D Deficiency     Vitamin B12     CBC with platelets differential     Comprehensive metabolic panel     TSH with free T4 reflex     Hemoglobin A1c  -   12. BMI 45.0-49.9, adult (H) Z68.42 OFFICE/OUTPT VISIT,EST,LEVL III- discussed diet       End of Life Planning:  Patient currently has an advanced directive: Yes.  Practitioner is supportive of decision.    COUNSELING:  Reviewed preventive health counseling, as reflected in patient instructions    BP Readings from Last 1 Encounters:   11/13/18 116/60     Estimated body mass index is 45.5 kg/(m^2) as calculated from the following:    Height as of this encounter: 5' (1.524 m).    Weight as of this encounter: 233 lb (105.7 kg).      Weight management plan: Discussed healthy diet and exercise guidelines and patient will follow up in 12 months in clinic to re-evaluate.     reports that she has never smoked. She has never used smokeless tobacco.      Appropriate preventive services were discussed with this patient, including applicable screening as appropriate for cardiovascular disease, diabetes, osteopenia/osteoporosis, and glaucoma.  As appropriate for age/gender, discussed screening for colorectal cancer, prostate cancer, breast cancer, and cervical cancer. Checklist reviewing preventive services available has been given to the patient.    Reviewed patients plan of care and provided an AVS. The Basic Care Plan (routine  screening as documented in Health Maintenance) for Tatyana meets the Care Plan requirement. This Care Plan has been established and reviewed with the Patient.    Counseling Resources:  ATP IV Guidelines  Pooled Cohorts Equation Calculator  Breast Cancer Risk Calculator  FRAX Risk Assessment  ICSI Preventive Guidelines  Dietary Guidelines for Americans, 2010  USDA's MyPlate  ASA Prophylaxis  Lung CA Screening    Ayaz Fernandez MD, MD  [facility]

## 2018-11-14 LAB
ALBUMIN SERPL-MCNC: 3.7 G/DL (ref 3.4–5)
ALP SERPL-CCNC: 87 U/L (ref 40–150)
ALT SERPL W P-5'-P-CCNC: 19 U/L (ref 0–50)
ANION GAP SERPL CALCULATED.3IONS-SCNC: 7 MMOL/L (ref 3–14)
AST SERPL W P-5'-P-CCNC: 15 U/L (ref 0–45)
BILIRUB SERPL-MCNC: 0.4 MG/DL (ref 0.2–1.3)
BUN SERPL-MCNC: 24 MG/DL (ref 7–30)
CALCIUM SERPL-MCNC: 9.4 MG/DL (ref 8.5–10.1)
CHLORIDE SERPL-SCNC: 107 MMOL/L (ref 94–109)
CHOLEST SERPL-MCNC: 157 MG/DL
CO2 SERPL-SCNC: 28 MMOL/L (ref 20–32)
CREAT SERPL-MCNC: 0.72 MG/DL (ref 0.52–1.04)
DEPRECATED CALCIDIOL+CALCIFEROL SERPL-MC: 43 UG/L (ref 20–75)
GFR SERPL CREATININE-BSD FRML MDRD: 77 ML/MIN/1.7M2
GLUCOSE SERPL-MCNC: 96 MG/DL (ref 70–99)
HDLC SERPL-MCNC: 48 MG/DL
LDLC SERPL CALC-MCNC: 86 MG/DL
NONHDLC SERPL-MCNC: 109 MG/DL
POTASSIUM SERPL-SCNC: 4.2 MMOL/L (ref 3.4–5.3)
PROT SERPL-MCNC: 7.1 G/DL (ref 6.8–8.8)
SODIUM SERPL-SCNC: 142 MMOL/L (ref 133–144)
TRIGL SERPL-MCNC: 114 MG/DL
TSH SERPL DL<=0.005 MIU/L-ACNC: 1.71 MU/L (ref 0.4–4)

## 2018-11-15 PROBLEM — J96.91 RESPIRATORY FAILURE WITH HYPOXIA (H): Status: RESOLVED | Noted: 2017-12-25 | Resolved: 2018-11-15

## 2018-11-19 ENCOUNTER — MYC MEDICAL ADVICE (OUTPATIENT)
Dept: PEDIATRICS | Facility: CLINIC | Age: 83
End: 2018-11-19

## 2018-11-19 DIAGNOSIS — M25.50 POLYARTHRALGIA: ICD-10-CM

## 2018-11-19 DIAGNOSIS — F33.1 MODERATE RECURRENT MAJOR DEPRESSION (H): Primary | ICD-10-CM

## 2018-11-19 RX ORDER — DULOXETIN HYDROCHLORIDE 30 MG/1
30 CAPSULE, DELAYED RELEASE ORAL DAILY
Qty: 180 CAPSULE | Refills: 3 | Status: SHIPPED | OUTPATIENT
Start: 2018-11-19 | End: 2019-02-06

## 2018-11-19 NOTE — TELEPHONE ENCOUNTER
Daughter sending in [employer]ette message stating patient would like to switch antidepressants.     Please advise.

## 2018-11-27 ENCOUNTER — TRANSFERRED RECORDS (OUTPATIENT)
Dept: HEALTH INFORMATION MANAGEMENT | Facility: CLINIC | Age: 83
End: 2018-11-27

## 2018-12-10 DIAGNOSIS — Z53.9 DIAGNOSIS NOT YET DEFINED: Primary | ICD-10-CM

## 2018-12-10 PROCEDURE — G0179 MD RECERTIFICATION HHA PT: HCPCS | Performed by: INTERNAL MEDICINE

## 2019-01-03 ENCOUNTER — TELEPHONE (OUTPATIENT)
Dept: PEDIATRICS | Facility: CLINIC | Age: 84
End: 2019-01-03

## 2019-01-03 DIAGNOSIS — I10 BENIGN ESSENTIAL HYPERTENSION: ICD-10-CM

## 2019-01-03 RX ORDER — METOPROLOL SUCCINATE 25 MG/1
TABLET, EXTENDED RELEASE ORAL
Qty: 45 TABLET | Refills: 1 | Status: SHIPPED | OUTPATIENT
Start: 2019-01-03 | End: 2019-06-11

## 2019-01-03 NOTE — TELEPHONE ENCOUNTER
Prescription approved per [facility], [facility] or [facility] refill protocol.  Holli COOK RN - Triage  [facility]

## 2019-01-03 NOTE — TELEPHONE ENCOUNTER
Received a fax from [facility] requesting medical records regarding CPAP machine. Faxed patient information from 11/13/18 physical to [phone]. Shazia Mederos MA

## 2019-01-04 ENCOUNTER — ANCILLARY PROCEDURE (OUTPATIENT)
Dept: BONE DENSITY | Facility: CLINIC | Age: 84
End: 2019-01-04
Attending: INTERNAL MEDICINE
Payer: COMMERCIAL

## 2019-01-04 ENCOUNTER — HOSPITAL ENCOUNTER (OUTPATIENT)
Dept: MAMMOGRAPHY | Facility: CLINIC | Age: 84
End: 2019-01-04
Attending: INTERNAL MEDICINE
Payer: COMMERCIAL

## 2019-01-04 ENCOUNTER — HOSPITAL ENCOUNTER (OUTPATIENT)
Dept: CT IMAGING | Facility: CLINIC | Age: 84
Discharge: HOME | End: 2019-01-04
Attending: INTERNAL MEDICINE | Admitting: INTERNAL MEDICINE
Payer: COMMERCIAL

## 2019-01-04 DIAGNOSIS — R10.32 ABDOMINAL PAIN, LEFT LOWER QUADRANT: ICD-10-CM

## 2019-01-04 DIAGNOSIS — E55.9 VITAMIN D DEFICIENCY: ICD-10-CM

## 2019-01-04 DIAGNOSIS — Z13.820 SCREENING FOR OSTEOPOROSIS: ICD-10-CM

## 2019-01-04 DIAGNOSIS — K29.50 CHRONIC GASTRITIS WITHOUT BLEEDING, UNSPECIFIED GASTRITIS TYPE: ICD-10-CM

## 2019-01-04 DIAGNOSIS — F33.1 MODERATE RECURRENT MAJOR DEPRESSION (H): ICD-10-CM

## 2019-01-04 DIAGNOSIS — R60.0 BILATERAL LOWER EXTREMITY EDEMA: ICD-10-CM

## 2019-01-04 DIAGNOSIS — Z12.31 ENCOUNTER FOR SCREENING MAMMOGRAM FOR BREAST CANCER: ICD-10-CM

## 2019-01-04 DIAGNOSIS — H65.92 MIDDLE EAR EFFUSION, LEFT: ICD-10-CM

## 2019-01-04 DIAGNOSIS — J31.0 CHRONIC RHINITIS: ICD-10-CM

## 2019-01-04 DIAGNOSIS — N64.4 PAIN OF BOTH BREASTS: ICD-10-CM

## 2019-01-04 DIAGNOSIS — R07.89 CHEST WALL PAIN: ICD-10-CM

## 2019-01-04 LAB
CREAT BLD-MCNC: 0.7 MG/DL (ref 0.52–1.04)
GFR SERPL CREATININE-BSD FRML MDRD: 79 ML/MIN/{1.73_M2}

## 2019-01-04 PROCEDURE — 77066 DX MAMMO INCL CAD BI: CPT

## 2019-01-04 PROCEDURE — 82565 ASSAY OF CREATININE: CPT

## 2019-01-04 PROCEDURE — 77080 DXA BONE DENSITY AXIAL: CPT | Performed by: INTERNAL MEDICINE

## 2019-01-04 PROCEDURE — 74177 CT ABD & PELVIS W/CONTRAST: CPT

## 2019-01-04 PROCEDURE — 71260 CT THORAX DX C+: CPT

## 2019-01-04 PROCEDURE — 25000128 H RX IP 250 OP 636: Performed by: RADIOLOGY

## 2019-01-04 PROCEDURE — G0279 TOMOSYNTHESIS, MAMMO: HCPCS

## 2019-01-04 RX ORDER — IOPAMIDOL 755 MG/ML
500 INJECTION, SOLUTION INTRAVASCULAR ONCE
Status: COMPLETED | OUTPATIENT
Start: 2019-01-04 | End: 2019-01-04

## 2019-01-04 RX ADMIN — IOPAMIDOL 65 ML: 755 INJECTION, SOLUTION INTRAVENOUS at 10:41

## 2019-01-18 DIAGNOSIS — I10 BENIGN ESSENTIAL HYPERTENSION: ICD-10-CM

## 2019-01-18 RX ORDER — LOSARTAN POTASSIUM 100 MG/1
TABLET ORAL
Qty: 90 TABLET | Refills: 2 | Status: SHIPPED | OUTPATIENT
Start: 2019-01-18 | End: 2019-10-12

## 2019-01-18 NOTE — TELEPHONE ENCOUNTER
"Requested Prescriptions   Pending Prescriptions Disp Refills     losartan (COZAAR) 100 MG tablet [Pharmacy Med Name: LOSARTAN 100MG TABLETS]  Last Written Prescription Date:  10/19/2018  Last Fill Quantity: 90 tablet,  # refills: 0   Last Office Visit: 11/13/2018   Future Office Visit:      90 tablet 0     Sig: TAKE 1 TABLET(100 MG) BY MOUTH DAILY    Angiotensin-II Receptors Passed - 1/18/2019 10:11 AM       Passed - Blood pressure under 140/90 in past 12 months    BP Readings from Last 3 Encounters:   11/13/18 116/60   01/30/18 134/83   01/03/18 124/60                Passed - Recent (12 mo) or future (30 days) visit within the authorizing provider's specialty    Patient had office visit in the last 12 months or has a visit in the next 30 days with authorizing provider or within the authorizing provider's specialty.  See \"Patient Info\" tab in inbasket, or \"Choose Columns\" in Meds & Orders section of the refill encounter.             Passed - Medication is active on med list       Passed - Patient is age 18 or older       Passed - No active pregnancy on record       Passed - Normal serum creatinine on file in past 12 months    Recent Labs   Lab Test 01/04/19  1025 11/13/18  1211   CR  --  0.72   CREAT 0.7  --             Passed - Normal serum potassium on file in past 12 months    Recent Labs   Lab Test 11/13/18  1211   POTASSIUM 4.2                   Passed - No positive pregnancy test in past 12 months          "

## 2019-01-29 DIAGNOSIS — M81.0 OSTEOPOROSIS, UNSPECIFIED OSTEOPOROSIS TYPE, UNSPECIFIED PATHOLOGICAL FRACTURE PRESENCE: ICD-10-CM

## 2019-01-30 RX ORDER — ALENDRONATE SODIUM 70 MG/1
70 TABLET ORAL
Qty: 12 TABLET | Refills: 2 | Status: SHIPPED | OUTPATIENT
Start: 2019-01-30 | End: 2019-11-25

## 2019-01-30 NOTE — TELEPHONE ENCOUNTER
"Requested Prescriptions   Pending Prescriptions Disp Refills     alendronate (FOSAMAX) 70 MG tablet    Last Written Prescription Date:  5/18/2018  Last Fill Quantity: 12,  # refills: 2   Last office visit: 11/13/2018 with prescribing provider:  Ayaz Fernandez     Future Office Visit:     12 tablet 2     Sig: Take 1 tablet (70 mg) by mouth every 7 days Take with over 8 ounces water and stay upright for at least 30 minutes after dose.  Take at least 60 minutes before breakfast    Bisphosphonates Passed - 1/29/2019  5:33 PM       Passed - Recent (12 mo) or future (30 days) visit within the authorizing provider's specialty    Patient had office visit in the last 12 months or has a visit in the next 30 days with authorizing provider or within the authorizing provider's specialty.  See \"Patient Info\" tab in inbasket, or \"Choose Columns\" in Meds & Orders section of the refill encounter.             Passed - Dexa on file within past 2 years    Please review last Dexa result.          Passed - Medication is active on med list       Passed - Patient is age 18 or older       Passed - Normal serum creatinine on file within past 12 months    Recent Labs   Lab Test 01/04/19  1025 11/13/18  1211   CR  --  0.72   CREAT 0.7  --                "

## 2019-01-30 NOTE — TELEPHONE ENCOUNTER
"Per last Dexa results on 1/4/19: \"Your bone density has IMPROVED from the last check. This is great news! I would continue the fosamax for a total of 5 years (I have down that it has been about 3 right now). \"    Prescription approved per [facility] Refill Protocol.  Printed and faxed.    Nancy Dixon RN  Message handled by Nurse Triage.          "

## 2019-01-31 DIAGNOSIS — K59.01 SLOW TRANSIT CONSTIPATION: ICD-10-CM

## 2019-01-31 NOTE — TELEPHONE ENCOUNTER
"Requested Prescriptions   Pending Prescriptions Disp Refills     polyethylene glycol (MIRALAX/GLYCOLAX) powder [Pharmacy Med Name: POLYETH GLYCOL 3350 NF POWDER 255GM]    Last Written Prescription Date:  10/11/2017  Last Fill Quantity: 1020 g,  # refills: 11   Last office visit: 11/13/2018 with prescribing provider:  Ayaz Fernandez     Future Office Visit:     1020 g 0     Sig: TAKE 17GRAMS BY MOUTH TWICE DAILY AS DIRECTED.    Laxatives Protocol Passed - 1/31/2019  1:02 PM       Passed - Patient is age 6 or older       Passed - Recent (12 mo) or future (30 days) visit within the authorizing provider's specialty    Patient had office visit in the last 12 months or has a visit in the next 30 days with authorizing provider or within the authorizing provider's specialty.  See \"Patient Info\" tab in inbasket, or \"Choose Columns\" in Meds & Orders section of the refill encounter.             Passed - Medication is active on med list          "

## 2019-02-01 RX ORDER — POLYETHYLENE GLYCOL 3350 17 G/17G
POWDER, FOR SOLUTION ORAL
Qty: 1020 G | Refills: 2 | Status: SHIPPED | OUTPATIENT
Start: 2019-02-01 | End: 2019-10-18

## 2019-02-01 NOTE — TELEPHONE ENCOUNTER
Prescription approved per [facility], [facility] or [facility] refill protocol.  Holli COOK RN - Triage  [facility]

## 2019-02-06 ENCOUNTER — OFFICE VISIT (OUTPATIENT)
Dept: PEDIATRICS | Facility: CLINIC | Age: 84
End: 2019-02-06
Payer: COMMERCIAL

## 2019-02-06 VITALS
WEIGHT: 241.9 LBS | OXYGEN SATURATION: 93 % | HEART RATE: 80 BPM | BODY MASS INDEX: 47.24 KG/M2 | TEMPERATURE: 98.4 F | SYSTOLIC BLOOD PRESSURE: 128 MMHG | RESPIRATION RATE: 22 BRPM | DIASTOLIC BLOOD PRESSURE: 86 MMHG

## 2019-02-06 DIAGNOSIS — F33.1 MODERATE RECURRENT MAJOR DEPRESSION (H): ICD-10-CM

## 2019-02-06 DIAGNOSIS — K59.00 CONSTIPATION, UNSPECIFIED CONSTIPATION TYPE: ICD-10-CM

## 2019-02-06 DIAGNOSIS — M25.50 POLYARTHRALGIA: ICD-10-CM

## 2019-02-06 DIAGNOSIS — R07.81 RIB PAIN ON RIGHT SIDE: Primary | ICD-10-CM

## 2019-02-06 PROCEDURE — 99214 OFFICE O/P EST MOD 30 MIN: CPT | Mod: GC | Performed by: STUDENT IN AN ORGANIZED HEALTH CARE EDUCATION/TRAINING PROGRAM

## 2019-02-06 RX ORDER — DULOXETIN HYDROCHLORIDE 60 MG/1
60 CAPSULE, DELAYED RELEASE ORAL DAILY
Qty: 90 CAPSULE | Refills: 3 | Status: SHIPPED | OUTPATIENT
Start: 2019-02-06 | End: 2019-11-25

## 2019-02-06 NOTE — PROGRESS NOTES
"  SUBJECTIVE:   Tatyana Metcalf is a 86 year old female who presents to clinic today for the following health issues:    FLANK   PAIN     Onset: Ongoing for about Three Months    Description:   Character: Sharp, squeezing, and shooting.   Location: Right Flank/Ribs, Right Breast, and Chest  Radiation: See Above     Intensity: 10/10    Progression of Symptoms:  Worse with movement, worse over time     Accompanying Signs & Symptoms:  Fever/Chills?: no   Gas/Bloating: no  Nausea: no   Vomitting: no   Diarrhea?: no   Constipation:YES- Ongoing Problem   Dysuria or Hematuria: no    History:   Trauma: YES- Three Months ago was reaching for something and heard her ribs cracked.   Previous similar pain: no    Previous tests done: CT    Precipitating factors:   Does the pain change with:     Food: no      BM: no     Urination: no     Alleviating factors:  See Below     Therapies Tried and outcome: Aleve does not help. Aspercreme sometimes helps.     LMP:  not applicable  States in November she had \"changes in the area\" on the right side after having a CT scan.      Chronic constipation - currently has bloating, has been having multiple liquid stools per day. No hard stool or struggling to stool recently.     Mood/depression - started the duloxetine 30 mg. Does not feel like it's helping yet - however, daughter notes patient felt significantly better over the first few weeks of taking it. Still wants to sleep all the time and feels irritable mostly with herself when she drops something.    Problem list and histories reviewed & adjusted, as indicated.  Additional history: as documented    Patient Active Problem List   Diagnosis     Secondary malignant neoplasm of lung (H)     Benign essential hypertension     Vestibular neuronitis of both ears     Spinal stenosis of lumbar region with neurogenic claudication     Hiatal hernia     Esophageal reflux     Obstructive sleep apnea syndrome     Moderate recurrent major depression " (H)     Chronic rhinitis     Iron deficiency anemia     Dermatographism     Bronchiectasis (H)     Vitamin D deficiency     Rib pain     Rib fractures     Endometrial carcinoma (H)     Advanced directives, counseling/discussion     Osteoporosis     Neuropathy     SNHL (sensorineural hearing loss)     Respiratory failure (H)     Lung cancer (H)     Moderate chronic obstructive pulmonary disease (H)     Hypoxia     BMI 45.0-49.9, adult (H)     Left leg pain     Past Surgical History:   Procedure Laterality Date     APPENDECTOMY  1952     BACK SURGERY  2002    Spinal Stenosis     BIOPSY  4-    Lung     COLONOSCOPY  7-    Normal     HYSTERECTOMY TOTAL ABDOMINAL, BILATERAL SALPINGO-OOPHORECTOMY, COMBINED  2000       Social History     Tobacco Use     Smoking status: Never Smoker     Smokeless tobacco: Never Used   Substance Use Topics     Alcohol use: Yes     Alcohol/week: 0.0 oz     Comment: a few times a year     Family History   Problem Relation Age of Onset     Heart Disease Mother      Hypertension Mother      Cerebrovascular Disease Mother      Heart Disease Father      Coronary Artery Disease Father            Reviewed and updated as needed this visit by clinical staff  Tobacco  Allergies  Meds  Problems  Med Hx  Surg Hx  Fam Hx  Soc Hx        Reviewed and updated as needed this visit by Provider  Allergies  Meds  Problems  Med Hx  Surg Hx         ROS:  Constitutional, HEENT, cardiovascular, pulmonary, GI, , musculoskeletal, neuro, skin, endocrine and psych systems are negative, except as otherwise noted.    OBJECTIVE:     /86 (BP Location: Right arm, Patient Position: Chair, Cuff Size: Adult Large)   Pulse 80   Temp 98.4  F (36.9  C) (Oral)   Resp 22   Wt 109.7 kg (241 lb 14.4 oz)   SpO2 93%   BMI 47.24 kg/m    Body mass index is 47.24 kg/m .  GENERAL: healthy, alert and no distress  NECK: no adenopathy, no asymmetry, masses, or scars and thyroid normal to palpation  RESP:  lungs clear to auscultation - no rales, rhonchi or wheezes, mild tenderness over right lower ribs  BREAST: normal without masses, tenderness or nipple discharge and no palpable axillary masses or adenopathy  CV: regular rate and rhythm, normal S1 S2, no S3 or S4, no murmur, click or rub, no peripheral edema and peripheral pulses strong  ABDOMEN: soft, nontender, no hepatosplenomegaly, no masses and bowel sounds normal  MS: no gross musculoskeletal defects noted, no edema  SKIN: no suspicious lesions or rashes  PSYCH: mentation appears normal, affect normal/bright    Diagnostic Test Results:  none     ASSESSMENT/PLAN:     1. Rib pain on right side  Pain seems most consistent with musculoskeletal pain, however, seems to be in dermatomal distribution as well.  Degeneration of thoracic spine could be impinging thoracic nerves and causing pain along right side.  No evidence of shingles infection. No trauma. Discussed options below with patient - she would like to try heat and lidocaine patches first before considering further imaging or referral.  - continue naproxen/ibuprofen prn  - advised lidocaine patches 4%  - heat or ice for 20 minutes 2-3 times per day  - consider spine films to look for joint narrowing for nerve compression  - consider referral to pain clinic if not getting better for injections vs psychical therapy for strength and stretching    2. Moderate recurrent major depression (H)  3. Polyarthralgia  Increased duloxetine dose from 30 to 60 mg. Patient notes she did have improvement in some numbness along her left ribs.  - DULoxetine (CYMBALTA) 60 MG capsule; Take 1 capsule (60 mg) by mouth daily  Dispense: 90 capsule; Refill: 3    4. Constipation, unspecified constipation type  Patient has refill of her miralax waiting for her at the pharmacy, but was out of the medication over the weekend.    Follow up as needed and patient will call if rib pain does not improve with the lidocaine  patches/heat/ice.    Bria Moscoso MD  Internal Medicine & Pediatrics PGY2  [facility]    I have seen and discussed this patient with Dr. Moscoso and agree with joint documentation as noted above.    Ayaz Fernandez MD  Attending Internal Medicine/Pediatrics Physician

## 2019-02-06 NOTE — PATIENT INSTRUCTIONS
Thank you for coming to clinic today. It was pleasure to meet you.    Lidocaine 4% patches to the area - able to get over the clinic  Heat or ice for 20 minutes 2-3 times per day  Please call if the pain is getting worse or not getting better, then we can get more images or refer you to the pain clinic   Increased Cymbalta to 60 mg daily    Stay warm!

## 2019-02-09 DIAGNOSIS — F33.1 MODERATE RECURRENT MAJOR DEPRESSION (H): ICD-10-CM

## 2019-02-09 DIAGNOSIS — R07.89 CHEST WALL PAIN: ICD-10-CM

## 2019-02-09 DIAGNOSIS — R60.0 BILATERAL LOWER EXTREMITY EDEMA: ICD-10-CM

## 2019-02-09 DIAGNOSIS — K29.50 CHRONIC GASTRITIS WITHOUT BLEEDING, UNSPECIFIED GASTRITIS TYPE: ICD-10-CM

## 2019-02-09 DIAGNOSIS — Z12.31 ENCOUNTER FOR SCREENING MAMMOGRAM FOR BREAST CANCER: ICD-10-CM

## 2019-02-09 DIAGNOSIS — R10.32 ABDOMINAL PAIN, LEFT LOWER QUADRANT: ICD-10-CM

## 2019-02-09 DIAGNOSIS — J31.0 CHRONIC RHINITIS: ICD-10-CM

## 2019-02-09 DIAGNOSIS — H65.92 MIDDLE EAR EFFUSION, LEFT: ICD-10-CM

## 2019-02-09 DIAGNOSIS — Z13.820 SCREENING FOR OSTEOPOROSIS: ICD-10-CM

## 2019-02-09 DIAGNOSIS — E55.9 VITAMIN D DEFICIENCY: ICD-10-CM

## 2019-02-09 NOTE — TELEPHONE ENCOUNTER
"Requested Prescriptions   Pending Prescriptions Disp Refills     citalopram (CELEXA) 20 MG tablet [Pharmacy Med Name: CITALOPRAM 20MG TABLETS]  DISCONTINUED  Last Written Prescription Date:  04/28/2017  Last Fill Quantity: 180 tablet,  # refills: 3   Last office visit: 2/6/2019 with prescribing provider:  Bria Moscoso MD   Future Office Visit:     180 tablet 0     Sig: TAKE 2 TABLETS BY MOUTH EVERY DAY    SSRIs Protocol Failed - 2/9/2019 10:13 AM       Failed - PHQ-9 score less than 5 in past 6 months    Please review last PHQ-9 score.   PHQ-9 SCORE 4/28/2017 10/11/2017 8/9/2018   PHQ-9 Total Score - - -   PHQ-9 Total Score MyChart - - 7 (Mild depression)   PHQ-9 Total Score 7 7 7     KYRIE-7 SCORE 10/6/2016 4/28/2017 10/11/2017   Total Score 3 (minimal anxiety) - -   Total Score - 4 2              Failed - Medication is active on med list       Passed - Patient is age 18 or older       Passed - No active pregnancy on record       Passed - No positive pregnancy test in last 12 months       Passed - Recent (6 mo) or future (30 days) visit within the authorizing provider's specialty    Patient had office visit in the last 6 months or has a visit in the next 30 days with authorizing provider or within the authorizing provider's specialty.  See \"Patient Info\" tab in inbasket, or \"Choose Columns\" in Meds & Orders section of the refill encounter.              "

## 2019-02-11 RX ORDER — CITALOPRAM HYDROBROMIDE 20 MG/1
TABLET ORAL
Qty: 180 TABLET | Refills: 0 | OUTPATIENT
Start: 2019-02-11

## 2019-02-15 ENCOUNTER — TRANSFERRED RECORDS (OUTPATIENT)
Dept: HEALTH INFORMATION MANAGEMENT | Facility: CLINIC | Age: 84
End: 2019-02-15

## 2019-04-18 ENCOUNTER — TRANSFERRED RECORDS (OUTPATIENT)
Dept: HEALTH INFORMATION MANAGEMENT | Facility: CLINIC | Age: 84
End: 2019-04-18

## 2019-06-05 ENCOUNTER — TRANSFERRED RECORDS (OUTPATIENT)
Dept: HEALTH INFORMATION MANAGEMENT | Facility: CLINIC | Age: 84
End: 2019-06-05

## 2019-06-11 DIAGNOSIS — I10 BENIGN ESSENTIAL HYPERTENSION: ICD-10-CM

## 2019-06-11 NOTE — TELEPHONE ENCOUNTER
"Requested Prescriptions   Pending Prescriptions Disp Refills     metoprolol succinate ER (TOPROL-XL) 25 MG 24 hr tablet [Pharmacy Med Name: METOPROLOL ER SUCCINATE 25MG TABS]    Last Written Prescription Date:  1/3/2019  Last Fill Quantity: 45,  # refills: 1   Last office visit: 2/6/2019 with prescribing provider:  Ayaz Fernandez     Future Office Visit:     45 tablet 0     Sig: TAKE 1/2 TABLET(12.5 MG) BY MOUTH DAILY       Beta-Blockers Protocol Passed - 6/11/2019  2:04 PM        Passed - Blood pressure under 140/90 in past 12 months     BP Readings from Last 3 Encounters:   02/06/19 128/86   11/13/18 116/60   01/30/18 134/83                 Passed - Patient is age 6 or older        Passed - Recent (12 mo) or future (30 days) visit within the authorizing provider's specialty     Patient had office visit in the last 12 months or has a visit in the next 30 days with authorizing provider or within the authorizing provider's specialty.  See \"Patient Info\" tab in inbasket, or \"Choose Columns\" in Meds & Orders section of the refill encounter.              Passed - Medication is active on med list          "

## 2019-06-12 RX ORDER — METOPROLOL SUCCINATE 25 MG/1
TABLET, EXTENDED RELEASE ORAL
Qty: 15 TABLET | Refills: 0 | Status: SHIPPED | OUTPATIENT
Start: 2019-06-12 | End: 2019-07-15

## 2019-06-12 NOTE — TELEPHONE ENCOUNTER
Prescription approved per [facility] Protocol.  30 day supply given. Office visit due per last office note with Dr. Fernandez.    Arina Knox, BS, RN, N  [facility]) [phone]

## 2019-07-02 ENCOUNTER — MEDICAL CORRESPONDENCE (OUTPATIENT)
Dept: HEALTH INFORMATION MANAGEMENT | Facility: CLINIC | Age: 84
End: 2019-07-02

## 2019-07-10 ENCOUNTER — TRANSFERRED RECORDS (OUTPATIENT)
Dept: HEALTH INFORMATION MANAGEMENT | Facility: CLINIC | Age: 84
End: 2019-07-10

## 2019-07-15 DIAGNOSIS — I10 BENIGN ESSENTIAL HYPERTENSION: ICD-10-CM

## 2019-07-15 NOTE — TELEPHONE ENCOUNTER
Routing refill request to provider for review/approval because:  Keturah given x1 and patient did not follow up, please advise    Anibal Oro, RN, BSN  (Covering RN, please route response(s) to your care team for any follow up that is needed. Thank you!)

## 2019-07-15 NOTE — TELEPHONE ENCOUNTER
"Requested Prescriptions   Pending Prescriptions Disp Refills     metoprolol succinate ER (TOPROL-XL) 25 MG 24 hr tablet [Pharmacy Med Name: METOPROLOL ER SUCCINATE 25MG TABS]    Last Written Prescription Date:  6/12/2019  Last Fill Quantity: 15,  # refills: 0   Last office visit: 2/6/2019 with prescribing provider:  Ayaz Fernandez     Future Office Visit:       15 tablet 0     Sig: TAKE 1/2 TABLET BY MOUTH EVERY DAY       Beta-Blockers Protocol Passed - 7/15/2019  9:36 AM        Passed - Blood pressure under 140/90 in past 12 months     BP Readings from Last 3 Encounters:   02/06/19 128/86   11/13/18 116/60   01/30/18 134/83                 Passed - Patient is age 6 or older        Passed - Recent (12 mo) or future (30 days) visit within the authorizing provider's specialty     Patient had office visit in the last 12 months or has a visit in the next 30 days with authorizing provider or within the authorizing provider's specialty.  See \"Patient Info\" tab in inbasket, or \"Choose Columns\" in Meds & Orders section of the refill encounter.              Passed - Medication is active on med list          "

## 2019-07-16 ENCOUNTER — TRANSFERRED RECORDS (OUTPATIENT)
Dept: HEALTH INFORMATION MANAGEMENT | Facility: CLINIC | Age: 84
End: 2019-07-16

## 2019-07-16 RX ORDER — METOPROLOL SUCCINATE 25 MG/1
TABLET, EXTENDED RELEASE ORAL
Qty: 15 TABLET | Refills: 0 | Status: SHIPPED | OUTPATIENT
Start: 2019-07-16 | End: 2019-08-13

## 2019-07-24 DIAGNOSIS — Z53.9 DIAGNOSIS NOT YET DEFINED: Primary | ICD-10-CM

## 2019-07-24 PROCEDURE — 99207 C MD CERTIFICATION HHA PATIENT: CPT | Performed by: INTERNAL MEDICINE

## 2019-07-29 ENCOUNTER — DOCUMENTATION ONLY (OUTPATIENT)
Dept: OTHER | Facility: CLINIC | Age: 84
End: 2019-07-29

## 2019-07-29 ENCOUNTER — AMBULATORY - HEALTHEAST (OUTPATIENT)
Dept: OTHER | Facility: CLINIC | Age: 84
End: 2019-07-29

## 2019-08-13 ENCOUNTER — TRANSFERRED RECORDS (OUTPATIENT)
Dept: HEALTH INFORMATION MANAGEMENT | Facility: CLINIC | Age: 84
End: 2019-08-13

## 2019-09-24 ENCOUNTER — TELEPHONE (OUTPATIENT)
Dept: PEDIATRICS | Facility: CLINIC | Age: 84
End: 2019-09-24

## 2019-09-24 NOTE — TELEPHONE ENCOUNTER
I called and spoke to María and she will re fax the forms to [phone] tomorrow. Care team please be on the look out for them.   Jo Horne on 9/24/2019 at 2:16 PM

## 2019-09-24 NOTE — TELEPHONE ENCOUNTER
Reason for Call:  Other call back    Detailed comments: María from [facility] called to check the status on forms from the PCP. They are in regards to the approval of a tub removal for the pt. She mentions that she called last week as well (9/18). Please contact María to advise. Thanks!    Phone Number Patient can be reached at: Other phone number:  María- [phone]    Best Time: Any    Can we leave a detailed message on this number? Not Applicable    Call taken on 9/24/2019 at 10:12 AM by Irene Fisher

## 2019-09-24 NOTE — TELEPHONE ENCOUNTER
I spoke to María with [facility]. She said that she spoke to Jo last week who the fax was going to go to. Per María Osorio was going to call her back when fax was received. María stated form is difficult for her to refax as it is coming from a different entity.    Forwarding message to Jo at [facility] to see if he knows anything about this fax.     María would like a call back about form.    FERNANDO DOTY MA on 9/24/2019 at 2:02 PM

## 2019-10-12 DIAGNOSIS — I10 BENIGN ESSENTIAL HYPERTENSION: ICD-10-CM

## 2019-10-12 NOTE — TELEPHONE ENCOUNTER
"Requested Prescriptions   Pending Prescriptions Disp Refills     losartan (COZAAR) 100 MG tablet [Pharmacy Med Name: LOSARTAN 100MG TABLETS]  Last Written Prescription Date:  1/18/19  Last Fill Quantity: 90 tablet,  # refills: 2   Last office visit: 2/6/2019 with prescribing provider:  Danis     Future Office Visit:   Next 5 appointments (look out 90 days)    Nov 25, 2019  9:30 AM CST  Adult Preventative Visit with Jill Briscoe MD, EA EXAM ROOM 42  [facility] [address] [place] [address]  [address]  [facility] [id number] [phone]          90 tablet 2     Sig: TAKE 1 TABLET(100 MG) BY MOUTH DAILY       Angiotensin-II Receptors Passed - 10/12/2019 12:13 PM        Passed - Last blood pressure under 140/90 in past 12 months     BP Readings from Last 3 Encounters:   02/06/19 128/86   11/13/18 116/60   01/30/18 134/83                 Passed - Recent (12 mo) or future (30 days) visit within the authorizing provider's specialty     Patient has had an office visit with the authorizing provider or a provider within the authorizing providers department within the previous 12 mos or has a future within next 30 days. See \"Patient Info\" tab in inbasket, or \"Choose Columns\" in Meds & Orders section of the refill encounter.              Passed - Medication is active on med list        Passed - Patient is age 18 or older        Passed - No active pregnancy on record        Passed - Normal serum creatinine on file in past 12 months     Recent Labs   Lab Test 01/04/19  1025 11/13/18  1211   CR  --  0.72   CREAT 0.7  --              Passed - Normal serum potassium on file in past 12 months     Recent Labs   Lab Test 11/13/18  1211   POTASSIUM 4.2                    Passed - No positive pregnancy test in past 12 months        "

## 2019-10-14 RX ORDER — LOSARTAN POTASSIUM 100 MG/1
TABLET ORAL
Qty: 90 TABLET | Refills: 0 | Status: SHIPPED | OUTPATIENT
Start: 2019-10-14 | End: 2019-11-25

## 2019-10-18 DIAGNOSIS — K59.01 SLOW TRANSIT CONSTIPATION: ICD-10-CM

## 2019-10-18 RX ORDER — POLYETHYLENE GLYCOL 3350 17 G/17G
POWDER, FOR SOLUTION ORAL
Qty: 1020 G | Refills: 2 | Status: SHIPPED | OUTPATIENT
Start: 2019-10-18

## 2019-10-18 NOTE — TELEPHONE ENCOUNTER
"Requested Prescriptions   Pending Prescriptions Disp Refills     polyethylene glycol (MIRALAX/GLYCOLAX) powder    Last Written Prescription Date:  2/1/2019  Last Fill Quantity: 1020 g,  # refills: 2   Last office visit: 2/6/2019 with prescribing provider:  Ayaz Fernandez     Future Office Visit:   Next 5 appointments (look out 90 days)    Nov 25, 2019  9:30 AM CST  Adult Preventative Visit with Jill Briscoe MD,  EXAM ROOM 42  [facility] ([facility]) [address] [place] [address]  [address] [place] [id number]  [phone]          1020 g 2       Laxatives Protocol Passed - 10/18/2019 10:26 AM        Passed - Patient is age 6 or older        Passed - Recent (12 mo) or future (30 days) visit within the authorizing provider's specialty     Patient has had an office visit with the authorizing provider or a provider within the authorizing providers department within the previous 12 mos or has a future within next 30 days. See \"Patient Info\" tab in inbasket, or \"Choose Columns\" in Meds & Orders section of the refill encounter.              Passed - Medication is active on med list          "

## 2019-11-08 ENCOUNTER — MEDICAL CORRESPONDENCE (OUTPATIENT)
Dept: HEALTH INFORMATION MANAGEMENT | Facility: CLINIC | Age: 84
End: 2019-11-08

## 2019-11-25 ENCOUNTER — OFFICE VISIT (OUTPATIENT)
Dept: PEDIATRICS | Facility: CLINIC | Age: 84
End: 2019-11-25
Payer: COMMERCIAL

## 2019-11-25 VITALS
BODY MASS INDEX: 47.2 KG/M2 | OXYGEN SATURATION: 92 % | HEART RATE: 81 BPM | SYSTOLIC BLOOD PRESSURE: 142 MMHG | TEMPERATURE: 97.8 F | HEIGHT: 60 IN | WEIGHT: 240.4 LBS | DIASTOLIC BLOOD PRESSURE: 80 MMHG

## 2019-11-25 DIAGNOSIS — M81.0 OSTEOPOROSIS, UNSPECIFIED OSTEOPOROSIS TYPE, UNSPECIFIED PATHOLOGICAL FRACTURE PRESENCE: ICD-10-CM

## 2019-11-25 DIAGNOSIS — G47.33 OBSTRUCTIVE SLEEP APNEA SYNDROME: ICD-10-CM

## 2019-11-25 DIAGNOSIS — E55.9 VITAMIN D DEFICIENCY: ICD-10-CM

## 2019-11-25 DIAGNOSIS — E55.9 VITAMIN D DEFICIENCY, UNSPECIFIED: ICD-10-CM

## 2019-11-25 DIAGNOSIS — K29.50 CHRONIC GASTRITIS WITHOUT BLEEDING, UNSPECIFIED GASTRITIS TYPE: ICD-10-CM

## 2019-11-25 DIAGNOSIS — F33.1 MODERATE RECURRENT MAJOR DEPRESSION (H): Primary | ICD-10-CM

## 2019-11-25 DIAGNOSIS — M25.50 POLYARTHRALGIA: ICD-10-CM

## 2019-11-25 DIAGNOSIS — I10 BENIGN ESSENTIAL HYPERTENSION: ICD-10-CM

## 2019-11-25 DIAGNOSIS — Z79.899 POLYPHARMACY: ICD-10-CM

## 2019-11-25 LAB
DEPRECATED CALCIDIOL+CALCIFEROL SERPL-MC: 55 UG/L (ref 20–75)
ERYTHROCYTE [DISTWIDTH] IN BLOOD BY AUTOMATED COUNT: 14.5 % (ref 10–15)
HCT VFR BLD AUTO: 48.6 % (ref 35–47)
HGB BLD-MCNC: 15.1 G/DL (ref 11.7–15.7)
MCH RBC QN AUTO: 29.3 PG (ref 26.5–33)
MCHC RBC AUTO-ENTMCNC: 31.1 G/DL (ref 31.5–36.5)
MCV RBC AUTO: 94 FL (ref 78–100)
PLATELET # BLD AUTO: 262 10E9/L (ref 150–450)
RBC # BLD AUTO: 5.16 10E12/L (ref 3.8–5.2)
VIT B12 SERPL-MCNC: 320 PG/ML (ref 193–986)
WBC # BLD AUTO: 10.6 10E9/L (ref 4–11)

## 2019-11-25 PROCEDURE — 85027 COMPLETE CBC AUTOMATED: CPT | Performed by: PEDIATRICS

## 2019-11-25 PROCEDURE — 82607 VITAMIN B-12: CPT | Performed by: PEDIATRICS

## 2019-11-25 PROCEDURE — G0438 PPPS, INITIAL VISIT: HCPCS | Performed by: PEDIATRICS

## 2019-11-25 PROCEDURE — 82306 VITAMIN D 25 HYDROXY: CPT | Performed by: PEDIATRICS

## 2019-11-25 PROCEDURE — 84443 ASSAY THYROID STIM HORMONE: CPT | Performed by: PEDIATRICS

## 2019-11-25 PROCEDURE — 36415 COLL VENOUS BLD VENIPUNCTURE: CPT | Performed by: PEDIATRICS

## 2019-11-25 PROCEDURE — 99207 C PAF COMPLETED  NO CHARGE: CPT | Performed by: PEDIATRICS

## 2019-11-25 PROCEDURE — 80053 COMPREHEN METABOLIC PANEL: CPT | Performed by: PEDIATRICS

## 2019-11-25 RX ORDER — LOSARTAN POTASSIUM 100 MG/1
100 TABLET ORAL DAILY
Qty: 90 TABLET | Refills: 3 | Status: SHIPPED | OUTPATIENT
Start: 2019-11-25 | End: 2020-10-22

## 2019-11-25 RX ORDER — DULOXETIN HYDROCHLORIDE 60 MG/1
60 CAPSULE, DELAYED RELEASE ORAL DAILY
Qty: 90 CAPSULE | Refills: 3 | Status: SHIPPED | OUTPATIENT
Start: 2019-11-25 | End: 2020-09-08

## 2019-11-25 RX ORDER — ALENDRONATE SODIUM 70 MG/1
70 TABLET ORAL
Qty: 12 TABLET | Refills: 3 | Status: SHIPPED | OUTPATIENT
Start: 2019-11-25 | End: 2020-10-22

## 2019-11-25 RX ORDER — METOPROLOL SUCCINATE 25 MG/1
12.5 TABLET, EXTENDED RELEASE ORAL DAILY
Qty: 45 TABLET | Refills: 3 | Status: SHIPPED | OUTPATIENT
Start: 2019-11-25 | End: 2019-12-10

## 2019-11-25 RX ORDER — LANSOPRAZOLE 30 MG/1
CAPSULE, DELAYED RELEASE ORAL
Qty: 90 CAPSULE | Refills: 3 | Status: SHIPPED | OUTPATIENT
Start: 2019-11-25 | End: 2020-01-08

## 2019-11-25 SDOH — SOCIAL STABILITY: SOCIAL NETWORK: HOW OFTEN DO YOU ATTENT MEETINGS OF THE CLUB OR ORGANIZATION YOU BELONG TO?: 1 TO 4 TIMES PER YEAR

## 2019-11-25 SDOH — HEALTH STABILITY: MENTAL HEALTH: HOW MANY STANDARD DRINKS CONTAINING ALCOHOL DO YOU HAVE ON A TYPICAL DAY?: 1 OR 2

## 2019-11-25 SDOH — SOCIAL STABILITY: SOCIAL NETWORK
DO YOU BELONG TO ANY CLUBS OR ORGANIZATIONS SUCH AS CHURCH GROUPS UNIONS, FRATERNAL OR ATHLETIC GROUPS, OR SCHOOL GROUPS?: YES

## 2019-11-25 SDOH — ECONOMIC STABILITY: TRANSPORTATION INSECURITY
IN THE PAST 12 MONTHS, HAS THE LACK OF TRANSPORTATION KEPT YOU FROM MEDICAL APPOINTMENTS OR FROM GETTING MEDICATIONS?: NO

## 2019-11-25 SDOH — HEALTH STABILITY: MENTAL HEALTH: HOW OFTEN DO YOU HAVE 6 OR MORE DRINKS ON ONE OCCASION?: NEVER

## 2019-11-25 SDOH — HEALTH STABILITY: PHYSICAL HEALTH: ON AVERAGE, HOW MANY DAYS PER WEEK DO YOU ENGAGE IN MODERATE TO STRENUOUS EXERCISE (LIKE A BRISK WALK)?: 0 DAYS

## 2019-11-25 SDOH — ECONOMIC STABILITY: TRANSPORTATION INSECURITY
IN THE PAST 12 MONTHS, HAS LACK OF TRANSPORTATION KEPT YOU FROM MEETINGS, WORK, OR FROM GETTING THINGS NEEDED FOR DAILY LIVING?: NO

## 2019-11-25 SDOH — SOCIAL STABILITY: SOCIAL NETWORK
IN A TYPICAL WEEK, HOW MANY TIMES DO YOU TALK ON THE PHONE WITH FAMILY, FRIENDS, OR NEIGHBORS?: MORE THAN THREE TIMES A WEEK

## 2019-11-25 SDOH — ECONOMIC STABILITY: FOOD INSECURITY: WITHIN THE PAST 12 MONTHS, THE FOOD YOU BOUGHT JUST DIDN'T LAST AND YOU DIDN'T HAVE MONEY TO GET MORE.: NEVER TRUE

## 2019-11-25 SDOH — ECONOMIC STABILITY: FOOD INSECURITY: WITHIN THE PAST 12 MONTHS, YOU WORRIED THAT YOUR FOOD WOULD RUN OUT BEFORE YOU GOT MONEY TO BUY MORE.: NEVER TRUE

## 2019-11-25 SDOH — ECONOMIC STABILITY: INCOME INSECURITY: HOW HARD IS IT FOR YOU TO PAY FOR THE VERY BASICS LIKE FOOD, HOUSING, MEDICAL CARE, AND HEATING?: NOT HARD AT ALL

## 2019-11-25 SDOH — SOCIAL STABILITY: SOCIAL NETWORK: HOW OFTEN DO YOU ATTEND CHURCH OR RELIGIOUS SERVICES?: 1 TO 4 TIMES PER YEAR

## 2019-11-25 SDOH — HEALTH STABILITY: PHYSICAL HEALTH: ON AVERAGE, HOW MANY MINUTES DO YOU ENGAGE IN EXERCISE AT THIS LEVEL?: 0 MIN

## 2019-11-25 SDOH — HEALTH STABILITY: MENTAL HEALTH: HOW OFTEN DO YOU HAVE A DRINK CONTAINING ALCOHOL?: MONTHLY OR LESS

## 2019-11-25 SDOH — SOCIAL STABILITY: SOCIAL NETWORK: ARE YOU MARRIED, WIDOWED, DIVORCED, SEPARATED, NEVER MARRIED, OR LIVING WITH A PARTNER?: DIVORCED

## 2019-11-25 SDOH — HEALTH STABILITY: MENTAL HEALTH
STRESS IS WHEN SOMEONE FEELS TENSE, NERVOUS, ANXIOUS, OR CAN'T SLEEP AT NIGHT BECAUSE THEIR MIND IS TROUBLED. HOW STRESSED ARE YOU?: NOT AT ALL

## 2019-11-25 ASSESSMENT — ENCOUNTER SYMPTOMS
PALPITATIONS: 0
CONSTIPATION: 0
ARTHRALGIAS: 1
PARESTHESIAS: 0
HEMATURIA: 0
HEARTBURN: 1
SORE THROAT: 0
JOINT SWELLING: 0
DYSURIA: 0
ABDOMINAL PAIN: 0
FREQUENCY: 0
FEVER: 0
EYE PAIN: 0
DIARRHEA: 0
COUGH: 1
NERVOUS/ANXIOUS: 0
HEADACHES: 0
HEMATOCHEZIA: 0
NAUSEA: 0
BREAST MASS: 0
DIZZINESS: 1
WEAKNESS: 0
CHILLS: 0
SHORTNESS OF BREATH: 0
MYALGIAS: 0

## 2019-11-25 ASSESSMENT — ACTIVITIES OF DAILY LIVING (ADL)
CURRENT_FUNCTION: HOUSEWORK REQUIRES ASSISTANCE
CURRENT_FUNCTION: BATHING REQUIRES ASSISTANCE
CURRENT_FUNCTION: SHOPPING REQUIRES ASSISTANCE
CURRENT_FUNCTION: TRANSPORTATION REQUIRES ASSISTANCE

## 2019-11-25 ASSESSMENT — MIFFLIN-ST. JEOR: SCORE: 1446.95

## 2019-11-25 NOTE — PATIENT INSTRUCTIONS
Medications refilled.    I will have someone from [facility] (pharmacy) call you to set up appt.

## 2019-11-25 NOTE — PROGRESS NOTES
"SUBJECTIVE:   Tatyana Mtecalf is a 87 year old female who presents for Preventive Visit.    Patient transferring care from Dr. Fernandez.    Patient here today with daughter.  Are you in the first 12 months of your Medicare coverage?  No    Healthy Habits:     In general, how would you rate your overall health?  Good    Frequency of exercise:  6-7 days/week    Duration of exercise:  Less than 15 minutes    Do you usually eat at least 4 servings of fruit and vegetables a day, include whole grains    & fiber and avoid regularly eating high fat or \"junk\" foods?  No    Taking medications regularly:  Yes    Medication side effects:  None    Ability to successfully perform activities of daily living:  Transportation requires assistance, shopping requires assistance, housework requires assistance and bathing requires assistance    Home Safety:  No safety concerns identified    Hearing Impairment:  Difficulty following a conversation in a noisy restaurant or crowded room, feel that people are mumbling or not speaking clearly, difficulty following dialogue in the theater, need to ask people to speak up or repeat themselves, difficulty understanding soft or whispered speech and difficulty understanding speech on the telephone    In the past 6 months, have you been bothered by leaking of urine? Yes    In general, how would you rate your overall mental or emotional health?  Good      PHQ-2 Total Score: 0    Additional concerns today:  No    Do you feel safe in your environment? Yes    Have you ever done Advance Care Planning? (For example, a Health Directive, POLST, or a discussion with a medical provider or your loved ones about your wishes): Yes, advance care planning is on file.      Fall risk  Fallen 2 or more times in the past year?: No  Any fall with injury in the past year?: No    Cognitive Screening   1) Repeat 3 items (Leader, Season, Table)    2) Clock draw: NORMAL  3) 3 item recall: Recalls 2 objects   Results: NORMAL " clock, 1-2 items recalled: COGNITIVE IMPAIRMENT LESS LIKELY    Mini-CogTM Copyright JUAN Higgins. Licensed by the author for use in [facility]; reprinted with permission (kenisha@[email or web address]). All rights reserved.      Do you have sleep apnea, excessive snoring or daytime drowsiness?: yes    Reviewed and updated as needed this visit by clinical staff  Tobacco  Allergies  Meds  Med Hx  Surg Hx  Fam Hx  Soc Hx        Reviewed and updated as needed this visit by Provider  Meds        Social History     Tobacco Use     Smoking status: Never Smoker     Smokeless tobacco: Never Used   Substance Use Topics     Alcohol use: Yes     Alcohol/week: 0.0 standard drinks     Frequency: Monthly or less     Drinks per session: 1 or 2     Binge frequency: Never     Comment: a few times a year     If you drink alcohol do you typically have >3 drinks per day or >7 drinks per week? No    Alcohol Use 11/25/2019   Prescreen: >3 drinks/day or >7 drinks/week? No   Prescreen: >3 drinks/day or >7 drinks/week? -     TCO - injection in back       Pt would like to establish care with the provider.    Experiencing dizziness and vertigo, felt dizzy after taking weight measurement, goes away once she sat down - dizziness is not new. Patient not concerned about this.    Current providers sharing in care for this patient include:   Patient Care Team:  Jill Briscoe MD as PCP - General (Internal Medicine)  Ayaz Fernandez MD as Assigned PCP    The following health maintenance items are reviewed in Epic and correct as of today:  Health Maintenance   Topic Date Due     ANNUAL REVIEW OF HM ORDERS  03/19/1932     PHQ-9  02/09/2019     FALL RISK ASSESSMENT  11/13/2019     MEDICARE ANNUAL WELLNESS VISIT  11/13/2019     DTAP/TDAP/TD IMMUNIZATION (2 - Td) 10/31/2022     ADVANCE CARE PLANNING  07/29/2024     SPIROMETRY  Completed     COPD ACTION PLAN  Completed     DEPRESSION ACTION PLAN  Completed     PNEUMOCOCCAL IMMUNIZATION 65+  HIGH/HIGHEST RISK  Completed     ZOSTER IMMUNIZATION  Completed     INFLUENZA VACCINE  Addressed     IPV IMMUNIZATION  Aged Out     MENINGITIS IMMUNIZATION  Aged Out       Review of Systems   Constitutional: Negative for chills and fever.   HENT: Positive for congestion and hearing loss. Negative for ear pain and sore throat.    Eyes: Negative for pain and visual disturbance.   Respiratory: Positive for cough. Negative for shortness of breath.    Cardiovascular: Positive for peripheral edema. Negative for chest pain and palpitations.   Gastrointestinal: Positive for heartburn. Negative for abdominal pain, constipation, diarrhea, hematochezia and nausea.   Breasts:  Negative for tenderness and breast mass.   Genitourinary: Negative for dysuria, frequency, genital sores, hematuria, pelvic pain, urgency, vaginal bleeding and vaginal discharge.   Musculoskeletal: Positive for arthralgias. Negative for joint swelling and myalgias.   Skin: Negative for rash.   Neurological: Positive for dizziness. Negative for weakness, headaches and paresthesias.   Psychiatric/Behavioral: Negative for mood changes. The patient is not nervous/anxious.          OBJECTIVE:   BP (!) 142/80 (BP Location: Right arm, Patient Position: Sitting, Cuff Size: Adult Large)   Pulse 81   Temp 97.8  F (36.6  C) (Oral)   Ht 1.524 m (5')   Wt 109 kg (240 lb 6.4 oz)   SpO2 92%   BMI 46.95 kg/m   Estimated body mass index is 46.95 kg/m  as calculated from the following:    Height as of this encounter: 1.524 m (5').    Weight as of this encounter: 109 kg (240 lb 6.4 oz).  Physical Exam  GENERAL APPEARANCE: healthy, alert and no distress  EYES: Eyes grossly normal to inspection, PERRL and conjunctivae and sclerae normal  HENT: ear canals and TM's normal, nose and mouth without ulcers or lesions, oropharynx clear and oral mucous membranes moist  NECK: no adenopathy, no asymmetry, masses, or scars and thyroid normal to palpation  RESP: lungs clear to  auscultation - no rales, rhonchi or wheezes  BREAST: normal without masses, tenderness or nipple discharge and no palpable axillary masses or adenopathy  CV: regular rate and rhythm, normal S1 S2, no S3 or S4, no murmur, click or rub, no peripheral edema and peripheral pulses strong  ABDOMEN: soft, nontender, no hepatosplenomegaly, no masses and bowel sounds normal  MS: no musculoskeletal defects are noted and gait is age appropriate without ataxia  SKIN: no suspicious lesions or rashes  NEURO: Normal strength and tone, sensory exam grossly normal, mentation intact and speech normal  PSYCH: mentation appears normal and affect normal/bright    Diagnostic Test Results:  Labs reviewed in Epic    ASSESSMENT / PLAN:   1. Moderate recurrent major depression (H)  Controlled, has been stable on this for quite some time.  - DULoxetine (CYMBALTA) 60 MG capsule; Take 1 capsule (60 mg) by mouth daily  Dispense: 90 capsule; Refill: 3    2. Polypharmacy  Patient does not want to use pill box - states she never misses any pills (has them all in a basket), but then later states its hard to remember the fosamax.  Her daughters are concerned about this and don't think she is taking things correctly.  Patient open to meeting with MTM - thinks she did this in the past and found it helpful.   - MED THERAPY MANAGE REFERRAL    3. Obstructive sleep apnea syndrome  Using CPAP    5. Osteoporosis, unspecified osteoporosis type, unspecified pathological fracture presence  - alendronate (FOSAMAX) 70 MG tablet; Take 1 tablet (70 mg) by mouth every 7 days Take with over 8 ounces water and stay upright for at least 30 minutes after dose.  Take at least 60 minutes before breakfast  Dispense: 12 tablet; Refill: 3    6. Polyarthralgia  - DULoxetine (CYMBALTA) 60 MG capsule; Take 1 capsule (60 mg) by mouth daily  Dispense: 90 capsule; Refill: 3    7. Chronic gastritis without bleeding, unspecified gastritis type  - LANsoprazole (PREVACID) 30 MG DR  capsule; Take 1 tablet (30 mg) by mouth daily 30-60 minutes before a meal--OVERDUE for appt. Must keep scheduled appt on 11/25/19 for further refill  Dispense: 90 capsule; Refill: 3    8. Vitamin D deficiency  - Vitamin D Deficiency    9. Benign essential hypertension  Controlled, goal <150   - losartan (COZAAR) 100 MG tablet; Take 1 tablet (100 mg) by mouth daily  Dispense: 90 tablet; Refill: 3  - metoprolol succinate ER (TOPROL-XL) 25 MG 24 hr tablet; Take 0.5 tablets (12.5 mg) by mouth daily  Dispense: 45 tablet; Refill: 3  - Comprehensive metabolic panel  - Vitamin D Deficiency  - Vitamin B12  - TSH with free T4 reflex  - CBC with platelets    10. Vitamin D deficiency, unspecified   - Vitamin D Deficiency    Patient with h/o lung and endometrial cancer - sees her oncologists yearly.     COUNSELING:  Reviewed preventive health counseling, as reflected in patient instructions    Estimated body mass index is 46.95 kg/m  as calculated from the following:    Height as of this encounter: 1.524 m (5').    Weight as of this encounter: 109 kg (240 lb 6.4 oz).    Weight management plan: Discussed healthy diet and exercise guidelines     reports that she has never smoked. She has never used smokeless tobacco.      Appropriate preventive services were discussed with this patient, including applicable screening as appropriate for cardiovascular disease, diabetes, osteopenia/osteoporosis, and glaucoma.  As appropriate for age/gender, discussed screening for colorectal cancer, prostate cancer, breast cancer, and cervical cancer. Checklist reviewing preventive services available has been given to the patient.    Reviewed patients plan of care and provided an AVS. The Basic Care Plan (routine screening as documented in Health Maintenance) for Tatyana meets the Care Plan requirement. This Care Plan has been established and reviewed with the Patient and daughter.    Counseling Resources:  ATP IV Guidelines  Pooled Cohorts Equation  Calculator  Breast Cancer Risk Calculator  FRAX Risk Assessment  ICSI Preventive Guidelines  Dietary Guidelines for Americans, 2010  [employer] MyPlate  ASA Prophylaxis  Lung CA Screening    Jill Briscoe MD  [facility]    Identified Health Risks:

## 2019-11-26 LAB
ALBUMIN SERPL-MCNC: 3.7 G/DL (ref 3.4–5)
ALP SERPL-CCNC: 121 U/L (ref 40–150)
ALT SERPL W P-5'-P-CCNC: 17 U/L (ref 0–50)
ANION GAP SERPL CALCULATED.3IONS-SCNC: 5 MMOL/L (ref 3–14)
AST SERPL W P-5'-P-CCNC: 11 U/L (ref 0–45)
BILIRUB SERPL-MCNC: 0.4 MG/DL (ref 0.2–1.3)
BUN SERPL-MCNC: 22 MG/DL (ref 7–30)
CALCIUM SERPL-MCNC: 9.5 MG/DL (ref 8.5–10.1)
CHLORIDE SERPL-SCNC: 106 MMOL/L (ref 94–109)
CO2 SERPL-SCNC: 28 MMOL/L (ref 20–32)
CREAT SERPL-MCNC: 0.59 MG/DL (ref 0.52–1.04)
GFR SERPL CREATININE-BSD FRML MDRD: 82 ML/MIN/{1.73_M2}
GLUCOSE SERPL-MCNC: 123 MG/DL (ref 70–99)
POTASSIUM SERPL-SCNC: 4.1 MMOL/L (ref 3.4–5.3)
PROT SERPL-MCNC: 7.4 G/DL (ref 6.8–8.8)
SODIUM SERPL-SCNC: 139 MMOL/L (ref 133–144)
TSH SERPL DL<=0.005 MIU/L-ACNC: 3.3 MU/L (ref 0.4–4)

## 2019-12-10 ENCOUNTER — MYC REFILL (OUTPATIENT)
Dept: PEDIATRICS | Facility: CLINIC | Age: 84
End: 2019-12-10

## 2019-12-10 DIAGNOSIS — I10 BENIGN ESSENTIAL HYPERTENSION: ICD-10-CM

## 2019-12-11 RX ORDER — METOPROLOL SUCCINATE 25 MG/1
12.5 TABLET, EXTENDED RELEASE ORAL DAILY
Qty: 45 TABLET | Refills: 3 | Status: SHIPPED | OUTPATIENT
Start: 2019-12-11 | End: 2020-09-08

## 2019-12-13 DIAGNOSIS — R10.32 ABDOMINAL PAIN, LEFT LOWER QUADRANT: ICD-10-CM

## 2019-12-13 DIAGNOSIS — E55.9 VITAMIN D DEFICIENCY: ICD-10-CM

## 2019-12-13 DIAGNOSIS — F33.1 MODERATE RECURRENT MAJOR DEPRESSION (H): ICD-10-CM

## 2019-12-13 DIAGNOSIS — K29.50 CHRONIC GASTRITIS WITHOUT BLEEDING, UNSPECIFIED GASTRITIS TYPE: ICD-10-CM

## 2019-12-13 DIAGNOSIS — Z12.31 ENCOUNTER FOR SCREENING MAMMOGRAM FOR BREAST CANCER: ICD-10-CM

## 2019-12-13 DIAGNOSIS — H65.92 MIDDLE EAR EFFUSION, LEFT: ICD-10-CM

## 2019-12-13 DIAGNOSIS — Z13.820 SCREENING FOR OSTEOPOROSIS: ICD-10-CM

## 2019-12-13 DIAGNOSIS — J31.0 CHRONIC RHINITIS: ICD-10-CM

## 2019-12-13 DIAGNOSIS — R60.0 BILATERAL LOWER EXTREMITY EDEMA: ICD-10-CM

## 2019-12-13 DIAGNOSIS — R07.89 CHEST WALL PAIN: ICD-10-CM

## 2019-12-16 ENCOUNTER — TRANSFERRED RECORDS (OUTPATIENT)
Dept: HEALTH INFORMATION MANAGEMENT | Facility: CLINIC | Age: 84
End: 2019-12-16

## 2019-12-16 RX ORDER — FUROSEMIDE 20 MG
20 TABLET ORAL DAILY PRN
Qty: 30 TABLET | Refills: 3 | OUTPATIENT
Start: 2019-12-16

## 2019-12-16 RX ORDER — FUROSEMIDE 20 MG
20 TABLET ORAL DAILY PRN
Qty: 30 TABLET | Refills: 0 | Status: SHIPPED | OUTPATIENT
Start: 2019-12-16 | End: 2020-10-22

## 2019-12-16 RX ORDER — AZELASTINE 1 MG/ML
1-2 SPRAY, METERED NASAL 2 TIMES DAILY
Qty: 3 BOTTLE | Refills: 3 | Status: SHIPPED | OUTPATIENT
Start: 2019-12-16 | End: 2021-11-04

## 2019-12-16 NOTE — TELEPHONE ENCOUNTER
Nasal spray signed.    Lasix not signed - please find out why patient needing this now as this is old prescription.  May need phone, evisit or office visit depending on symptoms.     Jill Briscoe MD  Internal Medicine/Pediatrics  [facility]

## 2019-12-16 NOTE — TELEPHONE ENCOUNTER
"Routing refill request to provider for review/approval because:  A break in medication: Lasix last prescribed 2018, #30 tabs with 3 refills.  by 2019.  BP above goal  Patient outside of age range for protocol for Antihistamine (6-64 yrs)    Prudence Adhikari RN  [facility]      Requested Prescriptions   Pending Prescriptions Disp Refills     azelastine (ASTELIN) 0.1 % nasal spray 3 Bottle 3     Sig: Spray 1-2 sprays into both nostrils 2 times daily       Antihistamines Protocol Failed - 2019  4:13 PM        Failed - Patient is 3-64 years of age     Apply weight-based dosing for peds patients age 3 - 12 years of age.    Forward request to provider for patients under the age of 3 or over the age of 64.          Passed - Recent (12 mo) or future (30 days) visit within the authorizing provider's specialty     Patient has had an office visit with the authorizing provider or a provider within the authorizing providers department within the previous 12 mos or has a future within next 30 days. See \"Patient Info\" tab in inbasket, or \"Choose Columns\" in Meds & Orders section of the refill encounter.              Passed - Medication is active on med list        furosemide (LASIX) 20 MG tablet 30 tablet 3     Sig: Take 1 tablet (20 mg) by mouth daily as needed (edema)       Diuretics (Including Combos) Protocol Failed - 2019  4:13 PM        Failed - Blood pressure under 140/90 in past 12 months     BP Readings from Last 3 Encounters:   19 (!) 142/80   19 128/86   18 116/60                 Passed - Recent (12 mo) or future (30 days) visit within the authorizing provider's specialty     Patient has had an office visit with the authorizing provider or a provider within the authorizing providers department within the previous 12 mos or has a future within next 30 days. See \"Patient Info\" tab in inbasket, or \"Choose Columns\" in Meds & Orders " section of the refill encounter.              Passed - Medication is active on med list        Passed - Patient is age 18 or older        Passed - No active pregancy on record        Passed - Normal serum creatinine on file in past 12 months     Recent Labs   Lab Test 11/25/19  1047   CR 0.59              Passed - Normal serum potassium on file in past 12 months     Recent Labs   Lab Test 11/25/19  1047   POTASSIUM 4.1                    Passed - Normal serum sodium on file in past 12 months     Recent Labs   Lab Test 11/25/19  1047                 Passed - No positive pregnancy test in past 12 months        Astelin spray  Last Written Prescription Date:  11/13/18  Last Fill Quantity: 3 bottles,  # refills: 3 refills   Last office visit: 11/25/2019 with prescribing provider:     Future Office Visit:      Lasix  Last Written Prescription Date:  11/13/18  Last Fill Quantity: 30,  # refills: 3   Last office visit: 11/25/2019 with prescribing provider:     Future Office Visit:

## 2019-12-16 NOTE — TELEPHONE ENCOUNTER
"Placed call to patient. Spoke with daughter, Jennifer ([facility] on file).    Per daughter, lasix was filled last in Nov 2018. Daughter states patient was instructed to take this PRN at time of ankle swelling. Patient has only taken this \"a handful of times\". Tried to obtain a refill to have on hand, but was advised by pharmacy new prescription was needed.     Per daughter, unsure if patient has any swelling at this time, patient has not had a c/o of swelling or pain in ankles. If Dr. Briscoe still would like to see patient before prescribing lasix, daughter says she will wait for patient to have a complaint of swelling and will then schedule appointment for patient to be seen.    Huddled with Dr. Briscoe. Received verbal to refill previous lasix prescription for #30 with 0 refills and for daughter to send in  message with update if they use the medication. Daughter stated understanding and is in agreement with the plan.    Lasix prescription sent to [facility] pharmacy in [place].    Bonnie ANGELO RN    "

## 2020-01-21 ENCOUNTER — TRANSFERRED RECORDS (OUTPATIENT)
Dept: HEALTH INFORMATION MANAGEMENT | Facility: CLINIC | Age: 85
End: 2020-01-21

## 2020-02-11 ENCOUNTER — HOSPITAL ENCOUNTER (OUTPATIENT)
Dept: CT IMAGING | Facility: CLINIC | Age: 85
Discharge: HOME | End: 2020-02-11
Attending: INTERNAL MEDICINE | Admitting: INTERNAL MEDICINE
Payer: COMMERCIAL

## 2020-02-11 DIAGNOSIS — C54.1 ENDOMETRIAL CANCER (H): ICD-10-CM

## 2020-02-11 LAB
CREAT BLD-MCNC: 0.6 MG/DL (ref 0.52–1.04)
GFR SERPL CREATININE-BSD FRML MDRD: >90 ML/MIN/{1.73_M2}

## 2020-02-11 PROCEDURE — 71260 CT THORAX DX C+: CPT

## 2020-02-11 PROCEDURE — 25000125 ZZHC RX 250: Performed by: INTERNAL MEDICINE

## 2020-02-11 PROCEDURE — 25000128 H RX IP 250 OP 636: Performed by: INTERNAL MEDICINE

## 2020-02-11 PROCEDURE — 82565 ASSAY OF CREATININE: CPT

## 2020-02-11 RX ORDER — IOPAMIDOL 755 MG/ML
500 INJECTION, SOLUTION INTRAVASCULAR ONCE
Status: COMPLETED | OUTPATIENT
Start: 2020-02-11 | End: 2020-02-11

## 2020-02-11 RX ADMIN — IOPAMIDOL 80 ML: 755 INJECTION, SOLUTION INTRAVENOUS at 11:37

## 2020-02-11 RX ADMIN — SODIUM CHLORIDE 64 ML: 9 INJECTION, SOLUTION INTRAVENOUS at 11:38

## 2020-02-12 DIAGNOSIS — Z53.9 DIAGNOSIS NOT YET DEFINED: Primary | ICD-10-CM

## 2020-02-12 PROCEDURE — G0179 MD RECERTIFICATION HHA PT: HCPCS | Performed by: INTERNAL MEDICINE

## 2020-02-18 PROBLEM — N39.46 MIXED STRESS AND URGE URINARY INCONTINENCE: Status: ACTIVE | Noted: 2020-02-18

## 2020-04-09 ENCOUNTER — MEDICAL CORRESPONDENCE (OUTPATIENT)
Dept: HEALTH INFORMATION MANAGEMENT | Facility: CLINIC | Age: 85
End: 2020-04-09

## 2020-05-05 ENCOUNTER — MYC MEDICAL ADVICE (OUTPATIENT)
Dept: PEDIATRICS | Facility: CLINIC | Age: 85
End: 2020-05-05

## 2020-05-06 NOTE — TELEPHONE ENCOUNTER
We could do evisit or virtual visit to specifically rule out a bladder infection due to the increased frequency.  I would want to get a urine sample since my suspicion is low for infection and this could all be incontinence (I would not empirically treat her for infection). So, patient would need to come to lab or daughter would need to check with facility to see if she could bring in sterile cup for sample.    If the abd pain is worsening or if the daughter is concerned this might be something else then she does need an OV.    Jill Briscoe MD  Internal Medicine/Pediatrics  [facility]

## 2020-05-06 NOTE — TELEPHONE ENCOUNTER
Please see  message:    My Mom is a poor historian. She has told me for the last 3-4 weeks that she is urinating every hour day and night. She is using more incontienent pads, I think she is not actually going to the bathroom that often. She said her urine will just run out when she stands up. She has insisted that she does not have any other symptoms until today. Today she told me she does at times have lower abdominal pain. When I asked her how often she said once or twice a week. She did not describe it as burning. No back pain, fever, chills or increased confusion. I worry she is not drinking enough and she admits she is not. I have encouraged her to drink more. She lives in a senior housing unit and has not been out and no one visits.    Will route to Dr. Briscoe to review/advise    Bonnie ANGELO RN

## 2020-05-06 NOTE — TELEPHONE ENCOUNTER
"Spoke with patient's daughter, Jennifer.    Went over Dr. Briscoe recommendation below. Jennifer states she is a nurse and is not concerned about the abdominal pain thinking patient might be more associating this with \"bladder\" discomfort.    Because of this and no other symptoms other than increased frequency and increased incontinence, scheduled for video visit 5/8. Jennifer is aware if labs are requested, may need to send someone to  supplies to obtain urine at home.    Jennifer has PAL direct line to call if questions in the meantime    Bonnie ANGELO RN    "

## 2020-05-08 ENCOUNTER — VIRTUAL VISIT (OUTPATIENT)
Dept: PEDIATRICS | Facility: CLINIC | Age: 85
End: 2020-05-08
Payer: COMMERCIAL

## 2020-05-08 ENCOUNTER — TELEPHONE (OUTPATIENT)
Dept: PEDIATRICS | Facility: CLINIC | Age: 85
End: 2020-05-08

## 2020-05-08 DIAGNOSIS — R32 URINARY INCONTINENCE, UNSPECIFIED TYPE: Primary | ICD-10-CM

## 2020-05-08 DIAGNOSIS — R35.89 POLYURIA: ICD-10-CM

## 2020-05-08 PROCEDURE — 99213 OFFICE O/P EST LOW 20 MIN: CPT | Mod: GT | Performed by: INTERNAL MEDICINE

## 2020-05-08 NOTE — TELEPHONE ENCOUNTER
I left message for patient's daughter, Chelsea to see if patient is able to have her visit earlier than scheduled 335 telephone visit with Dr. Desai. If Chelsea calls back can offer as early as 1:15pm.    FERNANDO DOTY MA on 5/8/2020 at 10:41 AM

## 2020-05-08 NOTE — PROGRESS NOTES
"  Tatyana Metcalf is a 88 year old female who is being evaluated via a billable video visit.      The patient has been notified of following:     \"This video visit will be conducted via a call between you and your physician/provider. We have found that certain health care needs can be provided without the need for an in-person physical exam.  This service lets us provide the care you need with a video conversation.  If a prescription is necessary we can send it directly to your pharmacy.  If lab work is needed we can place an order for that and you can then stop by our lab to have the test done at a later time.    Video visits are billed at different rates depending on your insurance coverage.  Please reach out to your insurance provider with any questions.    If during the course of the call the physician/provider feels a video visit is not appropriate, you will not be charged for this service.\"    Patient has given verbal consent for Video visit? Yes    How would you like to obtain your AVS? Blade    Patient would like the video invitation sent by: Text to cell phone: [phone]    Will anyone else be joining your video visit? Daughter james is with patient    Subjective     aTtyana Metcalf is a 88 year old female who presents today via video visit for the following health issues:    HPI  URINARY TRACT SYMPTOMS      Duration: difficult to determine - patient is poor historian    Description  dysuria and frequency      Video Start Time: 3:30     Has been urinating more frequently.  During the day, can need to go every 2-3 hours, and at night, goes every 1 hour.  This is relatively new in the 1 month.  No burning or blood.  Does note that in the last 1 week, has burned a few times, but not all the time.  No foul smell.      bowel movements are normal, on miralax.  3-4 per day, not hard.  Laxative helps.     No real abdominal pain, but just sometimes.      Patient Active Problem List   Diagnosis     Secondary " malignant neoplasm of lung (H)     Benign essential hypertension     Vestibular neuronitis of both ears     Spinal stenosis of lumbar region with neurogenic claudication     Hiatal hernia     Esophageal reflux     Obstructive sleep apnea syndrome     Moderate recurrent major depression (H)     Chronic rhinitis     Iron deficiency anemia     Dermatographism     Bronchiectasis (H)     Vitamin D deficiency     Rib pain     Rib fractures     Endometrial carcinoma (H)     Osteoporosis     Neuropathy     SNHL (sensorineural hearing loss)     Respiratory failure (H)     Lung cancer (H)     Moderate chronic obstructive pulmonary disease (H)     Hypoxia     BMI 45.0-49.9, adult (H)     Left leg pain     Mixed stress and urge urinary incontinence     Past Surgical History:   Procedure Laterality Date     APPENDECTOMY  1952     BACK SURGERY  2002    Spinal Stenosis     BIOPSY  4-    Lung     COLONOSCOPY  7-    Normal     HYSTERECTOMY TOTAL ABDOMINAL, BILATERAL SALPINGO-OOPHORECTOMY, COMBINED  2000       Social History     Tobacco Use     Smoking status: Never Smoker     Smokeless tobacco: Never Used   Substance Use Topics     Alcohol use: Yes     Alcohol/week: 0.0 standard drinks     Frequency: Monthly or less     Drinks per session: 1 or 2     Binge frequency: Never     Comment: a few times a year     Family History   Problem Relation Age of Onset     Heart Disease Mother      Hypertension Mother      Cerebrovascular Disease Mother      Heart Disease Father      Coronary Artery Disease Father          Current Outpatient Medications   Medication Sig Dispense Refill     albuterol (ACCUNEB) 1.25 MG/3ML nebulizer solution Take 1 vial (1.25 mg) by nebulization every 4 hours as needed for shortness of breath / dyspnea or wheezing 30 vial 11     alendronate (FOSAMAX) 70 MG tablet Take 1 tablet (70 mg) by mouth every 7 days Take with over 8 ounces water and stay upright for at least 30 minutes after dose.  Take at least  60 minutes before breakfast 12 tablet 3     azelastine (ASTELIN) 0.1 % nasal spray Spray 1-2 sprays into both nostrils 2 times daily 3 Bottle 3     budesonide-formoterol (SYMBICORT) 160-4.5 MCG/ACT Inhaler Inhale 2 puffs into the lungs 2 times daily       Camphor-Menthol-Methyl Sal 1.2-5.7-6.3 % PTCH        cetirizine (ZYRTEC) 10 MG tablet Take 1 tablet (10 mg) by mouth every evening 30 tablet 1     cholecalciferol (VITAMIN D3) 1000 UNIT tablet Take 1,000 Units by mouth every evening       DULoxetine (CYMBALTA) 60 MG capsule Take 1 capsule (60 mg) by mouth daily 90 capsule 3     ferrous sulfate (IRON) 325 (65 FE) MG tablet Take 325 mg by mouth every evening        furosemide (LASIX) 20 MG tablet Take 1 tablet (20 mg) by mouth daily as needed (edema) 30 tablet 0     LANsoprazole (PREVACID) 30 MG DR capsule Take 1 tablet (30 mg) by mouth daily 30-60 minutes before a meal--OVERDUE for appt. Must keep scheduled appt on 11/25/19 for further refill 90 capsule 1     losartan (COZAAR) 100 MG tablet Take 1 tablet (100 mg) by mouth daily 90 tablet 3     metoprolol succinate ER (TOPROL-XL) 25 MG 24 hr tablet Take 0.5 tablets (12.5 mg) by mouth daily 45 tablet 3     polyethylene glycol (MIRALAX/GLYCOLAX) powder TAKE 17GRAMS BY MOUTH TWICE DAILY AS DIRECTED. 1020 g 2     PROAIR RESPICLICK 108 (90 BASE) MCG/ACT AEPB INL 2 PUFFS PO Q 4 TO 6 H PRN  12     Allergies   Allergen Reactions     Penicillins      hives     Sulfa Drugs      Rash       BP Readings from Last 3 Encounters:   11/25/19 (!) 142/80   02/06/19 128/86   11/13/18 116/60    Wt Readings from Last 3 Encounters:   11/25/19 109 kg (240 lb 6.4 oz)   02/06/19 109.7 kg (241 lb 14.4 oz)   11/13/18 105.7 kg (233 lb)                    Reviewed and updated as needed this visit by Provider         Review of Systems   CONSTITUTIONAL: NEGATIVE for fever, chills, change in weight  INTEGUMENTARY/SKIN: NEGATIVE for worrisome rashes, moles or lesions  EYES: NEGATIVE for vision  changes or irritation  ENT/MOUTH: NEGATIVE for ear, mouth and throat problems  RESP: NEGATIVE for significant cough or SOB  BREAST: NEGATIVE for masses, tenderness or discharge  CV: NEGATIVE for chest pain, palpitations or peripheral edema  GI: NEGATIVE for nausea, abdominal pain, heartburn, or change in bowel habits  : NEGATIVE for frequency, dysuria, or hematuria  MUSCULOSKELETAL: NEGATIVE for significant arthralgias or myalgia  NEURO: NEGATIVE for weakness, dizziness or paresthesias  ENDOCRINE: NEGATIVE for temperature intolerance, skin/hair changes  HEME: NEGATIVE for bleeding problems  PSYCHIATRIC: NEGATIVE for changes in mood or affect      Objective    There were no vitals taken for this visit.  Estimated body mass index is 46.95 kg/m  as calculated from the following:    Height as of 11/25/19: 1.524 m (5').    Weight as of 11/25/19: 109 kg (240 lb 6.4 oz).  Physical Exam     GENERAL: Healthy, alert and no distress  EYES: Eyes grossly normal to inspection.  No discharge or erythema, or obvious scleral/conjunctival abnormalities.  RESP: No audible wheeze, cough, or visible cyanosis.  No visible retractions or increased work of breathing.    SKIN: Visible skin clear. No significant rash, abnormal pigmentation or lesions.  NEURO: Cranial nerves grossly intact.  Mentation and speech appropriate for age.  PSYCH: Mentation appears normal, affect normal/bright, judgement and insight intact, normal speech and appearance well-groomed.      Diagnostic Test Results:  Labs reviewed in Epic        Assessment & Plan     1. Polyuria  With incotinence.  Will perform ua and wait for culture before treating.  No signs of significant infection, or pyelonephritis.   - *UA reflex to Microscopic and Culture ([place] and [facility] (except [place] and [place])       See Patient Instructions    No follow-ups on file.    Naveen Desai MD  [facility]      Video-Visit Details    Type of service:  Video  Visit    Video End Time:3:50    Originating Location (pt. Location): Home    Distant Location (provider location):  [facility]     Platform used for Video Visit: VonWell    No follow-ups on file.       Naveen Desai MD

## 2020-05-11 DIAGNOSIS — R35.89 POLYURIA: ICD-10-CM

## 2020-05-11 DIAGNOSIS — R32 URINARY INCONTINENCE, UNSPECIFIED TYPE: ICD-10-CM

## 2020-05-11 LAB
ALBUMIN UR-MCNC: NEGATIVE MG/DL
APPEARANCE UR: CLEAR
BILIRUB UR QL STRIP: ABNORMAL
COLOR UR AUTO: YELLOW
GLUCOSE UR STRIP-MCNC: NEGATIVE MG/DL
HGB UR QL STRIP: NEGATIVE
KETONES UR STRIP-MCNC: NEGATIVE MG/DL
LEUKOCYTE ESTERASE UR QL STRIP: NEGATIVE
NITRATE UR QL: NEGATIVE
PH UR STRIP: 5 PH (ref 5–7)
SOURCE: ABNORMAL
SP GR UR STRIP: >1.03 (ref 1–1.03)
UROBILINOGEN UR STRIP-ACNC: 1 EU/DL (ref 0.2–1)

## 2020-05-11 PROCEDURE — 81003 URINALYSIS AUTO W/O SCOPE: CPT | Performed by: INTERNAL MEDICINE

## 2020-06-08 DIAGNOSIS — K29.50 CHRONIC GASTRITIS WITHOUT BLEEDING, UNSPECIFIED GASTRITIS TYPE: ICD-10-CM

## 2020-06-09 NOTE — TELEPHONE ENCOUNTER
Routing refill request to provider for review/approval because:  Drug not on the [facility] refill protocol due to having osteoporosis on PL  Darya Chen RN, BSN

## 2020-06-10 RX ORDER — LANSOPRAZOLE 30 MG/1
CAPSULE, DELAYED RELEASE ORAL
Qty: 90 CAPSULE | Refills: 0 | Status: SHIPPED | OUTPATIENT
Start: 2020-06-10 | End: 2020-09-08

## 2020-08-28 ENCOUNTER — MYC MEDICAL ADVICE (OUTPATIENT)
Dept: PEDIATRICS | Facility: CLINIC | Age: 85
End: 2020-08-28

## 2020-08-28 NOTE — TELEPHONE ENCOUNTER
I spoke to patient's daughter, Chelsea [phone]. Appointment scheduled with Dr. Connolly.    FERNANDO DOTY MA on 8/28/2020 at 4:50 PM

## 2020-08-28 NOTE — TELEPHONE ENCOUNTER
Received call from pt's daughter Jennifer    Pt knows that Jennifer is calling the clinic on her behalf CTC on file  Daughter noticed that pt is sleeping more, not interested in doing things and more focused on the negative about her health and Covid    Daughter would like to make a virtual visit with a provider to address these concerns and possibly increasing or changes  Her antidepressants    MA/TC: Please call pt's other daughter Chelsea at [phone] to schedule a video visit. Any provider is fine Chelsea assists pt with virtual visits    Thank you  Gabriela Zarco RN on 8/28/2020 at 4:16 PM

## 2020-09-03 ENCOUNTER — VIRTUAL VISIT (OUTPATIENT)
Dept: PEDIATRICS | Facility: CLINIC | Age: 85
End: 2020-09-03
Payer: COMMERCIAL

## 2020-09-03 DIAGNOSIS — R53.83 FATIGUE, UNSPECIFIED TYPE: Primary | ICD-10-CM

## 2020-09-03 DIAGNOSIS — F33.1 MODERATE RECURRENT MAJOR DEPRESSION (H): ICD-10-CM

## 2020-09-03 DIAGNOSIS — M17.2 POST-TRAUMATIC OSTEOARTHRITIS OF BOTH KNEES: ICD-10-CM

## 2020-09-03 PROCEDURE — 99214 OFFICE O/P EST MOD 30 MIN: CPT | Mod: 95 | Performed by: INTERNAL MEDICINE

## 2020-09-03 ASSESSMENT — PATIENT HEALTH QUESTIONNAIRE - PHQ9: SUM OF ALL RESPONSES TO PHQ QUESTIONS 1-9: 10

## 2020-09-03 NOTE — PROGRESS NOTES
"  Tatyana Metcalf is a 88 year old female who is being evaluated via a billable telephone visit.      The patient has been notified of following:     \"This telephone visit will be conducted via a call between you and your physician/provider. We have found that certain health care needs can be provided without the need for a physical exam.  This service lets us provide the care you need with a short phone conversation.  If a prescription is necessary we can send it directly to your pharmacy.  If lab work is needed we can place an order for that and you can then stop by our lab to have the test done at a later time.    Telephone visits are billed at different rates depending on your insurance coverage. During this emergency period, for some insurers they may be billed the same as an in-person visit.  Please reach out to your insurance provider with any questions.    If during the course of the call the physician/provider feels a telephone visit is not appropriate, you will not be charged for this service.\"    Patient has given verbal consent for Telephone visit?  Yes    What phone number would you like to be contacted at? [phone]    How would you like to obtain your AVS? Blade Genao     Tatyana Metcalf is a 88 year old female who presents via phone visit today for the following health issues:    HPI    Depression and Anxiety Follow-Up    How are you doing with your depression since your last visit? Worsened     How are you doing with your anxiety since your last visit?  Worsened     Are you having other symptoms that might be associated with depression or anxiety? Yes:  noticed that pt is sleeping more, not interested in doing things and more focused on the negative about her health and Covid    Have you had a significant life event? No     Do you have any concerns with your use of alcohol or other drugs? No    Social History     Tobacco Use     Smoking status: Never Smoker     Smokeless tobacco: " "Never Used   Substance Use Topics     Alcohol use: Yes     Alcohol/week: 0.0 standard drinks     Frequency: Monthly or less     Drinks per session: 1 or 2     Binge frequency: Never     Comment: a few times a year     Drug use: No     PHQ 4/28/2017 10/11/2017 8/9/2018   PHQ-9 Total Score 7 7 7   Q9: Thoughts of better off dead/self-harm past 2 weeks Not at all Not at all Not at all     KYRIE-7 SCORE 10/6/2016 4/28/2017 10/11/2017   Total Score 3 (minimal anxiety) - -   Total Score - 4 2     Last PHQ-9 8/9/2018   1.  Little interest or pleasure in doing things 1   2.  Feeling down, depressed, or hopeless 0   3.  Trouble falling or staying asleep, or sleeping too much 1   4.  Feeling tired or having little energy 2   5.  Poor appetite or overeating 2   6.  Feeling bad about yourself 0   7.  Trouble concentrating 0   8.  Moving slowly or restless 1   Q9: Thoughts of better off dead/self-harm past 2 weeks 0   PHQ-9 Total Score 7   Difficulty at work, home, or with people -     KYRIE-7  10/11/2017   1. Feeling nervous, anxious, or on edge 0   2. Not being able to stop or control worrying 0   3. Worrying too much about different things 0   4. Trouble relaxing 0   5. Being so restless that it is hard to sit still 1   6. Becoming easily annoyed or irritable 1   7. Feeling afraid, as if something awful might happen 0   KYRIE-7 Total Score 2   If you checked any problems, how difficult have they made it for you to do your work, take care of things at home, or get along with other people? Not difficult at all       Suicide Assessment Five-step Evaluation and Treatment (SAFE-T)        Concern - order for cortisone injections in knees      {additonal problems for provider to add (Optional):968875}    Review of Systems   {ROS COMP (Optional):118235}       Objective          Vitals:  No vitals were obtained today due to virtual visit.    {GENERAL APPEARANCE:50::\"healthy\",\"alert\",\"no distress\"}  PSYCH: Alert and oriented times 3; " "coherent speech, normal   rate and volume, able to articulate logical thoughts, able   to abstract reason, no tangential thoughts, no hallucinations   or delusions  Her affect is { :9500194::\"normal\"}  RESP: No cough, no audible wheezing, able to talk in full sentences  Remainder of exam unable to be completed due to telephone visits    {Diagnostic Test Results (Optional):700007}        Assessment/Plan:    {PROVIDER CHARTING PREFERENCE SOAPO:277421}    Phone call duration:  *** minutes                    "

## 2020-09-03 NOTE — Clinical Note
Lots of referrals today!  Elderly lady on max dose of duloxetine with some worsening symptoms of depression (anhedonia).  Seems very worried and preoccupied with current events.  Would likely benefit from therapy

## 2020-09-03 NOTE — Clinical Note
Please call to help schedule (1) lab and nurse only for flu shot (2) f/up with Dr. HAMM (PCP) in 1-2 months

## 2020-09-03 NOTE — PROGRESS NOTES
"Tatyana Metcalf is a 88 year old female who is being evaluated via a billable video visit.      The patient has been notified of following:     \"This video visit will be conducted via a call between you and your physician/provider. We have found that certain health care needs can be provided without the need for an in-person physical exam.  This service lets us provide the care you need with a video conversation.  If a prescription is necessary we can send it directly to your pharmacy.  If lab work is needed we can place an order for that and you can then stop by our lab to have the test done at a later time.    Video visits are billed at different rates depending on your insurance coverage.  Please reach out to your insurance provider with any questions.    If during the course of the call the physician/provider feels a video visit is not appropriate, you will not be charged for this service.\"    Patient has given verbal consent for Video visit? Yes  How would you like to obtain your AVS? MyChart  If you are dropped from the video visit, the video invite should be resent to: Text to cell phone: see demographics  Will anyone else be joining your video visit? No    Subjective     Tatyana Metcalf is a 88 year old female who presents today via video visit for the following health issues:    HPI     Tatyana Metcalf is a 88 year old female who presents via phone visit today for the following health issues:   HPI   Depression and Anxiety Follow-Up   How are you doing with your depression since your last visit? Worsened   How are you doing with your anxiety since your last visit? Worsened   Are you having other symptoms that might be associated with depression or anxiety? Yes: noticed that pt is sleeping more, not interested in doing things and more focused on the negative about her health and Covid  Have you had a significant life event? No   Do you have any concerns with your use of alcohol or other drugs? " No  Social History           Tobacco Use     Smoking status: Never Smoker     Smokeless tobacco: Never Used   Substance Use Topics     Alcohol use: Yes     Alcohol/week: 0.0 standard drinks     Frequency: Monthly or less     Drinks per session: 1 or 2     Binge frequency: Never     Comment: a few times a year     Drug use: No     PHQ 4/28/2017 10/11/2017 8/9/2018   PHQ-9 Total Score 7 7 7   Q9: Thoughts of better off dead/self-harm past 2 weeks Not at all Not at all Not at all     KYRIE-7 SCORE 10/6/2016 4/28/2017 10/11/2017   Total Score 3 (minimal anxiety) - -   Total Score - 4 2     Last PHQ-9 8/9/2018   1. Little interest or pleasure in doing things 1   2. Feeling down, depressed, or hopeless 0   3. Trouble falling or staying asleep, or sleeping too much 1   4. Feeling tired or having little energy 2   5. Poor appetite or overeating 2   6. Feeling bad about yourself 0   7. Trouble concentrating 0   8. Moving slowly or restless 1   Q9: Thoughts of better off dead/self-harm past 2 weeks 0   PHQ-9 Total Score 7   Difficulty at work, home, or with people -     KYRIE-7  10/11/2017   1. Feeling nervous, anxious, or on edge 0   2. Not being able to stop or control worrying 0   3. Worrying too much about different things 0   4. Trouble relaxing 0   5. Being so restless that it is hard to sit still 1   6. Becoming easily annoyed or irritable 1   7. Feeling afraid, as if something awful might happen 0   KYRIE-7 Total Score 2   If you checked any problems, how difficult have they made it for you to do your work, take care of things at home, or get along with other people? Not difficult at all   Suicide Assessment Five-step Evaluation and Treatment (SAFE-T)     Mood - has been more concerned about current events.  Not interested in things she used to be.  Lost interest in eating.  Not sleeping as well as typical.    Concern - order for cortisone injections in knees     \  Video Start Time: 4:06 PM        Review of Systems   All  "other systems on a 10-point review are negative, unless otherwise noted in HPI        Objective           Vitals:  No vitals were obtained today due to virtual visit.    Physical Exam     GENERAL: Healthy, alert and no distress  EYES: Eyes grossly normal to inspection.  No discharge or erythema, or obvious scleral/conjunctival abnormalities.  RESP: No audible wheeze, cough, or visible cyanosis.  No visible retractions or increased work of breathing.    SKIN: Visible skin clear. No significant rash, abnormal pigmentation or lesions.  NEURO: Cranial nerves grossly intact.  Mentation and speech appropriate for age.  PSYCH: Mentation appears normal, affect normal/bright, judgement and insight intact, normal speech and appearance well-groomed.      No results found for this or any previous visit (from the past 24 hour(s)).        Assessment & Plan     Fatigue, unspecified type  Fatigue and generalized malaise (pt descriptor \"feeling icky\").  Will check some basic labs to rule out organic/reversible etiologies.  If stable/normal, will start with therapy and optimization of mood (see below).  - **CBC with platelets FUTURE anytime; Future  - **TSH with free T4 reflex FUTURE anytime; Future  - **Comprehensive metabolic panel FUTURE anytime; Future  - **Vitamin D Deficiency FUTURE anytime; Future  - **Vitamin B12 FUTURE 2mo; Future    Moderate recurrent major depression (H)  Daughters seem to think her symptoms of fatigue and generalized malaise are due to worsening depression.  She is on duloxetine 60 mg daily already.  -- Will check labs per above  -- Referral to [facility]  -- f/up with PCP in 1-2 months to reassess and possibly discuss medication changes if no improvement    Post-traumatic osteoarthritis of both knees  Worsening pain and has had success with cortisone shots in past.  Will refer back.  - PAIN MANAGEMENT REFERRAL; Future       BMI:   Estimated body mass index is 46.95 kg/m  as calculated from the following:    " Height as of 11/25/19: 1.524 m (5').    Weight as of 11/25/19: 109 kg (240 lb 6.4 oz).           Patient Instructions   1. Start with labs (can arrange for flu shot at that time).  I will contact you with results and any further recommendations.  2. Referral to start therapy with our behavioral health clinician (Cele).  She will contact you.  3. Referral for cortisone shots  4. Follow-up with PCP in 1-2 months.  If no improvement in mood at that time, discuss medication changes.      No follow-ups on file.    Dahiana Connolly MD  [facility]      Video-Visit Details    Type of service:  Video Visit    Video End Time:4:28 PM    Originating Location (pt. Location): Home    Distant Location (provider location):  [facility]     Platform used for Video Visit: Youboox

## 2020-09-04 DIAGNOSIS — F33.1 MODERATE RECURRENT MAJOR DEPRESSION (H): ICD-10-CM

## 2020-09-04 DIAGNOSIS — I10 BENIGN ESSENTIAL HYPERTENSION: ICD-10-CM

## 2020-09-04 DIAGNOSIS — M25.50 POLYARTHRALGIA: ICD-10-CM

## 2020-09-04 DIAGNOSIS — K29.50 CHRONIC GASTRITIS WITHOUT BLEEDING, UNSPECIFIED GASTRITIS TYPE: ICD-10-CM

## 2020-09-08 ENCOUNTER — TELEPHONE (OUTPATIENT)
Dept: BEHAVIORAL HEALTH | Facility: CLINIC | Age: 85
End: 2020-09-08

## 2020-09-08 RX ORDER — DULOXETIN HYDROCHLORIDE 60 MG/1
CAPSULE, DELAYED RELEASE ORAL
Qty: 90 CAPSULE | Refills: 0 | Status: SHIPPED | OUTPATIENT
Start: 2020-09-08 | End: 2020-10-22

## 2020-09-08 RX ORDER — METOPROLOL SUCCINATE 25 MG/1
12.5 TABLET, EXTENDED RELEASE ORAL DAILY
Qty: 45 TABLET | Refills: 0 | Status: SHIPPED | OUTPATIENT
Start: 2020-09-08 | End: 2020-11-23

## 2020-09-08 RX ORDER — LANSOPRAZOLE 30 MG/1
CAPSULE, DELAYED RELEASE ORAL
Qty: 90 CAPSULE | Refills: 0 | Status: SHIPPED | OUTPATIENT
Start: 2020-09-08 | End: 2021-02-11

## 2020-09-08 NOTE — TELEPHONE ENCOUNTER
Routing refill request to provider for review/approval because:  Labs out of range:  BP, PHQ  Diagnosis of osteoporosis on record    BP Readings from Last 3 Encounters:   11/25/19 (!) 142/80   02/06/19 128/86   11/13/18 116/60     PHQ 10/11/2017 8/9/2018 9/3/2020   PHQ-9 Total Score 7 7 10   Q9: Thoughts of better off dead/self-harm past 2 weeks Not at all Not at all Not at all     Sravan RUBIO, RN, BSN

## 2020-09-08 NOTE — TELEPHONE ENCOUNTER
Phone Encounter   [facility] attempted to reach patient, by PCP request. [facility] left message with daughter. Daughter stated that patient is not interested in starting therapy at this time. [facility] informed her that patient can call back at any time to schedule. Okay to schedule.    [facility], [facility]

## 2020-09-09 NOTE — TELEPHONE ENCOUNTER
Daughter returned call stating the 10/15 appointment will not work. They would like a call back to change the date if possible. They also mention to only wanting to see PCP and not a resident.Please call to advise.  [phone]    Irene Fisher on 9/9/2020 at 12:15 PM

## 2020-09-09 NOTE — TELEPHONE ENCOUNTER
Left message with patient and also with daughter. Patient has appt 10/15/20 with Dr. Jacinto Grove (Dr. Briscoe is precepting). Patient also has a lab scheduled for 9/15/20.

## 2020-09-15 ENCOUNTER — TRANSFERRED RECORDS (OUTPATIENT)
Dept: HEALTH INFORMATION MANAGEMENT | Facility: CLINIC | Age: 85
End: 2020-09-15

## 2020-09-15 DIAGNOSIS — R53.83 FATIGUE, UNSPECIFIED TYPE: ICD-10-CM

## 2020-09-15 LAB
ERYTHROCYTE [DISTWIDTH] IN BLOOD BY AUTOMATED COUNT: 14.3 % (ref 10–15)
HCT VFR BLD AUTO: 46.3 % (ref 35–47)
HGB BLD-MCNC: 14.8 G/DL (ref 11.7–15.7)
MCH RBC QN AUTO: 29.5 PG (ref 26.5–33)
MCHC RBC AUTO-ENTMCNC: 32 G/DL (ref 31.5–36.5)
MCV RBC AUTO: 92 FL (ref 78–100)
PLATELET # BLD AUTO: 294 10E9/L (ref 150–450)
RBC # BLD AUTO: 5.02 10E12/L (ref 3.8–5.2)
WBC # BLD AUTO: 9.9 10E9/L (ref 4–11)

## 2020-09-15 PROCEDURE — 84443 ASSAY THYROID STIM HORMONE: CPT | Performed by: INTERNAL MEDICINE

## 2020-09-15 PROCEDURE — 85027 COMPLETE CBC AUTOMATED: CPT | Performed by: INTERNAL MEDICINE

## 2020-09-15 PROCEDURE — 80053 COMPREHEN METABOLIC PANEL: CPT | Performed by: INTERNAL MEDICINE

## 2020-09-15 PROCEDURE — 82306 VITAMIN D 25 HYDROXY: CPT | Performed by: INTERNAL MEDICINE

## 2020-09-15 PROCEDURE — 36415 COLL VENOUS BLD VENIPUNCTURE: CPT | Performed by: INTERNAL MEDICINE

## 2020-09-16 LAB
ALBUMIN SERPL-MCNC: 3.6 G/DL (ref 3.4–5)
ALP SERPL-CCNC: 109 U/L (ref 40–150)
ALT SERPL W P-5'-P-CCNC: 12 U/L (ref 0–50)
ANION GAP SERPL CALCULATED.3IONS-SCNC: 4 MMOL/L (ref 3–14)
AST SERPL W P-5'-P-CCNC: 11 U/L (ref 0–45)
BILIRUB SERPL-MCNC: 0.4 MG/DL (ref 0.2–1.3)
BUN SERPL-MCNC: 19 MG/DL (ref 7–30)
CALCIUM SERPL-MCNC: 9.4 MG/DL (ref 8.5–10.1)
CHLORIDE SERPL-SCNC: 108 MMOL/L (ref 94–109)
CO2 SERPL-SCNC: 28 MMOL/L (ref 20–32)
CREAT SERPL-MCNC: 0.62 MG/DL (ref 0.52–1.04)
DEPRECATED CALCIDIOL+CALCIFEROL SERPL-MC: 41 UG/L (ref 20–75)
GFR SERPL CREATININE-BSD FRML MDRD: 80 ML/MIN/{1.73_M2}
GLUCOSE SERPL-MCNC: 108 MG/DL (ref 70–99)
POTASSIUM SERPL-SCNC: 3.9 MMOL/L (ref 3.4–5.3)
PROT SERPL-MCNC: 7.5 G/DL (ref 6.8–8.8)
SODIUM SERPL-SCNC: 140 MMOL/L (ref 133–144)
TSH SERPL DL<=0.005 MIU/L-ACNC: 2.58 MU/L (ref 0.4–4)

## 2020-09-17 NOTE — RESULT ENCOUNTER NOTE
Dear Tatyana,    Your lab results, including electrolytes, kidney and liver function, blood counts, thyroid and vitamin D levels, are all within normal limits (nothing to explain your fatigue).  At this time, I do not recommend any changes to your current plan of care.    Please feel free to call with any questions.      Sincerely,    Dahiana Connolly MD

## 2020-09-22 ENCOUNTER — TRANSFERRED RECORDS (OUTPATIENT)
Dept: HEALTH INFORMATION MANAGEMENT | Facility: CLINIC | Age: 85
End: 2020-09-22

## 2020-10-21 ASSESSMENT — ENCOUNTER SYMPTOMS
HEMATURIA: 0
SHORTNESS OF BREATH: 1
WEAKNESS: 1
COUGH: 1
FREQUENCY: 1
NAUSEA: 0
MYALGIAS: 1
JOINT SWELLING: 0
DIZZINESS: 0
EYE PAIN: 0
PARESTHESIAS: 0
ARTHRALGIAS: 1
HEMATOCHEZIA: 0
SORE THROAT: 0
CONSTIPATION: 0
DYSURIA: 0
ABDOMINAL PAIN: 0
HEARTBURN: 1
CHILLS: 0
HEADACHES: 1
DIARRHEA: 0
BREAST MASS: 0
FEVER: 0
PALPITATIONS: 0
NERVOUS/ANXIOUS: 0

## 2020-10-21 ASSESSMENT — PATIENT HEALTH QUESTIONNAIRE - PHQ9
SUM OF ALL RESPONSES TO PHQ QUESTIONS 1-9: 9
SUM OF ALL RESPONSES TO PHQ QUESTIONS 1-9: 9
10. IF YOU CHECKED OFF ANY PROBLEMS, HOW DIFFICULT HAVE THESE PROBLEMS MADE IT FOR YOU TO DO YOUR WORK, TAKE CARE OF THINGS AT HOME, OR GET ALONG WITH OTHER PEOPLE: SOMEWHAT DIFFICULT

## 2020-10-21 ASSESSMENT — ACTIVITIES OF DAILY LIVING (ADL)
CURRENT_FUNCTION: LAUNDRY REQUIRES ASSISTANCE
CURRENT_FUNCTION: MEDICATION ADMINISTRATION REQUIRES ASSISTANCE
CURRENT_FUNCTION: SHOPPING REQUIRES ASSISTANCE
CURRENT_FUNCTION: BATHING REQUIRES ASSISTANCE
CURRENT_FUNCTION: TRANSPORTATION REQUIRES ASSISTANCE
CURRENT_FUNCTION: HOUSEWORK REQUIRES ASSISTANCE

## 2020-10-22 ENCOUNTER — OFFICE VISIT (OUTPATIENT)
Dept: PEDIATRICS | Facility: CLINIC | Age: 85
End: 2020-10-22
Payer: COMMERCIAL

## 2020-10-22 VITALS
SYSTOLIC BLOOD PRESSURE: 126 MMHG | HEART RATE: 71 BPM | WEIGHT: 237.1 LBS | OXYGEN SATURATION: 93 % | HEIGHT: 60 IN | DIASTOLIC BLOOD PRESSURE: 68 MMHG | TEMPERATURE: 98 F | BODY MASS INDEX: 46.55 KG/M2

## 2020-10-22 DIAGNOSIS — M81.0 OSTEOPOROSIS, UNSPECIFIED OSTEOPOROSIS TYPE, UNSPECIFIED PATHOLOGICAL FRACTURE PRESENCE: ICD-10-CM

## 2020-10-22 DIAGNOSIS — N64.4 BREAST PAIN: ICD-10-CM

## 2020-10-22 DIAGNOSIS — Z00.01 ENCOUNTER FOR GENERAL ADULT MEDICAL EXAMINATION WITH ABNORMAL FINDINGS: Primary | ICD-10-CM

## 2020-10-22 DIAGNOSIS — R07.89 CHEST WALL PAIN: ICD-10-CM

## 2020-10-22 DIAGNOSIS — I10 BENIGN ESSENTIAL HYPERTENSION: ICD-10-CM

## 2020-10-22 DIAGNOSIS — R60.0 BILATERAL LOWER EXTREMITY EDEMA: ICD-10-CM

## 2020-10-22 DIAGNOSIS — C78.00 MALIGNANT NEOPLASM METASTATIC TO LUNG, UNSPECIFIED LATERALITY (H): ICD-10-CM

## 2020-10-22 DIAGNOSIS — E55.9 VITAMIN D DEFICIENCY: ICD-10-CM

## 2020-10-22 DIAGNOSIS — F33.1 MODERATE RECURRENT MAJOR DEPRESSION (H): ICD-10-CM

## 2020-10-22 DIAGNOSIS — Z23 ENCOUNTER FOR IMMUNIZATION: ICD-10-CM

## 2020-10-22 DIAGNOSIS — H61.21 IMPACTED CERUMEN OF RIGHT EAR: ICD-10-CM

## 2020-10-22 DIAGNOSIS — J34.89 SINUS PRESSURE: ICD-10-CM

## 2020-10-22 DIAGNOSIS — M25.50 POLYARTHRALGIA: ICD-10-CM

## 2020-10-22 PROCEDURE — 99214 OFFICE O/P EST MOD 30 MIN: CPT | Mod: 25 | Performed by: PEDIATRICS

## 2020-10-22 PROCEDURE — 99397 PER PM REEVAL EST PAT 65+ YR: CPT | Mod: 25 | Performed by: PEDIATRICS

## 2020-10-22 PROCEDURE — 90662 IIV NO PRSV INCREASED AG IM: CPT | Performed by: PEDIATRICS

## 2020-10-22 PROCEDURE — 69209 REMOVE IMPACTED EAR WAX UNI: CPT | Mod: RT | Performed by: PEDIATRICS

## 2020-10-22 PROCEDURE — G0008 ADMIN INFLUENZA VIRUS VAC: HCPCS | Performed by: PEDIATRICS

## 2020-10-22 RX ORDER — ALENDRONATE SODIUM 70 MG/1
70 TABLET ORAL
Qty: 12 TABLET | Refills: 3 | Status: SHIPPED | OUTPATIENT
Start: 2020-10-22 | End: 2021-11-04

## 2020-10-22 RX ORDER — LOSARTAN POTASSIUM 100 MG/1
100 TABLET ORAL DAILY
Qty: 90 TABLET | Refills: 3 | Status: SHIPPED | OUTPATIENT
Start: 2020-10-22 | End: 2021-11-04

## 2020-10-22 RX ORDER — AZITHROMYCIN 250 MG/1
TABLET, FILM COATED ORAL
Qty: 6 TABLET | Refills: 0 | Status: SHIPPED | OUTPATIENT
Start: 2020-10-22 | End: 2020-10-27

## 2020-10-22 RX ORDER — COVID-19 ANTIGEN TEST
220 KIT MISCELLANEOUS AT BEDTIME
COMMUNITY
End: 2022-10-19

## 2020-10-22 RX ORDER — DULOXETIN HYDROCHLORIDE 60 MG/1
60 CAPSULE, DELAYED RELEASE ORAL DAILY
Qty: 90 CAPSULE | Refills: 1 | Status: SHIPPED | OUTPATIENT
Start: 2020-10-22 | End: 2021-07-28

## 2020-10-22 RX ORDER — FUROSEMIDE 20 MG
20 TABLET ORAL DAILY PRN
Qty: 30 TABLET | Refills: 0 | Status: SHIPPED | OUTPATIENT
Start: 2020-10-22

## 2020-10-22 ASSESSMENT — ENCOUNTER SYMPTOMS
HEMATOCHEZIA: 0
CONSTIPATION: 0
BREAST MASS: 0
MYALGIAS: 1
HEARTBURN: 1
FEVER: 0
WEAKNESS: 1
ABDOMINAL PAIN: 0
SHORTNESS OF BREATH: 1
FREQUENCY: 1
HEADACHES: 1
JOINT SWELLING: 0
COUGH: 1
DYSURIA: 0
CHILLS: 0
PALPITATIONS: 0
EYE PAIN: 0
SORE THROAT: 0
NERVOUS/ANXIOUS: 0
HEMATURIA: 0
DIZZINESS: 0
DIARRHEA: 0
PARESTHESIAS: 0
NAUSEA: 0
ARTHRALGIAS: 1

## 2020-10-22 ASSESSMENT — ACTIVITIES OF DAILY LIVING (ADL)
CURRENT_FUNCTION: HOUSEWORK REQUIRES ASSISTANCE
CURRENT_FUNCTION: TRANSPORTATION REQUIRES ASSISTANCE
CURRENT_FUNCTION: MEDICATION ADMINISTRATION REQUIRES ASSISTANCE
CURRENT_FUNCTION: BATHING REQUIRES ASSISTANCE
CURRENT_FUNCTION: SHOPPING REQUIRES ASSISTANCE
CURRENT_FUNCTION: LAUNDRY REQUIRES ASSISTANCE

## 2020-10-22 ASSESSMENT — MIFFLIN-ST. JEOR: SCORE: 1426.98

## 2020-10-22 ASSESSMENT — PATIENT HEALTH QUESTIONNAIRE - PHQ9: SUM OF ALL RESPONSES TO PHQ QUESTIONS 1-9: 9

## 2020-10-22 NOTE — PROGRESS NOTES
"SUBJECTIVE:   Tatyana Metcalf is a 88 year old female who presents for Preventive Visit.    Patient has been advised of split billing requirements and indicates understanding: Yes     Are you in the first 12 months of your Medicare coverage?  No    Healthy Habits:     In general, how would you rate your overall health?  Fair    Frequency of exercise:  None    Duration of exercise:  Other    Do you usually eat at least 4 servings of fruit and vegetables a day, include whole grains    & fiber and avoid regularly eating high fat or \"junk\" foods?  No    Taking medications regularly:  Yes    Barriers to taking medications:  None    Medication side effects:  None    Ability to successfully perform activities of daily living:  Transportation requires assistance, shopping requires assistance, housework requires assistance, bathing requires assistance, laundry requires assistance and medication administration requires assistance    Home Safety:  No safety concerns identified    Hearing Impairment:  Difficulty following a conversation in a noisy restaurant or crowded room, feel that people are mumbling or not speaking clearly, difficulty following dialogue in the theater, difficult to understand a speaker at a public meeting or [employer] service, need to ask people to speak up or repeat themselves, difficulty understanding soft or whispered speech and difficulty understanding speech on the telephone    In the past 6 months, have you been bothered by leaking of urine? Yes    In general, how would you rate your overall mental or emotional health?  Fair      PHQ-2 Total Score: 4    Additional concerns today:  Yes     Patient here today with daughter.    --already wears hearing aids    --Headache x 3 months - patient states frontal, daily, constant.  At first states no eye symptoms or nausea.  Later in visit states her eyes \"ache\" with headaches, no diplopia or blurry vision.  Patient states she does use nasal steroid spray " (daughter disagrees).     --Fatigue - recently saw one of my partners for this - reviewed all labs - normal.  Patient states her follow up with her cancer doctors has been ok.     --chest wall pain over sternum    --bilateral breast pain - mammo two years ago for similar complaints - aching, patient cannot feel any lumps.  No nipple discharge. Known lung cancer and endometrial cancer.      Answers for HPI/ROS submitted by the patient on 10/21/2020   Annual Exam:  If you checked off any problems, how difficult have these problems made it for you to do your work, take care of things at home, or get along with other people?: Somewhat difficult  PHQ9 TOTAL SCORE: 9    Do you feel safe in your environment? Yes    Have you ever done Advance Care Planning? (For example, a Health Directive, POLST, or a discussion with a medical provider or your loved ones about your wishes): Yes, advance care planning is on file.      Fall risk  Fallen 2 or more times in the past year?: No  Any fall with injury in the past year?: No      Cognitive Screening   1) Repeat 3 items (Leader, Season, Table)    2) Clock draw: NORMAL  3) 3 item recall: Recalls 3 objects  Results: 3 items recalled: COGNITIVE IMPAIRMENT LESS LIKELY    Mini-CogTM Copyright S Ronaldo. Licensed by the author for use in [employer]; reprinted with permission (kenisha@.[email or web address]). All rights reserved.      Do you have sleep apnea, excessive snoring or daytime drowsiness?: yes CPAP     Reviewed and updated as needed this visit by clinical staff                 Reviewed and updated as needed this visit by Provider                Social History     Tobacco Use     Smoking status: Never Smoker     Smokeless tobacco: Never Used   Substance Use Topics     Alcohol use: Yes     Alcohol/week: 0.0 standard drinks     Frequency: Monthly or less     Drinks per session: 1 or 2     Binge frequency: Never     Comment: a few times a year     If you drink alcohol do you typically have  >3 drinks per day or >7 drinks per week? No    Alcohol Use 10/21/2020   Prescreen: >3 drinks/day or >7 drinks/week? No   Prescreen: >3 drinks/day or >7 drinks/week? -         Current providers sharing in care for this patient include:   Patient Care Team:  Jill Briscoe MD as PCP - General (Internal Medicine)  Jill Briscoe MD as Assigned PCP    The following health maintenance items are reviewed in Epic and correct as of today:  Health Maintenance   Topic Date Due     INFLUENZA VACCINE (1) 09/01/2020     MEDICARE ANNUAL WELLNESS VISIT  11/25/2020     ANNUAL REVIEW OF HM ORDERS  11/25/2020     FALL RISK ASSESSMENT  11/25/2020     PHQ-9  04/22/2021     DTAP/TDAP/TD IMMUNIZATION (2 - Td) 10/31/2022     ADVANCE CARE PLANNING  11/25/2024     SPIROMETRY  Completed     COPD ACTION PLAN  Completed     DEPRESSION ACTION PLAN  Completed     Pneumococcal Vaccine: Pediatrics (0 to 5 Years) and At-Risk Patients (6 to 64 Years)  Completed     Pneumococcal Vaccine: 65+ Years  Completed     ZOSTER IMMUNIZATION  Completed     IPV IMMUNIZATION  Aged Out     MENINGITIS IMMUNIZATION  Aged Out     HEPATITIS B IMMUNIZATION  Aged Out     BP Readings from Last 3 Encounters:   10/22/20 126/68   11/25/19 (!) 142/80   02/06/19 128/86    Wt Readings from Last 3 Encounters:   10/22/20 107.5 kg (237 lb 1.6 oz)   11/25/19 109 kg (240 lb 6.4 oz)   02/06/19 109.7 kg (241 lb 14.4 oz)           Review of Systems   Constitutional: Negative for chills and fever.   HENT: Positive for congestion and hearing loss. Negative for ear pain and sore throat.    Eyes: Positive for visual disturbance. Negative for pain.   Respiratory: Positive for cough and shortness of breath.    Cardiovascular: Negative for chest pain, palpitations and peripheral edema.   Gastrointestinal: Positive for heartburn. Negative for abdominal pain, constipation, diarrhea, hematochezia and nausea.   Breasts:  Negative for tenderness, breast mass and discharge.    Genitourinary: Positive for frequency and urgency. Negative for dysuria, genital sores, hematuria, pelvic pain, vaginal bleeding and vaginal discharge.   Musculoskeletal: Positive for arthralgias and myalgias. Negative for joint swelling.   Skin: Negative for rash.   Neurological: Positive for weakness and headaches. Negative for dizziness and paresthesias.   Psychiatric/Behavioral: Positive for mood changes. The patient is not nervous/anxious.          OBJECTIVE:   /68 (BP Location: Right arm, Patient Position: Sitting, Cuff Size: Adult Large)   Pulse 71   Temp 98  F (36.7  C) (Temporal)   Ht 1.524 m (5')   Wt 107.5 kg (237 lb 1.6 oz)   SpO2 93%   BMI 46.31 kg/m   Estimated body mass index is 46.95 kg/m  as calculated from the following:    Height as of 11/25/19: 1.524 m (5').    Weight as of 11/25/19: 109 kg (240 lb 6.4 oz).  Physical Exam  GENERAL APPEARANCE: hard of hearing  EYES: Eyes grossly normal to inspection, PERRL and conjunctivae and sclerae normal  HENT: right ear canal impacted, left TM normal.  NECK: no adenopathy, no asymmetry, masses, or scars and thyroid normal to palpation  RESP: lungs clear to auscultation - no rales, rhonchi or wheezes  BREAST: examined in chair - could not get to table, no masses, bilateral tenderness lower half of each breast  CHEST: reproducable sternal chest wall pain  CV: regular rate and rhythm, normal S1 S2, no S3 or S4, no murmur, click or rub, no peripheral edema and peripheral pulses strong  ABDOMEN: soft, nontender, no hepatosplenomegaly, no masses and bowel sounds normal  MS: in wheelchair - could not fully assess  SKIN: no suspicious lesions or rashes  NEURO: speech clear  PSYCH: mentation appears normal and affect normal/bright    Diagnostic Test Results:  Labs reviewed in Epic    ASSESSMENT / PLAN:   1. Encounter for general adult medical examination with abnormal findings    2. Osteoporosis, unspecified osteoporosis type, unspecified pathological  fracture presence  continue  - alendronate (FOSAMAX) 70 MG tablet; Take 1 tablet (70 mg) by mouth every 7 days Take with over 8 ounces water and stay upright for at least 30 minutes after dose.  Take at least 60 minutes before breakfast  Dispense: 12 tablet; Refill: 3    3. Moderate recurrent major depression (H)  Stable - might consider as cause for her ongoing fatigue given that w/u to date is normal.  - DULoxetine (CYMBALTA) 60 MG capsule; Take 1 capsule (60 mg) by mouth daily  Dispense: 90 capsule; Refill: 1  - OFFICE/OUTPT VISIT,EST,LEVL IV    4. Polyarthralgia  - DULoxetine (CYMBALTA) 60 MG capsule; Take 1 capsule (60 mg) by mouth daily  Dispense: 90 capsule; Refill: 1    5. Bilateral lower extremity edema  Patient unclear how often she is using this  - furosemide (LASIX) 20 MG tablet; Take 1 tablet (20 mg) by mouth daily as needed (edema)  Dispense: 30 tablet; Refill: 0  - OFFICE/OUTPT VISIT,EST,LEVL IV    6. Chest wall pain  reproducable on exam - likely muscle/sternal strain from coughing  - OFFICE/OUTPT VISIT,EST,LEVL IV    7. Vitamin D deficiency    8. Benign essential hypertension  - losartan (COZAAR) 100 MG tablet; Take 1 tablet (100 mg) by mouth daily  Dispense: 90 tablet; Refill: 3    9. Encounter for immunization   - FLUZONE HIGH DOSE 65+  [[id number]]    10. Sinus pressure  Will try treating for this as cause of headaches - see PI.  If not improving would consider brain MRI for ongoing unexplained headaches.  - azithromycin (ZITHROMAX) 250 MG tablet; Take 2 tablets (500 mg) by mouth daily for 1 day, THEN 1 tablet (250 mg) daily for 4 days.  Dispense: 6 tablet; Refill: 0  - OFFICE/OUTPT VISIT,EST,LEVL IV    11. Breast pain  Needs further evaluation  - MA Diagnostic Digital Bilateral; Future  - US Breast Bilateral Complete 4 Quadrants; Future  - OFFICE/OUTPT VISIT,EST,LEVL IV    12. Impacted cerumen of right ear  Irrigated by MA    13. Malignant neoplasm metastatic to lung, unspecified laterality  (H)  Following with oncology.      Patient has been advised of split billing requirements and indicates understanding: Yes  COUNSELING:  Reviewed preventive health counseling, as reflected in patient instructions    Estimated body mass index is 46.95 kg/m  as calculated from the following:    Height as of 11/25/19: 1.524 m (5').    Weight as of 11/25/19: 109 kg (240 lb 6.4 oz).        She reports that she has never smoked. She has never used smokeless tobacco.      Appropriate preventive services were discussed with this patient, including applicable screening as appropriate for cardiovascular disease, diabetes, osteopenia/osteoporosis, and glaucoma.  As appropriate for age/gender, discussed screening for colorectal cancer, prostate cancer, breast cancer, and cervical cancer. Checklist reviewing preventive services available has been given to the patient.    Reviewed patients plan of care and provided an AVS. The Basic Care Plan (routine screening as documented in Health Maintenance) for Tatyana meets the Care Plan requirement. This Care Plan has been established and reviewed with the Patient and daughter.    Counseling Resources:  ATP IV Guidelines  Pooled Cohorts Equation Calculator  Breast Cancer Risk Calculator  Breast Cancer: Medication to Reduce Risk  FRAX Risk Assessment  ICSI Preventive Guidelines  Dietary Guidelines for Americans, 2010  [employer]'s MyPlate  ASA Prophylaxis  Lung CA Screening    Jill Briscoe MD  [facility]

## 2020-10-22 NOTE — PATIENT INSTRUCTIONS
For headaches:    --will treat for any sinus related issues:  1. Use nasal spray - 2 puffs in each nostril once daily for 2 weeks.  2. Continue your zyrtec daily  3. Use Azithromycin antibiotic x 5 days    If this is not helping you headache at all, please let me know and we will consider brain MRI.    You will be called to schedule mammogram    We will clean your right ear today.        Patient Education   Personalized Prevention Plan  You are due for the preventive services outlined below.  Your care team is available to assist you in scheduling these services.  If you have already completed any of these items, please share that information with your care team to update in your medical record.  Health Maintenance Due   Topic Date Due     Flu Vaccine (1) 09/01/2020     Your Health Risk Assessment indicates you feel you are not in good health    A healthy lifestyle helps keep the body fit and the mind alert. It helps protect you from disease, helps you fight disease, and helps prevent chronic disease (disease that doesn't go away) from getting worse. This is important as you get older and begin to notice twinges in muscles and joints and a decline in the strength and stamina you once took for granted. A healthy lifestyle includes good healthcare, good nutrition, weight control, recreation, and regular exercise. Avoid harmful substances and do what you can to keep safe. Another part of a healthy lifestyle is stay mentally active and socially involved.    Good healthcare     Have a wellness visit every year.     If you have new symptoms, let us know right away. Don't wait until the next checkup.     Take medicines exactly as prescribed and keep your medicines in a safe place. Tell us if your medicine causes problems.   Healthy diet and weight control     Eat 3 or 4 small, nutritious, low-fat, high-fiber meals a day. Include a variety of fruits, vegetables, and whole-grain foods.     Make sure you get enough calcium in  your diet. Calcium, vitamin D, and exercise help prevent osteoporosis (bone thinning).     If you live alone, try eating with others when you can. That way you get a good meal and have company while you eat it.     Try to keep a healthy weight. If you eat more calories than your body uses for energy, it will be stored as fat and you will gain weight.     Recreation   Recreation is not limited to sports and team events. It includes any activity that provides relaxation, interest, enjoyment, and exercise. Recreation provides an outlet for physical, mental, and social energy. It can give a sense of worth and achievement. It can help you stay healthy.    Mental Exercise and Social Involvement  Mental and emotional health is as important as physical health. Keep in touch with friends and family. Stay as active as possible. Continue to learn and challenge yourself.   Things you can do to stay mentally active are:    Learn something new, like a foreign language or musical instrument.     Play SCRABBLE or do crossword puzzles. If you cannot find people to play these games with you at home, you can play them with others on your computer through the Internet.     Join a games club--anything from card games to chess or checkers or lawn bowling.     Start a new hobby.     Go back to school.     Volunteer.     Read.   Keep up with world events.    Exercise for a Healthier Heart     Exercise with a friend. When activity is fun, you're more likely to stick with it.   You may wonder how you can improve the health of your heart. If you re thinking about exercise, you re on the right track. You don t need to become an athlete, but you do need a certain amount of brisk exercise to help strengthen your heart. If you have been diagnosed with a heart condition, your doctor may recommend exercise to help stabilize your condition. To help make exercise a habit, choose safe, fun activities.  Be sure to check with your healthcare provider  before starting an exercise program.  Why exercise?  Exercising regularly offers many healthy rewards. It can help you do all of the following:    Improve your blood cholesterol level to help prevent further heart trouble    Lower your blood pressure to help prevent a stroke or heart attack    Control diabetes, or reduce your risk of getting this disease    Improve your heart and lung function    Reach and maintain a healthy weight    Make your muscles stronger and more limber so you can stay active    Prevent falls and fractures by slowing the loss of bone mass (osteoporosis)    Manage stress better    Reduce your blood pressure    Improve your sense of self and your body image  Exercise tips  Ease into your routine. Set small goals. Then build on them.  Exercise on most days. Aim for a total of 150 or more minutes of moderate to  vigorous intensity activity each week. Consider 40 minutes, 3 to 4 times a week. For best results, activity should last for 40 minutes on average. It is OK to work up to the 40 minute period over time. Examples of moderate-intensity activity is walking 1 mile in 15 minutes or 30 to 45 minutes of yard work.  Step up your daily activity level. Along with your exercise program, try being more active throughout the day. Walk instead of drive. Do more household tasks or yard work.  Choose one or more activities you enjoy. Walking is one of the easiest things you can do. You can also try swimming, riding a bike, dancing, or taking an exercise class.  Stop exercising and call your doctor if you:    Have chest pain or feel dizzy or lightheaded    Feel burning, tightness, pressure, or heaviness in your chest, neck, shoulders, back, or arms    Have unusual shortness of breath    Have increased joint or muscle pain    Have palpitations or an irregular heartbeat  Date Last Reviewed: 5/1/2016 2000-2019 [employer]. [address] [facility], [place] [address]. All rights reserved. This  information is not intended as a substitute for professional medical care. Always follow your healthcare professional's instructions.          Understanding USDA MyPlate  The USDA (U.S. Department of Agriculture) has guidelines to help you make healthy food choices. These are called MyPlate. MyPlate shows the food groups that make up healthy meals using the image of a place setting. Before you eat, think about the healthiest choices for what to put onto your plate or into your cup or bowl. To learn more about building a healthy plate, visit www.choosemyplate.gov.    The food groups    Fruits. Any fruit or 100% fruit juice counts as part of the Fruit Group. Fruits may be fresh, canned, frozen, or dried, and may be whole, cut-up, or pureed. Make half your plate fruits and vegetables.    Vegetables. Any vegetable or 100% vegetable juice counts as a member of the Vegetable Group. Vegetables may be fresh, frozen, canned, or dried. They can be served raw or cooked and may be whole, cut-up, or mashed. Make half your plate fruits and vegetables.    Grains. All foods made from grains are part of the Grains Group. These include wheat, rice, oats, cornmeal, and barley such as bread, pasta, oatmeal, cereal, tortillas, and grits. Grains should be no more than a quarter of your plate. At least half of your grains should be whole grains.    Protein. This group includes meat, poultry, seafood, beans and peas, eggs, processed soy products (like tofu), nuts (including nut butters), and seeds. Make protein choices no more than a quarter of your plate. Meat and poultry choices should be lean or low fat.    Dairy. All fluid milk products and foods made from milk that contain calcium, like yogurt and cheese, are part of the Dairy Group. (Foods that have little calcium, such as cream, butter, and cream cheese, are not part of the group.) Most dairy choices should be low-fat or fat-free.    Oils. These are fats that are liquid at room  temperature. They include canola, corn, olive, soybean, and sunflower oil. Foods that are mainly oil include mayonnaise, certain salad dressings, and soft margarines. You should have only 5 to 7 teaspoons of oils a day. You probably already get this much from the food you eat.  Date Last Reviewed: 8/1/2017 2000-2019 [employer]. [address] [place] [address]. All rights reserved. This information is not intended as a substitute for professional medical care. Always follow your healthcare professional's instructions.        Activities of Daily Living    Your Health Risk Assessment indicates you have difficulties with activities of daily living such as housework, bathing, preparing meals, taking medication, etc. Please make a follow up appointment for us to address this issue in more detail.    Signs of Hearing Loss     Hearing much better with one ear can be a sign of hearing loss.     Hearing loss is a problem shared by many people. In fact, it is one of the most common health conditions, particularly as people age. Most people over age 65 have some hearing loss, and by age 80, almost everyone does. Because hearing loss usually occurs slowly over the years, you may not realize your hearing ability has gotten worse.  Have your hearing checked  Contact your healthcare provider if you:    Have to strain to hear normal conversation    Have to watch other people s faces very carefully to follow what they re saying    Need to ask people to repeat what they ve said    Often misunderstand what people are saying    Turn the volume of the television or radio up so high that others complain    Feel that people are mumbling when they re talking to you    Find that the effort to hear leaves you feeling tired and irritated    Notice, when using the phone, that you hear better with one ear than the other  Date Last Reviewed: 12/1/2016 2000-2019 [employer]. [address] [place] [address],  VICTORIA Osorio [address]. All rights reserved. This information is not intended as a substitute for professional medical care. Always follow your healthcare professional's instructions.          Urinary Incontinence, Female (Adult)  Urinary incontinence means loss of control of the bladder. This problem affects many women, especially as they get older. If you have incontinence, you may be embarrassed to ask for help. But know that this problem can be treated.  Types of Incontinence  There are different types of incontinence. Two of the main types are described here. You can have more than one type.    Stress incontinence. With this type, urine leaks when pressure (stress) is put on the bladder. This may happen when you cough, sneeze, or laugh. Stress incontinence most often occurs because the pelvic floor muscles that support the bladder and urethra are weak. This can happen after pregnancy and vaginal childbirth or a hysterectomy. It can also be due to excess body weight or hormone changes.    Urge incontinence (also called overactive bladder). With this type, a sudden urge to urinate is felt often. This may happen even though there may not be much urine in the bladder. The need to urinate often during the night is common. Urge incontinence most often occurs because of bladder spasms. This may be due to bladder irritation or infection. Damage to bladder nerves or pelvic muscles, constipation, and certain medicines can also lead to urge incontinence.  Treatment of urinary incontinence depends on the cause. Further evaluation is needed to find the type you have. This will likely include an exam and certain tests. Based on the results, you and your healthcare provider can then plan treatment. Until a diagnosis is made, the home care tips below can help relieve symptoms.  Home care    Do pelvic floor muscle exercises, if they are prescribed. The pelvic floor muscles help support the bladder and urethra. Many women find that  their symptoms improve when doing special exercises that strengthen these muscles. To do the exercises contract the muscles you would use to stop your stream of urine, but do this when you re not urinating. Hold for 10 seconds, then relax. Repeat 10 to 20 times in a row, at least 3 times a day. Your provider may give you other instructions for how to do the exercises and how often.    Keep a bladder diary. This helps track how often and how much you urinate over a set period of time. Bring this diary with you to your next visit with the provider. The information can help your provider learn more about your bladder problem.    Lose weight, if advised to by your provider. Excess weight puts pressure on the bladder. Your provider can help you create a weight-loss plan that s right for you. This may include exercising more and making certain diet changes.    Don't consume foods and drinks that may irritate the bladder. These can include alcohol and caffeinated drinks.    Quit smoking. Smoking and other tobacco use can lead to chronic cough that strains the pelvic floor muscles. Smoking may also damage the bladder and urethra. Talk with your provider about treatments or methods you can use to quit smoking.    If drinking large amounts of fluid causes you to have symptoms, you may be advised to limit your fluid intake. You may also be advised to drink most of your fluids during the day and to limit fluids at night.    If you re worried about urine leakage or accidents, you may wear absorbent pads to catch urine. Change the pads often. This helps reduce discomfort. It may also reduce the risk of skin or bladder infections.  Follow-up care  Follow up with your healthcare provider, or as directed. It may take some to find the right treatment for your problem. Your treatment plan may include special therapies or medicines. Certain procedures or surgery may also be options. Be sure to discuss any questions you have with your  provider.  When to seek medical advice  Call the healthcare provider right away if any of these occur:    Fever of 100.4 F (38 C) or higher, or as directed by your provider    Bladder pain or fullness    Abdominal swelling    Nausea or vomiting    Back pain    Weakness, dizziness or fainting  Date Last Reviewed: 10/1/2017    0160-5155 [employer]. [address] [place], [place] [id number]. All rights reserved. This information is not intended as a substitute for professional medical care. Always follow your healthcare professional's instructions.        Your Health Risk Assessment indicates you feel you are not in good emotional health.    Recreation   Recreation is not limited to sports and team events. It includes any activity that provides relaxation, interest, enjoyment, and exercise. Recreation provides an outlet for physical, mental, and social energy. It can give a sense of worth and achievement. It can help you stay healthy.    Mental Exercise and Social Involvement  Mental and emotional health is as important as physical health. Keep in touch with friends and family. Stay as active as possible. Continue to learn and challenge yourself.   Things you can do to stay mentally active are:    Learn something new, like a foreign language or musical instrument.     Play SCRABBLE or do crossword puzzles. If you cannot find people to play these games with you at home, you can play them with others on your computer through the Internet.     Join a games club--anything from card games to chess or checkers or lawn bowling.     Start a new hobby.     Go back to school.     Volunteer.     Read.   Keep up with world events.    Depression and Suicide in Older Adults    Nearly 2 million older Americans have some type of depression. Sadly, some of them even take their own lives. Yet depression among older adults is often ignored. Learn the warning signs. You may help spare a loved one needless pain. You may also  save a life.  What is depression?  Depression is a serious illness that affects the way you think and feel. It is not a normal part of aging, nor is it a sign of weakness, a character flaw, or something you can snap out of. Most people with depression need treatment to get better. The most common symptom is a feeling of deep sadness. People who are depressed also may seem tired and listless. And nothing seems to give them pleasure. It s normal to grieve or be sad sometimes. But sadness lessens or passes with time. Depression rarely goes away or improves on its own. Other symptoms of depression are:    Sleeping more or less than normal    Eating more or less than normal    Having headaches, stomachaches, or other pains that don t go away    Feeling nervous,  empty,  or worthless    Crying a great deal    Thinking or talking about suicide or death    Feeling confused or forgetful  What causes it?  The causes of depression aren t fully known. But, it is thought to result from a complex interaction of biochemistry, genetics, environmental factors, and personality. Certain chemicals in the brain play a role. Depression does run in families. And life stresses can also trigger depression in some people. Older adults often face many stressors, such as death of friends or a spouse, health problems, and financial concerns.  How you can help  Often, depressed people may not want to ask for help. When they do, they may be ignored. Or, they may receive the wrong treatment. You can help by showing parents and older friends love and support. If they seem depressed, help them find the right treatment. Talk to your doctor. Or contact a local mental health center, social service agency, or hospital. With modern treatment, no one has to suffer from depression.  If your older friend or family member agrees, you can be an advocate for him or her in the healthcare setting. Many times, older adults have other chronic illnesses such as  diabetes, heart disease, or cancer that can cause symptoms of depression. Medicine side effects can also contribute to certain behaviors and feelings. It is important that the older adult's healthcare provider listens and sorts out the causes of any symptoms of depression and makes referrals to mental health specialists when needed. Untreated depression can result in misdiagnosis, including brain disorders such as dementia and Alzheimer's. If the health professional does not take the issue of depression seriously, ask your family member or friend to consider finding another provider.  Resources    [place] Suicide Prevention Lifeline (crisis hotline)000-163-VWNN (1013)    National Severance of Mental Xemqhi620-920-9199gow.[facility].nih.gov    National Index on Mental Ehxzoin609-274-3895rkx.malou.org    Mental Health Yzbamlp312-884-9598qzu.[facility].org    National Suicide Wwrreat800-157-3630 (800-SUICIDE)   Date Last Reviewed: 2/1/2017 2000-2019 [employer]. [address] [place] [address], [place] [address]. All rights reserved. This information is not intended as a substitute for professional medical care. Always follow your healthcare professional's instructions.

## 2020-11-21 DIAGNOSIS — I10 BENIGN ESSENTIAL HYPERTENSION: ICD-10-CM

## 2020-11-23 RX ORDER — METOPROLOL SUCCINATE 25 MG/1
TABLET, EXTENDED RELEASE ORAL
Qty: 45 TABLET | Refills: 3 | Status: SHIPPED | OUTPATIENT
Start: 2020-11-23 | End: 2021-11-04

## 2020-11-23 NOTE — TELEPHONE ENCOUNTER
Prescription approved per [facility] Refill Protocol.  Darya Chen RN, BSN

## 2021-01-11 ENCOUNTER — MYC MEDICAL ADVICE (OUTPATIENT)
Dept: PEDIATRICS | Facility: CLINIC | Age: 86
End: 2021-01-11

## 2021-01-21 ENCOUNTER — TRANSFERRED RECORDS (OUTPATIENT)
Dept: HEALTH INFORMATION MANAGEMENT | Facility: CLINIC | Age: 86
End: 2021-01-21

## 2021-01-30 ENCOUNTER — IMMUNIZATION (OUTPATIENT)
Dept: NURSING | Facility: CLINIC | Age: 86
End: 2021-01-30
Payer: COMMERCIAL

## 2021-01-30 PROCEDURE — 91300 PR COVID VAC PFIZER DIL RECON 30 MCG/0.3 ML IM: CPT

## 2021-01-30 PROCEDURE — 0001A PR COVID VAC PFIZER DIL RECON 30 MCG/0.3 ML IM: CPT

## 2021-02-10 ENCOUNTER — E-VISIT (OUTPATIENT)
Dept: PEDIATRICS | Facility: CLINIC | Age: 86
End: 2021-02-10
Payer: COMMERCIAL

## 2021-02-10 DIAGNOSIS — J01.00 ACUTE MAXILLARY SINUSITIS, RECURRENCE NOT SPECIFIED: ICD-10-CM

## 2021-02-10 DIAGNOSIS — G44.209 TENSION HEADACHE: ICD-10-CM

## 2021-02-10 DIAGNOSIS — R05.9 COUGH: Primary | ICD-10-CM

## 2021-02-10 PROCEDURE — 99421 OL DIG E/M SVC 5-10 MIN: CPT | Performed by: PEDIATRICS

## 2021-02-11 DIAGNOSIS — K29.50 CHRONIC GASTRITIS WITHOUT BLEEDING, UNSPECIFIED GASTRITIS TYPE: ICD-10-CM

## 2021-02-11 RX ORDER — LANSOPRAZOLE 30 MG/1
CAPSULE, DELAYED RELEASE ORAL
Qty: 90 CAPSULE | Refills: 0 | Status: SHIPPED | OUTPATIENT
Start: 2021-02-11 | End: 2021-05-06

## 2021-02-11 NOTE — TELEPHONE ENCOUNTER
Routing refill request to provider for review/approval because:  Diagnosis of Osteoporosis listed on Problem list

## 2021-02-11 NOTE — PATIENT INSTRUCTIONS
Dear Tatyana,     I'm sorry you are feeling ill!     I do recommend COVID testing before we treat for a sinus infection.  Even though you have gotten your first COVID vaccine, this is only providing a little immunity until two weeks after your second dose. We have had quite a few patients who think they have a sinus infection and actually have COVID.     If you COVID test is negative, then we will treat you for a sinus infection.     I have ordered a COVID test - you will be called to schedule this.     Jill Briscoe MD  Internal Medicine/Pediatrics  [facility]

## 2021-02-17 RX ORDER — AZITHROMYCIN 250 MG/1
TABLET, FILM COATED ORAL
Qty: 6 TABLET | Refills: 0 | Status: SHIPPED | OUTPATIENT
Start: 2021-02-17 | End: 2021-02-22

## 2021-02-20 ENCOUNTER — IMMUNIZATION (OUTPATIENT)
Dept: NURSING | Facility: CLINIC | Age: 86
End: 2021-02-20
Attending: INTERNAL MEDICINE
Payer: COMMERCIAL

## 2021-02-20 PROCEDURE — 91300 PR COVID VAC PFIZER DIL RECON 30 MCG/0.3 ML IM: CPT

## 2021-02-20 PROCEDURE — 0002A PR COVID VAC PFIZER DIL RECON 30 MCG/0.3 ML IM: CPT

## 2021-05-05 DIAGNOSIS — K29.50 CHRONIC GASTRITIS WITHOUT BLEEDING, UNSPECIFIED GASTRITIS TYPE: ICD-10-CM

## 2021-05-06 ENCOUNTER — TRANSFERRED RECORDS (OUTPATIENT)
Dept: HEALTH INFORMATION MANAGEMENT | Facility: CLINIC | Age: 86
End: 2021-05-06

## 2021-05-06 RX ORDER — LANSOPRAZOLE 30 MG/1
CAPSULE, DELAYED RELEASE ORAL
Qty: 90 CAPSULE | Refills: 1 | Status: SHIPPED | OUTPATIENT
Start: 2021-05-06 | End: 2021-11-04

## 2021-05-06 NOTE — TELEPHONE ENCOUNTER
Routing refill request to provider for review/approval because:  Patient needs to be seen because:  Confirm if f/u appointment due?  Not discussed at last appointment, ok for ongoing refills?  Drug alert--no Diagnosis of osteoporosis-pt taking Fosamax  Madison Ruiz RN, BSN  Message handled by CLINIC NURSE.

## 2021-06-05 ENCOUNTER — RECORDS - HEALTHEAST (OUTPATIENT)
Dept: RADIATION ONCOLOGY | Facility: HOSPITAL | Age: 86
End: 2021-06-05

## 2021-06-05 DIAGNOSIS — C34.90 MALIGNANT NEOPLASM OF BRONCHUS AND LUNG (H): ICD-10-CM

## 2021-06-10 NOTE — PROGRESS NOTES
Sent patient a letter to follow up.  Encouraged her to call me if there was anything that I could do for her.  She is on my yearly follow up at this time, so I will plan to follow up again in May 2018 unless notified before that if she needs any assistance.  Sarah Beth Fritz RN

## 2021-07-05 ENCOUNTER — TRANSFERRED RECORDS (OUTPATIENT)
Dept: HEALTH INFORMATION MANAGEMENT | Facility: CLINIC | Age: 86
End: 2021-07-05

## 2021-07-05 ENCOUNTER — TELEPHONE (OUTPATIENT)
Dept: PEDIATRICS | Facility: CLINIC | Age: 86
End: 2021-07-05

## 2021-07-05 NOTE — TELEPHONE ENCOUNTER
Patient Quality Outreach      Summary:    Patient has the following on her problem list/HM:     Depression / Dysthymia review      12 Month Remission: 10-14 month window range: 07/05-11/02/2021       PHQ-9 SCORE 8/9/2018 9/3/2020 10/21/2020   PHQ-9 Total Score - - -   PHQ-9 Total Score MyChart 7 (Mild depression) - 9 (Mild depression)   PHQ-9 Total Score 7 10 9       If PHQ-9 recheck is 5 or more, route to provider for next steps.    Patient is due/failing the following:   PHQ-9 Needed    Type of outreach:    Sent BEETmobile message.    Questions for provider review:    None                                                                                                                                     FERNANDO DOTY MA on 7/5/2021 at 2:47 PM

## 2021-07-26 DIAGNOSIS — F33.1 MODERATE RECURRENT MAJOR DEPRESSION (H): ICD-10-CM

## 2021-07-26 DIAGNOSIS — M25.50 POLYARTHRALGIA: ICD-10-CM

## 2021-07-28 RX ORDER — DULOXETIN HYDROCHLORIDE 60 MG/1
CAPSULE, DELAYED RELEASE ORAL
Qty: 90 CAPSULE | Refills: 0 | Status: SHIPPED | OUTPATIENT
Start: 2021-07-28 | End: 2021-11-04

## 2021-08-30 ENCOUNTER — TELEPHONE (OUTPATIENT)
Dept: PEDIATRICS | Facility: CLINIC | Age: 86
End: 2021-08-30

## 2021-08-30 NOTE — TELEPHONE ENCOUNTER
"Per Dr. Briscoe:  \"SB3 PAL: Please move this patient to my 11:20am KARTHIK spot on Thursday 9/2/21. If this time does not work for patient - then please do as suggested and give patient atleast an hour with resident.\"    Called patient, spoke with Norma daughter of the patient.     Unable to move the appointment time.   Appt kept as currently scheduled at 9/2/21 at 9:40 am.    Appt time appears to be 100 minutes with resident.  Dr. Grove on 9/2/21.    Pete Sneed RN on 8/30/2021 at 4:30 PM    "

## 2021-09-02 ENCOUNTER — OFFICE VISIT (OUTPATIENT)
Dept: PEDIATRICS | Facility: CLINIC | Age: 86
End: 2021-09-02
Payer: COMMERCIAL

## 2021-09-02 VITALS
TEMPERATURE: 97.9 F | SYSTOLIC BLOOD PRESSURE: 135 MMHG | OXYGEN SATURATION: 93 % | WEIGHT: 235 LBS | DIASTOLIC BLOOD PRESSURE: 69 MMHG | BODY MASS INDEX: 45.9 KG/M2 | HEART RATE: 71 BPM

## 2021-09-02 DIAGNOSIS — R10.30 LOWER ABDOMINAL PAIN: ICD-10-CM

## 2021-09-02 DIAGNOSIS — R53.81 PHYSICAL DECONDITIONING: Primary | ICD-10-CM

## 2021-09-02 DIAGNOSIS — F33.1 MODERATE RECURRENT MAJOR DEPRESSION (H): ICD-10-CM

## 2021-09-02 DIAGNOSIS — F03.90 SENILE DEMENTIA WITHOUT BEHAVIORAL DISTURBANCE (H): ICD-10-CM

## 2021-09-02 DIAGNOSIS — M17.0 OSTEOARTHRITIS OF BOTH KNEES, UNSPECIFIED OSTEOARTHRITIS TYPE: ICD-10-CM

## 2021-09-02 DIAGNOSIS — J30.2 SEASONAL ALLERGIC RHINITIS, UNSPECIFIED TRIGGER: ICD-10-CM

## 2021-09-02 PROCEDURE — 99214 OFFICE O/P EST MOD 30 MIN: CPT | Mod: GC | Performed by: STUDENT IN AN ORGANIZED HEALTH CARE EDUCATION/TRAINING PROGRAM

## 2021-09-02 RX ORDER — FLUTICASONE PROPIONATE 50 MCG
1 SPRAY, SUSPENSION (ML) NASAL DAILY PRN
Qty: 9.9 ML | Refills: 1 | Status: SHIPPED | OUTPATIENT
Start: 2021-09-02

## 2021-09-02 RX ORDER — GABAPENTIN 300 MG/1
300 CAPSULE ORAL AT BEDTIME
Qty: 30 CAPSULE | Refills: 0 | Status: SHIPPED | OUTPATIENT
Start: 2021-09-02 | End: 2021-10-01

## 2021-09-02 NOTE — PATIENT INSTRUCTIONS
-Try cutting back on the biscodyl and try to take miralax more consistently  -We'll call to set up home nursing  -Try the gabapentin at bedtime-- if you find it makes you too drowsy, it's ok to stop it.  -Choose either meloxicam or Aleve (naproxen) don't take both.

## 2021-09-02 NOTE — PROGRESS NOTES
"    Assessment & Plan     Physical deconditioning  Forgetfulness  Agree with patient's daughter Jennifer.  Patient likely needs a higher level of care to help with ADLs and taking her medication consistently.  Seems like forgetfulness has caused a lot of medication compliance issues.  We will start with home care nursing and social work assessment and follow-up at her appointment with Dr. Briscoe later this fall.  - HOME CARE NURSING REFERRAL    Lower abdominal pain  Patient abdominal pain seems closely related to her bowel movement patterns.  Though the patient says she is taking MiraLAX twice daily or at least daily as instructed, her daughter says that the fluid level in the bottle has barely changed and suspects that her mom is forgetting to take it.  Advised to work on strategies for getting Adrianna to take her MiraLAX twice daily (as opposed to intermittent as needed bisacodyl) to reestablish regular bowel movements so that she is not stuck in a pattern of alternating constipation and aggressive laxative induced-defecation.  Also advised that taking her probiotic daily is safe (this seems to help with her symptoms).    Osteoarthritis of both knees, unspecified osteoarthritis type  Discussed attempting a trial of low-dose gabapentin before bed.  Advised patient and her daughter on the potential sedating effects of the medication.  They will watch this closely and stop the medication if it becomes too sedating.  Also advised to choose either meloxicam or naproxen but not both going forward.  - gabapentin (NEURONTIN) 300 MG capsule; Take 1 capsule (300 mg) by mouth At Bedtime    Moderate recurrent major depression (H)  Adrianna's feeling of being generally \"miserable\" likely has a component of her known major depression.  She is on duloxetine which I would not change at this time.  Encouraged her to reach out to her friends and social contacts and spend more time doing what she likes to do (scrapbooking for example).  I " also expect that better control of her physical symptoms and assistance with her ADLs will help with her mood.    Seasonal allergic rhinitis, unspecified trigger  Increased congestion and pharyngeal secretions, likely partly seasonal allergies and partly COPD-related.  Zyrtec has not been helping as much recently so we will try over-the-counter Allegra as well as Flonase nasal spray and reassess at annual physical this fall.  - fluticasone (FLONASE) 50 MCG/ACT nasal spray; Spray 1 spray into both nostrils daily as needed for rhinitis or allergies             BMI:   Estimated body mass index is 45.9 kg/m  as calculated from the following:    Height as of 10/22/20: 1.524 m (5').    Weight as of this encounter: 106.6 kg (235 lb).     Follow-up annual physical with Dr. Briscoe scheduled for November 4.    Jacinto Grove MD  [facility] NICO Rodas is a 89 year old who presents for the following health issues  accompanied by her daughter Jennifer:    HPI     Abdominal pain/Constipation  Adrianna reports that she continues to have frequent abdominal pain that is relieved after having a bowel movement.  The pain is sharp and crampy in character largely isolated to a band across her lower abdomen and pelvis.  She has a long history of irritable bowel syndrome usually with constipation symptoms.  Previously Dr. Fernandez started her on twice daily MiraLAX which was very effective in keeping her bowel movements regular.  Adrianna says that she is taking the MiraLAX at least daily if not twice daily, however her daughter has noticed that the level in the MiraLAX bottle has not gone down any meaningful way and is concerned that her mom is not taking it.  On top of this Adrianna tells me that her abdominal pain will get worse the longer she is gone without a bowel movement leading her to take 3 bisacodyl tabs, after which she will spend much of the next day on the toilet defecating.  The abdominal pain is significantly  "improved after having a bowel movement.  Her abdominal pain also seems to get better when taking an over-the-counter probiotic and she is wondering if she can take this daily.    \"Feeling miserable\"  Adrianna also notes that she frequently wakes up feeling miserable.  She is unable to describe any more specifically how she feels but says that some days she just cannot seem to get out of bed or engage in activities she would normally be interested in doing.  She notes that she does feel depressed and does carry diagnosis of major depression which is treated by duloxetine.  She is lost a few close friends in the last few years and thinks that this is probably part of her difficulty.  She also notes that \"getting old is not fun.\"  She does have a number of friends in close contacts at her senior living facility and enjoys scrapbooking -- in fact her daughters have made her a craft room in her apartment.  She is planning on reaching out to another gal who lives in her senior living building who is also described again she thinks that they might have some fun together.    Osteoarthritis:  Mercys mobility continues to be limited by her severe osteoarthritis.  She sees Ortho for this but they have declined to operate on her knees.  She gets regular joint injections few times a year which help for a time but then wear off.  Her Ortho prescribed her meloxicam once daily however she has been taking it as needed, sometimes 3 times a day.  She also takes Aleve as needed.  She and her daughter are wondering if she could try gabapentin as this was recommended as a potential option by her orthopod.    Overall Functional Status:  Adrianna's daughter Jennifer is concerned that her mother is not taking her medications as prescribed and is having a hard time taking care of herself in independent senior living.  She thinks that her mother probably needs a higher level of care the wonders if we could start by having a home nursing " evaluation.    Review of Systems   See HPI.      Objective    /69 (BP Location: Right arm, Cuff Size: Adult Large)   Pulse 71   Temp 97.9  F (36.6  C) (Tympanic)   Wt 106.6 kg (235 lb)   SpO2 93%   BMI 45.90 kg/m    Body mass index is 45.9 kg/m .  Physical Exam   GENERAL: healthy, alert and no distress  EYES: Eyes grossly normal to inspection, conjunctivae and sclerae normal  HENT: ears externally normal, hearing aids in place.  RESP: lungs clear to auscultation - no rales, rhonchi or wheezes  CV: regular rate and rhythm, normal S1 S2, no S3 or S4, no murmur, click or rub, peripheral pulses strong  ABDOMEN: soft, tender to palpation across lower abdomen, no hepatosplenomegaly, no masses and bowel sounds normal  MS: tenderness and joint edema around bilateral knees. Back pain elicited   SKIN: no suspicious lesions or rashes  NEURO: Normal strength and tone, mentation intact and speech normal  PSYCH: mentation appears normal, affect normal/bright

## 2021-09-07 ENCOUNTER — TELEPHONE (OUTPATIENT)
Dept: PEDIATRICS | Facility: CLINIC | Age: 86
End: 2021-09-07

## 2021-09-07 DIAGNOSIS — F03.90 SENILE DEMENTIA WITHOUT BEHAVIORAL DISTURBANCE (H): ICD-10-CM

## 2021-09-07 DIAGNOSIS — R53.81 PHYSICAL DECONDITIONING: Primary | ICD-10-CM

## 2021-09-07 NOTE — TELEPHONE ENCOUNTER
Staff from [facility] home care left voicemail on [facility] Eddi MULLINS Calling to inform  that they are unable to provide home care services for patient due to being filled to capacity and calling to inform  that she will need to find services with another home care service. [facility] will rout to provider for review.    Devon Olguin at 11:41 AM on 9/7/2021  [facility]  Phone [phone]

## 2021-09-15 ENCOUNTER — MYC MEDICAL ADVICE (OUTPATIENT)
Dept: PEDIATRICS | Facility: CLINIC | Age: 86
End: 2021-09-15

## 2021-09-15 NOTE — TELEPHONE ENCOUNTER
[facility] called to report that the home care referral that was placed was located.   They do not have the ability to see the Pt at this time due to access issues.     They will however work on getting the Pt set up with another home care agency and keep in close contact with the family.    [place]: [phone].     Arina Hearn, RN   [facility]  -- Triage Nurse

## 2021-09-16 ENCOUNTER — TELEPHONE (OUTPATIENT)
Dept: PEDIATRICS | Facility: CLINIC | Age: 86
End: 2021-09-16

## 2021-09-16 NOTE — TELEPHONE ENCOUNTER
Did you intend to order home care through another agency? [facility] calling again, Basilia [phone] ext [phone] Intake is [phone]. She wants to let us know they do not have the ability to staff this. The daughter is calling them to check the status on home care. This needs to be re-ordered by PCP. Dana Tineo RN on 9/16/2021 at 11:42 AM

## 2021-09-16 NOTE — TELEPHONE ENCOUNTER
Called [facility] at Phone: [phone].    Home Care and SW.     Unable to accept this referral due to capacity.     Called [facility] Phone: [phone].  Fax: [phone].     [facility] needs the following faxed to them:  Order/referral.  Recent Med list.  Last visit note.  Demographics/insurance.     Above forms faxed to [facility].    Pete Sneed RN on 9/16/2021 at 12:37 PM

## 2021-09-17 ENCOUNTER — TELEPHONE (OUTPATIENT)
Dept: PEDIATRICS | Facility: CLINIC | Age: 86
End: 2021-09-17

## 2021-09-17 NOTE — TELEPHONE ENCOUNTER
Received call from [facility]  They need to delay the start of care until 9/19/21    Routing to PCP as FYI    Thank you  Gabriela Zarco RN on 9/17/2021 at 12:11 PM

## 2021-09-17 NOTE — TELEPHONE ENCOUNTER
Call transferred via centeral phone room, Message left on [facility] extensions voicemail. Message from a María at [facility] saying they did not reive face sheet with patient demographics and insurance information. Left fax number and phone number for contact.    Fax [phone]    Phone [phone]    Routing to 3 pal.    Devon Olguin at 4:41 PM on 9/17/2021  [facility] Guide  Phone [phone]

## 2021-09-20 ENCOUNTER — MYC MEDICAL ADVICE (OUTPATIENT)
Dept: PEDIATRICS | Facility: CLINIC | Age: 86
End: 2021-09-20

## 2021-09-20 ENCOUNTER — TELEPHONE (OUTPATIENT)
Dept: PEDIATRICS | Facility: CLINIC | Age: 86
End: 2021-09-20

## 2021-09-20 NOTE — TELEPHONE ENCOUNTER
Patient demographics and insurance information faxed to [facility] as requested to FAX: [phone].    Pete Sneed RN on 9/20/2021 at 8:17 AM

## 2021-09-20 NOTE — TELEPHONE ENCOUNTER
Adolfo from [facility] at [phone] calling for verbal orders:    Skilled nursing start date 9-19 for 3 times a week for one week, 2 times a week for one week and 1 time a week for 3 weeks.     Need medical social worker visit for care planning.    Approved requested orders. She will fax orders as well.     Bethany Birch RN on 9/20/2021 at 9:15 AM

## 2021-09-21 ENCOUNTER — TELEPHONE (OUTPATIENT)
Dept: PEDIATRICS | Facility: CLINIC | Age: 86
End: 2021-09-21

## 2021-09-21 NOTE — TELEPHONE ENCOUNTER
Homecare Orders Requested:     Jaida at Phone Number [phone] from [facility] called to request orders for the following Services:    Physical Therapy for:twice a week x 2 weeks, once a week x 2 weeks for strengh, mobility and balance    Homecare agency to fax orders over for review and signature of PCP.    Verbal orders provided, FYI to AF  Madison Ruiz RN, BSN  Message handled by CLINIC NURSE.

## 2021-09-30 ENCOUNTER — MEDICAL CORRESPONDENCE (OUTPATIENT)
Dept: HEALTH INFORMATION MANAGEMENT | Facility: CLINIC | Age: 86
End: 2021-09-30
Payer: MEDICARE

## 2021-09-30 ENCOUNTER — TRANSFERRED RECORDS (OUTPATIENT)
Dept: HEALTH INFORMATION MANAGEMENT | Facility: CLINIC | Age: 86
End: 2021-09-30

## 2021-09-30 DIAGNOSIS — M17.0 OSTEOARTHRITIS OF BOTH KNEES, UNSPECIFIED OSTEOARTHRITIS TYPE: ICD-10-CM

## 2021-10-01 RX ORDER — GABAPENTIN 300 MG/1
300 CAPSULE ORAL AT BEDTIME
Qty: 30 CAPSULE | Refills: 1 | Status: SHIPPED | OUTPATIENT
Start: 2021-10-01 | End: 2021-11-04

## 2021-10-03 ENCOUNTER — HEALTH MAINTENANCE LETTER (OUTPATIENT)
Age: 86
End: 2021-10-03

## 2021-10-15 ENCOUNTER — TRANSFERRED RECORDS (OUTPATIENT)
Dept: HEALTH INFORMATION MANAGEMENT | Facility: CLINIC | Age: 86
End: 2021-10-15
Payer: MEDICARE

## 2021-10-15 ENCOUNTER — DOCUMENTATION ONLY (OUTPATIENT)
Dept: OTHER | Facility: CLINIC | Age: 86
End: 2021-10-15

## 2021-10-22 ENCOUNTER — TRANSFERRED RECORDS (OUTPATIENT)
Dept: HEALTH INFORMATION MANAGEMENT | Facility: CLINIC | Age: 86
End: 2021-10-22
Payer: MEDICARE

## 2021-11-04 ENCOUNTER — OFFICE VISIT (OUTPATIENT)
Dept: PEDIATRICS | Facility: CLINIC | Age: 86
End: 2021-11-04
Payer: COMMERCIAL

## 2021-11-04 VITALS
TEMPERATURE: 97.4 F | WEIGHT: 233 LBS | DIASTOLIC BLOOD PRESSURE: 60 MMHG | HEART RATE: 73 BPM | OXYGEN SATURATION: 95 % | BODY MASS INDEX: 45.5 KG/M2 | RESPIRATION RATE: 28 BRPM | SYSTOLIC BLOOD PRESSURE: 120 MMHG

## 2021-11-04 DIAGNOSIS — Z00.01 ENCOUNTER FOR GENERAL ADULT MEDICAL EXAMINATION WITH ABNORMAL FINDINGS: Primary | ICD-10-CM

## 2021-11-04 DIAGNOSIS — B36.9 FUNGAL INFECTION OF SKIN: ICD-10-CM

## 2021-11-04 DIAGNOSIS — M25.50 POLYARTHRALGIA: ICD-10-CM

## 2021-11-04 DIAGNOSIS — K29.50 CHRONIC GASTRITIS WITHOUT BLEEDING, UNSPECIFIED GASTRITIS TYPE: ICD-10-CM

## 2021-11-04 DIAGNOSIS — C78.00 MALIGNANT NEOPLASM METASTATIC TO LUNG, UNSPECIFIED LATERALITY (H): ICD-10-CM

## 2021-11-04 DIAGNOSIS — J47.9 BRONCHIECTASIS WITHOUT COMPLICATION (H): ICD-10-CM

## 2021-11-04 DIAGNOSIS — D50.8 OTHER IRON DEFICIENCY ANEMIA: ICD-10-CM

## 2021-11-04 DIAGNOSIS — F33.1 MODERATE RECURRENT MAJOR DEPRESSION (H): ICD-10-CM

## 2021-11-04 DIAGNOSIS — M17.0 OSTEOARTHRITIS OF BOTH KNEES, UNSPECIFIED OSTEOARTHRITIS TYPE: ICD-10-CM

## 2021-11-04 DIAGNOSIS — I10 BENIGN ESSENTIAL HYPERTENSION: ICD-10-CM

## 2021-11-04 PROCEDURE — 99214 OFFICE O/P EST MOD 30 MIN: CPT | Mod: 25 | Performed by: PEDIATRICS

## 2021-11-04 PROCEDURE — 99397 PER PM REEVAL EST PAT 65+ YR: CPT | Mod: 25 | Performed by: PEDIATRICS

## 2021-11-04 PROCEDURE — 96127 BRIEF EMOTIONAL/BEHAV ASSMT: CPT | Performed by: PEDIATRICS

## 2021-11-04 PROCEDURE — G0008 ADMIN INFLUENZA VIRUS VAC: HCPCS | Performed by: PEDIATRICS

## 2021-11-04 PROCEDURE — 90662 IIV NO PRSV INCREASED AG IM: CPT | Performed by: PEDIATRICS

## 2021-11-04 RX ORDER — DULOXETIN HYDROCHLORIDE 60 MG/1
60 CAPSULE, DELAYED RELEASE ORAL DAILY
Qty: 90 CAPSULE | Refills: 1 | Status: SHIPPED | OUTPATIENT
Start: 2021-11-04

## 2021-11-04 RX ORDER — LANSOPRAZOLE 30 MG/1
CAPSULE, DELAYED RELEASE ORAL
Qty: 90 CAPSULE | Refills: 3 | Status: SHIPPED | OUTPATIENT
Start: 2021-11-04 | End: 2022-10-19

## 2021-11-04 RX ORDER — LOSARTAN POTASSIUM 100 MG/1
100 TABLET ORAL DAILY
Qty: 90 TABLET | Refills: 3 | Status: SHIPPED | OUTPATIENT
Start: 2021-11-04

## 2021-11-04 RX ORDER — ACETAMINOPHEN 500 MG
1000 TABLET ORAL 3 TIMES DAILY
COMMUNITY

## 2021-11-04 RX ORDER — GABAPENTIN 300 MG/1
300 CAPSULE ORAL 2 TIMES DAILY
Qty: 180 CAPSULE | Refills: 1 | Status: SHIPPED | OUTPATIENT
Start: 2021-11-04

## 2021-11-04 RX ORDER — FEXOFENADINE HCL 180 MG/1
180 TABLET ORAL EVERY EVENING
COMMUNITY

## 2021-11-04 RX ORDER — NYSTATIN 100000 [USP'U]/G
POWDER TOPICAL 3 TIMES DAILY PRN
Qty: 60 G | Refills: 1 | Status: SHIPPED | OUTPATIENT
Start: 2021-11-04

## 2021-11-04 RX ORDER — METOPROLOL SUCCINATE 25 MG/1
TABLET, EXTENDED RELEASE ORAL
Qty: 45 TABLET | Refills: 3 | Status: SHIPPED | OUTPATIENT
Start: 2021-11-04

## 2021-11-04 ASSESSMENT — ANXIETY QUESTIONNAIRES
GAD7 TOTAL SCORE: 1
8. IF YOU CHECKED OFF ANY PROBLEMS, HOW DIFFICULT HAVE THESE MADE IT FOR YOU TO DO YOUR WORK, TAKE CARE OF THINGS AT HOME, OR GET ALONG WITH OTHER PEOPLE?: NOT DIFFICULT AT ALL
GAD7 TOTAL SCORE: 1
2. NOT BEING ABLE TO STOP OR CONTROL WORRYING: NOT AT ALL
GAD7 TOTAL SCORE: 1
1. FEELING NERVOUS, ANXIOUS, OR ON EDGE: SEVERAL DAYS
7. FEELING AFRAID AS IF SOMETHING AWFUL MIGHT HAPPEN: NOT AT ALL
3. WORRYING TOO MUCH ABOUT DIFFERENT THINGS: NOT AT ALL
5. BEING SO RESTLESS THAT IT IS HARD TO SIT STILL: NOT AT ALL
6. BECOMING EASILY ANNOYED OR IRRITABLE: NOT AT ALL
4. TROUBLE RELAXING: NOT AT ALL
7. FEELING AFRAID AS IF SOMETHING AWFUL MIGHT HAPPEN: NOT AT ALL

## 2021-11-04 ASSESSMENT — ACTIVITIES OF DAILY LIVING (ADL)
CURRENT_FUNCTION: HOUSEWORK REQUIRES ASSISTANCE
CURRENT_FUNCTION: SHOPPING REQUIRES ASSISTANCE
CURRENT_FUNCTION: MEDICATION ADMINISTRATION REQUIRES ASSISTANCE
CURRENT_FUNCTION: BATHING REQUIRES ASSISTANCE
CURRENT_FUNCTION: PREPARING MEALS REQUIRES ASSISTANCE
CURRENT_FUNCTION: MONEY MANAGEMENT REQUIRES ASSISTANCE
CURRENT_FUNCTION: LAUNDRY REQUIRES ASSISTANCE
CURRENT_FUNCTION: TRANSPORTATION REQUIRES ASSISTANCE

## 2021-11-04 ASSESSMENT — ENCOUNTER SYMPTOMS
ARTHRALGIAS: 1
WEAKNESS: 0
BREAST MASS: 0
DIZZINESS: 1
SHORTNESS OF BREATH: 1
ABDOMINAL PAIN: 1
CONSTIPATION: 1

## 2021-11-04 ASSESSMENT — PATIENT HEALTH QUESTIONNAIRE - PHQ9
10. IF YOU CHECKED OFF ANY PROBLEMS, HOW DIFFICULT HAVE THESE PROBLEMS MADE IT FOR YOU TO DO YOUR WORK, TAKE CARE OF THINGS AT HOME, OR GET ALONG WITH OTHER PEOPLE: SOMEWHAT DIFFICULT
SUM OF ALL RESPONSES TO PHQ QUESTIONS 1-9: 2
SUM OF ALL RESPONSES TO PHQ QUESTIONS 1-9: 2

## 2021-11-04 NOTE — PROGRESS NOTES
"SUBJECTIVE:   Tatyana Metcalf is a 89 year old female who presents for Preventive Visit.      Patient has been advised of split billing requirements and indicates understanding: Yes   Are you in the first 12 months of your Medicare coverage?  No    Healthy Habits:     In general, how would you rate your overall health?  Good    Frequency of exercise:  None    Do you usually eat at least 4 servings of fruit and vegetables a day, include whole grains    & fiber and avoid regularly eating high fat or \"junk\" foods?  No    Taking medications regularly:  No    Barriers to taking medications:  Problems remembering to take them    Medication side effects:  None    Ability to successfully perform activities of daily living:  Transportation requires assistance, shopping requires assistance, preparing meals requires assistance, housework requires assistance, bathing requires assistance, laundry requires assistance, medication administration requires assistance and money management requires assistance    Home Safety:  No safety concerns identified    Hearing Impairment:  Difficulty following a conversation in a noisy restaurant or crowded room, feel that people are mumbling or not speaking clearly, difficulty following dialogue in the theater, difficult to understand a speaker at a public meeting or Muslim service, need to ask people to speak up or repeat themselves, find that men's voices are easier to understand than woman's and difficulty understanding soft or whispered speech    In the past 6 months, have you been bothered by leaking of urine? Yes    In general, how would you rate your overall mental or emotional health?  Good      PHQ-2 Total Score: 0    Additional concerns today:  No    Ortho - follows with [facility] for bilateral knee injections    Seen two months ago by Dr. Grove (resident) for deconditining, forgetfulness : home care referral made.  Forgetfulness was causing medication compliance issues.     Trialed " low dose gabapentin to try at bedtime - make sure only taking naproxen or meloxicam    At this time - family thinks her mood is much better. Home care did come - home nursing helped set up containers of miralax. Discharged last week from nursing - no regular BMs. She will be moving to [place] - on wait list. They are thinking about using doctor as facility.    Recently saw Lung Doctor - normal PFTs, but still gets shortness of breath with walking. I will take over pulmonary medications.     Do you feel safe in your environment? Yes    Have you ever done Advance Care Planning? (For example, a Health Directive, POLST, or a discussion with a medical provider or your loved ones about your wishes): Yes, advance care planning is on file.       Fall risk  Fallen 2 or more times in the past year?: No  Any fall with injury in the past year?: No    Cognitive Screening   1) Repeat 3 items (Leader, Season, Table)    2) Clock draw: NORMAL  3) 3 item recall: Recalls 1 object   Results: NORMAL clock, 1-2 items recalled: COGNITIVE IMPAIRMENT LESS LIKELY    Mini-CogTM Copyright S Ronaldo. Licensed by the author for use in [facility] [employer]; reprinted with permission (kenisha@[facility]). All rights reserved.      Do you have sleep apnea, excessive snoring or daytime drowsiness?: yes    Reviewed and updated as needed this visit by clinical staff  Tobacco  Allergies  Meds   Med Hx  Surg Hx  Fam Hx  Soc Hx        Reviewed and updated as needed this visit by Provider                Social History     Tobacco Use     Smoking status: Never Smoker     Smokeless tobacco: Never Used   Substance Use Topics     Alcohol use: Yes     Alcohol/week: 0.0 standard drinks     Comment: a few times a year         Alcohol Use 11/4/2021   Prescreen: >3 drinks/day or >7 drinks/week? No   Prescreen: >3 drinks/day or >7 drinks/week? -           Hypertension Follow-up      Do you check your blood pressure regularly outside of the clinic? Yes     Are you  following a low salt diet? No    Are your blood pressures ever more than 140 on the top number (systolic) OR more   than 90 on the bottom number (diastolic), for example 140/90? No    Depression Followup    How are you doing with your depression since your last visit? Improved     Are you having other symptoms that might be associated with depression? No    Have you had a significant life event?  OTHER: moving to assisted living      Are you feeling anxious or having panic attacks?   No    Do you have any concerns with your use of alcohol or other drugs? No    Social History     Tobacco Use     Smoking status: Never Smoker     Smokeless tobacco: Never Used   Substance Use Topics     Alcohol use: Yes     Alcohol/week: 0.0 standard drinks     Comment: a few times a year     Drug use: No     PHQ 9/3/2020 10/21/2020 11/4/2021   PHQ-9 Total Score 10 9 2   Q9: Thoughts of better off dead/self-harm past 2 weeks Not at all Not at all Not at all     KYRIE-7 SCORE 4/28/2017 10/11/2017 11/4/2021   Total Score - - 1 (minimal anxiety)   Total Score 4 2 1     Last PHQ-9 11/4/2021   1.  Little interest or pleasure in doing things 0   2.  Feeling down, depressed, or hopeless 0   3.  Trouble falling or staying asleep, or sleeping too much 0   4.  Feeling tired or having little energy 1   5.  Poor appetite or overeating 0   6.  Feeling bad about yourself 0   7.  Trouble concentrating 1   8.  Moving slowly or restless 0   Q9: Thoughts of better off dead/self-harm past 2 weeks 0   PHQ-9 Total Score 2   Difficulty at work, home, or with people -     KYRIE-7  11/4/2021   1. Feeling nervous, anxious, or on edge 1   2. Not being able to stop or control worrying 0   3. Worrying too much about different things 0   4. Trouble relaxing 0   5. Being so restless that it is hard to sit still 0   6. Becoming easily annoyed or irritable 0   7. Feeling afraid, as if something awful might happen 0   KYRIE-7 Total Score 1   If you checked any problems, how  difficult have they made it for you to do your work, take care of things at home, or get along with other people? -       Suicide Assessment Five-step Evaluation and Treatment (SAFE-T)      Current providers sharing in care for this patient include:   Patient Care Team:  Jill Briscoe MD as PCP - General (Internal Medicine)  Letitia Oorzco, RN as Specialty Care Coordinator (Hematology & Oncology)  Jill Briscoe MD as Assigned PCP    The following health maintenance items are reviewed in Epic and correct as of today:  Health Maintenance Due   Topic Date Due     ANNUAL REVIEW OF HM ORDERS  11/25/2020     PHQ-9  04/22/2021     INFLUENZA VACCINE (1) 09/01/2021     FALL RISK ASSESSMENT  10/22/2021             Pertinent mammograms are reviewed under the imaging tab.    Review of Systems      OBJECTIVE:   /60 (BP Location: Right arm, Patient Position: Sitting, Cuff Size: Adult Large)   Pulse 73   Temp 97.4  F (36.3  C) (Tympanic)   Resp 28   Wt 105.7 kg (233 lb)   SpO2 95%   BMI 45.50 kg/m   Estimated body mass index is 45.5 kg/m  as calculated from the following:    Height as of 10/22/20: 1.524 m (5').    Weight as of this encounter: 105.7 kg (233 lb).  Physical Exam  GENERAL APPEARANCE: healthy, alert and no distress  NECK: no adenopathy, no asymmetry, masses, or scars and thyroid normal to palpation  RESP: lungs clear to auscultation - no rales, rhonchi or wheezes  CV: regular rate and rhythm, normal S1 S2, no S3 or S4, no murmur, click or rub, no peripheral edema and peripheral pulses strong  SKIN: no suspicious lesions or rashes  PSYCH: mentation appears normal and affect normal/bright    Diagnostic Test Results:  Labs reviewed in Epic    ASSESSMENT / PLAN:   (Z00.01) Encounter for general adult medical examination with abnormal findings  (primary encounter diagnosis)  Comment:  Plan:    (M17.0) Osteoarthritis of both knees, unspecified osteoarthritis type  Comment: gabapentin working well  for nighttime pain - she would like to try during the day - see PI  Plan: Walker Order, gabapentin (NEURONTIN) 300 MG         capsule            (F33.1) Moderate recurrent major depression (H)  Comment: stable - continue current dose  Plan: DULoxetine (CYMBALTA) 60 MG capsule            (M25.50) Polyarthralgia  Comment: stable  Plan: DULoxetine (CYMBALTA) 60 MG capsule        continue    (I10) Benign essential hypertension  Comment: controlled  Plan: losartan (COZAAR) 100 MG tablet, metoprolol         succinate ER (TOPROL-XL) 25 MG 24 hr tablet,         Comprehensive metabolic panel (BMP + Alb, Alk         Phos, ALT, AST, Total. Bili, TP)        continue    (K29.50) Chronic gastritis without bleeding, unspecified gastritis type  Comment: stable  Plan: LANsoprazole (PREVACID) 30 MG DR capsule        continue    (B36.9) Fungal infection of skin  Comment: for prn use under breasts  Plan: nystatin (MYCOSTATIN) 446532 UNIT/GM external         powder            (C78.00) Malignant neoplasm metastatic to lung, unspecified laterality (H)  Comment: stable  Plan: follow up with pulm prn    (Z68.41) Body mass index (BMI) of 40.0 to 44.9 in adult (H)  Comment:  Plan:    (J47.9) Bronchiectasis without complication (H)  Comment: had been following with pulm - very stable  Plan: I will take over prescribing pulmonary medications - will refer back if changes    (D50.8) Other iron deficiency anemia  Comment: continue iron  Plan:     Patient has been advised of split billing requirements and indicates understanding: Yes  COUNSELING:  Reviewed preventive health counseling, as reflected in patient instructions    Estimated body mass index is 45.5 kg/m  as calculated from the following:    Height as of 10/22/20: 1.524 m (5').    Weight as of this encounter: 105.7 kg (233 lb).        She reports that she has never smoked. She has never used smokeless tobacco.      Appropriate preventive services were discussed with this patient, including  applicable screening as appropriate for cardiovascular disease, diabetes, osteopenia/osteoporosis, and glaucoma.  As appropriate for age/gender, discussed screening for colorectal cancer, prostate cancer, breast cancer, and cervical cancer. Checklist reviewing preventive services available has been given to the patient.    Reviewed patients plan of care and provided an AVS. The Basic Care Plan (routine screening as documented in Health Maintenance) for Tatyana meets the Care Plan requirement. This Care Plan has been established and reviewed with the Patient and daughter.    Counseling Resources:  ATP IV Guidelines  Pooled Cohorts Equation Calculator  Breast Cancer Risk Calculator  Breast Cancer: Medication to Reduce Risk  FRAX Risk Assessment  ICSI Preventive Guidelines  Dietary Guidelines for Americans, 2010  [employer]'s MyPlate  ASA Prophylaxis  Lung CA Screening    Jill Briscoe MD  [facility]    Identified Health Risks:  Answers for HPI/ROS submitted by the patient on 11/4/2021  If you checked off any problems, how difficult have these problems made it for you to do your work, take care of things at home, or get along with other people?: Somewhat difficult  PHQ9 TOTAL SCORE: 2  KYRIE 7 TOTAL SCORE: 1      DME (Durable Medical Equipment) Orders and Documentation  Orders Placed This Encounter   Procedures     Walker Order      The patient was assessed and it was determined the patient is in need of the following listed DME Supplies/Equipment. Please complete supporting documentation below to demonstrate medical necessity.      DME All Other Item(s) Documentation    List reason for need and supporting documentation for medical necessity below for each DME item.     1. Bilateral osteoarthritis    2. COPD

## 2021-11-04 NOTE — PATIENT INSTRUCTIONS
1. Gabapentin - ok to try taking 300mg twice daily.  If getting too sleepy with daytime dose, please let me know and we can decrease to 100mg in the AM.    2. STOP fosamax - will plan for DEXA next year.    Nystatin powder as needed    If vertigo spells last longer, ok to use over the counter meclizine          Patient Education   Personalized Prevention Plan  You are due for the preventive services outlined below.  Your care team is available to assist you in scheduling these services.  If you have already completed any of these items, please share that information with your care team to update in your medical record.  Health Maintenance Due   Topic Date Due     ANNUAL REVIEW OF HM ORDERS  11/25/2020     Depression Assessment  04/22/2021     COVID-19 Vaccine (3 - Booster for Pfizer series) 08/20/2021     Flu Vaccine (1) 09/01/2021     FALL RISK ASSESSMENT  10/22/2021     Annual Wellness Visit  10/22/2021

## 2021-11-04 NOTE — TELEPHONE ENCOUNTER
Brief Progress    Assessed patient's mouth Saran Qureshi had noticed patient with large blood clot between left cheek and teeth. Upon assessment, patient with large amount of blood in mouth.  I cannot see a source of fresh bleeding on exam.    Scott Hazel DO  11/04/21  2:21 AM Routing refill request to provider for review/approval because:  Labs out of range:  PHQ-9    PHQ 8/9/2018 9/3/2020 10/21/2020   PHQ-9 Total Score 7 10 9   Q9: Thoughts of better off dead/self-harm past 2 weeks Not at all Not at all Not at all     Yvan LAGUERRE RN

## 2021-11-05 ENCOUNTER — TELEPHONE (OUTPATIENT)
Dept: PEDIATRICS | Facility: CLINIC | Age: 86
End: 2021-11-05
Payer: MEDICARE

## 2021-11-05 ASSESSMENT — PATIENT HEALTH QUESTIONNAIRE - PHQ9: SUM OF ALL RESPONSES TO PHQ QUESTIONS 1-9: 2

## 2021-11-05 ASSESSMENT — ANXIETY QUESTIONNAIRES: GAD7 TOTAL SCORE: 1

## 2021-11-08 ENCOUNTER — LAB (OUTPATIENT)
Dept: LAB | Facility: CLINIC | Age: 86
End: 2021-11-08
Payer: COMMERCIAL

## 2021-11-08 DIAGNOSIS — I10 BENIGN ESSENTIAL HYPERTENSION: ICD-10-CM

## 2021-11-08 LAB
ALBUMIN SERPL-MCNC: 3.4 G/DL (ref 3.4–5)
ALP SERPL-CCNC: 83 U/L (ref 40–150)
ALT SERPL W P-5'-P-CCNC: 19 U/L (ref 0–50)
ANION GAP SERPL CALCULATED.3IONS-SCNC: 6 MMOL/L (ref 3–14)
AST SERPL W P-5'-P-CCNC: 13 U/L (ref 0–45)
BILIRUB SERPL-MCNC: 0.3 MG/DL (ref 0.2–1.3)
BUN SERPL-MCNC: 24 MG/DL (ref 7–30)
CALCIUM SERPL-MCNC: 9.5 MG/DL (ref 8.5–10.1)
CHLORIDE BLD-SCNC: 107 MMOL/L (ref 94–109)
CO2 SERPL-SCNC: 27 MMOL/L (ref 20–32)
CREAT SERPL-MCNC: 0.66 MG/DL (ref 0.52–1.04)
GFR SERPL CREATININE-BSD FRML MDRD: 79 ML/MIN/1.73M2
GLUCOSE BLD-MCNC: 137 MG/DL (ref 70–99)
POTASSIUM BLD-SCNC: 4.3 MMOL/L (ref 3.4–5.3)
PROT SERPL-MCNC: 7.2 G/DL (ref 6.8–8.8)
SODIUM SERPL-SCNC: 140 MMOL/L (ref 133–144)

## 2021-11-08 PROCEDURE — 80053 COMPREHEN METABOLIC PANEL: CPT

## 2021-11-08 PROCEDURE — 36415 COLL VENOUS BLD VENIPUNCTURE: CPT

## 2021-12-15 ENCOUNTER — MEDICAL CORRESPONDENCE (OUTPATIENT)
Dept: HEALTH INFORMATION MANAGEMENT | Facility: CLINIC | Age: 86
End: 2021-12-15
Payer: MEDICARE

## 2021-12-26 ENCOUNTER — LAB REQUISITION (OUTPATIENT)
Dept: LAB | Facility: CLINIC | Age: 86
End: 2021-12-26
Payer: COMMERCIAL

## 2021-12-26 DIAGNOSIS — D64.9 ANEMIA, UNSPECIFIED: ICD-10-CM

## 2021-12-26 DIAGNOSIS — I10 ESSENTIAL (PRIMARY) HYPERTENSION: ICD-10-CM

## 2021-12-26 DIAGNOSIS — E03.9 HYPOTHYROIDISM, UNSPECIFIED: ICD-10-CM

## 2021-12-26 DIAGNOSIS — E55.9 VITAMIN D DEFICIENCY, UNSPECIFIED: ICD-10-CM

## 2021-12-28 PROCEDURE — 80053 COMPREHEN METABOLIC PANEL: CPT | Mod: ORL | Performed by: PHYSICIAN ASSISTANT

## 2021-12-28 PROCEDURE — 82306 VITAMIN D 25 HYDROXY: CPT | Mod: ORL | Performed by: PHYSICIAN ASSISTANT

## 2021-12-28 PROCEDURE — 84443 ASSAY THYROID STIM HORMONE: CPT | Mod: ORL | Performed by: PHYSICIAN ASSISTANT

## 2021-12-28 PROCEDURE — 82728 ASSAY OF FERRITIN: CPT | Mod: ORL | Performed by: PHYSICIAN ASSISTANT

## 2021-12-28 PROCEDURE — P9604 ONE-WAY ALLOW PRORATED TRIP: HCPCS | Mod: ORL | Performed by: PHYSICIAN ASSISTANT

## 2021-12-28 PROCEDURE — 36415 COLL VENOUS BLD VENIPUNCTURE: CPT | Mod: ORL | Performed by: PHYSICIAN ASSISTANT

## 2021-12-28 PROCEDURE — 82607 VITAMIN B-12: CPT | Mod: ORL | Performed by: PHYSICIAN ASSISTANT

## 2021-12-28 PROCEDURE — 85027 COMPLETE CBC AUTOMATED: CPT | Mod: ORL | Performed by: PHYSICIAN ASSISTANT

## 2021-12-29 LAB
ALBUMIN SERPL-MCNC: 3.5 G/DL (ref 3.5–5)
ALP SERPL-CCNC: 95 U/L (ref 45–120)
ALT SERPL W P-5'-P-CCNC: 16 U/L (ref 0–45)
ANION GAP SERPL CALCULATED.3IONS-SCNC: 10 MMOL/L (ref 5–18)
AST SERPL W P-5'-P-CCNC: 18 U/L (ref 0–40)
BILIRUB SERPL-MCNC: 0.4 MG/DL (ref 0–1)
BUN SERPL-MCNC: 23 MG/DL (ref 8–28)
CALCIUM SERPL-MCNC: 10.2 MG/DL (ref 8.5–10.5)
CHLORIDE BLD-SCNC: 105 MMOL/L (ref 98–107)
CO2 SERPL-SCNC: 25 MMOL/L (ref 22–31)
CREAT SERPL-MCNC: 0.72 MG/DL (ref 0.6–1.1)
ERYTHROCYTE [DISTWIDTH] IN BLOOD BY AUTOMATED COUNT: 14.3 % (ref 10–15)
FERRITIN SERPL-MCNC: 232 NG/ML (ref 10–130)
GFR SERPL CREATININE-BSD FRML MDRD: 79 ML/MIN/1.73M2
GLUCOSE BLD-MCNC: 108 MG/DL (ref 70–125)
HCT VFR BLD AUTO: 48.1 % (ref 35–47)
HGB BLD-MCNC: 14.8 G/DL (ref 11.7–15.7)
MCH RBC QN AUTO: 29.7 PG (ref 26.5–33)
MCHC RBC AUTO-ENTMCNC: 30.8 G/DL (ref 31.5–36.5)
MCV RBC AUTO: 96 FL (ref 78–100)
PLATELET # BLD AUTO: 327 10E3/UL (ref 150–450)
POTASSIUM BLD-SCNC: 4.8 MMOL/L (ref 3.5–5)
PROT SERPL-MCNC: 7.3 G/DL (ref 6–8)
RBC # BLD AUTO: 4.99 10E6/UL (ref 3.8–5.2)
SODIUM SERPL-SCNC: 140 MMOL/L (ref 136–145)
TSH SERPL DL<=0.005 MIU/L-ACNC: 1.57 UIU/ML (ref 0.3–5)
VIT B12 SERPL-MCNC: 343 PG/ML (ref 213–816)
WBC # BLD AUTO: 10.7 10E3/UL (ref 4–11)

## 2021-12-30 LAB — DEPRECATED CALCIDIOL+CALCIFEROL SERPL-MC: 50 UG/L (ref 30–80)

## 2022-03-17 ENCOUNTER — TELEPHONE (OUTPATIENT)
Dept: PEDIATRICS | Facility: CLINIC | Age: 87
End: 2022-03-17
Payer: COMMERCIAL

## 2022-03-17 NOTE — TELEPHONE ENCOUNTER
Received fax from [employer] [place].   Physician order request for CPAP machine supplies.     Form is to be filled out, signed by Dr. Briscoe, and faxed back to  [facility] at [phone].     Form is filled out, signed by Dr. Briscoe, and faxed back to [facility] at [phone].     Pete Sneed RN on 3/17/2022 at 1:45 PM

## 2022-04-08 NOTE — PATIENT INSTRUCTIONS
Recommendations from today's MTM visit:                                                    MTM (medication therapy management) is a service provided by a clinical pharmacist designed to help you get the most of out of your medicines.     Check labs today    Rash:  Stop hydrocortisone cream and start nystatin powder    Pain:  Stop the Aleve PM.  Start Aleve/naproxen 220mg 1 tablet at night.  Continue Aspercreme lidocaine cream at night.    Sleep:  Can try the melatonin 3-6mg 1 hour before bedtime.  You can buy this over the counter    Constipation:  Decrease the Miralax/polytheylene glycol to 1 capful every day.    Mammogram-schedule for this    Billing for today service:  [facility] may send you a denial of benefits for this service today. This visit was covered today, but charges were submitted to [facility] in order for [facility] to then write them off.  You will not be charged for this visit.        Next MTM/pharmacist visit: 6 months See Dr. Fernandez in 6 months    To schedule another MTM appointment, please call the clinic directly or you may call the MTM scheduling line at [phone] or toll-free at [phone].     My Clinical Pharmacist's contact information:                                                      It was a pleasure seeing you today!  Please feel free to contact me with any questions or concerns you have.      Bonnie Gillespie, PharmD [facility]  Medication Therapy Management Practitioner   #[phone]    Daphne Simpson PharmD  Pharmaceutical Care Resident   Pager: [phone]    You may receive a survey about the MTM services you received.  I would appreciate your feedback to help me serve you better in the future. Please fill it out and return it when you can. Your comments will be anonymous.      And Dr. Fernandez      High-Fiber Diet  Fiber is in fruits, vegetables, cereals, and grains. Fiber passes through your body undigested. A high-fiber diet helps food move through your intestinal tract.  The added bulk is helpful in preventing constipation. In people with diverticulosis, fiber helps clean out the pouches along the colon wall. It also prevents new pouches from forming. A high-fiber diet reduces the risk of colon cancer. It also lowers blood cholesterol and prevents high blood sugar in people with diabetes.    The fiber-rich foods listed below should be part of your diet. If you are not used to high-fiber foods, start with 1 or 2 foods from this list. Every 3 to 4 days add a new one to your diet. Do this until you are eating 4 high-fiber foods per day. This should give you 20 to 35 grams of fiber a day. It is also important to drink a lot of water when you are on this diet. You should have 6 to 8 glasses of water a day. Water makes the fiber swell and increases the benefit.  Foods high in dietary fiber  The following foods are high in dietary fiber:    Breads. Breads made with 100% whole-wheat flour; devon, wheat, or rye crackers; whole-grain tortillas, bran muffins.    Cereals. Whole-grain and bran cereals with bran (shredded wheat, wheat flakes, raisin bran, corn bran); oatmeal, rolled oats, granola, and brown rice.    Fruits. Fresh fruits and their edible skins (pears, prunes, raisins, berries, apples, and apricots); bananas, citrus fruit, mangoes, pineapple; and prune juice.    Nuts. Any nuts and seeds.    Vegetables. Best served raw or lightly cooked. All types, especially: green peas, celery, eggplant, potatoes, spinach, broccoli, [place] sprouts, winter squash, carrots, cauliflower, soybeans, lentils, and fresh and dried beans of all kinds.    Other. Popcorn, any spices.  Date Last Reviewed: 8/1/2016 2000-2017 [employer]. [address] [place] [address], [place] [address]. All rights reserved. This information is not intended as a substitute for professional medical care. Always follow your healthcare professional's instructions.         Oriented to time, place, person, situation

## 2022-05-30 ENCOUNTER — HOSPITAL ENCOUNTER (INPATIENT)
Facility: CLINIC | Age: 87
LOS: 7 days | Discharge: HOME HEALTH | DRG: 177 | End: 2022-06-06
Attending: EMERGENCY MEDICINE | Admitting: INTERNAL MEDICINE
Payer: COMMERCIAL

## 2022-05-30 ENCOUNTER — APPOINTMENT (OUTPATIENT)
Dept: CT IMAGING | Facility: CLINIC | Age: 87
DRG: 177 | End: 2022-05-30
Attending: EMERGENCY MEDICINE
Payer: COMMERCIAL

## 2022-05-30 ENCOUNTER — APPOINTMENT (OUTPATIENT)
Dept: GENERAL RADIOLOGY | Facility: CLINIC | Age: 87
DRG: 177 | End: 2022-05-30
Attending: EMERGENCY MEDICINE
Payer: COMMERCIAL

## 2022-05-30 DIAGNOSIS — J44.1 COPD EXACERBATION (H): ICD-10-CM

## 2022-05-30 DIAGNOSIS — J43.9 PULMONARY EMPHYSEMA, UNSPECIFIED EMPHYSEMA TYPE (H): ICD-10-CM

## 2022-05-30 DIAGNOSIS — J96.11 CHRONIC HYPOXEMIC RESPIRATORY FAILURE (H): Primary | ICD-10-CM

## 2022-05-30 DIAGNOSIS — J96.01 ACUTE RESPIRATORY FAILURE WITH HYPOXIA (H): ICD-10-CM

## 2022-05-30 DIAGNOSIS — U07.1 INFECTION DUE TO 2019 NOVEL CORONAVIRUS: ICD-10-CM

## 2022-05-30 DIAGNOSIS — R53.81 PHYSICAL DECONDITIONING: ICD-10-CM

## 2022-05-30 DIAGNOSIS — J44.9 CHRONIC OBSTRUCTIVE PULMONARY DISEASE, UNSPECIFIED COPD TYPE (H): ICD-10-CM

## 2022-05-30 LAB
ALBUMIN SERPL-MCNC: 3.8 G/DL (ref 3.4–5)
ALP SERPL-CCNC: 91 U/L (ref 40–150)
ALT SERPL W P-5'-P-CCNC: 29 U/L (ref 0–50)
ANION GAP SERPL CALCULATED.3IONS-SCNC: 3 MMOL/L (ref 3–14)
AST SERPL W P-5'-P-CCNC: 23 U/L (ref 0–45)
BASOPHILS # BLD AUTO: 0 10E3/UL (ref 0–0.2)
BASOPHILS NFR BLD AUTO: 0 %
BILIRUB DIRECT SERPL-MCNC: <0.1 MG/DL (ref 0–0.2)
BILIRUB SERPL-MCNC: 0.3 MG/DL (ref 0.2–1.3)
BUN SERPL-MCNC: 33 MG/DL (ref 7–30)
CALCIUM SERPL-MCNC: 9.8 MG/DL (ref 8.5–10.1)
CHLORIDE BLD-SCNC: 103 MMOL/L (ref 94–109)
CO2 SERPL-SCNC: 33 MMOL/L (ref 20–32)
CREAT SERPL-MCNC: 0.61 MG/DL (ref 0.52–1.04)
CRP SERPL-MCNC: <2.9 MG/L (ref 0–8)
CRP SERPL-MCNC: <2.9 MG/L (ref 0–8)
D DIMER PPP FEU-MCNC: 1.59 UG/ML FEU (ref 0–0.5)
EOSINOPHIL # BLD AUTO: 0.2 10E3/UL (ref 0–0.7)
EOSINOPHIL NFR BLD AUTO: 2 %
ERYTHROCYTE [DISTWIDTH] IN BLOOD BY AUTOMATED COUNT: 14 % (ref 10–15)
FLUAV RNA SPEC QL NAA+PROBE: NEGATIVE
FLUBV RNA RESP QL NAA+PROBE: NEGATIVE
GFR SERPL CREATININE-BSD FRML MDRD: 84 ML/MIN/1.73M2
GLUCOSE BLD-MCNC: 126 MG/DL (ref 70–99)
HCO3 BLDV-SCNC: 34 MMOL/L (ref 21–28)
HCT VFR BLD AUTO: 46.1 % (ref 35–47)
HGB BLD-MCNC: 14.3 G/DL (ref 11.7–15.7)
HOLD SPECIMEN: NORMAL
IMM GRANULOCYTES # BLD: 0 10E3/UL
IMM GRANULOCYTES NFR BLD: 0 %
INR PPP: 0.92 (ref 0.85–1.15)
LACTATE BLD-SCNC: 0.7 MMOL/L
LYMPHOCYTES # BLD AUTO: 2.1 10E3/UL (ref 0.8–5.3)
LYMPHOCYTES NFR BLD AUTO: 22 %
MAGNESIUM SERPL-MCNC: 2.2 MG/DL (ref 1.6–2.3)
MCH RBC QN AUTO: 29.6 PG (ref 26.5–33)
MCHC RBC AUTO-ENTMCNC: 31 G/DL (ref 31.5–36.5)
MCV RBC AUTO: 95 FL (ref 78–100)
MONOCYTES # BLD AUTO: 1 10E3/UL (ref 0–1.3)
MONOCYTES NFR BLD AUTO: 11 %
NEUTROPHILS # BLD AUTO: 6 10E3/UL (ref 1.6–8.3)
NEUTROPHILS NFR BLD AUTO: 65 %
NRBC # BLD AUTO: 0 10E3/UL
NRBC BLD AUTO-RTO: 0 /100
PCO2 BLDV: 57 MM HG (ref 40–50)
PH BLDV: 7.38 [PH] (ref 7.32–7.43)
PLATELET # BLD AUTO: 312 10E3/UL (ref 150–450)
PO2 BLDV: 41 MM HG (ref 25–47)
POTASSIUM BLD-SCNC: 4.5 MMOL/L (ref 3.4–5.3)
PROT SERPL-MCNC: 7.7 G/DL (ref 6.8–8.8)
RBC # BLD AUTO: 4.83 10E6/UL (ref 3.8–5.2)
RSV RNA SPEC NAA+PROBE: NEGATIVE
SAO2 % BLDV: 73 % (ref 94–100)
SARS-COV-2 RNA RESP QL NAA+PROBE: POSITIVE
SODIUM SERPL-SCNC: 139 MMOL/L (ref 133–144)
TROPONIN I SERPL HS-MCNC: 6 NG/L
WBC # BLD AUTO: 9.3 10E3/UL (ref 4–11)

## 2022-05-30 PROCEDURE — 80048 BASIC METABOLIC PNL TOTAL CA: CPT | Performed by: EMERGENCY MEDICINE

## 2022-05-30 PROCEDURE — 120N000001 HC R&B MED SURG/OB

## 2022-05-30 PROCEDURE — 250N000013 HC RX MED GY IP 250 OP 250 PS 637: Performed by: PHYSICIAN ASSISTANT

## 2022-05-30 PROCEDURE — 83735 ASSAY OF MAGNESIUM: CPT | Performed by: EMERGENCY MEDICINE

## 2022-05-30 PROCEDURE — 71045 X-RAY EXAM CHEST 1 VIEW: CPT

## 2022-05-30 PROCEDURE — 84145 PROCALCITONIN (PCT): CPT | Performed by: PHYSICIAN ASSISTANT

## 2022-05-30 PROCEDURE — 250N000009 HC RX 250: Performed by: EMERGENCY MEDICINE

## 2022-05-30 PROCEDURE — 87637 SARSCOV2&INF A&B&RSV AMP PRB: CPT | Performed by: EMERGENCY MEDICINE

## 2022-05-30 PROCEDURE — 86140 C-REACTIVE PROTEIN: CPT | Performed by: EMERGENCY MEDICINE

## 2022-05-30 PROCEDURE — 71275 CT ANGIOGRAPHY CHEST: CPT

## 2022-05-30 PROCEDURE — 94640 AIRWAY INHALATION TREATMENT: CPT

## 2022-05-30 PROCEDURE — 82803 BLOOD GASES ANY COMBINATION: CPT

## 2022-05-30 PROCEDURE — 250N000011 HC RX IP 250 OP 636: Performed by: PHYSICIAN ASSISTANT

## 2022-05-30 PROCEDURE — 86140 C-REACTIVE PROTEIN: CPT | Performed by: PHYSICIAN ASSISTANT

## 2022-05-30 PROCEDURE — 96365 THER/PROPH/DIAG IV INF INIT: CPT | Mod: 59

## 2022-05-30 PROCEDURE — 80076 HEPATIC FUNCTION PANEL: CPT | Performed by: PHYSICIAN ASSISTANT

## 2022-05-30 PROCEDURE — 85025 COMPLETE CBC W/AUTO DIFF WBC: CPT | Performed by: EMERGENCY MEDICINE

## 2022-05-30 PROCEDURE — 85379 FIBRIN DEGRADATION QUANT: CPT | Performed by: EMERGENCY MEDICINE

## 2022-05-30 PROCEDURE — 258N000003 HC RX IP 258 OP 636: Performed by: EMERGENCY MEDICINE

## 2022-05-30 PROCEDURE — 99285 EMERGENCY DEPT VISIT HI MDM: CPT | Mod: 25

## 2022-05-30 PROCEDURE — 99223 1ST HOSP IP/OBS HIGH 75: CPT | Mod: AI | Performed by: PHYSICIAN ASSISTANT

## 2022-05-30 PROCEDURE — C9803 HOPD COVID-19 SPEC COLLECT: HCPCS

## 2022-05-30 PROCEDURE — 84484 ASSAY OF TROPONIN QUANT: CPT | Performed by: EMERGENCY MEDICINE

## 2022-05-30 PROCEDURE — 250N000011 HC RX IP 250 OP 636: Performed by: EMERGENCY MEDICINE

## 2022-05-30 PROCEDURE — 85610 PROTHROMBIN TIME: CPT | Performed by: PHYSICIAN ASSISTANT

## 2022-05-30 PROCEDURE — XW033E5 INTRODUCTION OF REMDESIVIR ANTI-INFECTIVE INTO PERIPHERAL VEIN, PERCUTANEOUS APPROACH, NEW TECHNOLOGY GROUP 5: ICD-10-PCS | Performed by: INTERNAL MEDICINE

## 2022-05-30 PROCEDURE — 36415 COLL VENOUS BLD VENIPUNCTURE: CPT | Performed by: PHYSICIAN ASSISTANT

## 2022-05-30 PROCEDURE — 96375 TX/PRO/DX INJ NEW DRUG ADDON: CPT

## 2022-05-30 PROCEDURE — 36415 COLL VENOUS BLD VENIPUNCTURE: CPT | Performed by: EMERGENCY MEDICINE

## 2022-05-30 PROCEDURE — 96366 THER/PROPH/DIAG IV INF ADDON: CPT

## 2022-05-30 RX ORDER — ACETAMINOPHEN 325 MG/1
650 TABLET ORAL EVERY 6 HOURS PRN
Status: DISCONTINUED | OUTPATIENT
Start: 2022-05-30 | End: 2022-06-06 | Stop reason: HOSPADM

## 2022-05-30 RX ORDER — POLYVINYL ALCOHOL 14 MG/ML
1 SOLUTION/ DROPS OPHTHALMIC EVERY MORNING
COMMUNITY
Start: 2022-04-08

## 2022-05-30 RX ORDER — ONDANSETRON 2 MG/ML
4 INJECTION INTRAMUSCULAR; INTRAVENOUS EVERY 6 HOURS PRN
Status: DISCONTINUED | OUTPATIENT
Start: 2022-05-30 | End: 2022-06-06 | Stop reason: HOSPADM

## 2022-05-30 RX ORDER — ACETAMINOPHEN 650 MG/1
650 SUPPOSITORY RECTAL EVERY 6 HOURS PRN
Status: DISCONTINUED | OUTPATIENT
Start: 2022-05-30 | End: 2022-06-06 | Stop reason: HOSPADM

## 2022-05-30 RX ORDER — DEXAMETHASONE SODIUM PHOSPHATE 10 MG/ML
10 INJECTION, SOLUTION INTRAMUSCULAR; INTRAVENOUS ONCE
Status: COMPLETED | OUTPATIENT
Start: 2022-05-30 | End: 2022-05-30

## 2022-05-30 RX ORDER — AMOXICILLIN 250 MG
1 CAPSULE ORAL 2 TIMES DAILY PRN
Status: DISCONTINUED | OUTPATIENT
Start: 2022-05-30 | End: 2022-06-06 | Stop reason: HOSPADM

## 2022-05-30 RX ORDER — ENOXAPARIN SODIUM 100 MG/ML
40 INJECTION SUBCUTANEOUS EVERY 24 HOURS
Status: DISCONTINUED | OUTPATIENT
Start: 2022-05-30 | End: 2022-06-03

## 2022-05-30 RX ORDER — CEFTRIAXONE 2 G/1
2 INJECTION, POWDER, FOR SOLUTION INTRAMUSCULAR; INTRAVENOUS ONCE
Status: COMPLETED | OUTPATIENT
Start: 2022-05-30 | End: 2022-05-30

## 2022-05-30 RX ORDER — IOPAMIDOL 755 MG/ML
73 INJECTION, SOLUTION INTRAVASCULAR ONCE
Status: COMPLETED | OUTPATIENT
Start: 2022-05-30 | End: 2022-05-30

## 2022-05-30 RX ORDER — IPRATROPIUM BROMIDE AND ALBUTEROL SULFATE 2.5; .5 MG/3ML; MG/3ML
3 SOLUTION RESPIRATORY (INHALATION) ONCE
Status: COMPLETED | OUTPATIENT
Start: 2022-05-30 | End: 2022-05-30

## 2022-05-30 RX ORDER — ALBUTEROL SULFATE 0.83 MG/ML
2.5 SOLUTION RESPIRATORY (INHALATION)
Status: DISCONTINUED | OUTPATIENT
Start: 2022-05-30 | End: 2022-05-31

## 2022-05-30 RX ORDER — DEXAMETHASONE SODIUM PHOSPHATE 10 MG/ML
6 INJECTION, SOLUTION INTRAMUSCULAR; INTRAVENOUS DAILY
Status: DISCONTINUED | OUTPATIENT
Start: 2022-05-31 | End: 2022-06-06 | Stop reason: HOSPADM

## 2022-05-30 RX ORDER — ONDANSETRON 4 MG/1
4 TABLET, ORALLY DISINTEGRATING ORAL EVERY 6 HOURS PRN
Status: DISCONTINUED | OUTPATIENT
Start: 2022-05-30 | End: 2022-06-06 | Stop reason: HOSPADM

## 2022-05-30 RX ORDER — BENZONATATE 100 MG/1
100 CAPSULE ORAL 3 TIMES DAILY PRN
Status: DISCONTINUED | OUTPATIENT
Start: 2022-05-30 | End: 2022-06-06 | Stop reason: HOSPADM

## 2022-05-30 RX ORDER — LIDOCAINE 40 MG/G
CREAM TOPICAL
Status: DISCONTINUED | OUTPATIENT
Start: 2022-05-30 | End: 2022-06-06 | Stop reason: HOSPADM

## 2022-05-30 RX ORDER — AMOXICILLIN 250 MG
2 CAPSULE ORAL 2 TIMES DAILY PRN
Status: DISCONTINUED | OUTPATIENT
Start: 2022-05-30 | End: 2022-06-06 | Stop reason: HOSPADM

## 2022-05-30 RX ORDER — CEFTRIAXONE 2 G/1
2 INJECTION, POWDER, FOR SOLUTION INTRAMUSCULAR; INTRAVENOUS EVERY 24 HOURS
Status: DISCONTINUED | OUTPATIENT
Start: 2022-05-31 | End: 2022-05-31

## 2022-05-30 RX ORDER — AZITHROMYCIN 500 MG/5ML
500 INJECTION, POWDER, LYOPHILIZED, FOR SOLUTION INTRAVENOUS ONCE
Status: COMPLETED | OUTPATIENT
Start: 2022-05-30 | End: 2022-05-30

## 2022-05-30 RX ORDER — GUAIFENESIN AND DEXTROMETHORPHAN HYDROBROMIDE 100; 10 MG/5ML; MG/5ML
LIQUID ORAL
COMMUNITY
Start: 2022-05-26 | End: 2022-05-31

## 2022-05-30 RX ORDER — GUAIFENESIN/DEXTROMETHORPHAN 100-10MG/5
10 SYRUP ORAL EVERY 4 HOURS PRN
Status: DISCONTINUED | OUTPATIENT
Start: 2022-05-30 | End: 2022-06-06 | Stop reason: HOSPADM

## 2022-05-30 RX ORDER — IPRATROPIUM BROMIDE AND ALBUTEROL SULFATE 2.5; .5 MG/3ML; MG/3ML
3 SOLUTION RESPIRATORY (INHALATION)
Status: DISPENSED | OUTPATIENT
Start: 2022-05-30 | End: 2022-05-30

## 2022-05-30 RX ORDER — IBUPROFEN 400 MG/1
400 TABLET, FILM COATED ORAL EVERY 6 HOURS PRN
Status: DISCONTINUED | OUTPATIENT
Start: 2022-05-30 | End: 2022-06-06 | Stop reason: HOSPADM

## 2022-05-30 RX ADMIN — CEFTRIAXONE 2 G: 2 INJECTION, POWDER, FOR SOLUTION INTRAMUSCULAR; INTRAVENOUS at 18:51

## 2022-05-30 RX ADMIN — SODIUM CHLORIDE 90 ML: 9 INJECTION, SOLUTION INTRAVENOUS at 19:58

## 2022-05-30 RX ADMIN — IPRATROPIUM BROMIDE AND ALBUTEROL SULFATE 3 ML: 2.5; .5 SOLUTION RESPIRATORY (INHALATION) at 18:05

## 2022-05-30 RX ADMIN — GUAIFENESIN AND DEXTROMETHORPHAN 10 ML: 100; 10 SYRUP ORAL at 22:48

## 2022-05-30 RX ADMIN — IPRATROPIUM BROMIDE AND ALBUTEROL SULFATE 3 ML: 2.5; .5 SOLUTION RESPIRATORY (INHALATION) at 18:32

## 2022-05-30 RX ADMIN — DEXAMETHASONE SODIUM PHOSPHATE 10 MG: 10 INJECTION, SOLUTION INTRAMUSCULAR; INTRAVENOUS at 18:15

## 2022-05-30 RX ADMIN — IPRATROPIUM BROMIDE AND ALBUTEROL SULFATE 3 ML: 2.5; .5 SOLUTION RESPIRATORY (INHALATION) at 18:14

## 2022-05-30 RX ADMIN — AZITHROMYCIN MONOHYDRATE 500 MG: 500 INJECTION, POWDER, LYOPHILIZED, FOR SOLUTION INTRAVENOUS at 20:04

## 2022-05-30 RX ADMIN — IOPAMIDOL 73 ML: 755 INJECTION, SOLUTION INTRAVENOUS at 19:44

## 2022-05-30 RX ADMIN — ENOXAPARIN SODIUM 40 MG: 40 INJECTION SUBCUTANEOUS at 22:48

## 2022-05-30 ASSESSMENT — ACTIVITIES OF DAILY LIVING (ADL)
ADLS_ACUITY_SCORE: 35
ADLS_ACUITY_SCORE: 35

## 2022-05-30 NOTE — ED PROVIDER NOTES
History     Chief Complaint:  Shortness of Breath and Headache       HPI   Tatyana Metcalf is a 89 year old female with PMH of arthritis, COPD, depressive who presents with increasing shortness of breath, sputum production, cough and headache.  Patient reports that she has been having increasing cough and shortness of breath the past 4 to 5 days.  She also notes increasing sputum production and presence of yellow sputum.  She denies any fever or chills.  She was tested for COVID at her assisted living facility today and was positive.  Due to her increasing work of breathing today EMS was called to her facility.  They noted her to be 86% on room air upon their arrival.  She was started on 8 L of nonrebreather and transported to the emergency department.  We were able to titrate her down to 2 L upon arrival here.  Patient is not on oxygen chronically.  She denies any chest pain, pleuritic pain, nausea, vomiting or diarrhea.  She notes that she is vaccinated and boosted x2.    ROS:  Review of Systems   Constitutional: Positive for fatigue.   Respiratory: Positive for cough, shortness of breath and wheezing.    Cardiovascular: Negative for chest pain, palpitations and leg swelling.   Gastrointestinal: Negative for diarrhea, nausea and vomiting.   All other systems reviewed and are negative.    Allergies:  Penicillins  Sulfa Drugs     Medications:    acetaminophen (TYLENOL) 500 MG tablet  albuterol (ACCUNEB) 1.25 MG/3ML nebulizer solution  ARTIFICIAL TEARS 1.4 % ophthalmic solution  budesonide-formoterol (SYMBICORT) 160-4.5 MCG/ACT Inhaler  cholecalciferol (VITAMIN D3) 1000 UNIT tablet  diclofenac (VOLTAREN) 1 % topical gel  DULoxetine (CYMBALTA) 60 MG capsule  ferrous sulfate (IRON) 325 (65 FE) MG tablet  fexofenadine (ALLEGRA) 180 MG tablet  fluticasone (FLONASE) 50 MCG/ACT nasal spray  furosemide (LASIX) 20 MG tablet  gabapentin (NEURONTIN) 300 MG capsule  LANsoprazole (PREVACID) 30 MG DR capsule  losartan  (COZAAR) 100 MG tablet  metoprolol succinate ER (TOPROL-XL) 25 MG 24 hr tablet  naproxen sodium 220 MG capsule  nystatin (MYCOSTATIN) 117622 UNIT/GM external powder  polyethylene glycol (MIRALAX/GLYCOLAX) powder  PROAIR RESPICLICK 108 (90 BASE) MCG/ACT AEPB  SILTUSSIN DM ALCOHOL FREE 100-10 MG/5ML syrup        Past Medical History:    Past Medical History:   Diagnosis Date     Arthritis December 2017, Jan 2018     Fox's esophagus 10/3/2014     COPD (chronic obstructive pulmonary disease) (H) February 2016     Depressive disorder May 2011     Endometrial cancer (H) 2000     Hypertension 2005     SNHL (sensorineural hearing loss) 9/15/2015     Uncomplicated asthma February 2016       Past Surgical History:    Past Surgical History:   Procedure Laterality Date     APPENDECTOMY  1952     APPENDECTOMY  1951     BACK SURGERY  2002    Spinal Stenosis     BIOPSY  4-    Lung     COLONOSCOPY  7-    Normal     HYSTERECTOMY TOTAL ABDOMINAL, BILATERAL SALPINGO-OOPHORECTOMY, COMBINED  2000     HYSTERECTOMY TOTAL ABDOMINAL, BILATERAL SALPINGO-OOPHORECTOMY, COMBINED  2000    for uterine cancer        Family History:    family history includes Cerebrovascular Disease in her mother; Coronary Artery Disease in her father; Heart Disease in her father and mother; Hypertension in her father and mother.    Social History:   reports that she has never smoked. She has never used smokeless tobacco. She reports current alcohol use. She reports that she does not use drugs.  PCP: Jill Briscoe     Physical Exam     Patient Vitals for the past 24 hrs:   BP Temp Pulse Resp SpO2 Weight   05/30/22 2230 124/65 -- 84 11 92 % --   05/30/22 2215 121/73 -- 92 24 93 % --   05/30/22 2200 130/69 -- 81 16 91 % --   05/30/22 2145 128/67 -- 90 11 93 % --   05/30/22 2130 117/84 -- 101 23 92 % --   05/30/22 2115 (!) 105/90 -- 92 11 95 % --   05/30/22 2100 118/80 -- 95 17 95 % --   05/30/22 2045 133/63 -- 89 12 95 % --   05/30/22 2030  132/72 -- 96 18 95 % --   05/30/22 2015 127/81 -- 89 14 96 % --   05/30/22 1930 125/65 -- 91 12 95 % --   05/30/22 1915 137/79 -- 87 16 95 % --   05/30/22 1900 (!) 140/72 -- 73 11 94 % --   05/30/22 1845 132/64 -- 77 14 92 % --   05/30/22 1830 138/74 -- 76 11 93 % --   05/30/22 1829 -- -- -- -- -- 109.1 kg (240 lb 9.6 oz)   05/30/22 1815 (!) 150/79 -- 78 12 93 % --   05/30/22 1810 -- -- 70 12 94 % --   05/30/22 1805 -- -- 74 22 96 % --   05/30/22 1800 136/80 -- 76 (!) 38 96 % --   05/30/22 1749 138/67 -- -- -- -- --   05/30/22 1743 -- 98  F (36.7  C) 73 24 92 % --        Physical Exam  General: Elderly, obese, chronically ill appearing  Head: The scalp, face, and head appear normal  Eyes: The pupils are equal, round, and reactive to light. Conjunctivae and sclerae are normal  ENT: External acoustic canals are normal. The oropharynx is normal without erythema. Uvula is in the midline  Neck: Normal range of motion. No anterior cervical lymphadenopathy noted  CV: Regular rate. S1/S2. No murmurs.   Resp: mild to moderate respiratory distress.  Inspiratory and expiratory wheezes. Prolonged expiratory phase. Course breath sounds noted. No asymmetry to breath sounds.   GI: Abdomen is soft, no rigidity, guarding, or rebound. No distension. No tenderness to palpation in any quadrant.     MS: Normal tone. Joints grossly normal without effusions. No asymmetric leg swelling, calf or thigh tenderness.    Skin: No rash or lesions noted. Normal capillary refill noted  Neuro:Speech is normal and fluent. Face is symmetric. Moving all extremities.   Psych:  Normal affect.  Appropriate interactions.    Emergency Department Course   ECG:  ECG results from 05/30/22   EKG 12-lead, tracing only     Value    Systolic Blood Pressure     Diastolic Blood Pressure     Ventricular Rate 72    Atrial Rate 72    CT Interval 142    QRS Duration 138        QTc 462    P Axis 42    R AXIS 266    T Axis 19    Interpretation ECG      Sinus rhythm  with sinus arrhythmia  Right bundle branch block  Abnormal ECG  When compared with ECG of 21-OCT-2016 15:02,  Borderline criteria for Lateral infarct are no longer Present  Nonspecific T wave abnormality no longer evident in Anterior leads         Imaging:  CT Chest Pulmonary Embolism w Contrast   Final Result   IMPRESSION:   1.  Chronic changes throughout both lungs with new upper lobe predominant groundglass opacities, could represent mild pulmonary edema or early COVID pneumonia.   2.  No evidence of pulmonary embolism.      XR Chest Port 1 View   Final Result   IMPRESSION: Unchanged size of cardiomediastinal silhouette. Stable right lower lobe and linear bandlike left lower lobe atelectasis/scarring. No new, focal airspace disease. No pleural effusion or pneumothorax. Multiple old right rib fractures again    noted.         Report per radiology    Laboratory:  Labs Ordered and Resulted from Time of ED Arrival to Time of ED Departure   BASIC METABOLIC PANEL - Abnormal       Result Value    Sodium 139      Potassium 4.5      Chloride 103      Carbon Dioxide (CO2) 33 (*)     Anion Gap 3      Urea Nitrogen 33 (*)     Creatinine 0.61      Calcium 9.8      Glucose 126 (*)     GFR Estimate 84     INFLUENZA A/B & SARS-COV2 PCR MULTIPLEX - Abnormal    Influenza A PCR Negative      Influenza B PCR Negative      RSV PCR Negative      SARS CoV2 PCR Positive (*)    CBC WITH PLATELETS AND DIFFERENTIAL - Abnormal    WBC Count 9.3      RBC Count 4.83      Hemoglobin 14.3      Hematocrit 46.1      MCV 95      MCH 29.6      MCHC 31.0 (*)     RDW 14.0      Platelet Count 312      % Neutrophils 65      % Lymphocytes 22      % Monocytes 11      % Eosinophils 2      % Basophils 0      % Immature Granulocytes 0      NRBCs per 100 WBC 0      Absolute Neutrophils 6.0      Absolute Lymphocytes 2.1      Absolute Monocytes 1.0      Absolute Eosinophils 0.2      Absolute Basophils 0.0      Absolute Immature Granulocytes 0.0      Absolute  NRBCs 0.0     D DIMER QUANTITATIVE - Abnormal    D-Dimer Quantitative 1.59 (*)    ISTAT GASES LACTATE VENOUS POCT - Abnormal    Lactic Acid POCT 0.7      Bicarbonate Venous POCT 34 (*)     O2 Sat, Venous POCT 73 (*)     pCO2V Venous POCT 57 (*)     pH Venous POCT 7.38      pO2 Venous POCT 41     TROPONIN I - Normal    Troponin I High Sensitivity 6     MAGNESIUM - Normal    Magnesium 2.2     CRP INFLAMMATION - Normal    CRP Inflammation <2.9     HEPATIC FUNCTION PANEL - Normal    Bilirubin Total 0.3      Bilirubin Direct <0.1      Protein Total 7.7      Albumin 3.8      Alkaline Phosphatase 91      AST 23      ALT 29     INR - Normal    INR 0.92     CRP INFLAMMATION - Normal    CRP Inflammation <2.9     PROCALCITONIN      Emergency Department Course:    Reviewed:  I reviewed nursing notes, vitals and past medical history    Consults:   Spoke with Julianna Denny for Fish     Interventions:  Medications   ipratropium - albuterol 0.5 mg/2.5 mg/3 mL (DUONEB) neb solution 3 mL (3 mLs Nebulization Given 5/30/22 1832)   lidocaine 1 % 0.1-1 mL (has no administration in time range)   lidocaine (LMX4) cream (has no administration in time range)   sodium chloride (PF) 0.9% PF flush 3 mL (3 mLs Intracatheter Given 5/30/22 2333)   sodium chloride (PF) 0.9% PF flush 3 mL (has no administration in time range)   Medication instructions: Do NOT use nebulized medications (has no administration in time range)   dexamethasone PF (DECADRON) injection 6 mg (has no administration in time range)   ipratropium-albuterol (COMBIVENT RESPIMAT) inhaler 2 puff (has no administration in time range)   albuterol (PROVENTIL) neb solution 2.5 mg (has no administration in time range)   acetaminophen (TYLENOL) tablet 650 mg (has no administration in time range)     Or   acetaminophen (TYLENOL) Suppository 650 mg (has no administration in time range)   senna-docusate (SENOKOT-S/PERICOLACE) 8.6-50 MG per tablet 1 tablet (has no administration in time  range)     Or   senna-docusate (SENOKOT-S/PERICOLACE) 8.6-50 MG per tablet 2 tablet (has no administration in time range)   ondansetron (ZOFRAN ODT) ODT tab 4 mg (has no administration in time range)     Or   ondansetron (ZOFRAN) injection 4 mg (has no administration in time range)   enoxaparin ANTICOAGULANT (LOVENOX) injection 40 mg (40 mg Subcutaneous Given 5/30/22 2248)   guaiFENesin-dextromethorphan (ROBITUSSIN DM) 100-10 MG/5ML syrup 10 mL (10 mLs Oral Given 5/30/22 2248)   benzonatate (TESSALON) capsule 100 mg (has no administration in time range)   cefTRIAXone (ROCEPHIN) 2 g vial to attach to  ml bag for ADULTS or NS 50 ml bag for PEDS (has no administration in time range)   azithromycin (ZITHROMAX) 250 mg in sodium chloride 0.9 % 250 mL intermittent infusion (has no administration in time range)   remdesivir 200 mg in sodium chloride 0.9 % 250 mL intermittent infusion (has no administration in time range)     Followed by   0.9% sodium chloride BOLUS (has no administration in time range)   remdesivir 100 mg in sodium chloride 0.9 % 250 mL intermittent infusion (has no administration in time range)     And   0.9% sodium chloride BOLUS (has no administration in time range)   ibuprofen (ADVIL/MOTRIN) tablet 400 mg (has no administration in time range)   ipratropium - albuterol 0.5 mg/2.5 mg/3 mL (DUONEB) neb solution 3 mL (3 mLs Nebulization Given 5/30/22 1805)   dexamethasone PF (DECADRON) injection 10 mg (10 mg Intravenous Given 5/30/22 1815)   cefTRIAXone (ROCEPHIN) 2 g vial to attach to  ml bag for ADULTS or NS 50 ml bag for PEDS (0 g Intravenous Stopped 5/30/22 1931)   azithromycin 500 mg (ZITHROMAX) in 0.9% NaCl 250 mL intermittent infusion 500 mg (0 mg Intravenous Stopped 5/30/22 2117)   iopamidol (ISOVUE-370) solution 73 mL (73 mLs Intravenous Given 5/30/22 1944)   sodium chloride 0.9 % bag 500mL for CT scan flush use (90 mLs Intravenous Given 5/30/22 1958)        Disposition:  The patient  was admitted to the hospital under the care of Dr. Woods .     Impression & Plan      Medical Decision Making:  Tatyana Metcalf is a 89 year old female with PMH of arthritis, COPD not typically on O2, depressive who presents with increasing shortness of breath, increased sputum production, cough and headache. Patient was diagnosed with COVID at assisted living facility today.  EMS was called to her facility today due to increased work of breathing and worsening wheezing.  She was found to be hypoxic to 86% on room air and was started on 8 L of nonrebreather.  She was able to be titrated down to 2 L nasal cannula in the emergency department with stabilization of her oxygen saturations.  Upon initial evaluation here she is otherwise hemodynamically stable.  Physical exam detailed above which was highlighted by coarse breath sounds in multiple lung fields with persistent inspiratory and expiratory wheezing.  Patient was treated with 3 duo nebs and steroids with improvement of her respiratory status.  She was confirmed to be positive for COVID today.  She is not retaining a significant amount of CO2 and is not in need of BiPAP at this juncture.  However given her increasing sputum production and shortness of breath, I will treat her with ceftriaxone and azithromycin for acute COPD exacerbation.  Patient remained hemodynamically stable under my care and will be admitted to the hospitalist team for further evaluation and treatment of her COVID and COPD exacerbation.    Diagnosis:    ICD-10-CM    1. COPD exacerbation (H)  J44.1    2. Infection due to 2019 novel coronavirus  U07.1    3. Acute respiratory failure with hypoxia (H)  J96.01         5/30/2022   MD Bakari Jones, Markos Mccray MD  05/31/22 0048

## 2022-05-30 NOTE — LETTER
KIM Boyer, W  Inpatient Care Coordination  MS3, [facility]  [phone]

## 2022-05-30 NOTE — LETTER
Name: Tatyana Metcalf  : 3/19/1932  MRN #: [id number]  Primary Care Provider: Jill Briscoe     Primary Clinic: [address] [place] [address] [address]ICO [address]ERO [id number]     Reason for Hospitalization:  COPD exacerbation (H) [J44.1]  Acute respiratory failure with hypoxia (H) [J96.01]  Infection due to 2019 novel coronavirus [U07.1]  Admit Date/Time: 2022  5:41 PM  Discharge Date: 22  Payor Source: Payor: BLUE PLUS / Plan: BLUE PLUS ADVANTAGE DUAL MSHO / Product Type: Medicare /       Any outstanding tests or procedures:        Referrals     Future Labs/Procedures    Home Care Referral     Comments:    Your provider has ordered home health services. If you have not been contacted within 2 days of your discharge please call the inpatient department phone number at [phone] .          Supplies     Future Labs/Procedures    Oxygen Adult/Peds     Process Instructions:    By signing this order, the Authorizing Provider is attesting that they have completed a face-to-face evaluation on the patient to determine their need for this equipment in the last 60 days. A new face-to-face evaluation is required each time  A new prescription for one of the specified items is ordered.   If an additional provider completed the evaluation, please indicate their name below.     **As of 2018, an order requisition and face sheet will print for all DME orders. Please give printed order and face sheet to patient if not obtaining product from [facility] closet.     Comments:    Oxygen Documentation:   I certify that this patient, Tatyana Metcalf has been under my care (or a nurse practitioner or physican's assistant working with me). This is the face-to-face encounter for oxygen medical necessity.      Tatyana Metcalf is now in a chronic stable state and continues to require supplemental oxygen. Patient has continued oxygen desaturation due to Chronic Respiratory Failure with Hypoxia J93.11  COPD  J44.9.    Alternative treatment(s) tried or considered and deemed clinically infective for treatment of Chronic Respiratory Failure with Hypoxia J93.11  COPD J44.9 include nebulizers, inhalers, steroids, and pulmonary toileting.  If portability is ordered, is the patient mobile within the home? yes    **Patients who qualify for home O2 coverage under the CMS guidelines require ABG tests or O2 sat readings obtained closest to, but no earlier than 2 days prior to the discharge, as evidence of the need for home oxygen therapy. Testing must be performed while patient is in the chronic stable state. See notes for O2 sats.**              Patient will discharge back to her Assisted Living Facility today with new needs for continuous oxygen. Please deliver additional equipment if needed ASAP to her facility. Contact SANGEETA Atkinson [phone] at her Assisted Living Facility.    Maxine Fair RN BSN CM  Inpatient Care Coordination  [facility]  [phone]

## 2022-05-30 NOTE — LETTER
KIM Boyer, W  Inpatient Care Coordination  MS3, [facility]  [phone]

## 2022-05-30 NOTE — ED NOTES
Bed: ED32  Expected date: 5/30/22  Expected time: 5:28 PM  Means of arrival: Ambulance  Comments:  [facility] - 89F

## 2022-05-30 NOTE — ED TRIAGE NOTES
Pt arrives from assisted living facility with c/o increased SOB over the past couple of days. Pt tested positive for COVID today. Pt also endorses a headache. Pt has hx of COPD, doesn't use oxygen at baseline. EMS found pt to have room air sat of 86% upon arrival, oxygenation improved with 8L via NRB.      Triage Assessment     Row Name 05/30/22 3312       Triage Assessment (Adult)    Airway WDL WDL       Respiratory WDL    Respiratory WDL X;rhythm/pattern;cough    Rhythm/Pattern, Respiratory shortness of breath    Cough Frequency frequent    Cough Type congested       Skin Circulation/Temperature WDL    Skin Circulation/Temperature WDL WDL       Cardiac WDL    Cardiac WDL WDL       Peripheral/Neurovascular WDL    Peripheral Neurovascular WDL WDL       Cognitive/Neuro/Behavioral WDL    Cognitive/Neuro/Behavioral WDL WDL

## 2022-05-31 LAB
ANION GAP SERPL CALCULATED.3IONS-SCNC: 6 MMOL/L (ref 3–14)
ATRIAL RATE - MUSE: 72 BPM
BUN SERPL-MCNC: 25 MG/DL (ref 7–30)
CALCIUM SERPL-MCNC: 9.5 MG/DL (ref 8.5–10.1)
CHLORIDE BLD-SCNC: 104 MMOL/L (ref 94–109)
CO2 SERPL-SCNC: 31 MMOL/L (ref 20–32)
CREAT SERPL-MCNC: 0.48 MG/DL (ref 0.52–1.04)
CRP SERPL-MCNC: <2.9 MG/L (ref 0–8)
D DIMER PPP FEU-MCNC: 1.44 UG/ML FEU (ref 0–0.5)
DIASTOLIC BLOOD PRESSURE - MUSE: NORMAL MMHG
ERYTHROCYTE [DISTWIDTH] IN BLOOD BY AUTOMATED COUNT: 13.6 % (ref 10–15)
GFR SERPL CREATININE-BSD FRML MDRD: 89 ML/MIN/1.73M2
GLUCOSE BLD-MCNC: 172 MG/DL (ref 70–99)
HCT VFR BLD AUTO: 44.7 % (ref 35–47)
HGB BLD-MCNC: 13.8 G/DL (ref 11.7–15.7)
INTERPRETATION ECG - MUSE: NORMAL
MCH RBC QN AUTO: 29.8 PG (ref 26.5–33)
MCHC RBC AUTO-ENTMCNC: 30.9 G/DL (ref 31.5–36.5)
MCV RBC AUTO: 97 FL (ref 78–100)
P AXIS - MUSE: 42 DEGREES
PLATELET # BLD AUTO: 300 10E3/UL (ref 150–450)
POTASSIUM BLD-SCNC: 4 MMOL/L (ref 3.4–5.3)
PR INTERVAL - MUSE: 142 MS
PROCALCITONIN SERPL-MCNC: <0.05 NG/ML
QRS DURATION - MUSE: 138 MS
QT - MUSE: 422 MS
QTC - MUSE: 462 MS
R AXIS - MUSE: 266 DEGREES
RBC # BLD AUTO: 4.63 10E6/UL (ref 3.8–5.2)
SODIUM SERPL-SCNC: 141 MMOL/L (ref 133–144)
SYSTOLIC BLOOD PRESSURE - MUSE: NORMAL MMHG
T AXIS - MUSE: 19 DEGREES
VENTRICULAR RATE- MUSE: 72 BPM
WBC # BLD AUTO: 8.5 10E3/UL (ref 4–11)

## 2022-05-31 PROCEDURE — 258N000003 HC RX IP 258 OP 636: Performed by: INTERNAL MEDICINE

## 2022-05-31 PROCEDURE — 250N000011 HC RX IP 250 OP 636: Performed by: PHYSICIAN ASSISTANT

## 2022-05-31 PROCEDURE — 999N000157 HC STATISTIC RCP TIME EA 10 MIN

## 2022-05-31 PROCEDURE — 250N000013 HC RX MED GY IP 250 OP 250 PS 637: Performed by: INTERNAL MEDICINE

## 2022-05-31 PROCEDURE — 80048 BASIC METABOLIC PNL TOTAL CA: CPT | Performed by: PHYSICIAN ASSISTANT

## 2022-05-31 PROCEDURE — 36415 COLL VENOUS BLD VENIPUNCTURE: CPT | Performed by: PHYSICIAN ASSISTANT

## 2022-05-31 PROCEDURE — 85379 FIBRIN DEGRADATION QUANT: CPT | Performed by: PHYSICIAN ASSISTANT

## 2022-05-31 PROCEDURE — 96366 THER/PROPH/DIAG IV INF ADDON: CPT

## 2022-05-31 PROCEDURE — 250N000011 HC RX IP 250 OP 636: Performed by: INTERNAL MEDICINE

## 2022-05-31 PROCEDURE — 250N000009 HC RX 250: Performed by: PHYSICIAN ASSISTANT

## 2022-05-31 PROCEDURE — 99233 SBSQ HOSP IP/OBS HIGH 50: CPT | Performed by: INTERNAL MEDICINE

## 2022-05-31 PROCEDURE — 96361 HYDRATE IV INFUSION ADD-ON: CPT

## 2022-05-31 PROCEDURE — 250N000013 HC RX MED GY IP 250 OP 250 PS 637: Performed by: PHYSICIAN ASSISTANT

## 2022-05-31 PROCEDURE — 85014 HEMATOCRIT: CPT | Performed by: PHYSICIAN ASSISTANT

## 2022-05-31 PROCEDURE — 94640 AIRWAY INHALATION TREATMENT: CPT

## 2022-05-31 PROCEDURE — 120N000001 HC R&B MED SURG/OB

## 2022-05-31 PROCEDURE — 86140 C-REACTIVE PROTEIN: CPT | Performed by: PHYSICIAN ASSISTANT

## 2022-05-31 PROCEDURE — 94640 AIRWAY INHALATION TREATMENT: CPT | Mod: 76

## 2022-05-31 RX ORDER — GUAIFENESIN/DEXTROMETHORPHAN 100-10MG/5
10 SYRUP ORAL EVERY 4 HOURS PRN
COMMUNITY

## 2022-05-31 RX ORDER — GUAIFENESIN/DEXTROMETHORPHAN 100-10MG/5
10 SYRUP ORAL EVERY 4 HOURS PRN
Status: DISCONTINUED | OUTPATIENT
Start: 2022-05-31 | End: 2022-05-31

## 2022-05-31 RX ORDER — POLYETHYLENE GLYCOL 3350 17 G/17G
17 POWDER, FOR SOLUTION ORAL DAILY
Status: DISCONTINUED | OUTPATIENT
Start: 2022-05-31 | End: 2022-06-06 | Stop reason: HOSPADM

## 2022-05-31 RX ORDER — FLUTICASONE PROPIONATE 50 MCG
1 SPRAY, SUSPENSION (ML) NASAL DAILY PRN
Status: DISCONTINUED | OUTPATIENT
Start: 2022-05-31 | End: 2022-06-06 | Stop reason: HOSPADM

## 2022-05-31 RX ORDER — ALBUTEROL SULFATE 0.83 MG/ML
1.25 SOLUTION RESPIRATORY (INHALATION) 2 TIMES DAILY
Status: DISCONTINUED | OUTPATIENT
Start: 2022-05-31 | End: 2022-06-01

## 2022-05-31 RX ORDER — ALBUTEROL SULFATE 1.25 MG/3ML
1.25 SOLUTION RESPIRATORY (INHALATION) 2 TIMES DAILY
Status: DISCONTINUED | OUTPATIENT
Start: 2022-05-31 | End: 2022-05-31

## 2022-05-31 RX ORDER — ALBUTEROL SULFATE 90 UG/1
1 AEROSOL, METERED RESPIRATORY (INHALATION) EVERY 4 HOURS PRN
Status: DISCONTINUED | OUTPATIENT
Start: 2022-05-31 | End: 2022-06-06 | Stop reason: HOSPADM

## 2022-05-31 RX ORDER — DULOXETIN HYDROCHLORIDE 60 MG/1
60 CAPSULE, DELAYED RELEASE ORAL DAILY
Status: DISCONTINUED | OUTPATIENT
Start: 2022-05-31 | End: 2022-06-06 | Stop reason: HOSPADM

## 2022-05-31 RX ORDER — BUDESONIDE 0.25 MG/2ML
0.25 INHALANT ORAL 2 TIMES DAILY
Status: DISCONTINUED | OUTPATIENT
Start: 2022-05-31 | End: 2022-06-01

## 2022-05-31 RX ORDER — BUDESONIDE 0.25 MG/2ML
0.25 INHALANT ORAL 2 TIMES DAILY
Status: ON HOLD | COMMUNITY
End: 2022-06-06

## 2022-05-31 RX ORDER — PANTOPRAZOLE SODIUM 40 MG/1
40 TABLET, DELAYED RELEASE ORAL
Status: DISCONTINUED | OUTPATIENT
Start: 2022-06-01 | End: 2022-06-06 | Stop reason: HOSPADM

## 2022-05-31 RX ORDER — LOSARTAN POTASSIUM 100 MG/1
100 TABLET ORAL DAILY
Status: DISCONTINUED | OUTPATIENT
Start: 2022-05-31 | End: 2022-06-06 | Stop reason: HOSPADM

## 2022-05-31 RX ORDER — ALBUTEROL SULFATE 90 UG/1
1 AEROSOL, METERED RESPIRATORY (INHALATION) EVERY 4 HOURS PRN
COMMUNITY

## 2022-05-31 RX ORDER — GABAPENTIN 300 MG/1
300 CAPSULE ORAL 2 TIMES DAILY
Status: DISCONTINUED | OUTPATIENT
Start: 2022-05-31 | End: 2022-06-06 | Stop reason: HOSPADM

## 2022-05-31 RX ORDER — GLIPIZIDE 10 MG/1
1 TABLET ORAL EVERY MORNING
Status: DISCONTINUED | OUTPATIENT
Start: 2022-06-01 | End: 2022-06-06 | Stop reason: HOSPADM

## 2022-05-31 RX ORDER — FEXOFENADINE HCL 180 MG/1
180 TABLET ORAL DAILY
Status: DISCONTINUED | OUTPATIENT
Start: 2022-05-31 | End: 2022-06-06 | Stop reason: HOSPADM

## 2022-05-31 RX ORDER — LACTOBACILLUS RHAMNOSUS GG 10B CELL
1 CAPSULE ORAL DAILY
Status: DISCONTINUED | OUTPATIENT
Start: 2022-05-31 | End: 2022-06-06 | Stop reason: HOSPADM

## 2022-05-31 RX ADMIN — GABAPENTIN 300 MG: 300 CAPSULE ORAL at 22:29

## 2022-05-31 RX ADMIN — SODIUM CHLORIDE 50 ML: 9 INJECTION, SOLUTION INTRAVENOUS at 17:24

## 2022-05-31 RX ADMIN — IBUPROFEN 400 MG: 400 TABLET ORAL at 00:43

## 2022-05-31 RX ADMIN — BENZONATATE 100 MG: 100 CAPSULE ORAL at 13:34

## 2022-05-31 RX ADMIN — BENZONATATE 100 MG: 100 CAPSULE ORAL at 08:59

## 2022-05-31 RX ADMIN — POLYETHYLENE GLYCOL 3350 17 G: 17 POWDER, FOR SOLUTION ORAL at 14:46

## 2022-05-31 RX ADMIN — DULOXETINE HYDROCHLORIDE 60 MG: 60 CAPSULE, DELAYED RELEASE ORAL at 14:46

## 2022-05-31 RX ADMIN — METOPROLOL SUCCINATE 12.5 MG: 25 TABLET, EXTENDED RELEASE ORAL at 14:46

## 2022-05-31 RX ADMIN — ALBUTEROL SULFATE 2.5 MG: 2.5 SOLUTION RESPIRATORY (INHALATION) at 08:55

## 2022-05-31 RX ADMIN — ENOXAPARIN SODIUM 40 MG: 40 INJECTION SUBCUTANEOUS at 22:31

## 2022-05-31 RX ADMIN — IPRATROPIUM BROMIDE AND ALBUTEROL 2 PUFF: 20; 100 SPRAY, METERED RESPIRATORY (INHALATION) at 15:58

## 2022-05-31 RX ADMIN — Medication 1 CAPSULE: at 14:46

## 2022-05-31 RX ADMIN — IPRATROPIUM BROMIDE AND ALBUTEROL 2 PUFF: 20; 100 SPRAY, METERED RESPIRATORY (INHALATION) at 20:03

## 2022-05-31 RX ADMIN — REMDESIVIR 100 MG: 100 INJECTION, POWDER, LYOPHILIZED, FOR SOLUTION INTRAVENOUS at 17:18

## 2022-05-31 RX ADMIN — FEXOFENADINE HCL 180 MG: 180 TABLET ORAL at 14:46

## 2022-05-31 RX ADMIN — IPRATROPIUM BROMIDE AND ALBUTEROL 2 PUFF: 20; 100 SPRAY, METERED RESPIRATORY (INHALATION) at 08:47

## 2022-05-31 RX ADMIN — SODIUM CHLORIDE 50 ML: 9 INJECTION, SOLUTION INTRAVENOUS at 01:46

## 2022-05-31 RX ADMIN — IPRATROPIUM BROMIDE AND ALBUTEROL 2 PUFF: 20; 100 SPRAY, METERED RESPIRATORY (INHALATION) at 13:05

## 2022-05-31 RX ADMIN — LOSARTAN POTASSIUM 100 MG: 100 TABLET, FILM COATED ORAL at 14:46

## 2022-05-31 RX ADMIN — DEXAMETHASONE SODIUM PHOSPHATE 6 MG: 10 INJECTION, SOLUTION INTRAMUSCULAR; INTRAVENOUS at 13:34

## 2022-05-31 RX ADMIN — ACETAMINOPHEN 650 MG: 325 TABLET, FILM COATED ORAL at 17:07

## 2022-05-31 RX ADMIN — REMDESIVIR 200 MG: 100 INJECTION, POWDER, LYOPHILIZED, FOR SOLUTION INTRAVENOUS at 00:45

## 2022-05-31 RX ADMIN — GUAIFENESIN AND DEXTROMETHORPHAN 10 ML: 100; 10 SYRUP ORAL at 06:53

## 2022-05-31 RX ADMIN — ACETAMINOPHEN 650 MG: 325 TABLET, FILM COATED ORAL at 08:58

## 2022-05-31 ASSESSMENT — ENCOUNTER SYMPTOMS
WHEEZING: 1
NAUSEA: 0
VOMITING: 0
DIARRHEA: 0
FATIGUE: 1
SHORTNESS OF BREATH: 1
COUGH: 1
PALPITATIONS: 0

## 2022-05-31 ASSESSMENT — ACTIVITIES OF DAILY LIVING (ADL)
ADLS_ACUITY_SCORE: 35
ADLS_ACUITY_SCORE: 44
ADLS_ACUITY_SCORE: 35
ADLS_ACUITY_SCORE: 35
ADLS_ACUITY_SCORE: 44
ADLS_ACUITY_SCORE: 35
ADLS_ACUITY_SCORE: 44
ADLS_ACUITY_SCORE: 35

## 2022-05-31 NOTE — ED NOTES
[facility]  ED Nurse Handoff Report    Tatyana Metcalf is a 89 year old female   ED Chief complaint: Shortness of Breath and Headache  . ED Diagnosis:   Final diagnoses:   None     Allergies:   Allergies   Allergen Reactions     Penicillins      hives     Sulfa Drugs      Rash         Code Status: DNR / DNI  Activity level - Baseline/Home:  Assist X 1. Activity Level - Current:   Assist X 2. Lift room needed: No. Bariatric: No   Needed: No   Isolation: Yes. Infection: Not Applicable  COVID r/o and special precautions.     Vital Signs:   Vitals:    05/30/22 1900 05/30/22 1915 05/30/22 1930 05/30/22 2015   BP: (!) 140/72 137/79 125/65 127/81   BP Location:       Pulse: 73 87 91 89   Resp: 11 16 12 14   Temp:       SpO2: 94% 95% 95% 96%   Weight:           Cardiac Rhythm:  ,      Pain level:    Patient confused: Yes. Patient Falls Risk: Yes.   Elimination Status: Has voided   Patient Report - Initial Complaint: Pt arrives from assisted living facility with c/o increased SOB over the past couple of days. Pt tested positive for COVID today. Pt also endorses a headache. Pt has hx of COPD, doesn't use oxygen at baseline. EMS found pt to have room air sat of 86% upon arrival, oxygenation improved with 8L via NRB. . Focused Assessment: Respiratory WDL - Respiratory WDL: .WDL except; cough; rhythm/pattern  Rhythm/Pattern, Respiratory: shortness of breath  Cough Frequency: frequent  Cough Type: productive; loose   Breath Sounds - Breath Sounds: All Fields  All Lung Fields Breath Sounds: Anterior:; Posterior:; wheezes, inspiratory; wheezes, expiratory   Cardiac WDL - Cardiac WDL: WDL   Peripheral/Neurovascular WDL - Peripheral Neurovascular WDL: WDL   Cognitive/Neuro/Behavioral WDL - Cognitive/Neuro/Behavioral WDL: WDL   Gastrointestinal WDL - Gastrointestinal WDL: WDL   Genitourinary WDL - Genitourinary WDL: WDL   Skin WDL - Skin WDL: WDL   Coping - Observed Emotional State: calm; cooperative     Tests Performed: labs, imaging. Abnormal Results:   Labs Ordered and Resulted from Time of ED Arrival to Time of ED Departure   BASIC METABOLIC PANEL - Abnormal       Result Value    Sodium 139      Potassium 4.5      Chloride 103      Carbon Dioxide (CO2) 33 (*)     Anion Gap 3      Urea Nitrogen 33 (*)     Creatinine 0.61      Calcium 9.8      Glucose 126 (*)     GFR Estimate 84     INFLUENZA A/B & SARS-COV2 PCR MULTIPLEX - Abnormal    Influenza A PCR Negative      Influenza B PCR Negative      RSV PCR Negative      SARS CoV2 PCR Positive (*)    CBC WITH PLATELETS AND DIFFERENTIAL - Abnormal    WBC Count 9.3      RBC Count 4.83      Hemoglobin 14.3      Hematocrit 46.1      MCV 95      MCH 29.6      MCHC 31.0 (*)     RDW 14.0      Platelet Count 312      % Neutrophils 65      % Lymphocytes 22      % Monocytes 11      % Eosinophils 2      % Basophils 0      % Immature Granulocytes 0      NRBCs per 100 WBC 0      Absolute Neutrophils 6.0      Absolute Lymphocytes 2.1      Absolute Monocytes 1.0      Absolute Eosinophils 0.2      Absolute Basophils 0.0      Absolute Immature Granulocytes 0.0      Absolute NRBCs 0.0     D DIMER QUANTITATIVE - Abnormal    D-Dimer Quantitative 1.59 (*)    ISTAT GASES LACTATE VENOUS POCT - Abnormal    Lactic Acid POCT 0.7      Bicarbonate Venous POCT 34 (*)     O2 Sat, Venous POCT 73 (*)     pCO2V Venous POCT 57 (*)     pH Venous POCT 7.38      pO2 Venous POCT 41     TROPONIN I - Normal    Troponin I High Sensitivity 6     MAGNESIUM - Normal    Magnesium 2.2     CRP INFLAMMATION - Normal    CRP Inflammation <2.9       CT Chest Pulmonary Embolism w Contrast   Final Result   IMPRESSION:   1.  Chronic changes throughout both lungs with new upper lobe predominant groundglass opacities, could represent mild pulmonary edema or early COVID pneumonia.   2.  No evidence of pulmonary embolism.      XR Chest Port 1 View   Final Result   IMPRESSION: Unchanged size of cardiomediastinal silhouette.  Stable right lower lobe and linear bandlike left lower lobe atelectasis/scarring. No new, focal airspace disease. No pleural effusion or pneumothorax. Multiple old right rib fractures again    noted.        .   Treatments provided: see MAR  Family Comments: none at bedside  OBS brochure/video discussed/provided to patient:  N/A  ED Medications:   Medications   ipratropium - albuterol 0.5 mg/2.5 mg/3 mL (DUONEB) neb solution 3 mL (3 mLs Nebulization Given 5/30/22 1832)   azithromycin 500 mg (ZITHROMAX) in 0.9% NaCl 250 mL intermittent infusion 500 mg (500 mg Intravenous New Bag 5/30/22 2004)   ipratropium - albuterol 0.5 mg/2.5 mg/3 mL (DUONEB) neb solution 3 mL (3 mLs Nebulization Given 5/30/22 1805)   dexamethasone PF (DECADRON) injection 10 mg (10 mg Intravenous Given 5/30/22 1815)   cefTRIAXone (ROCEPHIN) 2 g vial to attach to  ml bag for ADULTS or NS 50 ml bag for PEDS (0 g Intravenous Stopped 5/30/22 1931)   iopamidol (ISOVUE-370) solution 73 mL (73 mLs Intravenous Given 5/30/22 1944)   sodium chloride 0.9 % bag 500mL for CT scan flush use (90 mLs Intravenous Given 5/30/22 1958)     Drips infusing: No  For the majority of the shift, the patient's behavior Green. Interventions performed were N/A.    Sepsis treatment initiated: No     Patient tested for COVID 19 prior to admission: YES, POSITIVE    ED Nurse Name/Phone Number: Bria Abreu RN,   8:28 PM    RECEIVING UNIT ED HANDOFF REVIEW    Above ED Nurse Handoff Report was reviewed: Yes  Reviewed by: Courtney Stokes RN on May 31, 2022 at 12:37 PM

## 2022-05-31 NOTE — PROGRESS NOTES
[facility]  Hospitalist Progress Note  Kavin Gonzalez MD 05/31/22    Reason for Stay (Diagnosis): COVID-19         Assessment and Plan:      Summary of Stay: Tatyana Metcalf is a 89 year old vaccinated x4 female with severe hearing loss and a PMH significant for COPD, hx of endometrial cancer with mets to lung, HTN, GERD who presented 5/30/2022 with cough, wheezing and hypoxia found to have COVID-19.     Medics reported that in the field her oxygen saturations were 86% on room air.  She required 2 L nasal cannula.  Here in the ER CT PE study was negative for PE but showed bilateral infiltrates consistent with COVID-19 pneumonia.  She was started on Decadron, remdesivir and initially antibiotics but in the setting of negative procalcitonin these of been stopped.          #Acute hypoxemic respiratory failure suspect multifactorial related to COVID-pneumonia and COPD exacerbation  -Started on dexamethasone 10 mg daily on 5/30.  -- Started remdesivir 5/31  -- Continue as DuoNebs  --Lovenox for DVT/PE prophylaxis  --Lansoprazole while on steroids and anticoagulation  --Robitussin and Tessalon Perles for cough/congestion  --If she clinically declines consider baricitinib but currently her CRP is surprisingly low     #History of COPD  -Does not require oxygen at baseline  -Recently found out that Symbicort is not covered by insurance so she has not been taking.  She will need to be discharged on alternative  -Pharmacy liaison consult to price out combination steroid/bronchodilator  -Continue Allegra and Flonase     #HTN  -Continue Toprol-XL and losartan  -Hold Lasix for now     #Depression  -Continue Cymbalta     #Sensorineural hearing loss  -Recommend nursing order pocket talker     #History of endometrial cancer       Social: Lives in assisted living  Code: Discussed with patient  upon admission and and they have chosen DNR/DNI  VTE prophylaxis: Lovenox  Disposition:  Likely another 2 days  here.        Interval History (Subjective):      Patient was admitted last night, I assumed care today  She feels generally crummy and has ongoing cough.  Remains on approximately 2 L/min by nasal cannula  Continuing Decadron, remdesivir but stopping antibiotics given negative procalcitonin                  Physical Exam:      Last Vital Signs:  BP (!) 164/59 (BP Location: Left arm)   Pulse 89   Temp 98.2  F (36.8  C) (Oral)   Resp 24   Ht 1.524 m (5')   Wt 107.8 kg (237 lb 9.6 oz)   SpO2 95%   BMI 46.40 kg/m        Intake/Output Summary (Last 24 hours) at 5/31/2022 1347  Last data filed at 5/31/2022 1000  Gross per 24 hour   Intake 490 ml   Output 950 ml   Net -460 ml       General: Alert, awake, no acute distress but looks ill and tired.  HEENT: NC/AT, eyes anicteric, external occular movements intact, face symmetric.    Cardiac: RRR, S1, S2.  No murmurs appreciated.  Pulmonary: Normal chest rise, normal work of breathing.  Wet cough, bilateral crackles.  Abdomen: soft, non-tender, non-distended.  Bowel Sounds Present.  No guarding.  Extremities: no deformities.  Warm, well perfused.  Skin: no rashes or lesions noted.  Warm and Dry.  Neuro: No focal deficits noted.  Speech clear.  Coordination and strength grossly normal.  Psych: Appropriate affect.         Medications:      All current medications were reviewed with changes reflected in problem list.         Data:      All new lab and imaging data was reviewed.   Labs:  Recent Labs   Lab 05/31/22  0942      POTASSIUM 4.0   CHLORIDE 104   CO2 31   ANIONGAP 6   *   BUN 25   CR 0.48*   GFRESTIMATED 89   ISRAEL 9.5     Recent Labs   Lab 05/31/22  0942   WBC 8.5   HGB 13.8   HCT 44.7   MCV 97         Imaging:   Recent Results (from the past 48 hour(s))   XR Chest Port 1 View    Narrative    EXAM: XR CHEST PORT 1 VIEW  LOCATION: [facility]  DATE/TIME: 5/30/2022 6:13 PM    INDICATION: SOB, covid  COMPARISON: Chest  radiograph 02/11/2020      Impression    IMPRESSION: Unchanged size of cardiomediastinal silhouette. Stable right lower lobe and linear bandlike left lower lobe atelectasis/scarring. No new, focal airspace disease. No pleural effusion or pneumothorax. Multiple old right rib fractures again   noted.   CT Chest Pulmonary Embolism w Contrast    Narrative    EXAM: CT CHEST PULMONARY EMBOLISM W CONTRAST  LOCATION: [facility]  DATE/TIME: 5/30/2022 7:41 PM    INDICATION: PE suspected, low/intermediate prob, positive D-dimer, increasing shortness of breath  COMPARISON: Same day chest radiograph, CT 12/11/2020  TECHNIQUE: CT chest pulmonary angiogram during arterial phase injection of IV contrast. Multiplanar reformats and MIP reconstructions were performed. Dose reduction techniques were used.   CONTRAST: 73 mL Isovue 370    FINDINGS:  ANGIOGRAM CHEST: Pulmonary arteries are normal caliber and negative for pulmonary emboli. Thoracic aorta is negative for dissection. No CT evidence of right heart strain.    LUNGS AND PLEURA: Stable chronic right lower lobe consolidation with associated calcifications as well as linear atelectasis versus scarring in the left lower lobe. Newly visualized upper lobe predominant scattered groundglass opacities, most pronounced   in the left upper lobe. No new, focal airspace consolidation. No pleural effusion or pneumothorax.    MEDIASTINUM/AXILLAE: Large hiatal hernia.    CORONARY ARTERY CALCIFICATION: Mild.    UPPER ABDOMEN: Unremarkable.    MUSCULOSKELETAL: Multiple chronic bilateral rib fractures, some of which demonstrate increased callus formation/heterotopic ossification since prior examination. Stable thoracic spine compression deformities. No definite acute fracture.      Impression    IMPRESSION:  1.  Chronic changes throughout both lungs with new upper lobe predominant groundglass opacities, could represent mild pulmonary edema or early COVID pneumonia.  2.  No  evidence of pulmonary embolism.         Kavin Gonzalez MD

## 2022-05-31 NOTE — H&P
History and Physical     Tatyana Metcalf MRN# [id number]   YOB: 1932 Age: 89 year old      Date of Admission:  5/30/2022    Primary care provider: Jill Briscoe          Assessment and Plan:   Tatyana Metcalf is a 89 year old vaccinated x4 female with severe hearing loss and a PMH significant for COPD, hx of endometrial cancer with mets to lung, HTN, GERD who presents with cough, wheezing and hypoxia.     Patient was discussed with Dr. Villegas, who was provider in ED. Chart review of ED work up was reviewed as well as chart review of Care Everywhere, previous visits and admissions.     #Acute hypoxemic respiratory failure suspect multifactorial related to COVID-pneumonia and COPD exacerbation  #Possible bacterial pneumonia  -Describes 2 days of cough, shortness of breath and wheezing  -COVID antigen test positive on 5/30  -EMS reports oxygen levels of 86%, CT PE study negative for PE but shows bilateral infiltrates consistent with COVID-pneumonia  -Requiring 2 L of oxygen  -Patient reports colored sputum so ED started ceftriaxone and azithromycin.  Daughter states that she always has colored sputum.  -Add on procalcitonin and if elevated continue IV antibiotics  -Given dexamethasone 10 mg as well as DuoNebs  -Patient agreeable to remdesivir so we will add on LFTs before ordering  -Continue dexamethasone  -Scheduled DuoNebs  -Lovenox for DVT/PE prophylaxis  -Lansoprazole for stomach protection  -Robitussin and Tessalon Perles for cough/congestion    #History of COPD  -Does not require oxygen at baseline  -Recently found out that Symbicort is not covered by insurance so she has not been taking.  She will need to be discharged on alternative  -Pharmacy liaison consult to price out combination steroid/bronchodilator  -Continue Allegra and Flonase    #HTN  -Continue Toprol-XL and losartan  -Hold Lasix for now    #Depression  -Continue Cymbalta    #Sensorineural hearing loss  -Recommend nursing  order pocket talker    #History of endometrial cancer    Updated daughter by phone    Social: Lives in assisted living  Code: Discussed with patient and and they have chosen DNR/DNI  VTE prophylaxis: Lovenox  Disposition: Inpatient                    Chief Complaint:   Cough, wheezing         History of Present Illness:   History limited as patient is hard of hearing so complete details regarding timing of illness are limited    Tatyana Metcalf is a vaccinated x 4 89 year old female who presents with cough, wheezing and shortness of breath that has been worsening over the past couple of days. She tested positive for COVID at her assisted living facility today and with her difficulty in breathing/wheezing EMS was called. She was found to have oxygen level of 86%. She has a headache but otherwise denies pain, diarrhea, vomiting and urinary symptoms. She ambulates with a walker and has not fallen recently. She denies alcohol use and smoking.              Past Medical History:     Past Medical History:   Diagnosis Date     Arthritis December 2017, Jan 2018    left knee, right knee     Fox's esophagus 10/3/2014    last EGD in 2011     COPD (chronic obstructive pulmonary disease) (H) February 2016    was in hospital     Depressive disorder May 2011     Endometrial cancer (H) 2000    treated surgically, did not have chemo or XRT     Hypertension 2005     SNHL (sensorineural hearing loss) 9/15/2015     Uncomplicated asthma February 2016    was in hospital               Past Surgical History:     Past Surgical History:   Procedure Laterality Date     APPENDECTOMY  1952     APPENDECTOMY  1951     BACK SURGERY  2002    Spinal Stenosis     BIOPSY  4-    Lung     COLONOSCOPY  7-    Normal     HYSTERECTOMY TOTAL ABDOMINAL, BILATERAL SALPINGO-OOPHORECTOMY, COMBINED  2000     HYSTERECTOMY TOTAL ABDOMINAL, BILATERAL SALPINGO-OOPHORECTOMY, COMBINED  2000    for uterine cancer               Social History:      Social History     Socioeconomic History     Marital status:      Spouse name: Not on file     Number of children: Not on file     Years of education: Not on file     Highest education level: Not on file   Occupational History     Not on file   Tobacco Use     Smoking status: Never Smoker     Smokeless tobacco: Never Used   Substance and Sexual Activity     Alcohol use: Yes     Alcohol/week: 0.0 standard drinks     Comment: a few times a year     Drug use: No     Sexual activity: Never   Other Topics Concern     Parent/sibling w/ CABG, MI or angioplasty before 65F 55M? Yes     Comment: Mom and Dad   Social History Narrative     Not on file     Social Determinants of Health     Financial Resource Strain: Not on file   Food Insecurity: Not on file   Transportation Needs: Not on file   Physical Activity: Not on file   Stress: Not on file   Social Connections: Not on file   Intimate Partner Violence: Not on file   Housing Stability: Not on file               Family History:     Family History   Problem Relation Age of Onset     Heart Disease Mother      Hypertension Mother      Cerebrovascular Disease Mother      Heart Disease Father      Coronary Artery Disease Father      Hypertension Father               Allergies:      Allergies   Allergen Reactions     Penicillins      hives     Sulfa Drugs      Rash                 Medications:     Prior to Admission medications    Medication Sig Last Dose Taking? Auth Provider   acetaminophen (TYLENOL) 500 MG tablet Take 1,000 mg by mouth 2 times daily   Reported, Patient   albuterol (ACCUNEB) 1.25 MG/3ML nebulizer solution Take 1 vial (1.25 mg) by nebulization every 4 hours as needed for shortness of breath / dyspnea or wheezing   Shay Bauer MD   ARTIFICIAL TEARS 1.4 % ophthalmic solution    Reported, Patient   budesonide-formoterol (SYMBICORT) 160-4.5 MCG/ACT Inhaler Inhale 2 puffs into the lungs 2 times daily   Reported, Patient   cholecalciferol  (VITAMIN D3) 1000 UNIT tablet Take 1,000 Units by mouth every evening   Unknown, Entered By History   diclofenac (VOLTAREN) 1 % topical gel    Reported, Patient   DULoxetine (CYMBALTA) 60 MG capsule Take 1 capsule (60 mg) by mouth daily   Jill Briscoe MD   ferrous sulfate (IRON) 325 (65 FE) MG tablet Take 325 mg by mouth every evening    Reported, Patient   fexofenadine (ALLEGRA) 180 MG tablet Take 180 mg by mouth daily   Reported, Patient   fluticasone (FLONASE) 50 MCG/ACT nasal spray Spray 1 spray into both nostrils daily as needed for rhinitis or allergies   Rowdy Connolly Mai, MD   furosemide (LASIX) 20 MG tablet Take 1 tablet (20 mg) by mouth daily as needed (edema)   Jill Briscoe MD   gabapentin (NEURONTIN) 300 MG capsule Take 1 capsule (300 mg) by mouth 2 times daily   Jill Briscoe MD   LANsoprazole (PREVACID) 30 MG DR capsule Take 1 capsule (30 mg) by mouth daily 30-60 minutes before a meal   Jill Briscoe MD   losartan (COZAAR) 100 MG tablet Take 1 tablet (100 mg) by mouth daily   Jill Briscoe MD   metoprolol succinate ER (TOPROL-XL) 25 MG 24 hr tablet TAKE HALF TABLET BY MOUTH DAILY   Jill Briscoe MD   naproxen sodium 220 MG capsule Take 220 mg by mouth At Bedtime   Reported, Patient   nystatin (MYCOSTATIN) 113028 UNIT/GM external powder Apply topically 3 times daily as needed for dry skin (under breasts)   Jill Briscoe MD   polyethylene glycol (MIRALAX/GLYCOLAX) powder TAKE 17GRAMS BY MOUTH TWICE DAILY AS DIRECTED.   Ayaz Fernandez MD   PROAIR RESPICLICK 108 (90 BASE) MCG/ACT AEPB INL 2 PUFFS PO Q 4 TO 6 H PRN   Reported, Patient   SILTUSSIN DM ALCOHOL FREE 100-10 MG/5ML syrup    Reported, Patient              Review of Systems:   A Comprehensive greater than 10 system review of systems was carried out.  Pertinent positives and negatives are noted above.  Otherwise negative for contributory information.            Physical Exam:   Blood pressure  133/63, pulse 89, temperature 98  F (36.7  C), resp. rate 12, weight 109.1 kg (240 lb 9.6 oz), SpO2 95 %, not currently breastfeeding.  Exam:  GENERAL:  Comfortable.  PSYCH: pleasant, oriented, No acute distress.  HEENT:  PERRLA. Normal conjunctiva, normal hearing, nasal mucosa and Oropharynx are normal.  NECK:  Supple, no neck vein distention, adenopathy or bruits, normal thyroid.  HEART:  Normal S1, S2 with no murmur, no pericardial rub, gallops or S3 or S4.  LUNGS: Breathing comfortably, Diffuse rhonchi bilaterally  ABDOMEN:  Soft, no hepatosplenomegaly, normal bowel sounds. Non-tender, non distended.   EXTREMITIES:  No pedal edema, +2 pulses bilateral and equal.  SKIN:  Dry to touch, No rash, wound or ulcerations.  NEUROLOGIC:  CN 2-12 intact, BL 5/5 symmetric upper and lower extremity strength, sensation is intact with no focal deficits.               Data:     Recent Labs   Lab 05/30/22  1756   WBC 9.3   HGB 14.3   HCT 46.1   MCV 95        Recent Labs   Lab 05/30/22  1756      POTASSIUM 4.5   CHLORIDE 103   CO2 33*   ANIONGAP 3   *   BUN 33*   CR 0.61   GFRESTIMATED 84   ISRAEL 9.8   MAG 2.2     Recent Labs   Lab 05/30/22  1803   LACT 0.7         Recent Results (from the past 24 hour(s))   XR Chest Port 1 View    Narrative    EXAM: XR CHEST PORT 1 VIEW  LOCATION: [facility]  DATE/TIME: 5/30/2022 6:13 PM    INDICATION: SOB, covid  COMPARISON: Chest radiograph 02/11/2020      Impression    IMPRESSION: Unchanged size of cardiomediastinal silhouette. Stable right lower lobe and linear bandlike left lower lobe atelectasis/scarring. No new, focal airspace disease. No pleural effusion or pneumothorax. Multiple old right rib fractures again   noted.   CT Chest Pulmonary Embolism w Contrast    Narrative    EXAM: CT CHEST PULMONARY EMBOLISM W CONTRAST  LOCATION: [facility]  DATE/TIME: 5/30/2022 7:41 PM    INDICATION: PE suspected, low/intermediate prob,  positive D-dimer, increasing shortness of breath  COMPARISON: Same day chest radiograph, CT 12/11/2020  TECHNIQUE: CT chest pulmonary angiogram during arterial phase injection of IV contrast. Multiplanar reformats and MIP reconstructions were performed. Dose reduction techniques were used.   CONTRAST: 73 mL Isovue 370    FINDINGS:  ANGIOGRAM CHEST: Pulmonary arteries are normal caliber and negative for pulmonary emboli. Thoracic aorta is negative for dissection. No CT evidence of right heart strain.    LUNGS AND PLEURA: Stable chronic right lower lobe consolidation with associated calcifications as well as linear atelectasis versus scarring in the left lower lobe. Newly visualized upper lobe predominant scattered groundglass opacities, most pronounced   in the left upper lobe. No new, focal airspace consolidation. No pleural effusion or pneumothorax.    MEDIASTINUM/AXILLAE: Large hiatal hernia.    CORONARY ARTERY CALCIFICATION: Mild.    UPPER ABDOMEN: Unremarkable.    MUSCULOSKELETAL: Multiple chronic bilateral rib fractures, some of which demonstrate increased callus formation/heterotopic ossification since prior examination. Stable thoracic spine compression deformities. No definite acute fracture.      Impression    IMPRESSION:  1.  Chronic changes throughout both lungs with new upper lobe predominant groundglass opacities, could represent mild pulmonary edema or early COVID pneumonia.  2.  No evidence of pulmonary embolism.         Bonnie Denny PA-C    This patient was seen and discussed with Dr. Woods who agrees with the current plans as outlined above.

## 2022-05-31 NOTE — PROGRESS NOTES
Patient was waiting for bed placement in the ER. Patient AOx4 this shift, but very hard of hearing. Denies pain or nausea. Endorses SOB, coarse lung sounds with wheezing throughout all fields. Tylenol for c/o headache. PRN neb given for wheezing. All personal belongings sent with patient when transported to the floor

## 2022-05-31 NOTE — PHARMACY-ADMISSION MEDICATION HISTORY
Admission medication history interview status for this patient is complete. See [facility] admission navigator for allergy information, prior to admission medications and immunization status.     Medication history resources (including written lists, pill bottles, clinic record):med list from [facility]  Pharmacy: [facility] - [place], [place] - [address]    Changes made to PTA medication list:  Added: probiotics, budesonide nebs, albuterol inhaler PRN,   Changed:   - Albuterol nebs 1 vial q4h prn -> 1 vial BID  - Furosemide 20 mg daily PRN -> daily scheduled  - Miralax 17g BID -> daily  - Tylenol 1000 mg BID -> TID  Reported as Not Taking: none  Removed: Symbicort 160-4.5 2 puffs BID    Actions taken by pharmacist (provider contacted, etc):sticky note/paged provider     Additional medication history information:None    Medication reconciliation/reorder completed by provider prior to medication history?  N    Prior to Admission medications    Medication Sig Last Dose Taking? Auth Provider   acetaminophen (TYLENOL) 500 MG tablet Take 1,000 mg by mouth 3 times daily At 8am, 12pm, 8pm 5/30/2022 at Unknown time Yes Reported, Patient   albuterol (ACCUNEB) 1.25 MG/3ML nebulizer solution Take 1 vial (1.25 mg) by nebulization every 4 hours as needed for shortness of breath / dyspnea or wheezing  Patient taking differently: Take 1 vial by nebulization 2 times daily 5/30/2022 at Unknown time Yes Shay Bauer MD   albuterol (PROAIR HFA/PROVENTIL HFA/VENTOLIN HFA) 108 (90 Base) MCG/ACT inhaler Inhale 1 puff into the lungs every 4 hours as needed for shortness of breath / dyspnea or wheezing  Yes Unknown, Entered By History   ARTIFICIAL TEARS 1.4 % ophthalmic solution Place 1 drop into both eyes every morning 5/30/2022 at Unknown time Yes Reported, Patient   budesonide (PULMICORT) 0.25 MG/2ML neb solution Take 0.25 mg by nebulization 2 times daily 5/30/2022 at Unknown time Yes Unknown, Entered By History    DULoxetine (CYMBALTA) 60 MG capsule Take 1 capsule (60 mg) by mouth daily 5/30/2022 at Unknown time Yes Jill Briscoe MD   ferrous sulfate (IRON) 325 (65 FE) MG tablet Take 325 mg by mouth every evening  5/30/2022 at Unknown time Yes Reported, Patient   fexofenadine (ALLEGRA) 180 MG tablet Take 180 mg by mouth daily 5/30/2022 at Unknown time Yes Reported, Patient   fluticasone (FLONASE) 50 MCG/ACT nasal spray Spray 1 spray into both nostrils daily as needed for rhinitis or allergies  at PRN Yes Rowdy Connolly Mai, MD   furosemide (LASIX) 20 MG tablet Take 1 tablet (20 mg) by mouth daily as needed (edema)  Patient taking differently: Take 20 mg by mouth daily 5/30/2022 at Unknown time Yes Jill Briscoe MD   gabapentin (NEURONTIN) 300 MG capsule Take 1 capsule (300 mg) by mouth 2 times daily 5/30/2022 at Unknown time Yes Jill Briscoe MD   guaiFENesin-dextromethorphan (ROBITUSSIN DM) 100-10 MG/5ML syrup Take 10 mLs by mouth every 4 hours as needed for cough  at PRN Yes Unknown, Entered By History   Lactobacillus (ACIDOPHILUS PROBIOTIC PO) Take 1 capsule by mouth daily 5/30/2022 at Unknown time Yes Unknown, Entered By History   LANsoprazole (PREVACID) 30 MG DR capsule Take 1 capsule (30 mg) by mouth daily 30-60 minutes before a meal 5/30/2022 at Unknown time Yes Jill Briscoe MD   losartan (COZAAR) 100 MG tablet Take 1 tablet (100 mg) by mouth daily 5/30/2022 at PM Yes Jill Briscoe MD   metoprolol succinate ER (TOPROL-XL) 25 MG 24 hr tablet TAKE HALF TABLET BY MOUTH DAILY 5/30/2022 at PM Yes Jill Briscoe MD   naproxen sodium 220 MG capsule Take 220 mg by mouth At Bedtime 5/30/2022 at Unknown time Yes Reported, Patient   nystatin (MYCOSTATIN) 337261 UNIT/GM external powder Apply topically 3 times daily as needed for dry skin (under breasts)  at PRN Yes Jill Briscoe MD   polyethylene glycol (MIRALAX/GLYCOLAX) powder TAKE 17GRAMS BY MOUTH TWICE DAILY AS  DIRECTED.  Patient taking differently: Take 17 g by mouth daily 5/30/2022 at am Yes Ayaz Fernandez MD   vitamin D3 (CHOLECALCIFEROL) 1000 units (25 mcg) tablet Take 1,000 Units by mouth every evening 5/30/2022 at pm Yes Unknown, Entered By History       Reported, Patient

## 2022-05-31 NOTE — PROGRESS NOTES
Assumed care from 1255- 1500    Temp: 98.2  F (36.8  C) Temp src: Oral BP: (!) 164/59 Pulse: 89   Resp: 24 SpO2: 95 % O2 Device: Nasal cannula Oxygen Delivery: 3 LPM    Pt here for COVID PNA, LS whz, pt c/o SOB. Scheduled inhaler and PRN tessalon given. Frequent cough, leiva. Purewick in place. Pt [place], pocket talker ordered. Safety maintained, will continue POC.

## 2022-05-31 NOTE — PHARMACY-RX INSURANCE COVERAGE
Patient has Medicare + Medicaid through [facility].    LABA/ICS    Symbicort 160/4.5 Not covered   Breo 100/25 $0.   Advair 250/50 $0.     Vera Baldwin  Pharmacy Technician/Liaison, Discharge Pharmacy   [phone]  [email or web address]

## 2022-05-31 NOTE — PLAN OF CARE
End of shift summary- [id number]  Dx: Hypoxia & COVID +  A/O: Alert and Oriented x4, [place]  Diet: Regular  Fluids: is Saline locked.  Transfer: 1 Assist with Walker  Bathroom: Purewick  Pain: c/o headache - Ibuprofen given with relief  Telemetry Monitoring: No  Treatment:  Remdesivir, nebs, and cough medicine (Mucinex, Tessalon, and Robitussin). On 3L NC.   Discharge Plans: tbd        Blood pressure (!) 152/85, pulse 80, temperature 97.9  F (36.6  C), resp. rate 12, weight 109.1 kg (240 lb 9.6 oz), SpO2 92 %, not currently breastfeeding.

## 2022-05-31 NOTE — PROGRESS NOTES
Cross cover    ED notified of LFTs within normal limits per H&P.  Remdesivir as LFTs within normal limits

## 2022-06-01 ENCOUNTER — APPOINTMENT (OUTPATIENT)
Dept: CARDIOLOGY | Facility: CLINIC | Age: 87
DRG: 177 | End: 2022-06-01
Attending: INTERNAL MEDICINE
Payer: COMMERCIAL

## 2022-06-01 LAB
ANION GAP SERPL CALCULATED.3IONS-SCNC: 2 MMOL/L (ref 3–14)
BUN SERPL-MCNC: 25 MG/DL (ref 7–30)
CALCIUM SERPL-MCNC: 9.4 MG/DL (ref 8.5–10.1)
CHLORIDE BLD-SCNC: 108 MMOL/L (ref 94–109)
CO2 SERPL-SCNC: 30 MMOL/L (ref 20–32)
CREAT SERPL-MCNC: 0.49 MG/DL (ref 0.52–1.04)
CRP SERPL-MCNC: <2.9 MG/L (ref 0–8)
D DIMER PPP FEU-MCNC: 1.46 UG/ML FEU (ref 0–0.5)
ERYTHROCYTE [DISTWIDTH] IN BLOOD BY AUTOMATED COUNT: 13.9 % (ref 10–15)
GFR SERPL CREATININE-BSD FRML MDRD: 89 ML/MIN/1.73M2
GLUCOSE BLD-MCNC: 158 MG/DL (ref 70–99)
HCT VFR BLD AUTO: 44.3 % (ref 35–47)
HGB BLD-MCNC: 13.8 G/DL (ref 11.7–15.7)
LVEF ECHO: NORMAL
MCH RBC QN AUTO: 30.1 PG (ref 26.5–33)
MCHC RBC AUTO-ENTMCNC: 31.2 G/DL (ref 31.5–36.5)
MCV RBC AUTO: 97 FL (ref 78–100)
PLATELET # BLD AUTO: 311 10E3/UL (ref 150–450)
POTASSIUM BLD-SCNC: 3.9 MMOL/L (ref 3.4–5.3)
RBC # BLD AUTO: 4.59 10E6/UL (ref 3.8–5.2)
SODIUM SERPL-SCNC: 140 MMOL/L (ref 133–144)
WBC # BLD AUTO: 11 10E3/UL (ref 4–11)

## 2022-06-01 PROCEDURE — 94640 AIRWAY INHALATION TREATMENT: CPT

## 2022-06-01 PROCEDURE — 93325 DOPPLER ECHO COLOR FLOW MAPG: CPT | Mod: 26 | Performed by: INTERNAL MEDICINE

## 2022-06-01 PROCEDURE — 250N000013 HC RX MED GY IP 250 OP 250 PS 637: Performed by: PHYSICIAN ASSISTANT

## 2022-06-01 PROCEDURE — 80048 BASIC METABOLIC PNL TOTAL CA: CPT | Performed by: PHYSICIAN ASSISTANT

## 2022-06-01 PROCEDURE — 99233 SBSQ HOSP IP/OBS HIGH 50: CPT | Performed by: INTERNAL MEDICINE

## 2022-06-01 PROCEDURE — 250N000013 HC RX MED GY IP 250 OP 250 PS 637: Performed by: INTERNAL MEDICINE

## 2022-06-01 PROCEDURE — 250N000011 HC RX IP 250 OP 636: Performed by: INTERNAL MEDICINE

## 2022-06-01 PROCEDURE — 250N000011 HC RX IP 250 OP 636: Performed by: PHYSICIAN ASSISTANT

## 2022-06-01 PROCEDURE — 999N000157 HC STATISTIC RCP TIME EA 10 MIN

## 2022-06-01 PROCEDURE — 93308 TTE F-UP OR LMTD: CPT

## 2022-06-01 PROCEDURE — 86140 C-REACTIVE PROTEIN: CPT | Performed by: PHYSICIAN ASSISTANT

## 2022-06-01 PROCEDURE — 94640 AIRWAY INHALATION TREATMENT: CPT | Mod: 76

## 2022-06-01 PROCEDURE — 93321 DOPPLER ECHO F-UP/LMTD STD: CPT | Mod: 26 | Performed by: INTERNAL MEDICINE

## 2022-06-01 PROCEDURE — 36415 COLL VENOUS BLD VENIPUNCTURE: CPT | Performed by: PHYSICIAN ASSISTANT

## 2022-06-01 PROCEDURE — 85379 FIBRIN DEGRADATION QUANT: CPT | Performed by: PHYSICIAN ASSISTANT

## 2022-06-01 PROCEDURE — 85027 COMPLETE CBC AUTOMATED: CPT | Performed by: PHYSICIAN ASSISTANT

## 2022-06-01 PROCEDURE — 93325 DOPPLER ECHO COLOR FLOW MAPG: CPT

## 2022-06-01 PROCEDURE — 93308 TTE F-UP OR LMTD: CPT | Mod: 26 | Performed by: INTERNAL MEDICINE

## 2022-06-01 PROCEDURE — 120N000001 HC R&B MED SURG/OB

## 2022-06-01 PROCEDURE — 258N000003 HC RX IP 258 OP 636: Performed by: INTERNAL MEDICINE

## 2022-06-01 RX ORDER — FUROSEMIDE 10 MG/ML
20 INJECTION INTRAMUSCULAR; INTRAVENOUS ONCE
Status: COMPLETED | OUTPATIENT
Start: 2022-06-01 | End: 2022-06-01

## 2022-06-01 RX ADMIN — POLYETHYLENE GLYCOL 3350 17 G: 17 POWDER, FOR SOLUTION ORAL at 10:42

## 2022-06-01 RX ADMIN — GABAPENTIN 300 MG: 300 CAPSULE ORAL at 21:25

## 2022-06-01 RX ADMIN — IPRATROPIUM BROMIDE AND ALBUTEROL 2 PUFF: 20; 100 SPRAY, METERED RESPIRATORY (INHALATION) at 11:50

## 2022-06-01 RX ADMIN — SODIUM CHLORIDE 50 ML: 9 INJECTION, SOLUTION INTRAVENOUS at 17:56

## 2022-06-01 RX ADMIN — PANTOPRAZOLE SODIUM 40 MG: 40 TABLET, DELAYED RELEASE ORAL at 06:40

## 2022-06-01 RX ADMIN — DEXAMETHASONE SODIUM PHOSPHATE 6 MG: 10 INJECTION, SOLUTION INTRAMUSCULAR; INTRAVENOUS at 13:44

## 2022-06-01 RX ADMIN — GUAIFENESIN AND DEXTROMETHORPHAN 10 ML: 100; 10 SYRUP ORAL at 06:40

## 2022-06-01 RX ADMIN — GABAPENTIN 300 MG: 300 CAPSULE ORAL at 10:43

## 2022-06-01 RX ADMIN — FEXOFENADINE HCL 180 MG: 180 TABLET ORAL at 10:43

## 2022-06-01 RX ADMIN — REMDESIVIR 100 MG: 100 INJECTION, POWDER, LYOPHILIZED, FOR SOLUTION INTRAVENOUS at 17:55

## 2022-06-01 RX ADMIN — DEXTRAN 70, GLYCERIN, HYPROMELLOSE 1 DROP: 1; 2; 3 SOLUTION/ DROPS OPHTHALMIC at 10:44

## 2022-06-01 RX ADMIN — GUAIFENESIN AND DEXTROMETHORPHAN 10 ML: 100; 10 SYRUP ORAL at 01:48

## 2022-06-01 RX ADMIN — IPRATROPIUM BROMIDE AND ALBUTEROL 2 PUFF: 20; 100 SPRAY, METERED RESPIRATORY (INHALATION) at 16:26

## 2022-06-01 RX ADMIN — ENOXAPARIN SODIUM 40 MG: 40 INJECTION SUBCUTANEOUS at 22:57

## 2022-06-01 RX ADMIN — IPRATROPIUM BROMIDE AND ALBUTEROL 2 PUFF: 20; 100 SPRAY, METERED RESPIRATORY (INHALATION) at 20:02

## 2022-06-01 RX ADMIN — FUROSEMIDE 20 MG: 10 INJECTION, SOLUTION INTRAMUSCULAR; INTRAVENOUS at 13:44

## 2022-06-01 RX ADMIN — GUAIFENESIN AND DEXTROMETHORPHAN 10 ML: 100; 10 SYRUP ORAL at 21:25

## 2022-06-01 RX ADMIN — METOPROLOL SUCCINATE 12.5 MG: 25 TABLET, EXTENDED RELEASE ORAL at 10:43

## 2022-06-01 RX ADMIN — IPRATROPIUM BROMIDE AND ALBUTEROL 2 PUFF: 20; 100 SPRAY, METERED RESPIRATORY (INHALATION) at 08:23

## 2022-06-01 RX ADMIN — DULOXETINE HYDROCHLORIDE 60 MG: 60 CAPSULE, DELAYED RELEASE ORAL at 10:44

## 2022-06-01 RX ADMIN — BENZONATATE 100 MG: 100 CAPSULE ORAL at 21:25

## 2022-06-01 RX ADMIN — LOSARTAN POTASSIUM 100 MG: 100 TABLET, FILM COATED ORAL at 10:43

## 2022-06-01 RX ADMIN — Medication 1 CAPSULE: at 10:43

## 2022-06-01 ASSESSMENT — ACTIVITIES OF DAILY LIVING (ADL)
ADLS_ACUITY_SCORE: 42
ADLS_ACUITY_SCORE: 44
ADLS_ACUITY_SCORE: 42
ADLS_ACUITY_SCORE: 42
ADLS_ACUITY_SCORE: 44
ADLS_ACUITY_SCORE: 42

## 2022-06-01 NOTE — PROGRESS NOTES
"SPIRITUAL HEALTH SERVICES Progress Note  RH Med/Surg 3    Saw pt Tatyana Metcalf per an admission request.  Adrianna is very [place].  Provided reflective conversation to facilitate the processing of thoughts and feelings.      Illness Narrative - Adrianna reported that she is being treated for COVID-19 and COPD.      Distress - She shared that her biggest concern is \"clearing [her] lungs.\"      Coping -   o Adrianna named her two daughters as being core to her support network.  o Her [employer] roscoe is an important part of her life, and she attends services at her [facility].  Adrianna welcomed prayer.      Meaning-Making - She shared that she was the  for three [employer] churches and reflected, \"I loved working for the [employer].\"      Plan - Informed pt how she can request further  support.  This author and other chaplains remain available per pt/family request.    Justin Villatoro M.Div., [facility]  Staff   Phone [phone]  "

## 2022-06-01 NOTE — PROGRESS NOTES
End of Shift Summary Note     89-y/o female admitted on 05/30/22 COVID-19, PNA and COPD exacerbation.       Pertinent Assessment    Alert and oriented x 4, [place]. Mild [place]. At baseline patient is on 4L oxygen. Wheezes, and cough, on scheduled inhaler and PRN antitussive. Denies chest pain or chest pressure. No c/o nausea or vomiting, she is tolerating diet.         Plan: Cont on dexamethasone, IV Remdesivir, and scheduled inhaler. One time dose IV lasix 20 mg given this afternoon. ECHO completed today. Cont with strict I & O's and daily weights. On Lovenox for DVT prophylaxis. Plan of care reviewed with patient, daughter Jennifer was called and updated by provider today. Anticipated discharge in 2-4 days pending respiratory improvement.       Vital signs:  Temp: 98.1  F (36.7  C) Temp src: Oral BP: (!) 141/39 Pulse: 82   Resp: 18 SpO2: 95 % O2 Device: Nasal cannula Oxygen Delivery: 2 LPM Height: 152.4 cm (5') Weight: 106.6 kg (235 lb)  Estimated body mass index is 45.9 kg/m  as calculated from the following:    Height as of this encounter: 1.524 m (5').    Weight as of this encounter: 106.6 kg (235 lb).    Aliyah Adan RN on 6/1/2022 at 4:20 PM

## 2022-06-01 NOTE — PROGRESS NOTES
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  COVID-19 PCR positive.  CTPA ruled out PE, but did show bilateral infiltrates consistent with COVID-19 pneumonia.  Additionally likely COPD exacerbation given her significant wheezing.  CRP surprisingly undetectable.  Mild elevation in D-dimer.  Continue with dexamethasone, remdesivir, and Combivent.    Problem List/Assessment and Plan:   Acute hypoxemic respiratory failure  COVID-19 pneumonia  COPD with acute exacerbation: At baseline she uses 4 L O2 at night.  She has albuterol HFA, inhalers, and Pulmicort nebs although she is no longer using this due to insurance not covering it.  Presenting with cough productive of yellow sputum, wheezing, shortness of breath and found to be hypoxic.  COVID-19 PCR positive.  CTPA negative for PE, but shows bilateral infiltrate consistent with COVID-19 pneumonia.  Procalcitonin not detectable.  -Dexamethasone 6 mg daily, day 3/10  -IV Remdesevir, day 2/5  -Trialing IV Lasix as below  -Combivent inhaler 4 times daily  -Albuterol HFA as needed  -Robitussin-DM as needed  -Doubt bacterial pneumonia so antibiotics previously stopped  -On discharge recommend prescribing either Breo Ellipta or Advair in place of Symbicort as these both have $0 co-pay for her    Lower extremity edema: Started on Lasix 20 mg daily recently with improvement in her leg swelling.  Initially held here.  Trace bilateral edema on exam.  Bilateral rhonchi which more likely is from her COVID19, however in case of pulmonary edema  restarting diuretics.  -Give 20 mg IV Lasix today and obtain BMP tomorrow  -Strict intake and output Daily weights  -Obtain TTE    HTN: PTA on losartan 100 mg and daily metoprolol succinate 12.5 daily.  As above recently added Lasix 20 mg daily for leg swelling.  -Resume metoprolol and losartan  -IV Lasix today as above    History endometrial cancer with lung metastases    MDD: Resume duloxetine.    Morbid obesity: BMI 45.    DVT Prophylaxis: Enoxaparin (Lovenox) SQ  Code Status: DNR / DNI -this was discussed on admission  FEN: Regular diet  Discharge Dispo: TBD  Estimated Disch Date / # of Days until Disch: TBD.  2-4 days    Clinically Significant Risk Factors Present on Admission             # Severe Obesity: Estimated body mass index is 45.9 kg/m  as calculated from the following:    Height as of this encounter: 1.524 m (5').    Weight as of this encounter: 106.6 kg (235 lb).                  Interval History (Subjective):      No acute events overnight.  Ongoing frequent cough productive of yellow sputum.  Currently afebrile.  On 3 L via nasal cannula.  Wheezing today despite nebs.  Trialing IV Lasix.                  Physical Exam:      Last Vital Signs:  BP (!) 141/39 (BP Location: Left arm)   Pulse 82   Temp 98.1  F (36.7  C) (Oral)   Resp 18   Ht 1.524 m (5')   Wt 106.6 kg (235 lb)   SpO2 95%   BMI 45.90 kg/m        Intake/Output Summary (Last 24 hours) at 6/1/2022 1314  Last data filed at 6/1/2022 1047  Gross per 24 hour   Intake 240 ml   Output 600 ml   Net -360 ml       Constitutional: Awake, NAD   Eyes: sclera white   HEENT:   MMM, hard of hearing  Respiratory: Bilateral rhonchi right greater than sign left, bilateral expiratory wheeze, on 3 L via nasal cannula.  Cardiovascular: RRR.  No murmur, S3, or S4  GI: Obese, non-tender, not distended, bowel sounds present  Skin: no rash  Musculoskeletal/extremities: Trace bilateral lower extremity edema  Neurologic: A&O, speech clear, follows  commands  Psychiatric: calm, cooperative, normal affect         Medications:      All current medications were reviewed with changes reflected in problem list.         Data:      All new lab and imaging data was reviewed.   Labs:  Recent Labs   Lab 06/01/22 0626 05/31/22  0942 05/30/22  1756    141 139   POTASSIUM 3.9 4.0 4.5   CHLORIDE 108 104 103   CO2 30 31 33*   ANIONGAP 2* 6 3   * 172* 126*   BUN 25 25 33*   CR 0.49* 0.48* 0.61   GFRESTIMATED 89 89 84   ISRAEL 9.4 9.5 9.8     Recent Labs   Lab 06/01/22 0626 05/31/22  0942 05/30/22  1756   WBC 11.0 8.5 9.3   HGB 13.8 13.8 14.3   HCT 44.3 44.7 46.1   MCV 97 97 95    300 312     Recent Labs   Lab 06/01/22  0626   DD 1.46*     Recent Labs   Lab 06/01/22 0626 05/31/22  0942 05/30/22  1756   CRP <2.9 <2.9 <2.9  <2.9      Imaging:   None today      Anthony Valdez MD

## 2022-06-01 NOTE — PLAN OF CARE
VSS, A/O x 4,  LS audible wheezes, frequent cough, on 3L O2. IV remdesivir given, PIV R arm SL. Purewick in place w/ 400 mL output. Pt is hard of hearing, pocket talker is ordered. Covid +. Assist of 1 w/ GB & walker. Regular diet. Continue to monitor.

## 2022-06-02 LAB
ANION GAP SERPL CALCULATED.3IONS-SCNC: 3 MMOL/L (ref 3–14)
BUN SERPL-MCNC: 29 MG/DL (ref 7–30)
CALCIUM SERPL-MCNC: 9.1 MG/DL (ref 8.5–10.1)
CHLORIDE BLD-SCNC: 107 MMOL/L (ref 94–109)
CO2 SERPL-SCNC: 32 MMOL/L (ref 20–32)
CREAT SERPL-MCNC: 0.49 MG/DL (ref 0.52–1.04)
CRP SERPL-MCNC: <2.9 MG/L (ref 0–8)
D DIMER PPP FEU-MCNC: 1.26 UG/ML FEU (ref 0–0.5)
ERYTHROCYTE [DISTWIDTH] IN BLOOD BY AUTOMATED COUNT: 13.5 % (ref 10–15)
GFR SERPL CREATININE-BSD FRML MDRD: 89 ML/MIN/1.73M2
GLUCOSE BLD-MCNC: 150 MG/DL (ref 70–99)
HCT VFR BLD AUTO: 46 % (ref 35–47)
HGB BLD-MCNC: 14.1 G/DL (ref 11.7–15.7)
MAGNESIUM SERPL-MCNC: 2.3 MG/DL (ref 1.6–2.3)
MCH RBC QN AUTO: 29.9 PG (ref 26.5–33)
MCHC RBC AUTO-ENTMCNC: 30.7 G/DL (ref 31.5–36.5)
MCV RBC AUTO: 98 FL (ref 78–100)
PLATELET # BLD AUTO: 307 10E3/UL (ref 150–450)
POTASSIUM BLD-SCNC: 4.3 MMOL/L (ref 3.4–5.3)
RBC # BLD AUTO: 4.72 10E6/UL (ref 3.8–5.2)
SODIUM SERPL-SCNC: 142 MMOL/L (ref 133–144)
WBC # BLD AUTO: 11.2 10E3/UL (ref 4–11)

## 2022-06-02 PROCEDURE — 250N000013 HC RX MED GY IP 250 OP 250 PS 637: Performed by: INTERNAL MEDICINE

## 2022-06-02 PROCEDURE — 85379 FIBRIN DEGRADATION QUANT: CPT | Performed by: PHYSICIAN ASSISTANT

## 2022-06-02 PROCEDURE — 94640 AIRWAY INHALATION TREATMENT: CPT | Mod: 76

## 2022-06-02 PROCEDURE — 250N000013 HC RX MED GY IP 250 OP 250 PS 637: Performed by: PHYSICIAN ASSISTANT

## 2022-06-02 PROCEDURE — 94640 AIRWAY INHALATION TREATMENT: CPT

## 2022-06-02 PROCEDURE — 36415 COLL VENOUS BLD VENIPUNCTURE: CPT | Performed by: PHYSICIAN ASSISTANT

## 2022-06-02 PROCEDURE — 250N000011 HC RX IP 250 OP 636: Performed by: PHYSICIAN ASSISTANT

## 2022-06-02 PROCEDURE — 82310 ASSAY OF CALCIUM: CPT | Performed by: PHYSICIAN ASSISTANT

## 2022-06-02 PROCEDURE — 258N000003 HC RX IP 258 OP 636: Performed by: INTERNAL MEDICINE

## 2022-06-02 PROCEDURE — 250N000011 HC RX IP 250 OP 636: Performed by: INTERNAL MEDICINE

## 2022-06-02 PROCEDURE — 999N000157 HC STATISTIC RCP TIME EA 10 MIN

## 2022-06-02 PROCEDURE — 83735 ASSAY OF MAGNESIUM: CPT | Performed by: INTERNAL MEDICINE

## 2022-06-02 PROCEDURE — 86140 C-REACTIVE PROTEIN: CPT | Performed by: PHYSICIAN ASSISTANT

## 2022-06-02 PROCEDURE — 120N000001 HC R&B MED SURG/OB

## 2022-06-02 PROCEDURE — 99233 SBSQ HOSP IP/OBS HIGH 50: CPT | Performed by: INTERNAL MEDICINE

## 2022-06-02 PROCEDURE — 85027 COMPLETE CBC AUTOMATED: CPT | Performed by: PHYSICIAN ASSISTANT

## 2022-06-02 RX ORDER — FUROSEMIDE 20 MG
20 TABLET ORAL DAILY
Status: DISCONTINUED | OUTPATIENT
Start: 2022-06-02 | End: 2022-06-06 | Stop reason: HOSPADM

## 2022-06-02 RX ADMIN — IBUPROFEN 400 MG: 400 TABLET ORAL at 22:46

## 2022-06-02 RX ADMIN — ENOXAPARIN SODIUM 40 MG: 40 INJECTION SUBCUTANEOUS at 22:46

## 2022-06-02 RX ADMIN — IPRATROPIUM BROMIDE AND ALBUTEROL 2 PUFF: 20; 100 SPRAY, METERED RESPIRATORY (INHALATION) at 11:53

## 2022-06-02 RX ADMIN — METOPROLOL SUCCINATE 12.5 MG: 25 TABLET, EXTENDED RELEASE ORAL at 09:04

## 2022-06-02 RX ADMIN — Medication 1 CAPSULE: at 09:03

## 2022-06-02 RX ADMIN — PANTOPRAZOLE SODIUM 40 MG: 40 TABLET, DELAYED RELEASE ORAL at 06:46

## 2022-06-02 RX ADMIN — GUAIFENESIN AND DEXTROMETHORPHAN 10 ML: 100; 10 SYRUP ORAL at 22:46

## 2022-06-02 RX ADMIN — IPRATROPIUM BROMIDE AND ALBUTEROL 2 PUFF: 20; 100 SPRAY, METERED RESPIRATORY (INHALATION) at 20:54

## 2022-06-02 RX ADMIN — DEXAMETHASONE SODIUM PHOSPHATE 6 MG: 10 INJECTION, SOLUTION INTRAMUSCULAR; INTRAVENOUS at 13:36

## 2022-06-02 RX ADMIN — GUAIFENESIN AND DEXTROMETHORPHAN 10 ML: 100; 10 SYRUP ORAL at 06:58

## 2022-06-02 RX ADMIN — GABAPENTIN 300 MG: 300 CAPSULE ORAL at 21:00

## 2022-06-02 RX ADMIN — SODIUM CHLORIDE 50 ML: 9 INJECTION, SOLUTION INTRAVENOUS at 17:18

## 2022-06-02 RX ADMIN — BENZONATATE 100 MG: 100 CAPSULE ORAL at 17:17

## 2022-06-02 RX ADMIN — GUAIFENESIN AND DEXTROMETHORPHAN 10 ML: 100; 10 SYRUP ORAL at 17:17

## 2022-06-02 RX ADMIN — DEXTRAN 70, GLYCERIN, HYPROMELLOSE 1 DROP: 1; 2; 3 SOLUTION/ DROPS OPHTHALMIC at 09:05

## 2022-06-02 RX ADMIN — IPRATROPIUM BROMIDE AND ALBUTEROL 2 PUFF: 20; 100 SPRAY, METERED RESPIRATORY (INHALATION) at 08:28

## 2022-06-02 RX ADMIN — LOSARTAN POTASSIUM 100 MG: 100 TABLET, FILM COATED ORAL at 09:03

## 2022-06-02 RX ADMIN — IPRATROPIUM BROMIDE AND ALBUTEROL 2 PUFF: 20; 100 SPRAY, METERED RESPIRATORY (INHALATION) at 16:29

## 2022-06-02 RX ADMIN — GABAPENTIN 300 MG: 300 CAPSULE ORAL at 09:03

## 2022-06-02 RX ADMIN — POLYETHYLENE GLYCOL 3350 17 G: 17 POWDER, FOR SOLUTION ORAL at 09:04

## 2022-06-02 RX ADMIN — FUROSEMIDE 20 MG: 20 TABLET ORAL at 09:04

## 2022-06-02 RX ADMIN — DULOXETINE HYDROCHLORIDE 60 MG: 60 CAPSULE, DELAYED RELEASE ORAL at 09:04

## 2022-06-02 RX ADMIN — REMDESIVIR 100 MG: 100 INJECTION, POWDER, LYOPHILIZED, FOR SOLUTION INTRAVENOUS at 17:18

## 2022-06-02 RX ADMIN — FEXOFENADINE HCL 180 MG: 180 TABLET ORAL at 09:03

## 2022-06-02 ASSESSMENT — ACTIVITIES OF DAILY LIVING (ADL)
ADLS_ACUITY_SCORE: 43
ADLS_ACUITY_SCORE: 42
ADLS_ACUITY_SCORE: 42
ADLS_ACUITY_SCORE: 43
ADLS_ACUITY_SCORE: 42
ADLS_ACUITY_SCORE: 43
ADLS_ACUITY_SCORE: 42
ADLS_ACUITY_SCORE: 43

## 2022-06-02 NOTE — PROGRESS NOTES
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  COVID-19 PCR positive.  CTPA ruled out PE, but did show bilateral infiltrates consistent with COVID-19 pneumonia.  Additionally likely COPD exacerbation given her significant wheezing.  CRP surprisingly undetectable.  Mild elevation in D-dimer.  Continue with dexamethasone, remdesivir, and Combivent.    Problem List/Assessment and Plan:   Acute hypoxemic respiratory failure  COVID-19 pneumonia  COPD with acute exacerbation: At baseline she uses 4 L O2 at night.  She has albuterol HFA, inhalers, and Pulmicort nebs although she is no longer using this due to insurance not covering it.  Presenting with cough productive of yellow sputum, wheezing, shortness of breath and found to be hypoxic.  COVID-19 PCR positive.  CTPA negative for PE, but shows bilateral infiltrate consistent with COVID-19 pneumonia.  Procalcitonin not detectable.  At baseline she does have chronic wheezing and congestion.  -Dexamethasone 6 mg daily, day 4/10  -IV Remdesevir, day 3/5  -Combivent inhaler 4 times daily  -Albuterol HFA as needed  -Robitussin-DM as needed  -Doubt bacterial pneumonia so antibiotics previously stopped  -On discharge recommend prescribing either Breo Ellipta or Advair in place of Symbicort as these both have $0 co-pay for her    Lower extremity edema: Started on Lasix 20 mg daily recently with improvement in her leg swelling.  Initially held here.  Trace bilateral edema on exam.  Bilateral rhonchi which more likely is from her COVID19,  however in case of pulmonary edema restarting diuretics.  TTE obtained here that shows preserved EF, grade 1 diastolic dysfunction, 1+ MR that is unchanged from 6 years ago.  -Resume PTA p.o. Lasix 20 mg daily  -Strict intake and output and daily weights    HTN: PTA on losartan 100 mg and daily metoprolol succinate 12.5 daily.  As above recently added Lasix 20 mg daily for leg swelling.  -Resume metoprolol and losartan  -Resumed p.o. Lasix 20 mg daily    History endometrial cancer with lung metastases    MDD: Resume duloxetine.    Morbid obesity: BMI 45.    DVT Prophylaxis: Enoxaparin (Lovenox) SQ  Code Status: DNR / DNI -this was discussed on admission  FEN: Regular diet  Discharge Dispo: TBD.  Consult PT.  At baseline she uses walker when ambulating in her apartment.  Uses wheelchair for longer trips to the cafeteria.  Estimated Disch Date / # of Days until Disch: TBD.  Likely finish 5-day IV remdesivir course here then discharge if she feels well enough so likely Saturday or Sunday    Clinically Significant Risk Factors Present on Admission             # Severe Obesity: Estimated body mass index is 45.64 kg/m  as calculated from the following:    Height as of this encounter: 1.524 m (5').    Weight as of this encounter: 106 kg (233 lb 11.2 oz).                  Interval History (Subjective):      1.1 L urine output with 1 dose IV Lasix.  Echocardiogram unchanged from previous.  Today she feels the best she has since being here.  She slept well last night which is helping.  She still has intermittent cough and wheezing, but some of this is baseline.  Afebrile currently.                  Physical Exam:      Last Vital Signs:  BP (!) 159/71 (BP Location: Left arm)   Pulse 84   Temp 97.6  F (36.4  C) (Oral)   Resp 18   Ht 1.524 m (5')   Wt 106 kg (233 lb 11.2 oz)   SpO2 95%   BMI 45.64 kg/m      Intake/Output Summary (Last 24 hours) at 6/2/2022 1319  Last data filed at 6/2/2022 1223  Gross per 24 hour   Intake  540 ml   Output 1600 ml   Net -1060 ml       Constitutional: Awake, NAD   Eyes: sclera white   HEENT:   MMM, hard of hearing  Respiratory: Bilateral rhonchi right persisting with slight expiratory wheeze.  Appears comfortable on 2 L via nasal cannula.  Occasional cough.    Cardiovascular: RRR.  No murmur   GI: Obese, non-tender, not distended, bowel sounds present  Skin: no rash  Musculoskeletal/extremities: Trace bilateral lower extremity edema  Neurologic: A&O, speech clear, follows commands  Psychiatric: calm, cooperative, normal affect         Medications:      All current medications were reviewed with changes reflected in problem list.         Data:      All new lab and imaging data was reviewed.   Labs:  Recent Labs   Lab 22  0654 22  0622  0942    140 141   POTASSIUM 4.3 3.9 4.0   CHLORIDE 107 108 104   CO2 32 30 31   ANIONGAP 3 2* 6   * 158* 172*   BUN 29 25 25   CR 0.49* 0.49* 0.48*   GFRESTIMATED 89 89 89   ISRAEL 9.1 9.4 9.5     Recent Labs   Lab 22  0654 22  0626 22  0942   WBC 11.2* 11.0 8.5   HGB 14.1 13.8 13.8   HCT 46.0 44.3 44.7   MCV 98 97 97    311 300     Recent Labs   Lab 22  0654   DD 1.26*     Recent Labs   Lab 22  0654 22  0626 22  0942   CRP <2.9 <2.9 <2.9      Imaging:   Recent Results (from the past 24 hour(s))   Echocardiogram Limited   Result Value    LVEF  55-60%    Narrative    [id number]  [id number]  [id number]  [id number]^MONICA^VIRGILIO^PRASANNA     [facility]  Echocardiography Laboratory  [address] [place] [address]     Name: HERNANDEZ DAVIES  MRN: [id number]  : 1932  Study Date: 2022 01:16 PM  Age: 89 yrs  Gender: Female  Patient Location: [facility]  Reason For Study: Dyspnea, Edema  Ordering Physician: VIRGILIO ANDERSON  Referring Physician: Jill Briscoe  Performed By: Shannan Vital, RDCS     BSA: 2.0 m2  Height: 60 in  Weight: 235 lb  HR: 97  BP: 141/39  mmHg  ______________________________________________________________________________  Procedure  Limited Portable Echo Adult.  ______________________________________________________________________________  Interpretation Summary     Technically difficult imaging  Left ventricular systolic function is normal.  The visual ejection fraction is 55-60%.  The left ventricle is normal in size.  There is mild (1+) mitral regurgitation.     No signficant change since 2/22/2016  ______________________________________________________________________________  Left Ventricle  The left ventricle is normal in size. There is normal left ventricular wall  thickness. Left ventricular systolic function is normal. The visual ejection  fraction is 55-60%. Grade I or early diastolic dysfunction. No regional wall  motion abnormalities noted. There is no thrombus seen in the left ventricle.     Right Ventricle  The right ventricle is normal in structure, function and size. There is no  mass or thrombus in the right ventricle.     Atria  Normal left atrial size. Right atrial size is normal. There is no atrial shunt  seen. The left atrial appendage is not well visualized.     Mitral Valve  The mitral valve leaflets appear normal. There is no evidence of stenosis,  fluttering, or prolapse. There is mild (1+) mitral regurgitation. There is no  mitral valve stenosis.     Tricuspid Valve  Normal tricuspid valve. No tricuspid regurgitation. Right ventricular systolic  pressure could not be approximated due to inadequate tricuspid regurgitation.  There is no tricuspid stenosis.     Aortic Valve  The aortic valve is trileaflet. No aortic regurgitation is present. No aortic  stenosis is present.     Pulmonic Valve  The pulmonic valve is not well visualized. There is no pulmonic valvular  regurgitation. Increased pulmonic valve velocity.     Vessels  The aortic root is normal size. Normal size ascending aorta. The inferior vena  cava is normal. The  pulmonary artery is normal size.     Pericardium  The pericardium appears normal. There is no pleural effusion.     Rhythm  Sinus rhythm was noted.  ______________________________________________________________________________  Doppler Measurements & Calculations  MV E max rosemary: 85.0 cm/sec  MV A max rosemary: 117.0 cm/sec  MV E/A: 0.73  MV dec time: 0.19 sec  E/E' av.0  Lateral E/e': 9.1  Medial E/e': 12.9     ______________________________________________________________________________  Report approved by: Dr. Aniket Bernal 2022 02:10 PM                 Anthony Valdez MD

## 2022-06-02 NOTE — PLAN OF CARE
Goal Outcome Evaluation:    VSS on 2 L NC. A/O x 4, forgetful at times. Up assist of 1-2 w/ GB and walker. Denies pain. RPIV SL. IV remdesivir. Decadron, lovenox. LS dim, coarse. PRN robitussin given x1, tessalon pearls x1 for cough. Purewick in place. Possible discharge in 1-2 days. Will continue with plan of care.

## 2022-06-02 NOTE — PLAN OF CARE
BP (!) 162/68 (BP Location: Left arm)   Pulse 85   Temp 97.5  F (36.4  C) (Oral)   Resp 18   Ht 1.524 m (5')   Wt 106 kg (233 lb 11.2 oz)   SpO2 95%   BMI 45.64 kg/m      Pt A&Ox4. Forgetful. VSS on 2L via NC. Wheezy. Congested. Dyspnea on exertion. IV decadron & remdes. Frequent, congested cough. Tessalon given. PO lasix for BLE edema. Purewick in place. Up A1-A2 w/JUNG & walker.     Yari Lau RN

## 2022-06-03 PROCEDURE — 94640 AIRWAY INHALATION TREATMENT: CPT | Mod: 76

## 2022-06-03 PROCEDURE — 250N000011 HC RX IP 250 OP 636: Performed by: PHYSICIAN ASSISTANT

## 2022-06-03 PROCEDURE — 250N000011 HC RX IP 250 OP 636: Performed by: INTERNAL MEDICINE

## 2022-06-03 PROCEDURE — 250N000013 HC RX MED GY IP 250 OP 250 PS 637: Performed by: INTERNAL MEDICINE

## 2022-06-03 PROCEDURE — 258N000003 HC RX IP 258 OP 636: Performed by: INTERNAL MEDICINE

## 2022-06-03 PROCEDURE — 94640 AIRWAY INHALATION TREATMENT: CPT

## 2022-06-03 PROCEDURE — 250N000013 HC RX MED GY IP 250 OP 250 PS 637: Performed by: PHYSICIAN ASSISTANT

## 2022-06-03 PROCEDURE — 120N000001 HC R&B MED SURG/OB

## 2022-06-03 PROCEDURE — 99232 SBSQ HOSP IP/OBS MODERATE 35: CPT | Performed by: INTERNAL MEDICINE

## 2022-06-03 PROCEDURE — 999N000157 HC STATISTIC RCP TIME EA 10 MIN

## 2022-06-03 RX ORDER — ENOXAPARIN SODIUM 100 MG/ML
40 INJECTION SUBCUTANEOUS EVERY 12 HOURS
Status: DISCONTINUED | OUTPATIENT
Start: 2022-06-03 | End: 2022-06-06 | Stop reason: HOSPADM

## 2022-06-03 RX ADMIN — POLYETHYLENE GLYCOL 3350 17 G: 17 POWDER, FOR SOLUTION ORAL at 08:15

## 2022-06-03 RX ADMIN — DEXTRAN 70, GLYCERIN, HYPROMELLOSE 1 DROP: 1; 2; 3 SOLUTION/ DROPS OPHTHALMIC at 08:18

## 2022-06-03 RX ADMIN — IPRATROPIUM BROMIDE AND ALBUTEROL 2 PUFF: 20; 100 SPRAY, METERED RESPIRATORY (INHALATION) at 19:08

## 2022-06-03 RX ADMIN — GUAIFENESIN AND DEXTROMETHORPHAN 10 ML: 100; 10 SYRUP ORAL at 22:00

## 2022-06-03 RX ADMIN — LOSARTAN POTASSIUM 100 MG: 100 TABLET, FILM COATED ORAL at 08:13

## 2022-06-03 RX ADMIN — REMDESIVIR 100 MG: 100 INJECTION, POWDER, LYOPHILIZED, FOR SOLUTION INTRAVENOUS at 16:12

## 2022-06-03 RX ADMIN — ENOXAPARIN SODIUM 40 MG: 40 INJECTION SUBCUTANEOUS at 22:00

## 2022-06-03 RX ADMIN — FEXOFENADINE HCL 180 MG: 180 TABLET ORAL at 08:13

## 2022-06-03 RX ADMIN — PANTOPRAZOLE SODIUM 40 MG: 40 TABLET, DELAYED RELEASE ORAL at 06:30

## 2022-06-03 RX ADMIN — ALBUTEROL SULFATE 1 PUFF: 90 AEROSOL, METERED RESPIRATORY (INHALATION) at 05:14

## 2022-06-03 RX ADMIN — FUROSEMIDE 20 MG: 20 TABLET ORAL at 08:13

## 2022-06-03 RX ADMIN — DEXAMETHASONE SODIUM PHOSPHATE 6 MG: 10 INJECTION, SOLUTION INTRAMUSCULAR; INTRAVENOUS at 12:51

## 2022-06-03 RX ADMIN — ACETAMINOPHEN 650 MG: 325 TABLET, FILM COATED ORAL at 21:59

## 2022-06-03 RX ADMIN — IPRATROPIUM BROMIDE AND ALBUTEROL 2 PUFF: 20; 100 SPRAY, METERED RESPIRATORY (INHALATION) at 11:30

## 2022-06-03 RX ADMIN — IPRATROPIUM BROMIDE AND ALBUTEROL 2 PUFF: 20; 100 SPRAY, METERED RESPIRATORY (INHALATION) at 07:15

## 2022-06-03 RX ADMIN — GABAPENTIN 300 MG: 300 CAPSULE ORAL at 21:54

## 2022-06-03 RX ADMIN — GUAIFENESIN AND DEXTROMETHORPHAN 10 ML: 100; 10 SYRUP ORAL at 06:29

## 2022-06-03 RX ADMIN — DULOXETINE HYDROCHLORIDE 60 MG: 60 CAPSULE, DELAYED RELEASE ORAL at 08:13

## 2022-06-03 RX ADMIN — GABAPENTIN 300 MG: 300 CAPSULE ORAL at 08:13

## 2022-06-03 RX ADMIN — SODIUM CHLORIDE 50 ML: 9 INJECTION, SOLUTION INTRAVENOUS at 16:43

## 2022-06-03 RX ADMIN — METOPROLOL SUCCINATE 12.5 MG: 25 TABLET, EXTENDED RELEASE ORAL at 08:13

## 2022-06-03 RX ADMIN — ALBUTEROL SULFATE 1 PUFF: 90 AEROSOL, METERED RESPIRATORY (INHALATION) at 10:46

## 2022-06-03 RX ADMIN — Medication 1 CAPSULE: at 08:14

## 2022-06-03 ASSESSMENT — ACTIVITIES OF DAILY LIVING (ADL)
ADLS_ACUITY_SCORE: 43
ADLS_ACUITY_SCORE: 43
ADLS_ACUITY_SCORE: 47
ADLS_ACUITY_SCORE: 43
ADLS_ACUITY_SCORE: 47
ADLS_ACUITY_SCORE: 47
ADLS_ACUITY_SCORE: 43
ADLS_ACUITY_SCORE: 43
ADLS_ACUITY_SCORE: 47
DEPENDENT_IADLS:: MEAL PREPARATION;MEDICATION MANAGEMENT

## 2022-06-03 NOTE — PROGRESS NOTES
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  COVID-19 PCR positive.  CTPA ruled out PE, but did show bilateral infiltrates consistent with COVID-19 pneumonia.  Additionally likely COPD exacerbation given her significant wheezing, although has this at baseline.  CRP surprisingly undetectable.  Mild elevation in D-dimer.  Continue with dexamethasone, remdesivir, and Combivent.    Problem List/Assessment and Plan:   Acute hypoxemic respiratory failure  COVID-19 pneumonia  COPD with acute exacerbation: At baseline she uses 4 L O2 at night.  She has albuterol HFA, inhalers, and Pulmicort nebs although she is no longer using this due to insurance not covering it.  Presenting with cough productive of yellow sputum, wheezing, shortness of breath and found to be hypoxic.  COVID-19 PCR positive.  CTPA negative for PE, but shows bilateral infiltrate consistent with COVID-19 pneumonia.  Procalcitonin not detectable.  At baseline she does have chronic wheezing and chronic severe cough ever since CyberKnife surgery for her lung metastases in 2013.  -Dexamethasone 6 mg daily, day 5/10  -IV Remdesevir, day 5/5  -Combivent inhaler 4 times daily  -Albuterol HFA as needed  -Robitussin-DM as needed  -Doubt bacterial pneumonia so antibiotics previously stopped  -Wean oxygen a day, try room air at rest, uses 2 L at night through her CPAP  -On discharge recommend prescribing either Breo Ellipta or Advair in place of Symbicort as these both have $0 co-pay for her.  Tried Advair and did not like it.   Discharge with Breo Ellipta    Lower extremity edema: Started on Lasix 20 mg daily recently with improvement in her leg swelling.  Initially held here.  Trace bilateral edema on exam.  Bilateral rhonchi which more likely is from her COVID19, however in case of pulmonary edema restarting diuretics.  TTE obtained here that shows preserved EF, grade 1 diastolic dysfunction, 1+ MR that is unchanged from 6 years ago.  -Resume PTA p.o. Lasix 20 mg daily  -Strict intake and output and daily weights    HTN: PTA on losartan 100 mg and daily metoprolol succinate 12.5 daily.  As above recently added Lasix 20 mg daily for leg swelling.  -Resume metoprolol and losartan  -Resumed p.o. Lasix 20 mg daily    History endometrial cancer with lung metastases    MDD: Resume duloxetine.    Morbid obesity: BMI 45.    DVT Prophylaxis: Enoxaparin (Lovenox) SQ  Code Status: DNR / DNI -this was discussed on admission  FEN: Regular diet  Discharge Dispo: TBD.  Consult PT.  At baseline she uses walker when ambulating in her apartment.  Uses wheelchair for longer trips to the cafeteria.  Estimated Disch Date / # of Days until Disch: TBD.  Finish 5-day course Remdesevir today and reassess if she is ready to discharge by tomorrow or the next day    Clinically Significant Risk Factors Present on Admission             # Severe Obesity: Estimated body mass index is 45.66 kg/m  as calculated from the following:    Height as of this encounter: 1.524 m (5').    Weight as of this encounter: 106.1 kg (233 lb 12.8 oz).                  Interval History (Subjective):      No acute events overnight.  She did not sleep very well as she was interrupted multiple times by staff.  Afebrile.  Continues to have a intermittent cough.  Near continuous wheeze which she says is baseline.                  Physical Exam:      Last Vital Signs:  /65 (BP Location: Left arm)   Pulse 72   Temp 98.1  F (36.7  C) (Oral)   Resp 20   Ht 1.524 m (5')   Wt 106.1 kg (233  lb 12.8 oz)   SpO2 96%   BMI 45.66 kg/m      Intake/Output Summary (Last 24 hours) at 6/3/2022 1259  Last data filed at 6/3/2022 1258  Gross per 24 hour   Intake 610 ml   Output 2700 ml   Net -2090 ml       Constitutional: Awake, NAD   Eyes: sclera white   HEENT:   MMM, hard of hearing  Respiratory: Bilateral rhonchi right greater than sign left, mild expiratory wheeze.  Appears comfortable on 2 L via nasal cannula.  Occasional cough while in room.    Cardiovascular: RRR.  No murmur   GI: Obese, non-tender, not distended, bowel sounds present  Skin: no rash  Musculoskeletal/extremities: Trace bilateral lower extremity edema  Neurologic: A&O, speech clear, answers questions appropriately  Psychiatric: calm, cooperative, normal affect         Medications:      All current medications were reviewed with changes reflected in problem list.         Data:      All new lab and imaging data was reviewed.   Labs:  No new labs today  Imaging:   None today    Anthony Valdez MD

## 2022-06-03 NOTE — PLAN OF CARE
/65 (BP Location: Left arm)   Pulse 72   Temp 98.1  F (36.7  C) (Oral)   Resp 20   Ht 1.524 m (5')   Wt 106.1 kg (233 lb 12.8 oz)   SpO2 96%   BMI 45.66 kg/m        A&O. A1 GBW. 1L O2 via NC. LS coarse wheezes. Getting IV Decadron, inhalers, Remdesivir. COVID precautions remain. SW following. Will continue POC.

## 2022-06-03 NOTE — CONSULTS
Care Management Initial Consult    General Information  Assessment completed with: Caregiver, China Bhatia RN at [facility]  Type of CM/SW Visit: Initial Assessment    Primary Care Provider verified and updated as needed: Yes   Readmission within the last 30 days: no previous admission in last 30 days      Reason for Consult: discharge planning  Advance Care Planning: Advance Care Planning Reviewed: present on chart       Communication Assessment  Patient's communication style: spoken language (English or Bilingual)    Hearing Difficulty or Deaf: yes   Wear Glasses or Blind: yes    Cognitive  Cognitive/Neuro/Behavioral: WDL  Level of Consciousness: alert  Arousal Level: opens eyes spontaneously  Orientation: oriented x 4  Mood/Behavior: calm, cooperative  Best Language: 0 - No aphasia  Speech: clear, spontaneous    Living Environment:   People in home: facility resident     Current living Arrangements: assisted living  Name of Facility: [facility]   Able to return to prior arrangements: yes    Family/Social Support:  Care provided by: self, other (see comments) (Facility staff)  Provides care for: no one  Marital Status:   Children          Description of Support System: Involved, Supportive    Support Assessment: Adequate family and caregiver support, Adequate social supports    Current Resources:   Patient receiving home care services: No     Community Resources: None  Equipment currently used at home: walker, rolling, wheelchair, manual, shower chair  Supplies currently used at home: None    Employment/Financial:  Employment Status:          Financial Concerns: No concerns identified   Referral to Financial Worker: No     Lifestyle & Psychosocial Needs:  Social Determinants of Health     Tobacco Use: Low Risk      Smoking Tobacco Use: Never Smoker     Smokeless Tobacco Use: Never Used   Alcohol Use: Not on file   Financial Resource Strain: Not on file   Food Insecurity: Not on file   Transportation  "Needs: Not on file   Physical Activity: Not on file   Stress: Not on file   Social Connections: Not on file   Intimate Partner Violence: Not on file   Depression: Not at risk     PHQ-2 Score: 0   Housing Stability: Not on file     Functional Status:  Prior to admission patient needed assistance:   Dependent ADLs:: Ambulation-walker, Wheelchair-independent, Dressing, Bathing  Dependent IADLs:: Meal Preparation, Medication Management     Mental Health Status:  Mental Health Status: No Current Concerns       Chemical Dependency Status:  Chemical Dependency Status: No Current Concerns          Values/Beliefs:  Spiritual, Cultural Beliefs, Moravian Practices, Values that affect care: n/a         Additional Information:  Patient is 89 year old female admitted on 5/30/2022 with a chief complaint of \"cough, wheezing and hypoxia\" (per H&P). Pt identified as being covid positive. Chart reviewed. MANDY received phone call from SANGEETA Atkinson at [facility] p: [phone] (China's cell). China shared that at baseline, pt ambulates with a walker but will use a wheelchair for longer distances like going down for meals. [facility] assists pt with medication management, showers and meals. Per nursing, they are charting that pt is currently an assist of 1-2 w/ GB and walker. [place] stated if pt is an assist of 2, they would not be able to accept her back however they can manage an assist of 1. MANDY shared that a PT consult has been ordered. Per China, if home PT is recommended, they prefer to use [facility].     China stated pt is able to return on the weekend. China requested that she be called and notified p:[phone], orders be faxed to f:[phone] and any new meds be filled and sent with pt.     Social work will continue to follow and assist with discharge planning as needed.    KIM Boyer, LSW  Inpatient Care Coordination  MS3, [facility]  [phone]    KIM Rose        "

## 2022-06-03 NOTE — PLAN OF CARE
Goal Outcome Evaluation:    VSS on 2 L NC. A/O x 4. Assist of 1-2 w/ GB and walker. PRN ibuprofen given x1 for HA. PRN robitussin given x2 for cough. PRN albuterol inhaler given x1 for SOB. LS coarse/rohnchi/wheeze. RPIV SL. IV rocehpin, decadron. On subcutaneous lovenox. Purewick in place. Discharge plans TBD. Will continue with plan of care.

## 2022-06-04 ENCOUNTER — APPOINTMENT (OUTPATIENT)
Dept: PHYSICAL THERAPY | Facility: CLINIC | Age: 87
DRG: 177 | End: 2022-06-04
Attending: INTERNAL MEDICINE
Payer: COMMERCIAL

## 2022-06-04 LAB
CREAT SERPL-MCNC: 0.57 MG/DL (ref 0.52–1.04)
GFR SERPL CREATININE-BSD FRML MDRD: 86 ML/MIN/1.73M2
GLUCOSE BLDC GLUCOMTR-MCNC: 190 MG/DL (ref 70–99)
HOLD SPECIMEN: NORMAL
HOLD SPECIMEN: NORMAL
PLATELET # BLD AUTO: 295 10E3/UL (ref 150–450)

## 2022-06-04 PROCEDURE — 36415 COLL VENOUS BLD VENIPUNCTURE: CPT | Performed by: EMERGENCY MEDICINE

## 2022-06-04 PROCEDURE — 250N000011 HC RX IP 250 OP 636: Performed by: PHYSICIAN ASSISTANT

## 2022-06-04 PROCEDURE — 250N000013 HC RX MED GY IP 250 OP 250 PS 637: Performed by: INTERNAL MEDICINE

## 2022-06-04 PROCEDURE — 97530 THERAPEUTIC ACTIVITIES: CPT | Mod: GP

## 2022-06-04 PROCEDURE — 97161 PT EVAL LOW COMPLEX 20 MIN: CPT | Mod: GP

## 2022-06-04 PROCEDURE — 94640 AIRWAY INHALATION TREATMENT: CPT

## 2022-06-04 PROCEDURE — 250N000013 HC RX MED GY IP 250 OP 250 PS 637: Performed by: PHYSICIAN ASSISTANT

## 2022-06-04 PROCEDURE — 82565 ASSAY OF CREATININE: CPT | Performed by: EMERGENCY MEDICINE

## 2022-06-04 PROCEDURE — 250N000011 HC RX IP 250 OP 636: Performed by: INTERNAL MEDICINE

## 2022-06-04 PROCEDURE — 85049 AUTOMATED PLATELET COUNT: CPT | Performed by: EMERGENCY MEDICINE

## 2022-06-04 PROCEDURE — 120N000001 HC R&B MED SURG/OB

## 2022-06-04 PROCEDURE — 99232 SBSQ HOSP IP/OBS MODERATE 35: CPT | Performed by: INTERNAL MEDICINE

## 2022-06-04 PROCEDURE — 999N000157 HC STATISTIC RCP TIME EA 10 MIN

## 2022-06-04 PROCEDURE — 94640 AIRWAY INHALATION TREATMENT: CPT | Mod: 76

## 2022-06-04 RX ADMIN — DEXTRAN 70, GLYCERIN, HYPROMELLOSE 1 DROP: 1; 2; 3 SOLUTION/ DROPS OPHTHALMIC at 09:39

## 2022-06-04 RX ADMIN — DEXAMETHASONE SODIUM PHOSPHATE 6 MG: 10 INJECTION, SOLUTION INTRAMUSCULAR; INTRAVENOUS at 13:14

## 2022-06-04 RX ADMIN — DULOXETINE HYDROCHLORIDE 60 MG: 60 CAPSULE, DELAYED RELEASE ORAL at 09:36

## 2022-06-04 RX ADMIN — IBUPROFEN 400 MG: 400 TABLET ORAL at 22:21

## 2022-06-04 RX ADMIN — BENZONATATE 100 MG: 100 CAPSULE ORAL at 06:35

## 2022-06-04 RX ADMIN — METOPROLOL SUCCINATE 12.5 MG: 25 TABLET, EXTENDED RELEASE ORAL at 09:36

## 2022-06-04 RX ADMIN — LOSARTAN POTASSIUM 100 MG: 100 TABLET, FILM COATED ORAL at 09:36

## 2022-06-04 RX ADMIN — ENOXAPARIN SODIUM 40 MG: 40 INJECTION SUBCUTANEOUS at 22:13

## 2022-06-04 RX ADMIN — IPRATROPIUM BROMIDE AND ALBUTEROL 2 PUFF: 20; 100 SPRAY, METERED RESPIRATORY (INHALATION) at 11:36

## 2022-06-04 RX ADMIN — FEXOFENADINE HCL 180 MG: 180 TABLET ORAL at 09:36

## 2022-06-04 RX ADMIN — Medication 1 CAPSULE: at 09:36

## 2022-06-04 RX ADMIN — IPRATROPIUM BROMIDE AND ALBUTEROL 2 PUFF: 20; 100 SPRAY, METERED RESPIRATORY (INHALATION) at 15:54

## 2022-06-04 RX ADMIN — GABAPENTIN 300 MG: 300 CAPSULE ORAL at 22:12

## 2022-06-04 RX ADMIN — IPRATROPIUM BROMIDE AND ALBUTEROL 2 PUFF: 20; 100 SPRAY, METERED RESPIRATORY (INHALATION) at 19:24

## 2022-06-04 RX ADMIN — GABAPENTIN 300 MG: 300 CAPSULE ORAL at 09:36

## 2022-06-04 RX ADMIN — PANTOPRAZOLE SODIUM 40 MG: 40 TABLET, DELAYED RELEASE ORAL at 06:35

## 2022-06-04 RX ADMIN — IPRATROPIUM BROMIDE AND ALBUTEROL 2 PUFF: 20; 100 SPRAY, METERED RESPIRATORY (INHALATION) at 08:01

## 2022-06-04 RX ADMIN — GUAIFENESIN AND DEXTROMETHORPHAN 10 ML: 100; 10 SYRUP ORAL at 06:35

## 2022-06-04 RX ADMIN — FUROSEMIDE 20 MG: 20 TABLET ORAL at 09:36

## 2022-06-04 RX ADMIN — ENOXAPARIN SODIUM 40 MG: 40 INJECTION SUBCUTANEOUS at 09:36

## 2022-06-04 ASSESSMENT — ACTIVITIES OF DAILY LIVING (ADL)
ADLS_ACUITY_SCORE: 47
ADLS_ACUITY_SCORE: 43
ADLS_ACUITY_SCORE: 43
ADLS_ACUITY_SCORE: 47
ADLS_ACUITY_SCORE: 43
ADLS_ACUITY_SCORE: 47
ADLS_ACUITY_SCORE: 43
ADLS_ACUITY_SCORE: 43

## 2022-06-04 NOTE — PROGRESS NOTES
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  COVID-19 PCR positive.  CTPA ruled out PE, but did show bilateral infiltrates consistent with COVID-19 pneumonia.  Additionally likely COPD exacerbation given her significant wheezing, although has this at baseline.  CRP surprisingly undetectable.  Mild elevation in D-dimer.  Finished course of Remdesevir.  Continuing with dexamethasone and Combivent.  PT assessment.    Problem List/Assessment and Plan:   Acute hypoxemic respiratory failure  COVID-19 pneumonia  COPD with acute exacerbation: At baseline she uses 4 L O2 at night.  She has albuterol HFA, inhalers, and Pulmicort nebs although she is no longer using this due to insurance not covering it.  Presenting with cough productive of yellow sputum, wheezing, shortness of breath and found to be hypoxic.  COVID-19 PCR positive.  CTPA negative for PE, but shows bilateral infiltrate consistent with COVID-19 pneumonia.  Procalcitonin not detectable.  At baseline she does have chronic wheezing and chronic severe cough ever since CyberKnife surgery for her lung metastases in 2013.  -Dexamethasone 6 mg daily, day 6/10  -Finished 5-day course IV Remdesevir 6/3  -Combivent inhaler 4 times daily  -Albuterol HFA as needed  -Robitussin-DM as needed  -Doubt bacterial pneumonia so antibiotics previously stopped  -Wean oxygen a day, try room air at rest -current 1 L, uses 2 L at night through her CPAP  -On discharge recommend prescribing either Breo Ellipta or Advair in place of Symbicort as  these both have $0 co-pay for her.  Tried Advair and did not like it.  Discharge with Breo Ellipta    Lower extremity edema: Started on Lasix 20 mg daily recently with improvement in her leg swelling.  Initially held here.  Trace bilateral edema on exam.  Bilateral rhonchi which more likely is from her COVID19, however in case of pulmonary edema restarting diuretics.  TTE obtained here that shows preserved EF, grade 1 diastolic dysfunction, 1+ MR that is unchanged from 6 years ago.  -Resumed PTA p.o. Lasix 20 mg daily  -Strict intake and output and daily weights    HTN: PTA on losartan 100 mg and daily metoprolol succinate 12.5 daily.  As above recently added Lasix 20 mg daily for leg swelling.  -Resumed metoprolol and losartan  -Resumed p.o. Lasix 20 mg daily    History endometrial cancer with lung metastases    MDD: Resume duloxetine.    Morbid obesity: BMI 45.    DVT Prophylaxis: Enoxaparin (Lovenox) SQ  Code Status: DNR / DNI -this was discussed on admission  FEN: Regular diet  Discharge Dispo: TBD.  PT assessment this afternoon.  At baseline she uses walker when ambulating in her apartment.  Uses wheelchair for longer trips to the cafeteria.  Estimated Disch Date / # of Days until Disch: Feels a little more short of breath than baseline so hopefully discharge tomorrow pending PT assessment    Clinically Significant Risk Factors Present on Admission                               Interval History (Subjective):      Oxygen weaned to 1 L during the day.  Last night she did not feel as well.  This morning her breathing does not feel back to baseline.  No fever.  No headache.  Continues to have wheeze and congested sound breathing, not far from baseline.                  Physical Exam:      Last Vital Signs:  BP (!) 145/67 (BP Location: Left arm)   Pulse 69   Temp 98.2  F (36.8  C) (Oral)   Resp 24   Ht 1.524 m (5')   Wt 106.5 kg (234 lb 12.8 oz)   SpO2 95%   BMI 45.86 kg/m      Intake/Output Summary (Last 24  hours) at 6/4/2022 1143  Last data filed at 6/4/2022 0935  Gross per 24 hour   Intake 480 ml   Output 1800 ml   Net -1320 ml       Constitutional: Awake, NAD   Eyes: sclera white   HEENT:   MMM, hard of hearing  Respiratory: Bilateral rhonchi, better after allowing her to cough.  Bilateral expiratory wheeze.  Appears comfortable on 1 L via NC  Cardiovascular: RRR.  No murmur   GI: Obese, non-tender, not distended, bowel sounds present  Skin: no rash  Musculoskeletal/extremities: Trace bilateral lower extremity edema  Neurologic: A&O, speech clear, answers questions appropriately  Psychiatric: calm, cooperative, normal affect         Medications:      All current medications were reviewed with changes reflected in problem list.         Data:      All new lab and imaging data was reviewed.   Labs:    Creatine 0.57  Platelets 295    Imaging:   None today    Anthony Valdez MD

## 2022-06-04 NOTE — PLAN OF CARE
Goal Outcome Evaluation:    Plan of Care Reviewed With: patient     Pt. A&Ox4, remains on COVID precautions, up with assist of 1 to stand on standing scale for weight otherwise has been resting in bed overnight. Lung sounds coarse, exp. Wheezes, frequent cough, O2 at 1L during the day and 4L overnight, Robitussin given for frequent cough with some relief, pt. Encouraged to use I.S. and able to bring it up to 750. Pt. To possibly discharge back to [facility]. Pt. Denies any needs at this time. Will continue with POC.

## 2022-06-04 NOTE — PROGRESS NOTES
"   06/04/22 1525   Quick Adds   Type of Visit Initial PT Evaluation   Living Environment   People in Home alone   Current Living Arrangements assisted living   Living Environment Comments Pt was living at [facility]. At baseline, mod I with 4ww within APT, had w/c transport to meals. Pt was IND with dressing, toileting; req assist for housekeeping, med mgmt, and showers. Dtr provided rides to medical appts. Pt owns manual w/c and 4ww. Pt used 4 L O2 NC at night only which staff assisted wtih setting up. Per SW note, [facility] able to accommodate Ax1 mobility, but not Ax2.   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  COVID-19 PCR positive\"   Existing Precautions/Restrictions fall   Cognition   Cognitive Status Comments AOx4   Pain Assessment   Patient Currently in Pain No   Range of Motion (ROM)   ROM Comment BLE ROM WFL   Strength (Manual Muscle Testing)   Strength Comments Global strength deficits <3+/5; heavy BUE use with transfers 2/2 BLE weaknes   Bed Mobility   Comment, (Bed Mobility) SBA with sup>sit   Transfers   Comment, (Transfers) CGA with STS and SPT using FWW   Gait/Stairs (Locomotion)   Distance in Feet (Required for LE Total Joints) 10-12 feet bouts   Comment, (Gait/Stairs) Pt amb ~10-12 feet bouts with FWW and CGA and assist for line mgmt; prolonged gait distances limited by fatigue and hx of R knee pain   Balance "   Balance Comments Fair+ standing balance; requires BUE support for safety and CGA   Clinical Impression   Criteria for Skilled Therapeutic Intervention Yes, treatment indicated   PT Diagnosis (PT) impaired functional mobility   Influenced by the following impairments deficits in strength, balance, activity tolerance 2/2 mild SOB, fatigue, and R knee pain   Functional limitations due to impairments impaired bed mobility, transfers, and gait   Clinical Presentation (PT Evaluation Complexity) Stable/Uncomplicated   Clinical Presentation Rationale stable vitals, good social support from [facility], accessible prior living environment   Clinical Decision Making (Complexity) low complexity   Planned Therapy Interventions (PT) balance training;bed mobility training;gait training;neuromuscular re-education;patient/family education;ROM (range of motion);strengthening;transfer training   Anticipated Equipment Needs at Discharge (PT)   (FWW (pt has only 4ww))   Risk & Benefits of therapy have been explained evaluation/treatment results reviewed;care plan/treatment goals reviewed;risks/benefits reviewed;current/potential barriers reviewed;participants voiced agreement with care plan;participants included;patient   PT Discharge Planning   PT Discharge Recommendation (DC Rec) home with assist;home with home care physical therapy;Leaving home requires significant assistance;Leaving home requires significant taxing effort;Leaving home is limited by medical status   PT Rationale for DC Rec Recommend home with SBA/CGA for all mobility 24 hours for safety using FWW at this time. Recommend HH PT services to address deficits in strength, endurance, balance, and activity tolerance and to promote return to IND and safe mobility as currently, she does req Ax1 for all mobility for safety 2/2 deconditioning.   PT Brief overview of current status SBA/CGA with sup >sit, SPTs, and amb 10-12 feet with fww in room   Plan of Care Review   Plan of Care  Reviewed With patient   Total Evaluation Time   Total Evaluation Time (Minutes) 11   Physical Therapy Goals   PT Frequency Daily   PT Predicted Duration/Target Date for Goal Attainment   (3 days)   PT Goals Bed Mobility;Transfers;Gait   PT: Bed Mobility Modified independent;Supine to/from sit;Rolling   PT: Transfers Modified independent;Sit to/from stand;Bed to/from chair;Assistive device   PT: Gait Modified independent;Rolling walker;50 feet

## 2022-06-04 NOTE — PLAN OF CARE
/61 (BP Location: Left arm)   Pulse 77   Temp 97.7  F (36.5  C) (Oral)   Resp 24   Ht 1.524 m (5')   Wt 106.5 kg (234 lb 12.8 oz)   SpO2 94%   BMI 45.86 kg/m        A&O. A1 GBW. 1L O2 via NC. LS coarse wheezes. Getting IV Decadron, inhalers. COVID precautions remain. SW, PT following. Plan is possible discharge home tomorrow. Will continue POC.

## 2022-06-05 LAB — GLUCOSE BLDC GLUCOMTR-MCNC: 190 MG/DL (ref 70–99)

## 2022-06-05 PROCEDURE — 250N000013 HC RX MED GY IP 250 OP 250 PS 637: Performed by: INTERNAL MEDICINE

## 2022-06-05 PROCEDURE — 94640 AIRWAY INHALATION TREATMENT: CPT

## 2022-06-05 PROCEDURE — 250N000011 HC RX IP 250 OP 636: Performed by: PHYSICIAN ASSISTANT

## 2022-06-05 PROCEDURE — 120N000001 HC R&B MED SURG/OB

## 2022-06-05 PROCEDURE — 94640 AIRWAY INHALATION TREATMENT: CPT | Mod: 76

## 2022-06-05 PROCEDURE — 99232 SBSQ HOSP IP/OBS MODERATE 35: CPT | Performed by: INTERNAL MEDICINE

## 2022-06-05 PROCEDURE — 999N000157 HC STATISTIC RCP TIME EA 10 MIN

## 2022-06-05 PROCEDURE — 250N000013 HC RX MED GY IP 250 OP 250 PS 637: Performed by: PHYSICIAN ASSISTANT

## 2022-06-05 PROCEDURE — 250N000011 HC RX IP 250 OP 636: Performed by: INTERNAL MEDICINE

## 2022-06-05 RX ADMIN — GABAPENTIN 300 MG: 300 CAPSULE ORAL at 20:28

## 2022-06-05 RX ADMIN — FUROSEMIDE 20 MG: 20 TABLET ORAL at 08:53

## 2022-06-05 RX ADMIN — FEXOFENADINE HCL 180 MG: 180 TABLET ORAL at 08:53

## 2022-06-05 RX ADMIN — ENOXAPARIN SODIUM 40 MG: 40 INJECTION SUBCUTANEOUS at 20:28

## 2022-06-05 RX ADMIN — IPRATROPIUM BROMIDE AND ALBUTEROL 2 PUFF: 20; 100 SPRAY, METERED RESPIRATORY (INHALATION) at 07:44

## 2022-06-05 RX ADMIN — IPRATROPIUM BROMIDE AND ALBUTEROL 2 PUFF: 20; 100 SPRAY, METERED RESPIRATORY (INHALATION) at 11:38

## 2022-06-05 RX ADMIN — GABAPENTIN 300 MG: 300 CAPSULE ORAL at 08:53

## 2022-06-05 RX ADMIN — METOPROLOL SUCCINATE 12.5 MG: 25 TABLET, EXTENDED RELEASE ORAL at 08:52

## 2022-06-05 RX ADMIN — IBUPROFEN 400 MG: 400 TABLET ORAL at 20:28

## 2022-06-05 RX ADMIN — IPRATROPIUM BROMIDE AND ALBUTEROL 2 PUFF: 20; 100 SPRAY, METERED RESPIRATORY (INHALATION) at 20:10

## 2022-06-05 RX ADMIN — LOSARTAN POTASSIUM 100 MG: 100 TABLET, FILM COATED ORAL at 08:52

## 2022-06-05 RX ADMIN — GUAIFENESIN AND DEXTROMETHORPHAN 10 ML: 100; 10 SYRUP ORAL at 12:15

## 2022-06-05 RX ADMIN — ENOXAPARIN SODIUM 40 MG: 40 INJECTION SUBCUTANEOUS at 08:53

## 2022-06-05 RX ADMIN — DEXAMETHASONE SODIUM PHOSPHATE 6 MG: 10 INJECTION, SOLUTION INTRAMUSCULAR; INTRAVENOUS at 12:15

## 2022-06-05 RX ADMIN — PANTOPRAZOLE SODIUM 40 MG: 40 TABLET, DELAYED RELEASE ORAL at 06:34

## 2022-06-05 RX ADMIN — Medication 1 CAPSULE: at 08:52

## 2022-06-05 RX ADMIN — DULOXETINE HYDROCHLORIDE 60 MG: 60 CAPSULE, DELAYED RELEASE ORAL at 08:53

## 2022-06-05 RX ADMIN — IPRATROPIUM BROMIDE AND ALBUTEROL 2 PUFF: 20; 100 SPRAY, METERED RESPIRATORY (INHALATION) at 15:25

## 2022-06-05 RX ADMIN — DEXTRAN 70, GLYCERIN, HYPROMELLOSE 1 DROP: 1; 2; 3 SOLUTION/ DROPS OPHTHALMIC at 08:55

## 2022-06-05 ASSESSMENT — ACTIVITIES OF DAILY LIVING (ADL)
ADLS_ACUITY_SCORE: 43
ADLS_ACUITY_SCORE: 43
ADLS_ACUITY_SCORE: 42
ADLS_ACUITY_SCORE: 42
ADLS_ACUITY_SCORE: 43
ADLS_ACUITY_SCORE: 43
ADLS_ACUITY_SCORE: 42
ADLS_ACUITY_SCORE: 42
ADLS_ACUITY_SCORE: 43
ADLS_ACUITY_SCORE: 43
ADLS_ACUITY_SCORE: 42
ADLS_ACUITY_SCORE: 43

## 2022-06-05 NOTE — PLAN OF CARE
/74 (BP Location: Right arm)   Pulse 62   Temp 97  F (36.1  C) (Oral)   Resp 20   Ht 1.524 m (5')   Wt 106.5 kg (234 lb 12.8 oz)   SpO2 95%   BMI 45.86 kg/m        A&O. A1 GBW. RA -3L O2 via NC. LS coarse wheezes. Getting IV Decadron, inhalers, Robutussin. COVID precautions remain. SW, PT following. Plan is possible discharge back to facility tomorrow. Will continue POC.

## 2022-06-05 NOTE — PLAN OF CARE
Goal Outcome Evaluation:    Plan of Care Reviewed With: patient      Cared for from [id number]    A&O x . VSS. [place]. Assist x 2. On 4L of oxygen at Noc at BL. LS coarse and wheezes.Pure wick in place. PIV (R) arm SL.. on IV Lasix. Levonox and decadron    Possible discharge with home PT    SW and PT following

## 2022-06-05 NOTE — PROGRESS NOTES
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  COVID-19 PCR positive.  CTPA ruled out PE, but did show bilateral infiltrates consistent with COVID-19 pneumonia.  Additionally likely COPD exacerbation given her significant wheezing, although has this at baseline.  CRP surprisingly undetectable.  Mild elevation in D-dimer.  Finished course of Remdesevir.  Continuing with dexamethasone and Combivent.  Clinically improving and nearing discharge, likely tomorrow.  PT assessed patient and she needs assist of 1 and home PT on return to her previous facility.    Problem List/Assessment and Plan:   Acute hypoxemic respiratory failure  COVID-19 pneumonia  COPD with acute exacerbation: At baseline she uses 4 L O2 at night.  She has albuterol HFA, inhalers, and Pulmicort nebs although she is no longer using this due to insurance not covering it.  Presenting with cough productive of yellow sputum, wheezing, shortness of breath and found to be hypoxic.  COVID-19 PCR positive.  CTPA negative for PE, but shows bilateral infiltrate consistent with COVID-19 pneumonia.  Procalcitonin not detectable.  At baseline she does have chronic wheezing and chronic severe cough ever since CyberKnife surgery for her lung metastases in 2013.  -Patient feels near baseline and this is the best her lungs have sounded on auscultation today  --Dexamethasone 6 mg daily, day 7/10  -Finished 5-day course IV Remdesevir 6/3  -Combivent inhaler 4 times daily  -Albuterol HFA as needed  -Robitussin-DM as needed  -Doubt  bacterial pneumonia so antibiotics previously stopped  -Wean oxygen a day, try room air at rest -current 1 L, uses 4 L at night through her CPAP.  May need to use oxygen during the day temporarily  -On discharge recommend prescribing either Breo Ellipta or Advair in place of Symbicort as these both have $0 co-pay for her.  Tried Advair and did not like it.  Discharge with Breo Ellipta    Lower extremity edema: Started on Lasix 20 mg daily recently with improvement in her leg swelling.  Initially held here.  Trace bilateral edema on exam.  Bilateral rhonchi which more likely is from her COVID19, however in case of pulmonary edema restarting diuretics.  TTE obtained here that shows preserved EF, grade 1 diastolic dysfunction, 1+ MR that is unchanged from 6 years ago.  -Resumed PTA p.o. Lasix 20 mg daily, good urine output with this  -Strict intake and output and daily weights    HTN: PTA on losartan 100 mg and daily metoprolol succinate 12.5 daily.  As above recently added Lasix 20 mg daily for leg swelling.  -Resumed metoprolol and losartan  -Resumed p.o. Lasix 20 mg daily, good urine output with this    History endometrial cancer with lung metastases    MDD: Resume duloxetine.    Morbid obesity: BMI 45.    DVT Prophylaxis: Enoxaparin (Lovenox) SQ  Code Status: DNR / DNI -this was discussed on admission  FEN: Regular diet  Discharge Dispo:  PT assessed patient.  Recommend assistive 1, which can be done at her current facility per care coordinator note.  At baseline she uses walker when ambulating in her apartment.  Uses wheelchair for longer trips to the cafeteria.  Estimated Disch Date / # of Days until Disch: She does not feel quite ready for discharge due to fatigue today.  Likely discharge tomorrow    Clinically Significant Risk Factors Present on Admission                               Interval History (Subjective):      Did not sleep well last night.  Has a headache today.  Shortness of breath has improved  although she does not feel ready for discharge.  Feels very fatigued today.  Still has occasional cough, unchanged from baseline.  Persistent wheeze which she feels is close to baseline.  1.7 L urine output.                  Physical Exam:      Last Vital Signs:  BP (!) 155/71 (BP Location: Left arm)   Pulse 71   Temp 97.9  F (36.6  C) (Oral)   Resp 18   Ht 1.524 m (5')   Wt 106.5 kg (234 lb 12.8 oz)   SpO2 93%   BMI 45.86 kg/m        Intake/Output Summary (Last 24 hours) at 6/5/2022 1419  Last data filed at 6/5/2022 1358  Gross per 24 hour   Intake 480 ml   Output 2960 ml   Net -2480 ml       Constitutional: Awake, NAD, appears fatigued  Eyes: sclera white   HEENT:   MMM, hard of hearing  Respiratory: No rhonchi today, right-sided expiratory wheeze, appears comfortable on 2 L via NC  Cardiovascular: RRR.  No murmur   GI: Obese, non-tender, not distended, bowel sounds present  Skin: no rash  Musculoskeletal/extremities: No lower extremity edema  Neurologic: A&O, speech clear, answers questions appropriately  Psychiatric: calm, cooperative, normal affect         Medications:      All current medications were reviewed with changes reflected in problem list.         Data:      All new lab and imaging data was reviewed.   Labs:    Imaging:   None today    Anthony Valdez MD

## 2022-06-06 ENCOUNTER — APPOINTMENT (OUTPATIENT)
Dept: PHYSICAL THERAPY | Facility: CLINIC | Age: 87
DRG: 177 | End: 2022-06-06
Payer: COMMERCIAL

## 2022-06-06 VITALS
HEART RATE: 81 BPM | DIASTOLIC BLOOD PRESSURE: 67 MMHG | SYSTOLIC BLOOD PRESSURE: 151 MMHG | HEIGHT: 60 IN | WEIGHT: 230.2 LBS | BODY MASS INDEX: 45.2 KG/M2 | RESPIRATION RATE: 22 BRPM | OXYGEN SATURATION: 94 % | TEMPERATURE: 98.1 F

## 2022-06-06 PROCEDURE — 97530 THERAPEUTIC ACTIVITIES: CPT | Mod: GP | Performed by: PHYSICAL THERAPIST

## 2022-06-06 PROCEDURE — 250N000013 HC RX MED GY IP 250 OP 250 PS 637: Performed by: INTERNAL MEDICINE

## 2022-06-06 PROCEDURE — 999N000157 HC STATISTIC RCP TIME EA 10 MIN

## 2022-06-06 PROCEDURE — 99239 HOSP IP/OBS DSCHRG MGMT >30: CPT | Performed by: INTERNAL MEDICINE

## 2022-06-06 PROCEDURE — 94640 AIRWAY INHALATION TREATMENT: CPT

## 2022-06-06 PROCEDURE — 94640 AIRWAY INHALATION TREATMENT: CPT | Mod: 76

## 2022-06-06 PROCEDURE — 250N000013 HC RX MED GY IP 250 OP 250 PS 637: Performed by: PHYSICIAN ASSISTANT

## 2022-06-06 RX ORDER — DEXAMETHASONE 6 MG/1
6 TABLET ORAL DAILY
Qty: 2 TABLET | Refills: 0 | Status: SHIPPED | OUTPATIENT
Start: 2022-06-07 | End: 2022-10-19

## 2022-06-06 RX ORDER — GUAIFENESIN 200 MG/10ML
200 LIQUID ORAL EVERY 4 HOURS PRN
Qty: 473 ML | Refills: 0 | Status: SHIPPED | OUTPATIENT
Start: 2022-06-06 | End: 2022-10-19

## 2022-06-06 RX ADMIN — IPRATROPIUM BROMIDE AND ALBUTEROL 2 PUFF: 20; 100 SPRAY, METERED RESPIRATORY (INHALATION) at 12:10

## 2022-06-06 RX ADMIN — PANTOPRAZOLE SODIUM 40 MG: 40 TABLET, DELAYED RELEASE ORAL at 06:42

## 2022-06-06 RX ADMIN — GABAPENTIN 300 MG: 300 CAPSULE ORAL at 08:32

## 2022-06-06 RX ADMIN — DULOXETINE HYDROCHLORIDE 60 MG: 60 CAPSULE, DELAYED RELEASE ORAL at 08:32

## 2022-06-06 RX ADMIN — GUAIFENESIN AND DEXTROMETHORPHAN 10 ML: 100; 10 SYRUP ORAL at 08:32

## 2022-06-06 RX ADMIN — POLYETHYLENE GLYCOL 3350 17 G: 17 POWDER, FOR SOLUTION ORAL at 08:33

## 2022-06-06 RX ADMIN — DEXTRAN 70, GLYCERIN, HYPROMELLOSE 1 DROP: 1; 2; 3 SOLUTION/ DROPS OPHTHALMIC at 08:38

## 2022-06-06 RX ADMIN — LOSARTAN POTASSIUM 100 MG: 100 TABLET, FILM COATED ORAL at 08:32

## 2022-06-06 RX ADMIN — FUROSEMIDE 20 MG: 20 TABLET ORAL at 08:32

## 2022-06-06 RX ADMIN — METOPROLOL SUCCINATE 12.5 MG: 25 TABLET, EXTENDED RELEASE ORAL at 08:32

## 2022-06-06 RX ADMIN — Medication 1 CAPSULE: at 08:32

## 2022-06-06 RX ADMIN — IPRATROPIUM BROMIDE AND ALBUTEROL 2 PUFF: 20; 100 SPRAY, METERED RESPIRATORY (INHALATION) at 08:21

## 2022-06-06 RX ADMIN — FEXOFENADINE HCL 180 MG: 180 TABLET ORAL at 08:32

## 2022-06-06 ASSESSMENT — ACTIVITIES OF DAILY LIVING (ADL)
ADLS_ACUITY_SCORE: 42

## 2022-06-06 NOTE — PLAN OF CARE
Goal Outcome Evaluation:  Patient transported home via stretcher. Continues home dose of O2. Eating well. No complaints of pain. New medications given to patient.  All belongings with patient. Patient discharge complete.

## 2022-06-06 NOTE — PLAN OF CARE
Physical Therapy Discharge Summary    Reason for therapy discharge:    Discharged to home with Home therapy.    Progress towards therapy goal(s). See goals on Care Plan in [facility] electronic health record for goal details.  Goals not met, continues to need CGA with mob, limited distances    Therapy recommendation(s):    Continued therapy is recommended.  Rationale/Recommendations: Pt is below baseline for functional mobility, ROM and strength. Pt would benefit from continued skilled therapy to address these deficits.

## 2022-06-06 NOTE — DISCHARGE INSTRUCTIONS
You have been ordered home care services on discharge for RN/PT. You will be followed by [facility] care. They have received your orders and will be contacting you in the next 24-48 hrs. If you have any questions please call them at [phone].

## 2022-06-06 NOTE — DISCHARGE SUMMARY
[facility]  Discharge Summary  Name: Tatyana Metcalf    MRN: [id number]  YOB: 1932    Age: 89 year old  Date of Discharge:  6/6/2022  3:29 PM  Date of Admission: 5/30/2022  Primary Care Provider: Jill Briscoe  Discharge Physician:  Anthony Valdez MD  Discharging Service:  Hospitalist      Discharge Diagnoses:  Acute hypoxemic respiratory failure on chronic  COVID-19 pneumonia  COPD with acute exacerbation  Lower extremity edema  HTN  History endometrial cancer with lung metastases  MDD morbid obesity       Hospital Course:  Summary of Stay: Tatyana Metcalf is a 89 year old female with history of COPD on 4 L O2 at bedtime, endometrial cancer with metastases to the lung, HTN, GERD, hearing loss, and MDD who was admitted on 5/30/2022 after presenting with cough, wheezing, and shortness of breath found to be hypoxic in the ER.  COVID-19 PCR positive.  CTPA ruled out PE, but did show bilateral infiltrates consistent with COVID-19 pneumonia.  Additionally likely COPD exacerbation given her significant wheezing, although has this at baseline.  CRP surprisingly undetectable.  Mild elevation in D-dimer.  Finished course of Remdesevir.  Continuing with dexamethasone and Combivent.  Clinically improved and respiratory status appears close to baseline.  PT was consulted and recommended home PT.  Home RN ordered as well.  She will finish additional 2 doses of dexamethasone on discharge.  Breo Ellipta has been prescribed to replace her Pulmicort that is no longer formulary.  She does have oxygen at nighttime and I recommend at least for now she continue with 1-2 L during the day until her O2 sats are above 90 on room air.  Getting back on her home CPAP machine will also help with her sleep and atelectasis overnight.  Follow-up with PCP in 1 week.  Also, recommend she have follow-up with her pulmonologist in 2 to 4 weeks.     Problem List/Assessment and Plan:   Acute on chronic hypoxemic  respiratory failure  COVID-19 pneumonia  COPD with acute exacerbation: At baseline she uses 4 L O2 at night through a CPAP machine.  She has albuterol HFA, inhalers, and Pulmicort nebs although she is no longer using this due to insurance not covering it.  Presenting with cough productive of yellow sputum, wheezing, shortness of breath and found to be hypoxic.  COVID-19 PCR positive.  CTPA negative for PE, but shows bilateral infiltrate consistent with COVID-19 pneumonia.  Procalcitonin not detectable.  At baseline she does have chronic wheezing and chronic severe cough ever since CyberKnife surgery for her lung metastases in 2013.  -Patient feels near baseline and this is the best her lungs have sounded on auscultation today  --Dexamethasone 6 mg daily, day 8/10, prescribed 2 days on discharge  -Finished 5-day course IV Remdesevir 6/3  -Received Combivent while here  -Has albuterol HFA and nebs at home  -Robitussin as needed prescribed  -Doubt bacterial pneumonia so antibiotics previously stopped  -Uses 4 L at night through her CPAP which she should continue.  For now recommended 1-2 L via nasal cannula during the day as well until her O2 sats are consistently above 90% on room air.    -On discharge recommend prescribing either Breo Ellipta or Advair in place of Symbicort as these both have $0 co-pay for her.  Tried Advair and did not like it.  Discharged with Breo Ellipta     Lower extremity edema: Started on Lasix 20 mg daily recently with improvement in her leg swelling.  Initially held here.  Trace bilateral edema on exam.  Bilateral rhonchi which more likely is from her COVID19, however in case of pulmonary edema restarting diuretics.  TTE obtained here that shows preserved EF, grade 1 diastolic dysfunction, 1+ MR that is unchanged from 6 years ago.  -Resumed PTA p.o. Lasix 20 mg daily, good urine output with this  -Strict intake and output and daily weights     HTN: PTA on losartan 100 mg and daily metoprolol  succinate 12.5 daily.  As above recently added Lasix 20 mg daily for leg swelling.  -Resumed metoprolol and losartan  -Resumed p.o. Lasix 20 mg daily, good urine output with this     History endometrial cancer with lung metastases     MDD: Resume duloxetine.     Morbid obesity: BMI 45     Discharge Disposition:  Discharged to home     Allergies:  Allergies   Allergen Reactions     Penicillins      hives     Sulfa Drugs      Rash          Discharge Medications:   Current Discharge Medication List      START taking these medications    Details   dexamethasone (DECADRON) 6 MG tablet Take 1 tablet (6 mg) by mouth daily for 2 days  Qty: 2 tablet, Refills: 0    Associated Diagnoses: Infection due to 2019 novel coronavirus      fluticasone-vilanterol (BREO ELLIPTA) 100-25 MCG/INH inhaler Inhale 1 puff into the lungs daily  Qty: 1 each, Refills: 0    Comments: Per pharmacy fiorella, copay is $0, please confirm before fill  Associated Diagnoses: Chronic obstructive pulmonary disease, unspecified COPD type (H)      guaiFENesin (ROBITUSSIN) 100 MG/5ML liquid Take 10 mLs (200 mg) by mouth every 4 hours as needed for cough  Qty: 473 mL, Refills: 0    Associated Diagnoses: Infection due to 2019 novel coronavirus         CONTINUE these medications which have NOT CHANGED    Details   acetaminophen (TYLENOL) 500 MG tablet Take 1,000 mg by mouth 3 times daily At 8am, 12pm, 8pm      albuterol (ACCUNEB) 1.25 MG/3ML nebulizer solution Take 1 vial (1.25 mg) by nebulization every 4 hours as needed for shortness of breath / dyspnea or wheezing  Qty: 30 vial, Refills: 11    Associated Diagnoses: Bronchiectasis with acute exacerbation (H)      albuterol (PROAIR HFA/PROVENTIL HFA/VENTOLIN HFA) 108 (90 Base) MCG/ACT inhaler Inhale 1 puff into the lungs every 4 hours as needed for shortness of breath / dyspnea or wheezing      ARTIFICIAL TEARS 1.4 % ophthalmic solution Place 1 drop into both eyes every morning      DULoxetine (CYMBALTA) 60 MG  capsule Take 1 capsule (60 mg) by mouth daily  Qty: 90 capsule, Refills: 1    Associated Diagnoses: Moderate recurrent major depression (H); Polyarthralgia      ferrous sulfate (IRON) 325 (65 FE) MG tablet Take 325 mg by mouth every evening       fexofenadine (ALLEGRA) 180 MG tablet Take 180 mg by mouth daily      fluticasone (FLONASE) 50 MCG/ACT nasal spray Spray 1 spray into both nostrils daily as needed for rhinitis or allergies  Qty: 9.9 mL, Refills: 1    Associated Diagnoses: Seasonal allergic rhinitis, unspecified trigger      furosemide (LASIX) 20 MG tablet Take 1 tablet (20 mg) by mouth daily as needed (edema)  Qty: 30 tablet, Refills: 0    Associated Diagnoses: Bilateral lower extremity edema      gabapentin (NEURONTIN) 300 MG capsule Take 1 capsule (300 mg) by mouth 2 times daily  Qty: 180 capsule, Refills: 1    Associated Diagnoses: Osteoarthritis of both knees, unspecified osteoarthritis type      guaiFENesin-dextromethorphan (ROBITUSSIN DM) 100-10 MG/5ML syrup Take 10 mLs by mouth every 4 hours as needed for cough      Lactobacillus (ACIDOPHILUS PROBIOTIC PO) Take 1 capsule by mouth daily      LANsoprazole (PREVACID) 30 MG DR capsule Take 1 capsule (30 mg) by mouth daily 30-60 minutes before a meal  Qty: 90 capsule, Refills: 3    Associated Diagnoses: Chronic gastritis without bleeding, unspecified gastritis type      losartan (COZAAR) 100 MG tablet Take 1 tablet (100 mg) by mouth daily  Qty: 90 tablet, Refills: 3    Associated Diagnoses: Benign essential hypertension      metoprolol succinate ER (TOPROL-XL) 25 MG 24 hr tablet TAKE HALF TABLET BY MOUTH DAILY  Qty: 45 tablet, Refills: 3    Associated Diagnoses: Benign essential hypertension      naproxen sodium 220 MG capsule Take 220 mg by mouth At Bedtime      nystatin (MYCOSTATIN) 361941 UNIT/GM external powder Apply topically 3 times daily as needed for dry skin (under breasts)  Qty: 60 g, Refills: 1    Associated Diagnoses: Fungal infection of skin       polyethylene glycol (MIRALAX/GLYCOLAX) powder TAKE 17GRAMS BY MOUTH TWICE DAILY AS DIRECTED.  Qty: 1020 g, Refills: 2    Associated Diagnoses: Slow transit constipation      vitamin D3 (CHOLECALCIFEROL) 1000 units (25 mcg) tablet Take 1,000 Units by mouth every evening      diclofenac (VOLTAREN) 1 % topical gel          STOP taking these medications       budesonide (PULMICORT) 0.25 MG/2ML neb solution Comments:   Reason for Stopping:                Condition on Discharge:  Discharge condition: Stable   Discharge vitals: Blood pressure (!) 151/67, pulse 71, temperature 98.1  F (36.7  C), temperature source Oral, resp. rate 20, height 1.524 m (5'), weight 104.4 kg (230 lb 3.2 oz), SpO2 93 %, not currently breastfeeding.   Code status on discharge: DNR / DNI     History of Illness:  See detailed admission note for full details.    Physical Exam:  Blood pressure (!) 151/67, pulse 71, temperature 98.1  F (36.7  C), temperature source Oral, resp. rate 20, height 1.524 m (5'), weight 104.4 kg (230 lb 3.2 oz), SpO2 93 %, not currently breastfeeding.  Wt Readings from Last 1 Encounters:   06/06/22 104.4 kg (230 lb 3.2 oz)     Constitutional: Awake, NAD, appears fatigued  Eyes: sclera white   HEENT:   MMM, hard of hearing  Respiratory:  Occasional bilateral rhonchi, slight right-sided expiratory wheeze, appears comfortable on 2 L via nasal cannula   Cardiovascular: RRR.  No murmur   GI: Obese, non-tender, not distended, bowel sounds present  Skin: no rash  Musculoskeletal/extremities: No lower extremity edema  Neurologic: A&O, speech clear, answers questions appropriately  Psychiatric: calm, cooperative, normal affect    Procedures other than Imaging:  None     Imaging:  Results for orders placed or performed during the hospital encounter of 05/30/22   XR Chest Port 1 View    Narrative    EXAM: XR CHEST PORT 1 VIEW  LOCATION: [facility]  DATE/TIME: 5/30/2022 6:13 PM    INDICATION: SOB,  covid  COMPARISON: Chest radiograph 02/11/2020      Impression    IMPRESSION: Unchanged size of cardiomediastinal silhouette. Stable right lower lobe and linear bandlike left lower lobe atelectasis/scarring. No new, focal airspace disease. No pleural effusion or pneumothorax. Multiple old right rib fractures again   noted.   CT Chest Pulmonary Embolism w Contrast    Narrative    EXAM: CT CHEST PULMONARY EMBOLISM W CONTRAST  LOCATION: [facility]  DATE/TIME: 5/30/2022 7:41 PM    INDICATION: PE suspected, low/intermediate prob, positive D-dimer, increasing shortness of breath  COMPARISON: Same day chest radiograph, CT 12/11/2020  TECHNIQUE: CT chest pulmonary angiogram during arterial phase injection of IV contrast. Multiplanar reformats and MIP reconstructions were performed. Dose reduction techniques were used.   CONTRAST: 73 mL Isovue 370    FINDINGS:  ANGIOGRAM CHEST: Pulmonary arteries are normal caliber and negative for pulmonary emboli. Thoracic aorta is negative for dissection. No CT evidence of right heart strain.    LUNGS AND PLEURA: Stable chronic right lower lobe consolidation with associated calcifications as well as linear atelectasis versus scarring in the left lower lobe. Newly visualized upper lobe predominant scattered groundglass opacities, most pronounced   in the left upper lobe. No new, focal airspace consolidation. No pleural effusion or pneumothorax.    MEDIASTINUM/AXILLAE: Large hiatal hernia.    CORONARY ARTERY CALCIFICATION: Mild.    UPPER ABDOMEN: Unremarkable.    MUSCULOSKELETAL: Multiple chronic bilateral rib fractures, some of which demonstrate increased callus formation/heterotopic ossification since prior examination. Stable thoracic spine compression deformities. No definite acute fracture.      Impression    IMPRESSION:  1.  Chronic changes throughout both lungs with new upper lobe predominant groundglass opacities, could represent mild pulmonary edema or early  COVID pneumonia.  2.  No evidence of pulmonary embolism.   Echocardiogram Limited     Value    LVEF  55-60%    [facility]    [id number]  [id number]  [id number]  [id number]^MONICA^VIRGILIO^PRASANNA     [facility]  Echocardiography Laboratory  [address] [place] [address]     Name: HERNANDEZ DAVIES  MRN: [id number]  : 1932  Study Date: 2022 01:16 PM  Age: 89 yrs  Gender: Female  Patient Location: [facility]  Reason For Study: Dyspnea, Edema  Ordering Physician: VIRGILIO ANDERSON  Referring Physician: Jill Briscoe  Performed By: Shannan Vital RDCS     BSA: 2.0 m2  Height: 60 in  Weight: 235 lb  HR: 97  BP: 141/39 mmHg  ______________________________________________________________________________  Procedure  Limited Portable Echo Adult.  ______________________________________________________________________________  Interpretation Summary     Technically difficult imaging  Left ventricular systolic function is normal.  The visual ejection fraction is 55-60%.  The left ventricle is normal in size.  There is mild (1+) mitral regurgitation.     No signficant change since 2016  ______________________________________________________________________________  Left Ventricle  The left ventricle is normal in size. There is normal left ventricular wall  thickness. Left ventricular systolic function is normal. The visual ejection  fraction is 55-60%. Grade I or early diastolic dysfunction. No regional wall  motion abnormalities noted. There is no thrombus seen in the left ventricle.     Right Ventricle  The right ventricle is normal in structure, function and size. There is no  mass or thrombus in the right ventricle.     Atria  Normal left atrial size. Right atrial size is normal. There is no atrial shunt  seen. The left atrial appendage is not well visualized.     Mitral Valve  The mitral valve leaflets appear normal. There is no evidence of stenosis,  fluttering, or prolapse. There is  mild (1+) mitral regurgitation. There is no  mitral valve stenosis.     Tricuspid Valve  Normal tricuspid valve. No tricuspid regurgitation. Right ventricular systolic  pressure could not be approximated due to inadequate tricuspid regurgitation.  There is no tricuspid stenosis.     Aortic Valve  The aortic valve is trileaflet. No aortic regurgitation is present. No aortic  stenosis is present.     Pulmonic Valve  The pulmonic valve is not well visualized. There is no pulmonic valvular  regurgitation. Increased pulmonic valve velocity.     Vessels  The aortic root is normal size. Normal size ascending aorta. The inferior vena  cava is normal. The pulmonary artery is normal size.     Pericardium  The pericardium appears normal. There is no pleural effusion.     Rhythm  Sinus rhythm was noted.  ______________________________________________________________________________  Doppler Measurements & Calculations  MV E max rosemary: 85.0 cm/sec  MV A max rosemary: 117.0 cm/sec  MV E/A: 0.73  MV dec time: 0.19 sec  E/E' av.0  Lateral E/e': 9.1  Medial E/e': 12.9     ______________________________________________________________________________  Report approved by: Dr. Aniket Bernal 2022 02:10 PM                Consultations:  No consultations were requested during this admission.       Recent Lab Results:  Recent Labs   Lab 22  0715 22  0654 22  0626 22  0942   WBC  --  11.2* 11.0 8.5   HGB  --  14.1 13.8 13.8   HCT  --  46.0 44.3 44.7   MCV  --  98 97 97    307 311 300     Recent Labs   Lab 22  0846 22  0849 22  0715 22  0654 22  0622  0942   NA  --   --   --  142 140 141   POTASSIUM  --   --   --  4.3 3.9 4.0   CHLORIDE  --   --   --  107 108 104   CO2  --   --   --  32 30 31   ANIONGAP  --   --   --  3 2* 6   * 190*  --  150* 158* 172*   BUN  --   --   --  29 25 25   CR  --   --  0.57 0.49* 0.49* 0.48*   GFRESTIMATED  --   --  86 89 89 89    [place]  --   --   --  9.1 9.4 9.5     No results for input(s): NTBNPI, NTBNP in the last 168 hours.  Recent Labs   Lab 06/02/22  0654   DD 1.26*     Recent Labs   Lab 06/02/22  0654 06/01/22  0626 05/31/22  0942   CRP <2.9 <2.9 <2.9          Pending Results:    Unresulted Labs Ordered in the Past 30 Days of this Admission     No orders found from 4/30/2022 to 5/31/2022.         These results will be followed up by patient's primary care provider.    Discharge Instructions and Follow-Up:   Discharge Procedure Orders   Home Care Referral   Referral Priority: Routine: Next available opening Referral Type: Home Health Therapies & Aides   Number of Visits Requested: 1     Reason for your hospital stay   Order Comments: You were hospitalized for COVID-19 infection that has improved with a steroid called dexamethasone.  You will finish 2 additional days of this in pill form starting tomorrow.  You can take guaifenesin as needed for cough.  Your Pulmicort is no longer covered by your insurance, but Breo Ellipta inhaler reportedly is so I have prescribed this as a replacement.  You are needing some oxygen 1-2 L during the daytime in addition to your 4 L at night.  Check your oxygen saturations during the daytime and if you are above 90% on room air do not need to use the oxygen during the day.  Home care therapy services have been recommended and ordered.     Follow-up and recommended labs and tests    Order Comments: Follow up with primary care provider, Jill Briscoe, within 7 days for hospital follow- up.  No follow up labs or test are needed.    Follow-up with your pulmonologist within 2 to 4 weeks     Activity   Order Comments: Your activity upon discharge: activity as tolerated     Order Specific Question Answer Comments   Is discharge order? Yes      No CPR- Do NOT Intubate     Order Specific Question Answer Comments   Code status determined by: Discussion with patient/ legal decision maker      Oxygen Adult/Peds    Order Comments: Oxygen Documentation:   I certify that this patient, Tatyana Metcalf has been under my care (or a nurse practitioner or physican's assistant working with me). This is the face-to-face encounter for oxygen medical necessity.      Tatyana Metcalf is now in a chronic stable state and continues to require supplemental oxygen. Patient has continued oxygen desaturation due to Chronic Respiratory Failure with Hypoxia J93.11  COPD J44.9.    Alternative treatment(s) tried or considered and deemed clinically infective for treatment of Chronic Respiratory Failure with Hypoxia J93.11  COPD J44.9 include nebulizers, inhalers, steroids, and pulmonary toileting.  If portability is ordered, is the patient mobile within the home? yes    **Patients who qualify for home O2 coverage under the CMS guidelines require ABG tests or O2 sat readings obtained closest to, but no earlier than 2 days prior to the discharge, as evidence of the need for home oxygen therapy. Testing must be performed while patient is in the chronic stable state. See notes for O2 sats.**     Order Specific Question Answer Comments   DME Provider: [facility]    Type: Resume    Did the patient have SpO2 (sat) testing (only needed for new oxgyen or liter flow changes)? No    Length of Need: Lifetime    Frequency of Use: Continuous    Frequency of Use: Nocturnal    Mode of Delivery - Continuous Nasal Cannula    Liter Flow - Continuous (LPM): Use 1-2 LPM during the day, wean as able if O2 sats are above 90%    Mode of Delivery - Nocturnal Nasal Cannula    Liter Flow - Nocturnal (LPM): 4 LPM    Need for Portability: Yes    Evaluate for Conserving Device: Yes    Maintain Sats >= 90%    The face to face evaluation was performed on: 6/6/2022      Diet   Order Comments: Follow this diet upon discharge: Orders Placed This Encounter      Combination Diet Regular Diet Adult     Order Specific Question Answer Comments   Is discharge order? Yes           I, Anthony Valdez MD, personally saw the patient today and spent greater than 30 minutes discharging this patient.    Anthony Valdez MD

## 2022-06-06 NOTE — PLAN OF CARE
Goal Outcome Evaluation:    A/Ox4. C/o  headache, Ibuprofen given with relief.  Assist of 1 with GBW. Pure wick in place. Adequate urine output. LS coarse with crackles. Encouraged IS.  3L NC. Reg diet.     /63 (BP Location: Right arm)   Pulse 87   Temp 98  F (36.7  C) (Oral)   Resp 20   Ht 1.524 m (5')   Wt 106.5 kg (234 lb 12.8 oz)   SpO2 91%   BMI 45.86 kg/m

## 2022-06-06 NOTE — PROGRESS NOTES
Care Management Discharge Note    Discharge Date: 06/06/2022 @ 1500    Discharge Disposition:  Return to [facility]    Discharge Services: [facility]- RN/PT    Discharge DME:      Discharge Transportation: [employer] stretcher    Education Provided on the Discharge Plan:  yes  Persons Notified of Discharge Plans: pt's dtr, nursing, MD, RN [place] at [facility]  Patient/Family in Agreement with the Plan:  yes    Handoff Referral Completed: Yes    Additional Information:  Reviewed pt's status and discussed in rounds.     Noted pt has discharge orders. Home care ordered for RN/PT.     MANDY placed phone call to [facility]- but per intake, they are at capacity for pt's insurance.     Spoke with RN China p: [phone] at pt's [facility] to provide update on pt's discharge. China is agreeable with pt's return and requested orders be faxed to f: [phone]. Writer provided update on [place] not being able to service pt. China requested SW try [facility]. China requested any new meds be filled and sent with pt. MANDY faxed orders to [facility].     Spoke with intake at [facility]  who stated they are not contracted with pt's insurance.     Placed phone call to [facility] and spoke with Michelle. Per Michelle, they can accept the referral for RN/PT. MANDY faxed orders to [facility] f:[phone].     Placed phone call to pt's dtr Jennifer p: [phone] to inquire about transportation. Jennifer stated she is immunocompromised and her sister is working today so she requested medical transport be arranged.     MANDY arranged [employer] stretcher ride for next available stretcher ride which is 1500 (pt requiring stretcher ride due to covid +). PCS form completed.     At baseline, pt is on O2 at night. Pt now requiring continuous O2. Pt receives oxygen from [employer]. Phone call placed to [employer] p: [phone] to provide update on need for continuous oxygen. Orders faxed to [employer] f: [phone] to send additional equipment if  needed.     KIM Boyer, \[facility]\""  Inpatient Care Coordination  MS3, [facility]  [phone]    JACQUES RoseW

## 2022-06-07 ENCOUNTER — PATIENT OUTREACH (OUTPATIENT)
Dept: NURSING | Facility: CLINIC | Age: 87
End: 2022-06-07
Payer: COMMERCIAL

## 2022-06-07 NOTE — PROGRESS NOTES
Clinic Care Coordination Contact  [facility]: Post-Discharge Note  SITUATION                                                      Admission:    Admission Date: 05/30/22   Reason for Admission: cough, wheezing  Discharge:   Discharge Date: 06/06/22  Discharge Diagnosis: Acute hypoxemic respiratory failure on chronic  COVID-19 pneumonia    BACKGROUND                                                      Per hospital discharge summary and inpatient provider notes:  Summary of Stay: Tatyana Metcalf is a 89 year old female with history of COPD on 4 L O2 at bedtime, endometrial cancer with metastases to the lung, HTN, GERD, hearing loss, and MDD who was admitted on 5/30/2022 after presenting with cough, wheezing, and shortness of breath found to be hypoxic in the ER.  COVID-19 PCR positive.  CTPA ruled out PE, but did show bilateral infiltrates consistent with COVID-19 pneumonia.  Additionally likely COPD exacerbation given her significant wheezing, although has this at baseline.  CRP surprisingly undetectable.  Mild elevation in D-dimer.  Finished course of Remdesevir.  Continuing with dexamethasone and Combivent.  Clinically improved and respiratory status appears close to baseline.  PT was consulted and recommended home PT.  Home RN ordered as well.  She will finish additional 2 doses of dexamethasone on discharge.  Breo Ellipta has been prescribed to replace her Pulmicort that is no longer formulary.  She does have oxygen at nighttime and I recommend at least for now she continue with 1-2 L during the day until her O2 sats are above 90 on room air.  Getting back on her home CPAP machine will also help with her sleep and atelectasis overnight.  Follow-up with PCP in 1 week.  Also, recommend she have follow-up with her pulmonologist in 2 to 4 weeks.    ASSESSMENT      Enrollment  Primary Care Care Coordination Status: Declined    Discharge Assessment  How are your symptoms? (Red Flag symptoms escalate to triage  hotline per guidelines): Improved  Do you feel your condition is stable enough to be safe at home until your provider visit?: Yes  Does the patient have their discharge instructions? : Yes  Does the patient have questions regarding their discharge instructions? : No  Were you started on any new medications or were there changes to any of your previous medications? : Yes  Does the patient have all of their medications?: Yes  Do you have questions regarding any of your medications? : No  Do you have all of your needed medical supplies or equipment (DME)?  (i.e. oxygen tank, CPAP, cane, etc.): Yes  Discharge follow-up appointment scheduled within 14 calendar days? : Yes  Discharge Follow Up Appointment Date:  (Patient will be seen by PCP at [facility])  Discharge Follow Up Appointment Scheduled with?: Primary Care Provider              Care Management   Community Health Worker Initial Outreach    CHW Initial Information Gathering:  Referral Source: IP Handoff  Current living arrangement:: I live alone  Type of residence:: Assisted living  Community Resources: Home Care, DME ([facility]  Supplies Currently Used at Home: Oxygen Tubing/Supplies  Equipment Currently Used at Home: walker, rolling, wheelchair, manual, shower chair  Informal Support system:: Children  No PCP office visit in Past Year: No (Patient has a PCP at their [facility], Diya Shahid)  Transportation means:: Family       Patient accepts CC: No, Enrolled in home care. Patient will be sent Care Coordination introduction letter for future reference.     6-7, CHW:    Writer was able to connect with the patient's daughter, Jennifer ([facility]), and introduce self/care coordination.    Jennifer reported no questions or concerns. The patient has all of their services arranged through their [facility] - including Primary Care. Writer went over recommended follow-ups in the AVS.    Writer encouraged Jennifer to reach out to CC for any additional support; Jennifer agreeable with plan.     PLAN                                                       Outpatient Plan:      Follow up with primary care provider, Jill Briscoe, within 7 days for hospital follow- up.  No follow up labs or test are needed.     Follow-up with your pulmonologist within 2 to 4 weeks    No future appointments.      For any urgent concerns, please contact our 24 hour nurse triage line: [phone] ([phone])         TERRA Miller. Public Health  Community Health Worker  [facility]  Clinic Care Coordination  [phone]

## 2022-06-07 NOTE — LETTER
[facility] CARE COORDINATION  [facility]  June 7, 2022    Tatyana Metcalf  [address]  [place] [address]      Dear Tatyana,    I am a clinic community health worker who works with Jill Briscoe MD with the [facility]. I wanted to thank you for spending the time to talk with me.  Below is a description of clinic care coordination and how I can further assist you.       The clinic care coordination team is made up of a registered nurse, , financial resource worker and community health worker who understand the health care system. The goal of clinic care coordination is to help you manage your health and improve access to the health care system. Our team works alongside your provider to assist you in determining your health and social needs. We can help you obtain health care and community resources, providing you with necessary information and education. We can work with you through any barriers and develop a care plan that helps coordinate and strengthen the communication between you and your care team.    Please feel free to contact me with any questions or concerns regarding care coordination and what we can offer.      We are focused on providing you with the highest-quality healthcare experience possible.    Sincerely,     Prudence Navarro,     The Community Health Worker at the [facility]:     Ayleen BROWN [phone]

## 2022-06-09 ENCOUNTER — DOCUMENTATION ONLY (OUTPATIENT)
Dept: OTHER | Facility: CLINIC | Age: 87
End: 2022-06-09
Payer: COMMERCIAL

## 2022-07-02 ENCOUNTER — LAB REQUISITION (OUTPATIENT)
Dept: LAB | Facility: CLINIC | Age: 87
End: 2022-07-02
Payer: COMMERCIAL

## 2022-07-02 DIAGNOSIS — D64.9 ANEMIA, UNSPECIFIED: ICD-10-CM

## 2022-07-02 DIAGNOSIS — I10 ESSENTIAL (PRIMARY) HYPERTENSION: ICD-10-CM

## 2022-07-05 LAB
ANION GAP SERPL CALCULATED.3IONS-SCNC: 8 MMOL/L (ref 7–15)
BUN SERPL-MCNC: 18.9 MG/DL (ref 8–23)
CALCIUM SERPL-MCNC: 9.7 MG/DL (ref 8.2–9.6)
CHLORIDE SERPL-SCNC: 103 MMOL/L (ref 98–107)
CREAT SERPL-MCNC: 0.55 MG/DL (ref 0.51–0.95)
DEPRECATED HCO3 PLAS-SCNC: 34 MMOL/L (ref 22–29)
ERYTHROCYTE [DISTWIDTH] IN BLOOD BY AUTOMATED COUNT: 14 % (ref 10–15)
GFR SERPL CREATININE-BSD FRML MDRD: 87 ML/MIN/1.73M2
GLUCOSE SERPL-MCNC: 134 MG/DL (ref 70–99)
HCT VFR BLD AUTO: 43.2 % (ref 35–47)
HGB BLD-MCNC: 13.2 G/DL (ref 11.7–15.7)
MCH RBC QN AUTO: 30.2 PG (ref 26.5–33)
MCHC RBC AUTO-ENTMCNC: 30.6 G/DL (ref 31.5–36.5)
MCV RBC AUTO: 99 FL (ref 78–100)
PLATELET # BLD AUTO: 346 10E3/UL (ref 150–450)
POTASSIUM SERPL-SCNC: 4.3 MMOL/L (ref 3.4–5.3)
RBC # BLD AUTO: 4.37 10E6/UL (ref 3.8–5.2)
SODIUM SERPL-SCNC: 145 MMOL/L (ref 136–145)
WBC # BLD AUTO: 9.6 10E3/UL (ref 4–11)

## 2022-07-05 PROCEDURE — P9604 ONE-WAY ALLOW PRORATED TRIP: HCPCS | Mod: ORL | Performed by: PHYSICIAN ASSISTANT

## 2022-07-05 PROCEDURE — 36415 COLL VENOUS BLD VENIPUNCTURE: CPT | Mod: ORL | Performed by: PHYSICIAN ASSISTANT

## 2022-07-05 PROCEDURE — 80048 BASIC METABOLIC PNL TOTAL CA: CPT | Mod: ORL | Performed by: PHYSICIAN ASSISTANT

## 2022-07-05 PROCEDURE — 85027 COMPLETE CBC AUTOMATED: CPT | Mod: ORL | Performed by: PHYSICIAN ASSISTANT

## 2022-07-14 ENCOUNTER — TELEPHONE (OUTPATIENT)
Dept: PEDIATRICS | Facility: CLINIC | Age: 87
End: 2022-07-14

## 2022-07-14 NOTE — TELEPHONE ENCOUNTER
Forms/Letter Request    Name of form/letter: DNR    Have you been seen for this request: N/A    Do we have the form/letter: Yes:     When is form/letter needed by: asap    How would you like the form/letter returned: Mail    Patient Notified form requests are processed in 3-5 business days:No    Okay to leave a detailed message? No Other phone number:  [phone]

## 2022-07-14 NOTE — TELEPHONE ENCOUNTER
Obtained, placed in providers inbox for review/completion.    Thank you  Waqar AUGUSTE   - Complex Care Team

## 2022-08-18 ENCOUNTER — MEDICAL CORRESPONDENCE (OUTPATIENT)
Dept: PEDIATRICS | Facility: CLINIC | Age: 87
End: 2022-08-18

## 2022-08-18 ENCOUNTER — TELEPHONE (OUTPATIENT)
Dept: PEDIATRICS | Facility: CLINIC | Age: 87
End: 2022-08-18

## 2022-09-10 ENCOUNTER — HEALTH MAINTENANCE LETTER (OUTPATIENT)
Age: 87
End: 2022-09-10

## 2022-10-19 ENCOUNTER — APPOINTMENT (OUTPATIENT)
Dept: GENERAL RADIOLOGY | Facility: CLINIC | Age: 87
DRG: 191 | End: 2022-10-19
Attending: EMERGENCY MEDICINE
Payer: COMMERCIAL

## 2022-10-19 ENCOUNTER — HOSPITAL ENCOUNTER (INPATIENT)
Facility: CLINIC | Age: 87
LOS: 3 days | Discharge: NURSING, REHAB OR LONG-TERM CARE | DRG: 191 | End: 2022-10-22
Attending: EMERGENCY MEDICINE | Admitting: STUDENT IN AN ORGANIZED HEALTH CARE EDUCATION/TRAINING PROGRAM
Payer: COMMERCIAL

## 2022-10-19 ENCOUNTER — APPOINTMENT (OUTPATIENT)
Dept: CT IMAGING | Facility: CLINIC | Age: 87
DRG: 191 | End: 2022-10-19
Attending: STUDENT IN AN ORGANIZED HEALTH CARE EDUCATION/TRAINING PROGRAM
Payer: COMMERCIAL

## 2022-10-19 DIAGNOSIS — E09.69: ICD-10-CM

## 2022-10-19 DIAGNOSIS — I10 BENIGN ESSENTIAL HYPERTENSION: Primary | ICD-10-CM

## 2022-10-19 DIAGNOSIS — J18.9 PNEUMONIA DUE TO INFECTIOUS ORGANISM, UNSPECIFIED LATERALITY, UNSPECIFIED PART OF LUNG: ICD-10-CM

## 2022-10-19 DIAGNOSIS — J44.1 COPD EXACERBATION (H): ICD-10-CM

## 2022-10-19 LAB
ALBUMIN SERPL BCG-MCNC: 3.9 G/DL (ref 3.5–5.2)
ALP SERPL-CCNC: 106 U/L (ref 35–104)
ALT SERPL W P-5'-P-CCNC: 15 U/L (ref 10–35)
ANION GAP SERPL CALCULATED.3IONS-SCNC: 11 MMOL/L (ref 7–15)
AST SERPL W P-5'-P-CCNC: 19 U/L (ref 10–35)
BASOPHILS # BLD AUTO: 0 10E3/UL (ref 0–0.2)
BASOPHILS NFR BLD AUTO: 0 %
BILIRUB SERPL-MCNC: 0.2 MG/DL
BUN SERPL-MCNC: 20.4 MG/DL (ref 8–23)
CALCIUM SERPL-MCNC: 9.7 MG/DL (ref 8.2–9.6)
CHLORIDE SERPL-SCNC: 102 MMOL/L (ref 98–107)
CREAT SERPL-MCNC: 0.65 MG/DL (ref 0.51–0.95)
DEPRECATED HCO3 PLAS-SCNC: 30 MMOL/L (ref 22–29)
EOSINOPHIL # BLD AUTO: 0 10E3/UL (ref 0–0.7)
EOSINOPHIL NFR BLD AUTO: 0 %
ERYTHROCYTE [DISTWIDTH] IN BLOOD BY AUTOMATED COUNT: 13.2 % (ref 10–15)
FLUAV RNA SPEC QL NAA+PROBE: NEGATIVE
FLUBV RNA RESP QL NAA+PROBE: NEGATIVE
GFR SERPL CREATININE-BSD FRML MDRD: 83 ML/MIN/1.73M2
GLUCOSE BLDC GLUCOMTR-MCNC: 291 MG/DL (ref 70–99)
GLUCOSE BLDC GLUCOMTR-MCNC: 336 MG/DL (ref 70–99)
GLUCOSE SERPL-MCNC: 230 MG/DL (ref 70–99)
HBA1C MFR BLD: 7.6 %
HCO3 BLDV-SCNC: 34 MMOL/L (ref 21–28)
HCT VFR BLD AUTO: 46.7 % (ref 35–47)
HGB BLD-MCNC: 14 G/DL (ref 11.7–15.7)
HOLD SPECIMEN: NORMAL
IMM GRANULOCYTES # BLD: 0.1 10E3/UL
IMM GRANULOCYTES NFR BLD: 1 %
INR PPP: 1.13 (ref 0.85–1.15)
LACTATE BLD-SCNC: 1.5 MMOL/L
LYMPHOCYTES # BLD AUTO: 0.9 10E3/UL (ref 0.8–5.3)
LYMPHOCYTES NFR BLD AUTO: 5 %
MAGNESIUM SERPL-MCNC: 1.8 MG/DL (ref 1.7–2.3)
MCH RBC QN AUTO: 28.8 PG (ref 26.5–33)
MCHC RBC AUTO-ENTMCNC: 30 G/DL (ref 31.5–36.5)
MCV RBC AUTO: 96 FL (ref 78–100)
MONOCYTES # BLD AUTO: 1.3 10E3/UL (ref 0–1.3)
MONOCYTES NFR BLD AUTO: 8 %
NEUTROPHILS # BLD AUTO: 15.3 10E3/UL (ref 1.6–8.3)
NEUTROPHILS NFR BLD AUTO: 86 %
NRBC # BLD AUTO: 0 10E3/UL
NRBC BLD AUTO-RTO: 0 /100
PCO2 BLDV: 40 MM HG (ref 40–50)
PH BLDV: 7.54 [PH] (ref 7.32–7.43)
PLATELET # BLD AUTO: 274 10E3/UL (ref 150–450)
PO2 BLDV: 49 MM HG (ref 25–47)
POTASSIUM SERPL-SCNC: 4.3 MMOL/L (ref 3.4–5.3)
PROT SERPL-MCNC: 7.2 G/DL (ref 6.4–8.3)
RBC # BLD AUTO: 4.86 10E6/UL (ref 3.8–5.2)
RSV RNA SPEC NAA+PROBE: NEGATIVE
SAO2 % BLDV: 88 % (ref 94–100)
SARS-COV-2 RNA RESP QL NAA+PROBE: NEGATIVE
SODIUM SERPL-SCNC: 143 MMOL/L (ref 136–145)
WBC # BLD AUTO: 17.6 10E3/UL (ref 4–11)

## 2022-10-19 PROCEDURE — 94640 AIRWAY INHALATION TREATMENT: CPT

## 2022-10-19 PROCEDURE — 96365 THER/PROPH/DIAG IV INF INIT: CPT

## 2022-10-19 PROCEDURE — 83036 HEMOGLOBIN GLYCOSYLATED A1C: CPT | Performed by: STUDENT IN AN ORGANIZED HEALTH CARE EDUCATION/TRAINING PROGRAM

## 2022-10-19 PROCEDURE — C9803 HOPD COVID-19 SPEC COLLECT: HCPCS

## 2022-10-19 PROCEDURE — 87637 SARSCOV2&INF A&B&RSV AMP PRB: CPT | Performed by: EMERGENCY MEDICINE

## 2022-10-19 PROCEDURE — 250N000011 HC RX IP 250 OP 636: Performed by: STUDENT IN AN ORGANIZED HEALTH CARE EDUCATION/TRAINING PROGRAM

## 2022-10-19 PROCEDURE — 93005 ELECTROCARDIOGRAM TRACING: CPT

## 2022-10-19 PROCEDURE — 999N000105 HC STATISTIC NO DOCUMENTATION TO SUPPORT CHARGE

## 2022-10-19 PROCEDURE — 99223 1ST HOSP IP/OBS HIGH 75: CPT | Mod: AI | Performed by: STUDENT IN AN ORGANIZED HEALTH CARE EDUCATION/TRAINING PROGRAM

## 2022-10-19 PROCEDURE — 85610 PROTHROMBIN TIME: CPT | Performed by: EMERGENCY MEDICINE

## 2022-10-19 PROCEDURE — 120N000001 HC R&B MED SURG/OB

## 2022-10-19 PROCEDURE — 71250 CT THORAX DX C-: CPT

## 2022-10-19 PROCEDURE — 36415 COLL VENOUS BLD VENIPUNCTURE: CPT | Performed by: EMERGENCY MEDICINE

## 2022-10-19 PROCEDURE — 250N000013 HC RX MED GY IP 250 OP 250 PS 637: Performed by: EMERGENCY MEDICINE

## 2022-10-19 PROCEDURE — 250N000013 HC RX MED GY IP 250 OP 250 PS 637: Performed by: STUDENT IN AN ORGANIZED HEALTH CARE EDUCATION/TRAINING PROGRAM

## 2022-10-19 PROCEDURE — 71046 X-RAY EXAM CHEST 2 VIEWS: CPT

## 2022-10-19 PROCEDURE — 82803 BLOOD GASES ANY COMBINATION: CPT

## 2022-10-19 PROCEDURE — 83735 ASSAY OF MAGNESIUM: CPT | Performed by: STUDENT IN AN ORGANIZED HEALTH CARE EDUCATION/TRAINING PROGRAM

## 2022-10-19 PROCEDURE — 96375 TX/PRO/DX INJ NEW DRUG ADDON: CPT

## 2022-10-19 PROCEDURE — 250N000009 HC RX 250: Performed by: EMERGENCY MEDICINE

## 2022-10-19 PROCEDURE — 99285 EMERGENCY DEPT VISIT HI MDM: CPT | Mod: CS,25

## 2022-10-19 PROCEDURE — 80053 COMPREHEN METABOLIC PANEL: CPT | Performed by: EMERGENCY MEDICINE

## 2022-10-19 PROCEDURE — 96372 THER/PROPH/DIAG INJ SC/IM: CPT | Mod: 59

## 2022-10-19 PROCEDURE — 250N000011 HC RX IP 250 OP 636: Performed by: EMERGENCY MEDICINE

## 2022-10-19 PROCEDURE — 250N000012 HC RX MED GY IP 250 OP 636 PS 637: Performed by: STUDENT IN AN ORGANIZED HEALTH CARE EDUCATION/TRAINING PROGRAM

## 2022-10-19 PROCEDURE — 999N000157 HC STATISTIC RCP TIME EA 10 MIN

## 2022-10-19 PROCEDURE — 85025 COMPLETE CBC W/AUTO DIFF WBC: CPT | Performed by: EMERGENCY MEDICINE

## 2022-10-19 PROCEDURE — 250N000009 HC RX 250: Performed by: STUDENT IN AN ORGANIZED HEALTH CARE EDUCATION/TRAINING PROGRAM

## 2022-10-19 PROCEDURE — 94640 AIRWAY INHALATION TREATMENT: CPT | Mod: 76

## 2022-10-19 PROCEDURE — 83605 ASSAY OF LACTIC ACID: CPT

## 2022-10-19 RX ORDER — BUDESONIDE 0.25 MG/2ML
0.25 INHALANT ORAL 2 TIMES DAILY
COMMUNITY

## 2022-10-19 RX ORDER — CEFTRIAXONE 1 G/1
1 INJECTION, POWDER, FOR SOLUTION INTRAMUSCULAR; INTRAVENOUS EVERY 24 HOURS
Status: DISCONTINUED | OUTPATIENT
Start: 2022-10-20 | End: 2022-10-22 | Stop reason: HOSPADM

## 2022-10-19 RX ORDER — AMOXICILLIN 250 MG
1 CAPSULE ORAL 2 TIMES DAILY PRN
Status: DISCONTINUED | OUTPATIENT
Start: 2022-10-19 | End: 2022-10-22 | Stop reason: HOSPADM

## 2022-10-19 RX ORDER — METOPROLOL SUCCINATE 25 MG/1
25 TABLET, EXTENDED RELEASE ORAL DAILY
Status: DISCONTINUED | OUTPATIENT
Start: 2022-10-19 | End: 2022-10-22 | Stop reason: HOSPADM

## 2022-10-19 RX ORDER — AMOXICILLIN 250 MG
2 CAPSULE ORAL 2 TIMES DAILY PRN
Status: DISCONTINUED | OUTPATIENT
Start: 2022-10-19 | End: 2022-10-22 | Stop reason: HOSPADM

## 2022-10-19 RX ORDER — ONDANSETRON 4 MG/1
4 TABLET, ORALLY DISINTEGRATING ORAL EVERY 6 HOURS PRN
Status: DISCONTINUED | OUTPATIENT
Start: 2022-10-19 | End: 2022-10-22 | Stop reason: HOSPADM

## 2022-10-19 RX ORDER — DEXTROSE MONOHYDRATE 25 G/50ML
25-50 INJECTION, SOLUTION INTRAVENOUS
Status: DISCONTINUED | OUTPATIENT
Start: 2022-10-19 | End: 2022-10-22 | Stop reason: HOSPADM

## 2022-10-19 RX ORDER — ACETAMINOPHEN 325 MG/1
650 TABLET ORAL EVERY 6 HOURS PRN
Status: DISCONTINUED | OUTPATIENT
Start: 2022-10-19 | End: 2022-10-22 | Stop reason: HOSPADM

## 2022-10-19 RX ORDER — HYDROMORPHONE HCL IN WATER/PF 6 MG/30 ML
0.2 PATIENT CONTROLLED ANALGESIA SYRINGE INTRAVENOUS
Status: DISCONTINUED | OUTPATIENT
Start: 2022-10-19 | End: 2022-10-22 | Stop reason: HOSPADM

## 2022-10-19 RX ORDER — NICOTINE POLACRILEX 4 MG
15-30 LOZENGE BUCCAL
Status: DISCONTINUED | OUTPATIENT
Start: 2022-10-19 | End: 2022-10-22 | Stop reason: HOSPADM

## 2022-10-19 RX ORDER — PREDNISONE 20 MG/1
40 TABLET ORAL DAILY
Status: DISCONTINUED | OUTPATIENT
Start: 2022-10-20 | End: 2022-10-20

## 2022-10-19 RX ORDER — METHYLPREDNISOLONE SODIUM SUCCINATE 125 MG/2ML
125 INJECTION, POWDER, LYOPHILIZED, FOR SOLUTION INTRAMUSCULAR; INTRAVENOUS ONCE
Status: COMPLETED | OUTPATIENT
Start: 2022-10-19 | End: 2022-10-19

## 2022-10-19 RX ORDER — LIDOCAINE 40 MG/G
CREAM TOPICAL
Status: DISCONTINUED | OUTPATIENT
Start: 2022-10-19 | End: 2022-10-22 | Stop reason: HOSPADM

## 2022-10-19 RX ORDER — LOSARTAN POTASSIUM 100 MG/1
100 TABLET ORAL DAILY
Status: DISCONTINUED | OUTPATIENT
Start: 2022-10-19 | End: 2022-10-22 | Stop reason: HOSPADM

## 2022-10-19 RX ORDER — POLYVINYL ALCOHOL 14 MG/ML
1 SOLUTION/ DROPS OPHTHALMIC EVERY MORNING
Status: DISCONTINUED | OUTPATIENT
Start: 2022-10-20 | End: 2022-10-19 | Stop reason: RX

## 2022-10-19 RX ORDER — AZITHROMYCIN 500 MG/5ML
500 INJECTION, POWDER, LYOPHILIZED, FOR SOLUTION INTRAVENOUS EVERY 24 HOURS
Status: DISCONTINUED | OUTPATIENT
Start: 2022-10-20 | End: 2022-10-22 | Stop reason: HOSPADM

## 2022-10-19 RX ORDER — ACETAMINOPHEN 650 MG/1
650 SUPPOSITORY RECTAL EVERY 6 HOURS PRN
Status: DISCONTINUED | OUTPATIENT
Start: 2022-10-19 | End: 2022-10-22 | Stop reason: HOSPADM

## 2022-10-19 RX ORDER — ALBUTEROL SULFATE 1.25 MG/3ML
1.25 SOLUTION RESPIRATORY (INHALATION) 2 TIMES DAILY
COMMUNITY

## 2022-10-19 RX ORDER — IPRATROPIUM BROMIDE AND ALBUTEROL SULFATE 2.5; .5 MG/3ML; MG/3ML
3 SOLUTION RESPIRATORY (INHALATION)
Status: DISCONTINUED | OUTPATIENT
Start: 2022-10-19 | End: 2022-10-19

## 2022-10-19 RX ORDER — HYDROMORPHONE HYDROCHLORIDE 2 MG/1
2 TABLET ORAL EVERY 4 HOURS PRN
Status: DISCONTINUED | OUTPATIENT
Start: 2022-10-19 | End: 2022-10-22 | Stop reason: HOSPADM

## 2022-10-19 RX ORDER — ONDANSETRON 2 MG/ML
4 INJECTION INTRAMUSCULAR; INTRAVENOUS EVERY 6 HOURS PRN
Status: DISCONTINUED | OUTPATIENT
Start: 2022-10-19 | End: 2022-10-22 | Stop reason: HOSPADM

## 2022-10-19 RX ORDER — MECOBALAMIN 5000 MCG
15 TABLET,DISINTEGRATING ORAL EVERY OTHER DAY
COMMUNITY

## 2022-10-19 RX ORDER — GUAIFENESIN/DEXTROMETHORPHAN 100-10MG/5
10 SYRUP ORAL EVERY 4 HOURS PRN
Status: DISCONTINUED | OUTPATIENT
Start: 2022-10-19 | End: 2022-10-22 | Stop reason: HOSPADM

## 2022-10-19 RX ORDER — FLUTICASONE PROPIONATE 50 MCG
1 SPRAY, SUSPENSION (ML) NASAL DAILY PRN
Status: DISCONTINUED | OUTPATIENT
Start: 2022-10-19 | End: 2022-10-22 | Stop reason: HOSPADM

## 2022-10-19 RX ORDER — ENOXAPARIN SODIUM 100 MG/ML
40 INJECTION SUBCUTANEOUS EVERY 12 HOURS
Status: DISCONTINUED | OUTPATIENT
Start: 2022-10-19 | End: 2022-10-22 | Stop reason: HOSPADM

## 2022-10-19 RX ORDER — CARBOXYMETHYLCELLULOSE SODIUM 5 MG/ML
1 SOLUTION/ DROPS OPHTHALMIC EVERY MORNING
Status: DISCONTINUED | OUTPATIENT
Start: 2022-10-20 | End: 2022-10-22 | Stop reason: HOSPADM

## 2022-10-19 RX ORDER — SENNOSIDES 8.6 MG
1 TABLET ORAL DAILY
COMMUNITY

## 2022-10-19 RX ORDER — ALBUTEROL SULFATE 0.83 MG/ML
1.25 SOLUTION RESPIRATORY (INHALATION) EVERY 4 HOURS PRN
Status: DISCONTINUED | OUTPATIENT
Start: 2022-10-19 | End: 2022-10-22 | Stop reason: HOSPADM

## 2022-10-19 RX ORDER — CEFTRIAXONE 2 G/1
2 INJECTION, POWDER, FOR SOLUTION INTRAMUSCULAR; INTRAVENOUS ONCE
Status: COMPLETED | OUTPATIENT
Start: 2022-10-19 | End: 2022-10-19

## 2022-10-19 RX ORDER — AZITHROMYCIN 250 MG/1
500 TABLET, FILM COATED ORAL ONCE
Status: COMPLETED | OUTPATIENT
Start: 2022-10-19 | End: 2022-10-19

## 2022-10-19 RX ADMIN — FLUTICASONE FUROATE AND VILANTEROL TRIFENATATE 1 PUFF: 100; 25 POWDER RESPIRATORY (INHALATION) at 19:11

## 2022-10-19 RX ADMIN — LOSARTAN POTASSIUM 100 MG: 100 TABLET, FILM COATED ORAL at 19:48

## 2022-10-19 RX ADMIN — METOPROLOL SUCCINATE 25 MG: 25 TABLET, EXTENDED RELEASE ORAL at 19:05

## 2022-10-19 RX ADMIN — AZITHROMYCIN MONOHYDRATE 500 MG: 250 TABLET ORAL at 14:15

## 2022-10-19 RX ADMIN — ALBUTEROL SULFATE 1.25 MG: 2.5 SOLUTION RESPIRATORY (INHALATION) at 17:55

## 2022-10-19 RX ADMIN — METHYLPREDNISOLONE SODIUM SUCCINATE 125 MG: 125 INJECTION, POWDER, FOR SOLUTION INTRAMUSCULAR; INTRAVENOUS at 14:16

## 2022-10-19 RX ADMIN — ENOXAPARIN SODIUM 40 MG: 40 INJECTION SUBCUTANEOUS at 19:05

## 2022-10-19 RX ADMIN — INSULIN ASPART 2 UNITS: 100 INJECTION, SOLUTION INTRAVENOUS; SUBCUTANEOUS at 18:46

## 2022-10-19 RX ADMIN — ALBUTEROL SULFATE 1.25 MG: 2.5 SOLUTION RESPIRATORY (INHALATION) at 22:34

## 2022-10-19 RX ADMIN — CEFTRIAXONE 2 G: 2 INJECTION, POWDER, FOR SOLUTION INTRAMUSCULAR; INTRAVENOUS at 14:16

## 2022-10-19 RX ADMIN — IPRATROPIUM BROMIDE AND ALBUTEROL SULFATE 3 ML: .5; 3 SOLUTION RESPIRATORY (INHALATION) at 14:16

## 2022-10-19 RX ADMIN — ACETAMINOPHEN 650 MG: 325 TABLET, FILM COATED ORAL at 22:16

## 2022-10-19 ASSESSMENT — ACTIVITIES OF DAILY LIVING (ADL)
DRESSING/BATHING: BATHING DIFFICULTY, ASSISTANCE 1 PERSON
ADLS_ACUITY_SCORE: 35
ADLS_ACUITY_SCORE: 35
ADLS_ACUITY_SCORE: 41
VISION_MANAGEMENT: GLASSES
ADLS_ACUITY_SCORE: 35
DOING_ERRANDS_INDEPENDENTLY_DIFFICULTY: YES
ADLS_ACUITY_SCORE: 35
DIFFICULTY_COMMUNICATING: NO
DRESSING/BATHING_DIFFICULTY: YES
WEAR_GLASSES_OR_BLIND: YES
CONCENTRATING,_REMEMBERING_OR_MAKING_DECISIONS_DIFFICULTY: NO
WALKING_OR_CLIMBING_STAIRS_DIFFICULTY: YES
DIFFICULTY_EATING/SWALLOWING: NO
TOILETING_ISSUES: YES
WALKING_OR_CLIMBING_STAIRS: AMBULATION DIFFICULTY, REQUIRES EQUIPMENT

## 2022-10-19 ASSESSMENT — ENCOUNTER SYMPTOMS
WHEEZING: 1
NAUSEA: 0
SHORTNESS OF BREATH: 1
ABDOMINAL PAIN: 1
FEVER: 0
COUGH: 1
VOMITING: 0

## 2022-10-19 NOTE — ED TRIAGE NOTES
Patient arrives from assisted living via EMS , with cough , shortness of breath , normally on @L NC , H/O copd , received duoneb and O2 increased to 4 l , has congested cough no sputum

## 2022-10-19 NOTE — ED NOTES
Bed: ED03  Expected date: 10/19/22  Expected time:   Means of arrival: Ambulance  Comments:  [facility]

## 2022-10-19 NOTE — H&P
[facility]    History and Physical - Hospitalist Service       Date of Admission:  10/19/2022    Assessment & Plan      Tatyana Metcalf is a 89 year old female admitted on 10/19/2022.     She lives in assisted living and has history of COPD (on oxygen at 2 L/min at baseline), KAITLIN (CPAP at night with oxygen at 4 L/min) depression and surgically treated endometrial cancer who presented from assisted living via EMS, with cough, shortness of breath and purulent looking expectoration for last 1 week.  Denies chest pain or fever.    Vitals on arrival: Temperature 97.7  F, heart rate 93/min, blood pressure 160/72, respiratory rate 20 and oxygen of 96% on 4 L via nasal cannula.  Chest x-ray showed increased nodular opacity in the right midlung which could be hilar structure versus airspace disease.  WBC count of 17,000.  CBC was significant for WBC count of 17 point 6K.  CMP was unremarkable except glucose of 230.  Venous gas showed pH/PCO2 of 7.5/40 with bicarbonate 34.    In the ER patient got a dose of Solu-Medrol 125 mg IV, ceftriaxone, azithromycin and a DuoNeb.      1. COPD exacerbation.    Will start on scheduled duo nebs, as needed albuterol nebs, prednisone 40 mg daily and ceftriaxone/azithromycin.  Continue Breo.  Use sliding scale insulin while on steroids.    Supplemental oxygen to keep oxygen saturation above 88%, baseline oxygen use is 2 L/min.    Has a nodular density in the right lung on chest x-ray, will get CT chest.    2. Sleep apnea.    CPAP overnight, uses 4 L/min at night.      3. Depression.    Start Cymbalta once reconciled by pharmacy.    4. Metabolic alkalosis.    Likely overdiuresis.    Patient has been taking Lasix 20 mg daily, will hold.    5. Essential hypertension     Prior to admission losartan and metoprolol, continue.    6. Hyperglycemia.      Expected to worsen with steroids, start with sliding scale, adjust intensity according to response.         Diet:  Regular  diet  DVT Prophylaxis: Heparin SQ  Chung Catheter: Not present  Central Lines: None  Cardiac Monitoring: None  Code Status:  DNR/DNI, I confirmed this with the patient.    Clinically Significant Risk Factors Present on Admission                  # Hypertension: home medication list includes antihypertensive(s)             Disposition Plan         The patient's care was discussed with the Patient.    Washington Lorenzana MD  Hospitalist Service  [facility]  Securely message with the Vocera Web Console (learn more here)  Text page via [employer] Paging/Directory         ______________________________________________________________________    Chief Complaint   Shortness of breath    History is obtained from the patient    History of Present Illness   Tatyana Metcalf is a 89 year old female admitted on 10/19/2022.     She lives in assisted living and has history of COPD (on oxygen at 2 L/min at baseline), KAITLIN (CPAP at night with oxygen at 4 L/min) depression and surgically treated endometrial cancer who presented from assisted living via EMS, with cough, shortness of breath and purulent looking expectoration for last 1 week.  Denies chest pain or fever.    Vitals on arrival: Temperature 97.7  F, heart rate 93/min, blood pressure 160/72, respiratory rate 20 and oxygen of 96% on 4 L via nasal cannula.  Chest x-ray showed increased nodular opacity in the right midlung which could be hilar structure versus airspace disease.  WBC count of 17,000.  CBC was significant for WBC count of 17 point 6K.  CMP was unremarkable except glucose of 230.  Venous gas showed pH/PCO2 of 7.5/40 with bicarbonate 34.    In the ER patient got a dose of Solu-Medrol 125 mg IV, ceftriaxone, azithromycin and a DuoNeb.      Review of Systems    The 10 point Review of Systems is negative other than noted in the HPI or here.     Past Medical History    I have reviewed this patient's medical history and updated it with pertinent information  if needed.   Past Medical History:   Diagnosis Date     Arthritis December 2017, Jan 2018    left knee, right knee     Fox's esophagus 10/3/2014    last EGD in 2011     COPD (chronic obstructive pulmonary disease) (H) February 2016    was in hospital     Depressive disorder May 2011     Endometrial cancer (H) 2000    treated surgically, did not have chemo or XRT     Hypertension 2005     SNHL (sensorineural hearing loss) 9/15/2015     Uncomplicated asthma February 2016    was in hospital       Past Surgical History   I have reviewed this patient's surgical history and updated it with pertinent information if needed.  Past Surgical History:   Procedure Laterality Date     APPENDECTOMY  1952     APPENDECTOMY  1951     BACK SURGERY  2002    Spinal Stenosis     BIOPSY  4-    Lung     COLONOSCOPY  7-    Normal     HYSTERECTOMY TOTAL ABDOMINAL, BILATERAL SALPINGO-OOPHORECTOMY, COMBINED  2000     HYSTERECTOMY TOTAL ABDOMINAL, BILATERAL SALPINGO-OOPHORECTOMY, COMBINED  2000    for uterine cancer       Social History   I have reviewed this patient's social history and updated it with pertinent information if needed.  Social History     Tobacco Use     Smoking status: Never     Smokeless tobacco: Never   Substance Use Topics     Alcohol use: Yes     Alcohol/week: 0.0 standard drinks     Comment: a few times a year     Drug use: No       Family History   I have reviewed this patient's family history and updated it with pertinent information if needed.  Family History   Problem Relation Age of Onset     Heart Disease Mother      Hypertension Mother      Cerebrovascular Disease Mother      Heart Disease Father      Coronary Artery Disease Father      Hypertension Father        Prior to Admission Medications   Prior to Admission Medications   Prescriptions Last Dose Informant Patient Reported? Taking?   ARTIFICIAL TEARS 1.4 % ophthalmic solution   Yes No   Sig: Place 1 drop into both eyes every morning    DULoxetine (CYMBALTA) 60 MG capsule   No No   Sig: Take 1 capsule (60 mg) by mouth daily   LANsoprazole (PREVACID) 30 MG DR capsule   No No   Sig: Take 1 capsule (30 mg) by mouth daily 30-60 minutes before a meal   Lactobacillus (ACIDOPHILUS PROBIOTIC PO)   Yes No   Sig: Take 1 capsule by mouth daily   acetaminophen (TYLENOL) 500 MG tablet   Yes No   Sig: Take 1,000 mg by mouth 3 times daily At 8am, 12pm, 8pm   albuterol (ACCUNEB) 1.25 MG/3ML nebulizer solution   No No   Sig: Take 1 vial (1.25 mg) by nebulization every 4 hours as needed for shortness of breath / dyspnea or wheezing   Patient taking differently: Take 1 vial by nebulization 2 times daily   albuterol (PROAIR HFA/PROVENTIL HFA/VENTOLIN HFA) 108 (90 Base) MCG/ACT inhaler   Yes No   Sig: Inhale 1 puff into the lungs every 4 hours as needed for shortness of breath / dyspnea or wheezing   dexamethasone (DECADRON) 6 MG tablet   No No   Sig: Take 1 tablet (6 mg) by mouth daily for 2 days   diclofenac (VOLTAREN) 1 % topical gel   Yes No   ferrous sulfate (IRON) 325 (65 FE) MG tablet   Yes No   Sig: Take 325 mg by mouth every evening    fexofenadine (ALLEGRA) 180 MG tablet   Yes No   Sig: Take 180 mg by mouth daily   fluticasone (FLONASE) 50 MCG/ACT nasal spray   No No   Sig: Spray 1 spray into both nostrils daily as needed for rhinitis or allergies   fluticasone-vilanterol (BREO ELLIPTA) 100-25 MCG/INH inhaler   No No   Sig: Inhale 1 puff into the lungs daily   furosemide (LASIX) 20 MG tablet   No No   Sig: Take 1 tablet (20 mg) by mouth daily as needed (edema)   Patient taking differently: Take 20 mg by mouth daily   gabapentin (NEURONTIN) 300 MG capsule   No No   Sig: Take 1 capsule (300 mg) by mouth 2 times daily   guaiFENesin (ROBITUSSIN) 100 MG/5ML liquid   No No   Sig: Take 10 mLs (200 mg) by mouth every 4 hours as needed for cough   guaiFENesin-dextromethorphan (ROBITUSSIN DM) 100-10 MG/5ML syrup   Yes No   Sig: Take 10 mLs by mouth every 4 hours as  needed for cough   losartan (COZAAR) 100 MG tablet   No No   Sig: Take 1 tablet (100 mg) by mouth daily   metoprolol succinate ER (TOPROL-XL) 25 MG 24 hr tablet   No No   Sig: TAKE HALF TABLET BY MOUTH DAILY   naproxen sodium 220 MG capsule   Yes No   Sig: Take 220 mg by mouth At Bedtime   nystatin (MYCOSTATIN) 390711 UNIT/GM external powder   No No   Sig: Apply topically 3 times daily as needed for dry skin (under breasts)   polyethylene glycol (MIRALAX/GLYCOLAX) powder   No No   Sig: TAKE 17GRAMS BY MOUTH TWICE DAILY AS DIRECTED.   Patient taking differently: Take 17 g by mouth daily   vitamin D3 (CHOLECALCIFEROL) 1000 units (25 mcg) tablet   Yes No   Sig: Take 1,000 Units by mouth every evening      Facility-Administered Medications: None     Allergies   Allergies   Allergen Reactions     Penicillins      hives     Sulfa Drugs      Rash         Physical Exam   Vital Signs: Temp: 97.7  F (36.5  C) Temp src: Oral BP: (!) 159/61 Pulse: 93   Resp: 20 SpO2: 90 % O2 Device: Nasal cannula Oxygen Delivery: 4 LPM  Weight: 0 lbs 0 oz    General Appearance: Elderly female who is in mild distress due to shortness of breath and is audibly wheezing.  Eyes: No icterus  HEENT: Moist mucosa  Respiratory: Bilateral wheezing  Cardiovascular: S1 and S2 is normal  GI: Soft and nontender  Lymph/Hematologic: Not examined  Genitourinary: Not examined  Skin: No rash  Musculoskeletal: No edema  Neurologic: Nonfocal  Psychiatric: Normal mood      Data   Data reviewed today: I reviewed all medications, new labs and imaging results over the last 24 hours.     Recent Labs   Lab 10/19/22  1430   WBC 17.6*   HGB 14.0   MCV 96      INR 1.13      POTASSIUM 4.3   CHLORIDE 102   CO2 30*   BUN 20.4   CR 0.65   ANIONGAP 11   ISRAEL 9.7*   *   ALBUMIN 3.9   PROTTOTAL 7.2   BILITOTAL 0.2   ALKPHOS 106*   ALT 15   AST 19     Recent Results (from the past 24 hour(s))   XR Chest 2 Views    Narrative    CHEST TWO VIEWS  10/19/2022 3:39 PM      HISTORY: Cough, shortness of breath.    COMPARISON: Chest x-ray 5/30/2022.      Impression    IMPRESSION: Increased nodular opacity at the right midlung is felt to  be indeterminant. This could just be hilar structures, but a focal  increasing area of airspace disease is a possibility. Consider further  evaluation with CT of the chest. Small right pleural fluid. Stable  cardiac silhouette. Left lung base atelectasis again noted.    GUNNER ANTOINE MD         SYSTEM ID:  [id number]

## 2022-10-19 NOTE — ED PROVIDER NOTES
History   Chief Complaint:  Cough and Shortness of Breath     The history is provided by the patient.      Tatyana Metcalf is an oxygen dependent 89 year old female with history of COPD, KAITLIN, hypertension, bronchiectasis, endometrial cancer, neuropathy, SNHL, obesity, ischemic colitis, IBS, and COVID-19 infection who presents with cough and shortness of breath.     The patient reports she began having shortness of breath a week ago. She notes she has allergies and COPD and attributes her symptoms to a COPD flare. She endorses increased productive cough, wheezing, and generalized chest pain which she reports is due to coughing. She reports coughing up bright yellow phlegm. She is reportedly on 2 L of oxygen at home but this was increased to 4L by EMS. She reports no change in her chronic leg swelling. She notes use of nebulizers and inhalers at home, which have not been helping over the last 2-3 days. She states she was taking a water pill. She denies abdominal pain. No fever, nausea, or vomiting. She denies any other symptoms.    Review of Systems   Constitutional: Negative for fever.   Respiratory: Positive for cough (productive), shortness of breath and wheezing.    Cardiovascular: Positive for chest pain (generalized).   Gastrointestinal: Positive for abdominal pain (with palpation). Negative for nausea and vomiting.   All other systems reviewed and are negative.      Allergies:  Penicillins  Sulfa Drugs    Medications:  Decadron  Accuneb  Cymbalta  Flonase  Breo ellipta  Lasix  Neurontin  Prevacid  Cozaar  Toprol XL  Mycostatin  Albuterol inhaler   Allegra  Naproxen sodium  Acidophilus probiotic    Past Medical History:     Benign essential hypertension  Vestibular neuronitis of both ears  Spinal stenosis of lumbar region with neurogenic claudication  Hiatal hernia  Obstructive sleep apnea syndrome  Moderate recurrent major depression   Chronic rhinitis  Iron deficiency  anemia  Dermatographism  Bronchiectasis   Vitamin D deficiency  Osteoporosis  Neuropathy  SNHL (sensorineural hearing loss)  Lung cancer   Hypoxia  Obesity   Mixed stress and urge urinary incontinence  COPD exacerbation   Acute respiratory failure with hypoxia   Infection due to 2019 novel coronavirus  RLS  GERD  Fox esophagus  Hypertension  Insomnia  Depression   Varicose veins  Venous peripheral insuffiencey  Disorder of lipoid metabolism    Uterine cancer  Endometrial adenocarcinoma   Hiatal hernia  IBS  Actinic keratosis  Allergic rhinitis  Chicken pox   Colon polyp   Diverticulosis  Dyslipidemia  Enterocutaneous fistula  [place] measles  Hemorrhoids  Ischemic colitis  Mumps   Shingles    Past Surgical History:    Cholecystectomy  D&C  Appendectomy  Lung cancer cyberknife  Spinal stenosis procedure  Lung biopsy  Colonoscopy   KEELY & BSO    Family History:    Father: heart disease, hypertension, CVD  Father: heart disease, CAD, hypertension    Social History:  The patient presents to the ED alone  PCP: Jill Briscoe     Physical Exam     Patient Vitals for the past 24 hrs:   BP Temp Temp src Pulse Resp SpO2   10/19/22 1551 -- -- -- -- -- 90 %   10/19/22 1506 (!) 159/61 -- -- -- -- --   10/19/22 1451 (!) 150/90 -- -- -- -- 95 %   10/19/22 1436 (!) 157/90 -- -- -- -- 92 %   10/19/22 1421 -- -- -- -- -- 93 %   10/19/22 1406 -- -- -- -- -- 93 %   10/19/22 1351 -- -- -- -- -- 93 %   10/19/22 1336 -- -- -- -- -- 93 %   10/19/22 1321 -- -- -- -- -- 93 %   10/19/22 1309 (!) 160/72 97.7  F (36.5  C) Oral 93 20 96 %     Physical Exam  General: Well appearing, nontoxic. Resting comfortably  Head:  Scalp, face, and head appear normal  Eyes:  Pupils are equal, round    Conjunctivae non-injected and sclerae white  ENT:    The external nose is normal    Pinnae are normal  Neck:  Normal range of motion    There is no rigidity noted    Trachea is in the midline  CV:  Regular rate and rhythm     Normal S1/S2, no  S3/S4    No murmur or rub. Radial pulses 2+ bilaterally.  Resp:  Lungs are equal bilaterally  Mild tachypnea    No increased work of breathing    Bilateral expiratory wheezing and rales at the bilateral bases. No rhonchi  GI:  Abdomen is soft, obese, no rigidity or guarding    No distension, or mass    No tenderness or rebound tenderness   MS:  Normal muscular tone    Symmetric motor strength    1+ pitting lower extremity edema to bilateral ankles  Skin:  No rash or acute skin lesions noted  Neuro: Awake and alert  Speech is normal and fluent  Moves all extremities spontaneously  Psych:  Normal affect. Appropriate interactions.      Emergency Department Course     ECG  ECG obtained at 1624, ECG read at 1629  Normal sinus rhythm  Right bundle branch block  Left anterior fascicular block  * * * Bifascicular block * * *  Possible lateral infarct age undetermined  Abnormal ECG  No significant change from prior ECG dated 5/30/22.  Rate 94 bpm. NC interval 144 ms. QRS duration 138 ms. QT/QTc 384/480 ms. P-R-T axes 43 -89 33.    Imaging:  XR Chest 2 Views   Preliminary Result   IMPRESSION: Increased nodular opacity at the right midlung is felt to   be indeterminant. This could just be hilar structures, but a focal   increasing area of airspace disease is a possibility. Consider further   evaluation with CT of the chest. Small right pleural fluid. Stable   cardiac silhouette. Left lung base atelectasis again noted.        Report per radiology    Laboratory:  Labs Ordered and Resulted from Time of ED Arrival to Time of ED Departure   COMPREHENSIVE METABOLIC PANEL - Abnormal       Result Value    Sodium 143      Potassium 4.3      Chloride 102      Carbon Dioxide (CO2) 30 (*)     Anion Gap 11      Urea Nitrogen 20.4      Creatinine 0.65      Calcium 9.7 (*)     Glucose 230 (*)     Alkaline Phosphatase 106 (*)     AST 19      ALT 15      Protein Total 7.2      Albumin 3.9      Bilirubin Total 0.2      GFR Estimate 83     ISTAT  GASES LACTATE VENOUS POCT - Abnormal    Lactic Acid POCT 1.5      Bicarbonate Venous POCT 34 (*)     O2 Sat, Venous POCT 88 (*)     pCO2V Venous POCT 40      pH Venous POCT 7.54 (*)     pO2 Venous POCT 49 (*)    CBC WITH PLATELETS AND DIFFERENTIAL - Abnormal    WBC Count 17.6 (*)     RBC Count 4.86      Hemoglobin 14.0      Hematocrit 46.7      MCV 96      MCH 28.8      MCHC 30.0 (*)     RDW 13.2      Platelet Count 274      % Neutrophils 86      % Lymphocytes 5      % Monocytes 8      % Eosinophils 0      % Basophils 0      % Immature Granulocytes 1      NRBCs per 100 WBC 0      Absolute Neutrophils 15.3 (*)     Absolute Lymphocytes 0.9      Absolute Monocytes 1.3      Absolute Eosinophils 0.0      Absolute Basophils 0.0      Absolute Immature Granulocytes 0.1      Absolute NRBCs 0.0     INFLUENZA A/B & SARS-COV2 PCR MULTIPLEX - Normal    Influenza A PCR Negative      Influenza B PCR Negative      RSV PCR Negative      SARS CoV2 PCR Negative     INR        Procedures      Emergency Department Course:     Reviewed:  I reviewed nursing notes, vitals, past medical history and Care Everywhere    Assessments:  1439 I obtained history and examined the patient as noted above.    I rechecked the patient and explained findings.       Consults:  1626 I spoke with Dr. Lorenzana of the hospitalist service, who agreed to admit the patient.     Interventions:  Medications   ipratropium - albuterol 0.5 mg/2.5 mg/3 mL (DUONEB) neb solution 3 mL (3 mLs Nebulization Given 10/19/22 1416)   methylPREDNISolone sodium succinate (solu-MEDROL) injection 125 mg (125 mg Intravenous Given 10/19/22 1416)   cefTRIAXone (ROCEPHIN) 2 g vial to attach to  ml bag for ADULTS or NS 50 ml bag for PEDS (0 g Intravenous Stopped 10/19/22 1505)   azithromycin (ZITHROMAX) tablet 500 mg (500 mg Oral Given 10/19/22 1415)     Disposition:  The patient was admitted to the hospital under the care of Dr. Lorenzana.     Impression & Plan       Medical Decision  Making:  Tatyana Metcalf is a 89 year old female with past medical history as noted above who presents with increased purulent sputum production, cough and shortness of breath.  Patient presented to the emergency department afebrile, hemodynamically stable.  Mild tachypnea but no overt severe respiratory distress.  Patient had diffuse expiratory wheezing and rales in the bilateral lung bases.  Patient is chronically on 2 L of home oxygen.  Work-up in the emergency department is consistent with COPD exacerbation.  No ischemic chest pain symptoms.  EKG without acute ischemic changes or dysrhythmia. Patient treated with bronchodilators, steroids, antibiotics given productive purulent sputum with dyspnea, leukocytosis and possible pneumonia on CXR. No other acute thoracic findings on CXR. VBG without significant respiratory acidosis. Lactate normal. No sepsis. Given significant symptoms, multiple comorbidities patient will be admitted for ongoing monitoring, evaluation and treatment. Case discussed with the hospitalist and patient admitted in stable condition.     Diagnosis:    ICD-10-CM    1. COPD exacerbation (H)  J44.1       2. Pneumonia due to infectious organism, unspecified laterality, unspecified part of lung  J18.9           Scribe Disclosure:  Jim WESTBROOK, am serving as a scribe at 1:38 PM on 10/19/2022 to document services personally performed by Sam Syed MD based on my observations and the provider's statements to me.     Scribe Disclosure:  Yarely WESTBROOK, am serving as a scribe at 4:30 PM on 10/19/2022 to document services personally performed by Sam Syed MD based on my observations and the provider's statements to me.     Sam Syed MD  10/19/22 1844

## 2022-10-19 NOTE — ED NOTES
Bed: ED18  Expected date: 10/19/22  Expected time: 4:27 PM  Means of arrival:   Comments:  Held Ed 3

## 2022-10-19 NOTE — ED NOTES
[facility]  ED Nurse Handoff Report    Tatyana Metcalf is a 89 year old female   ED Chief complaint: Cough and Shortness of Breath  . ED Diagnosis:   Final diagnoses:   None     Allergies:   Allergies   Allergen Reactions     Penicillins      hives     Sulfa Drugs      Rash         Code Status: Full Code  Activity level - Baseline/Home:  Assist X 1. Activity Level - Current:   Assist X 1. Lift room needed: No. Bariatric: No   Needed: No   Isolation: No. Infection: Not Applicable.     Vital Signs:   Vitals:    10/19/22 1436 10/19/22 1451 10/19/22 1506 10/19/22 1551   BP: (!) 157/90 (!) 150/90 (!) 159/61    Pulse:       Resp:       Temp:       TempSrc:       SpO2: 92% 95%  90%       Cardiac Rhythm:  ,   nsr   Pain level: 0   Patient confused: No. Patient Falls Risk: Yes.   Elimination Status: Has voided   Patient Report - Initial Complaint: cough. Focused Assessment: COPD exacerbation   Tests Performed:   Labs Ordered and Resulted from Time of ED Arrival to Time of ED Departure   COMPREHENSIVE METABOLIC PANEL - Abnormal       Result Value    Sodium 143      Potassium 4.3      Chloride 102      Carbon Dioxide (CO2) 30 (*)     Anion Gap 11      Urea Nitrogen 20.4      Creatinine 0.65      Calcium 9.7 (*)     Glucose 230 (*)     Alkaline Phosphatase 106 (*)     AST 19      ALT 15      Protein Total 7.2      Albumin 3.9      Bilirubin Total 0.2      GFR Estimate 83     ISTAT GASES LACTATE VENOUS POCT - Abnormal    Lactic Acid POCT 1.5      Bicarbonate Venous POCT 34 (*)     O2 Sat, Venous POCT 88 (*)     pCO2V Venous POCT 40      pH Venous POCT 7.54 (*)     pO2 Venous POCT 49 (*)    CBC WITH PLATELETS AND DIFFERENTIAL - Abnormal    WBC Count 17.6 (*)     RBC Count 4.86      Hemoglobin 14.0      Hematocrit 46.7      MCV 96      MCH 28.8      MCHC 30.0 (*)     RDW 13.2      Platelet Count 274      % Neutrophils 86      % Lymphocytes 5      % Monocytes 8      % Eosinophils 0      % Basophils 0       % Immature Granulocytes 1      NRBCs per 100 WBC 0      Absolute Neutrophils 15.3 (*)     Absolute Lymphocytes 0.9      Absolute Monocytes 1.3      Absolute Eosinophils 0.0      Absolute Basophils 0.0      Absolute Immature Granulocytes 0.1      Absolute NRBCs 0.0     INFLUENZA A/B & SARS-COV2 PCR MULTIPLEX - Normal    Influenza A PCR Negative      Influenza B PCR Negative      RSV PCR Negative      SARS CoV2 PCR Negative     INR    . Abnormal Results: see above    Treatments provided: neb  Family Comments: none  OBS brochure/video discussed/provided to patient:  Yes  ED Medications:   Medications   ipratropium - albuterol 0.5 mg/2.5 mg/3 mL (DUONEB) neb solution 3 mL (3 mLs Nebulization Given 10/19/22 1416)   methylPREDNISolone sodium succinate (solu-MEDROL) injection 125 mg (125 mg Intravenous Given 10/19/22 1416)   cefTRIAXone (ROCEPHIN) 2 g vial to attach to  ml bag for ADULTS or NS 50 ml bag for PEDS (0 g Intravenous Stopped 10/19/22 1505)   azithromycin (ZITHROMAX) tablet 500 mg (500 mg Oral Given 10/19/22 1415)     Drips infusing:  No  For the majority of the shift, the patient's behavior Green. Interventions performed were cxr, iv abxlabs.    Sepsis treatment initiated: No     Patient tested for COVID 19 prior to admission: YES  NEGATIVE    ED Nurse Name/Phone Number: Marianela Duran RN,   3:51 PM  .RECEIVING UNIT ED HANDOFF REVIEW    Above ED Nurse Handoff Report was reviewed: Yes  Reviewed by: Cristela Benitez RN on October 19, 2022 at 8:09 PM

## 2022-10-19 NOTE — PHARMACY-ADMISSION MEDICATION HISTORY
Admission medication history interview status for this patient is complete. See [facility] admission navigator for allergy information, prior to admission medications and immunization status.     Medication history interview done, indicate source(s): NH pt, no interview  Medication history resources (including written lists, pill bottles, clinic record): Med list from Diya Shahid    Changes made to PTA medication list:  Added: Senna, budesonide, albuterol neb scheduled (also has PRN order), furosemide scheduled --> PRN, lansoprazole 30 mg every day --> 15 mg every other day  Changed:   Reported as Not Taking: n/a  Removed: Breo Ellipta, guaifenesin (kept guaifenesin DM), naproxen    Actions taken by pharmacist (provider contacted, etc):None     Additional medication history information:None    Medication reconciliation/reorder completed by provider prior to medication history?  N   (Y/N)     Prior to Admission medications    Medication Sig Last Dose Taking? Auth Provider Long Term End Date   acetaminophen (TYLENOL) 500 MG tablet Take 1,000 mg by mouth 3 times daily At 8am, 12pm, 8pm 10/19/2022 Yes Reported, Patient     albuterol (ACCUNEB) 1.25 MG/3ML neb solution Take 1.25 mg by nebulization 2 times daily 10/19/2022 at am Yes Unknown, Entered By History Yes    albuterol (ACCUNEB) 1.25 MG/3ML nebulizer solution Take 1 vial (1.25 mg) by nebulization every 4 hours as needed for shortness of breath / dyspnea or wheezing  Patient taking differently: Take 1 vial by nebulization 2 times daily Unknown at prn Yes Shay Bauer MD Yes    albuterol (PROAIR HFA/PROVENTIL HFA/VENTOLIN HFA) 108 (90 Base) MCG/ACT inhaler Inhale 1 puff into the lungs every 4 hours as needed for shortness of breath / dyspnea or wheezing Unknown at prn Yes Unknown, Entered By History Yes    ARTIFICIAL TEARS 1.4 % ophthalmic solution Place 1 drop into both eyes every morning 10/19/2022 at am Yes Reported, Patient     budesonide (PULMICORT) 0.25  MG/2ML neb solution Take 0.25 mg by nebulization 2 times daily 10/19/2022 at am Yes Unknown, Entered By History No    diclofenac (VOLTAREN) 1 % topical gel Apply topically 4 times daily as needed (pain) Unknown at prn Yes Reported, Patient     DULoxetine (CYMBALTA) 60 MG capsule Take 1 capsule (60 mg) by mouth daily 10/19/2022 at am Yes Jill Briscoe MD Yes    ferrous sulfate (IRON) 325 (65 FE) MG tablet Take 325 mg by mouth every evening  10/18/2022 at pm Yes Reported, Patient     fexofenadine (ALLEGRA) 180 MG tablet Take 180 mg by mouth every evening 10/18/2022 at pm Yes Reported, Patient     fluticasone (FLONASE) 50 MCG/ACT nasal spray Spray 1 spray into both nostrils daily as needed for rhinitis or allergies Unknown at prn Yes Rowdy Connolly Mai, MD     furosemide (LASIX) 20 MG tablet Take 1 tablet (20 mg) by mouth daily as needed (edema) Unknown at prn Yes Jill Briscoe MD Yes    gabapentin (NEURONTIN) 300 MG capsule Take 1 capsule (300 mg) by mouth 2 times daily 10/19/2022 at am Yes Jill Briscoe MD Yes    guaiFENesin-dextromethorphan (ROBITUSSIN DM) 100-10 MG/5ML syrup Take 10 mLs by mouth every 4 hours as needed for cough Unknown at prn Yes Unknown, Entered By History     Lactobacillus (ACIDOPHILUS PROBIOTIC PO) Take 1 capsule by mouth daily 10/19/2022 at am Yes Unknown, Entered By History     LANsoprazole (PREVACID) 15 MG DR capsule Take 15 mg by mouth every other day 10/18/2022 at am Yes Unknown, Entered By History     losartan (COZAAR) 100 MG tablet Take 1 tablet (100 mg) by mouth daily 10/18/2022 at pm Yes Jill Briscoe MD Yes    metoprolol succinate ER (TOPROL-XL) 25 MG 24 hr tablet TAKE HALF TABLET BY MOUTH DAILY  Patient taking differently: 12.5 mg TAKE HALF TABLET BY MOUTH DAILY 10/18/2022 at pm Yes Jill Briscoe MD Yes    nystatin (MYCOSTATIN) 412769 UNIT/GM external powder Apply topically 3 times daily as needed for dry skin (under breasts) Unknown Yes Luis Felipe,  Jill Triplett MD     polyethylene glycol (MIRALAX/GLYCOLAX) powder TAKE 17GRAMS BY MOUTH TWICE DAILY AS DIRECTED.  Patient taking differently: Take 1 capful by mouth 2 times daily TAKE 17GRAMS BY MOUTH TWICE DAILY AS DIRECTED. 10/19/2022 at am Yes Ayaz Fernandez MD     vitamin D3 (CHOLECALCIFEROL) 1000 units (25 mcg) tablet Take 1,000 Units by mouth every evening 10/18/2022 at pm Yes Unknown, Entered By History

## 2022-10-20 LAB
ANION GAP SERPL CALCULATED.3IONS-SCNC: 9 MMOL/L (ref 7–15)
ATRIAL RATE - MUSE: 94 BPM
BUN SERPL-MCNC: 21.4 MG/DL (ref 8–23)
CALCIUM SERPL-MCNC: 9.5 MG/DL (ref 8.2–9.6)
CHLORIDE SERPL-SCNC: 102 MMOL/L (ref 98–107)
CREAT SERPL-MCNC: 0.52 MG/DL (ref 0.51–0.95)
DEPRECATED HCO3 PLAS-SCNC: 33 MMOL/L (ref 22–29)
DIASTOLIC BLOOD PRESSURE - MUSE: NORMAL MMHG
ERYTHROCYTE [DISTWIDTH] IN BLOOD BY AUTOMATED COUNT: 13.2 % (ref 10–15)
GFR SERPL CREATININE-BSD FRML MDRD: 88 ML/MIN/1.73M2
GLUCOSE BLDC GLUCOMTR-MCNC: 154 MG/DL (ref 70–99)
GLUCOSE BLDC GLUCOMTR-MCNC: 202 MG/DL (ref 70–99)
GLUCOSE BLDC GLUCOMTR-MCNC: 216 MG/DL (ref 70–99)
GLUCOSE BLDC GLUCOMTR-MCNC: 268 MG/DL (ref 70–99)
GLUCOSE BLDC GLUCOMTR-MCNC: 280 MG/DL (ref 70–99)
GLUCOSE SERPL-MCNC: 185 MG/DL (ref 70–99)
HCT VFR BLD AUTO: 45.9 % (ref 35–47)
HGB BLD-MCNC: 13.7 G/DL (ref 11.7–15.7)
INTERPRETATION ECG - MUSE: NORMAL
MAGNESIUM SERPL-MCNC: 2 MG/DL (ref 1.7–2.3)
MCH RBC QN AUTO: 29.1 PG (ref 26.5–33)
MCHC RBC AUTO-ENTMCNC: 29.8 G/DL (ref 31.5–36.5)
MCV RBC AUTO: 98 FL (ref 78–100)
P AXIS - MUSE: 43 DEGREES
PLATELET # BLD AUTO: 275 10E3/UL (ref 150–450)
POTASSIUM SERPL-SCNC: 3.8 MMOL/L (ref 3.4–5.3)
PR INTERVAL - MUSE: 144 MS
QRS DURATION - MUSE: 138 MS
QT - MUSE: 384 MS
QTC - MUSE: 480 MS
R AXIS - MUSE: -89 DEGREES
RBC # BLD AUTO: 4.71 10E6/UL (ref 3.8–5.2)
SODIUM SERPL-SCNC: 144 MMOL/L (ref 136–145)
SYSTOLIC BLOOD PRESSURE - MUSE: NORMAL MMHG
T AXIS - MUSE: 33 DEGREES
VENTRICULAR RATE- MUSE: 94 BPM
WBC # BLD AUTO: 16.3 10E3/UL (ref 4–11)

## 2022-10-20 PROCEDURE — 258N000003 HC RX IP 258 OP 636: Performed by: STUDENT IN AN ORGANIZED HEALTH CARE EDUCATION/TRAINING PROGRAM

## 2022-10-20 PROCEDURE — 250N000013 HC RX MED GY IP 250 OP 250 PS 637: Performed by: STUDENT IN AN ORGANIZED HEALTH CARE EDUCATION/TRAINING PROGRAM

## 2022-10-20 PROCEDURE — 99233 SBSQ HOSP IP/OBS HIGH 50: CPT | Performed by: STUDENT IN AN ORGANIZED HEALTH CARE EDUCATION/TRAINING PROGRAM

## 2022-10-20 PROCEDURE — 80048 BASIC METABOLIC PNL TOTAL CA: CPT | Performed by: STUDENT IN AN ORGANIZED HEALTH CARE EDUCATION/TRAINING PROGRAM

## 2022-10-20 PROCEDURE — 999N000157 HC STATISTIC RCP TIME EA 10 MIN

## 2022-10-20 PROCEDURE — 250N000012 HC RX MED GY IP 250 OP 636 PS 637: Performed by: STUDENT IN AN ORGANIZED HEALTH CARE EDUCATION/TRAINING PROGRAM

## 2022-10-20 PROCEDURE — 94640 AIRWAY INHALATION TREATMENT: CPT

## 2022-10-20 PROCEDURE — 83735 ASSAY OF MAGNESIUM: CPT | Performed by: STUDENT IN AN ORGANIZED HEALTH CARE EDUCATION/TRAINING PROGRAM

## 2022-10-20 PROCEDURE — 250N000011 HC RX IP 250 OP 636: Performed by: STUDENT IN AN ORGANIZED HEALTH CARE EDUCATION/TRAINING PROGRAM

## 2022-10-20 PROCEDURE — 85027 COMPLETE CBC AUTOMATED: CPT | Performed by: STUDENT IN AN ORGANIZED HEALTH CARE EDUCATION/TRAINING PROGRAM

## 2022-10-20 PROCEDURE — 94640 AIRWAY INHALATION TREATMENT: CPT | Mod: 76

## 2022-10-20 PROCEDURE — 250N000011 HC RX IP 250 OP 636: Performed by: INTERNAL MEDICINE

## 2022-10-20 PROCEDURE — 250N000009 HC RX 250: Performed by: STUDENT IN AN ORGANIZED HEALTH CARE EDUCATION/TRAINING PROGRAM

## 2022-10-20 PROCEDURE — 120N000001 HC R&B MED SURG/OB

## 2022-10-20 PROCEDURE — 36415 COLL VENOUS BLD VENIPUNCTURE: CPT | Performed by: STUDENT IN AN ORGANIZED HEALTH CARE EDUCATION/TRAINING PROGRAM

## 2022-10-20 PROCEDURE — 99207 PR NO BILLABLE SERVICE THIS VISIT: CPT | Performed by: INTERNAL MEDICINE

## 2022-10-20 PROCEDURE — 999N000127 HC STATISTIC PERIPHERAL IV START W US GUIDANCE

## 2022-10-20 RX ORDER — LABETALOL HYDROCHLORIDE 5 MG/ML
10 INJECTION, SOLUTION INTRAVENOUS
Status: DISCONTINUED | OUTPATIENT
Start: 2022-10-20 | End: 2022-10-22 | Stop reason: HOSPADM

## 2022-10-20 RX ORDER — GABAPENTIN 300 MG/1
300 CAPSULE ORAL 2 TIMES DAILY
Status: DISCONTINUED | OUTPATIENT
Start: 2022-10-20 | End: 2022-10-22 | Stop reason: HOSPADM

## 2022-10-20 RX ORDER — IPRATROPIUM BROMIDE AND ALBUTEROL SULFATE 2.5; .5 MG/3ML; MG/3ML
3 SOLUTION RESPIRATORY (INHALATION)
Status: DISCONTINUED | OUTPATIENT
Start: 2022-10-20 | End: 2022-10-22 | Stop reason: HOSPADM

## 2022-10-20 RX ORDER — NALOXONE HYDROCHLORIDE 0.4 MG/ML
0.2 INJECTION, SOLUTION INTRAMUSCULAR; INTRAVENOUS; SUBCUTANEOUS
Status: DISCONTINUED | OUTPATIENT
Start: 2022-10-20 | End: 2022-10-22 | Stop reason: HOSPADM

## 2022-10-20 RX ORDER — NALOXONE HYDROCHLORIDE 0.4 MG/ML
0.4 INJECTION, SOLUTION INTRAMUSCULAR; INTRAVENOUS; SUBCUTANEOUS
Status: DISCONTINUED | OUTPATIENT
Start: 2022-10-20 | End: 2022-10-22 | Stop reason: HOSPADM

## 2022-10-20 RX ORDER — LOSARTAN POTASSIUM 50 MG/1
50 TABLET ORAL DAILY
Status: DISCONTINUED | OUTPATIENT
Start: 2022-10-20 | End: 2022-10-20

## 2022-10-20 RX ORDER — PREDNISONE 20 MG/1
40 TABLET ORAL 2 TIMES DAILY WITH MEALS
Status: DISCONTINUED | OUTPATIENT
Start: 2022-10-20 | End: 2022-10-22 | Stop reason: HOSPADM

## 2022-10-20 RX ORDER — FEXOFENADINE HCL 60 MG/1
180 TABLET, FILM COATED ORAL EVERY EVENING
Status: DISCONTINUED | OUTPATIENT
Start: 2022-10-20 | End: 2022-10-22 | Stop reason: HOSPADM

## 2022-10-20 RX ORDER — BUDESONIDE 0.25 MG/2ML
0.25 INHALANT ORAL 2 TIMES DAILY
Status: DISCONTINUED | OUTPATIENT
Start: 2022-10-20 | End: 2022-10-22 | Stop reason: HOSPADM

## 2022-10-20 RX ORDER — PANTOPRAZOLE SODIUM 20 MG/1
20 TABLET, DELAYED RELEASE ORAL EVERY OTHER DAY
Status: DISCONTINUED | OUTPATIENT
Start: 2022-10-20 | End: 2022-10-22 | Stop reason: HOSPADM

## 2022-10-20 RX ORDER — DULOXETIN HYDROCHLORIDE 60 MG/1
60 CAPSULE, DELAYED RELEASE ORAL DAILY
Status: DISCONTINUED | OUTPATIENT
Start: 2022-10-20 | End: 2022-10-22 | Stop reason: HOSPADM

## 2022-10-20 RX ORDER — HYDRALAZINE HYDROCHLORIDE 20 MG/ML
10 INJECTION INTRAMUSCULAR; INTRAVENOUS EVERY 4 HOURS PRN
Status: DISCONTINUED | OUTPATIENT
Start: 2022-10-20 | End: 2022-10-22 | Stop reason: HOSPADM

## 2022-10-20 RX ORDER — POLYETHYLENE GLYCOL 3350 17 G/17G
17 POWDER, FOR SOLUTION ORAL 2 TIMES DAILY
Status: DISCONTINUED | OUTPATIENT
Start: 2022-10-20 | End: 2022-10-22 | Stop reason: HOSPADM

## 2022-10-20 RX ORDER — SENNOSIDES 8.6 MG
1 TABLET ORAL DAILY
Status: DISCONTINUED | OUTPATIENT
Start: 2022-10-20 | End: 2022-10-22 | Stop reason: HOSPADM

## 2022-10-20 RX ORDER — AMLODIPINE BESYLATE 10 MG/1
10 TABLET ORAL DAILY
Status: DISCONTINUED | OUTPATIENT
Start: 2022-10-20 | End: 2022-10-22 | Stop reason: HOSPADM

## 2022-10-20 RX ADMIN — PREDNISONE 40 MG: 20 TABLET ORAL at 17:55

## 2022-10-20 RX ADMIN — HYDRALAZINE HYDROCHLORIDE 10 MG: 20 INJECTION INTRAMUSCULAR; INTRAVENOUS at 17:47

## 2022-10-20 RX ADMIN — AMLODIPINE BESYLATE 10 MG: 10 TABLET ORAL at 16:43

## 2022-10-20 RX ADMIN — IPRATROPIUM BROMIDE AND ALBUTEROL SULFATE 3 ML: 2.5; .5 SOLUTION RESPIRATORY (INHALATION) at 17:55

## 2022-10-20 RX ADMIN — IPRATROPIUM BROMIDE AND ALBUTEROL SULFATE 3 ML: 2.5; .5 SOLUTION RESPIRATORY (INHALATION) at 20:48

## 2022-10-20 RX ADMIN — ACETAMINOPHEN 650 MG: 325 TABLET, FILM COATED ORAL at 21:27

## 2022-10-20 RX ADMIN — AZITHROMYCIN MONOHYDRATE 500 MG: 500 INJECTION, POWDER, LYOPHILIZED, FOR SOLUTION INTRAVENOUS at 13:21

## 2022-10-20 RX ADMIN — ENOXAPARIN SODIUM 40 MG: 40 INJECTION SUBCUTANEOUS at 08:06

## 2022-10-20 RX ADMIN — POLYETHYLENE GLYCOL 3350 17 G: 17 POWDER, FOR SOLUTION ORAL at 21:26

## 2022-10-20 RX ADMIN — LOSARTAN POTASSIUM 100 MG: 100 TABLET, FILM COATED ORAL at 08:06

## 2022-10-20 RX ADMIN — FEXOFENADINE HYDROCHLORIDE 180 MG: 60 TABLET ORAL at 21:27

## 2022-10-20 RX ADMIN — FLUTICASONE FUROATE AND VILANTEROL TRIFENATATE 1 PUFF: 100; 25 POWDER RESPIRATORY (INHALATION) at 10:39

## 2022-10-20 RX ADMIN — IPRATROPIUM BROMIDE AND ALBUTEROL SULFATE 3 ML: 2.5; .5 SOLUTION RESPIRATORY (INHALATION) at 10:42

## 2022-10-20 RX ADMIN — PREDNISONE 40 MG: 20 TABLET ORAL at 08:06

## 2022-10-20 RX ADMIN — DULOXETINE 60 MG: 60 CAPSULE, DELAYED RELEASE ORAL at 14:50

## 2022-10-20 RX ADMIN — GABAPENTIN 300 MG: 300 CAPSULE ORAL at 21:27

## 2022-10-20 RX ADMIN — GUAIFENESIN AND DEXTROMETHORPHAN 10 ML: 100; 10 SYRUP ORAL at 10:40

## 2022-10-20 RX ADMIN — INSULIN GLARGINE 12 UNITS: 100 INJECTION, SOLUTION SUBCUTANEOUS at 01:26

## 2022-10-20 RX ADMIN — METOPROLOL SUCCINATE 25 MG: 25 TABLET, EXTENDED RELEASE ORAL at 08:06

## 2022-10-20 RX ADMIN — Medication 1 DROP: at 08:19

## 2022-10-20 RX ADMIN — ALBUTEROL SULFATE 1.25 MG: 2.5 SOLUTION RESPIRATORY (INHALATION) at 05:19

## 2022-10-20 RX ADMIN — BUDESONIDE 0.25 MG: 0.25 INHALANT RESPIRATORY (INHALATION) at 20:51

## 2022-10-20 RX ADMIN — GUAIFENESIN AND DEXTROMETHORPHAN 10 ML: 100; 10 SYRUP ORAL at 19:07

## 2022-10-20 RX ADMIN — PANTOPRAZOLE SODIUM 20 MG: 20 TABLET, DELAYED RELEASE ORAL at 14:50

## 2022-10-20 RX ADMIN — ENOXAPARIN SODIUM 40 MG: 40 INJECTION SUBCUTANEOUS at 21:26

## 2022-10-20 RX ADMIN — CEFTRIAXONE 1 G: 1 INJECTION, POWDER, FOR SOLUTION INTRAMUSCULAR; INTRAVENOUS at 12:13

## 2022-10-20 RX ADMIN — LABETALOL HYDROCHLORIDE 10 MG: 5 INJECTION, SOLUTION INTRAVENOUS at 18:58

## 2022-10-20 ASSESSMENT — ACTIVITIES OF DAILY LIVING (ADL)
ADLS_ACUITY_SCORE: 38
ADLS_ACUITY_SCORE: 40
ADLS_ACUITY_SCORE: 38
ADLS_ACUITY_SCORE: 41
ADLS_ACUITY_SCORE: 40
ADLS_ACUITY_SCORE: 40
ADLS_ACUITY_SCORE: 38
DEPENDENT_IADLS:: MEAL PREPARATION;MEDICATION MANAGEMENT
ADLS_ACUITY_SCORE: 40
ADLS_ACUITY_SCORE: 40

## 2022-10-20 NOTE — PLAN OF CARE
A&Ox4. VSS except on 5L of O2. Pt's baseline is 2L of O2 during the day and 4L with the cpap at night. Pt reports that her family will be bringing her Cpap today; pt refuses to use the hospital cpap. IV infiltrated at 14:30. IV removed and warm compress placed on pt L arm. Ambulates with Ax1 using the walker and gait belt. Voiding in the bathroom. BM x3 today. Lungs have inspiratory and expiratory wheezes. Dyspnea on exertion. Redness under abdominal folds.     On Rocephin and Azithromycin for abx. Took Robitussin PRN x1 today.      Goal Outcome Evaluation:    Plan of Care Reviewed With: patient    Overall Patient Progress: no change    Discharge: Pending based on pt progress.

## 2022-10-20 NOTE — PLAN OF CARE
Goal Outcome Evaluation:  .Patient vital signs are at baseline: Yes  Patient able to ambulate as they were prior to admission or with assist devices provided by therapies during their stay:  Yes  Patient MUST void prior to discharge:  Yes  Patient able to tolerate oral intake:  Yes  Pain has adequate pain control using Oral analgesics:  Yes  Does patient have an identified :  Yes  Has goal D/C date and time been discussed with patient:  No,  Reason: to be determined  Pt arrived to the floor around 2045, full code. Complain of SOB and cough , paged respiratory to give nebulizer.

## 2022-10-20 NOTE — PROGRESS NOTES
[facility]    Medicine Progress Note - Hospitalist Service    Date of Admission:  10/19/2022    Assessment & Plan   Tatyana Metcalf is a 89 year old female admitted on 10/19/2022.      She lives in assisted living and has history of COPD (on oxygen at 2 L/min at baseline), KAITLIN (CPAP at night with oxygen at 4 L/min) depression and surgically treated endometrial cancer who presented from assisted living via EMS, with cough, shortness of breath and purulent looking expectoration for last 1 week.  Denies chest pain or fever.     Vitals on arrival: Temperature 97.7  F, heart rate 93/min, blood pressure 160/72, respiratory rate 20 and oxygen of 96% on 4 L via nasal cannula.  Chest x-ray showed increased nodular opacity in the right midlung which could be hilar structure versus airspace disease.  WBC count of 17,000.  CBC was significant for WBC count of 17 point 6K.  CMP was unremarkable except glucose of 230.  Venous gas showed pH/PCO2 of 7.5/40 with bicarbonate 34.     In the ER patient got a dose of Solu-Medrol 125 mg IV, ceftriaxone, azithromycin and a DuoNeb.        1. COPD exacerbation.    Started on scheduled duo nebs, as needed albuterol nebs, increase prednisone to 40 mg twice daily and continue ceftriaxone and azithromycin.  Continue Breo.  Use sliding scale insulin while on steroids.  Resume Pulmicort nebs.    Supplemental oxygen to keep oxygen saturation above 88%, baseline oxygen use is 2 L/min.  Currently needing oxygen at 5 L/min and continues to be dyspneic.    Has a nodular density in the right lung on chest x-ray, CT chest was obtained which did not show any hilar nodularity.     2. Sleep apnea.    CPAP overnight, uses 4 L/min.       3. Depression.    Start Cymbalta     4. Metabolic alkalosis.    Likely overdiuresis.    Patient has been taking Lasix 20 mg daily, will hold.     5. Essential hypertension     Prior to admission losartan and metoprolol,  continue.     6. Hyperglycemia.      Expected to worsen with steroids, start with sliding scale, adjust intensity according to response.    Increase Lantus to 20 units as steroid dose was increased today.          Diet: Combination Diet Regular Diet Adult    DVT Prophylaxis: Enoxaparin (Lovenox) SQ  Chung Catheter: Not present  Central Lines: None  Cardiac Monitoring: None  Code Status: No CPR- Do NOT Intubate      Disposition Plan     Expected Discharge Date: 10/21/2022      Destination: assisted living          The patient's care was discussed with the Patient.    Washington Lorenzana MD  Hospitalist Service  [facility]  Securely message with the Vocera Web Console (learn more here)  Text page via [employer] Paging/Directory         Clinically Significant Risk Factors Present on Admission                  # Hypertension: home medication list includes antihypertensive(s)    # DMII: A1C = 7.6 % (Ref range: <5.7 %) within past 3 months            ______________________________________________________________________    Interval History   Continues to be short of breath and wheezing.  Needing oxygen at 5 L/min.  Blood sugar continues to run high    Data reviewed today: I reviewed all medications, new labs and imaging results over the last 24 hours.   Physical Exam   Vital Signs: Temp: 97.5  F (36.4  C) Temp src: Temporal BP: (!) 153/54 Pulse: 86   Resp: 22 SpO2: 95 % O2 Device: Nasal cannula Oxygen Delivery: 5 LPM  Weight: 242 lbs 1.04 oz  General Appearance: Elderly female who is in distress due to shortness of breath.  Eyes: No icterus  HEENT: Moist mucosa  Respiratory: Bilateral wheezing  Cardiovascular: S1 and S2 is normal  GI: Soft and nontender  Lymph/Hematologic: Not examined  Genitourinary: Not examined  Skin: No rash  Musculoskeletal: No edema  Neurologic: Nonfocal  Psychiatric: Normal mood      Data   Recent Labs   Lab 10/20/22  1217 10/20/22  0811 10/20/22  0721 10/19/22  1818 10/19/22  1430   WBC   --   --  16.3*  --  17.6*   HGB  --   --  13.7  --  14.0   MCV  --   --  98  --  96   PLT  --   --  275  --  274   INR  --   --   --   --  1.13   NA  --   --  144  --  143   POTASSIUM  --   --  3.8  --  4.3   CHLORIDE  --   --  102  --  102   CO2  --   --  33*  --  30*   BUN  --   --  21.4  --  20.4   CR  --   --  0.52  --  0.65   ANIONGAP  --   --  9  --  11   ISRAEL  --   --  9.5  --  9.7*   * 202* 185*   < > 230*   ALBUMIN  --   --   --   --  3.9   PROTTOTAL  --   --   --   --  7.2   BILITOTAL  --   --   --   --  0.2   ALKPHOS  --   --   --   --  106*   ALT  --   --   --   --  15   AST  --   --   --   --  19    < > = values in this interval not displayed.     Recent Results (from the past 24 hour(s))   XR Chest 2 Views    Narrative    CHEST TWO VIEWS  10/19/2022 3:39 PM     HISTORY: Cough, shortness of breath.    COMPARISON: Chest x-ray 5/30/2022.      Impression    IMPRESSION: Increased nodular opacity at the right midlung is felt to  be indeterminant. This could just be hilar structures, but a focal  increasing area of airspace disease is a possibility. Consider further  evaluation with CT of the chest. Small right pleural fluid. Stable  cardiac silhouette. Left lung base atelectasis again noted.    GUNNER ANTOINE MD         SYSTEM ID:  [id number]   CT Chest w/o Contrast    Narrative    EXAM: CT CHEST W/O CONTRAST  LOCATION: [facility]  DATE/TIME: 10/19/2022 6:41 PM    INDICATION: Right hilar nodularity on chest x ray  COMPARISON: Chest x-ray dated 10/19/2022, chest CT dated 05/30/2022, chest CT dated 02/11/2020  TECHNIQUE: CT chest without IV contrast. Multiplanar reformats were obtained. Dose reduction techniques were used.  CONTRAST: None.    FINDINGS:   LUNGS AND PLEURA: Chronic lower lobe atelectasis and scarring, right greater than left, with some associated calcification is likely unchanged. There are no acute infiltrates. Central airways are patent. Trace right pleural effusion,  slightly increased.    MEDIASTINUM/AXILLAE: Scattered subcentimeter mediastinal nodes. No new hilar mass. Large hiatal hernia.    CORONARY ARTERY CALCIFICATION: Mild.    UPPER ABDOMEN: 8.6 mm splenic artery aneurysm, unchanged    MUSCULOSKELETAL: Chronic bilateral posterior rib deformities. Mild spondylosis. Stable less than 50% midthoracic compression deformity.      Impression    IMPRESSION:   1.  Chronic bilateral areas of pulmonary consolidation/atelectasis, with slight increase in right pleural effusion.  2.  No evidence of new hilar mass.  3.  Additional chronic findings as above.       Medications       azithromycin  500 mg Intravenous Q24H     budesonide  0.25 mg Nebulization BID     artificial tears  1 drop Both Eyes QAM     cefTRIAXone  1 g Intravenous Q24H     DULoxetine  60 mg Oral Daily     enoxaparin ANTICOAGULANT  40 mg Subcutaneous Q12H     fexofenadine  180 mg Oral QPM     fluticasone-vilanterol  1 puff Inhalation Daily     gabapentin  300 mg Oral BID     insulin aspart  1-7 Units Subcutaneous TID AC     insulin aspart  1-5 Units Subcutaneous At Bedtime     insulin glargine  12 Units Subcutaneous At Bedtime     ipratropium - albuterol 0.5 mg/2.5 mg/3 mL  3 mL Nebulization 4x daily     losartan  100 mg Oral Daily     metoprolol succinate ER  25 mg Oral Daily     pantoprazole  20 mg Oral Every Other Day     polyethylene glycol  17 g Oral BID     predniSONE  40 mg Oral Daily     sennosides  1 tablet Oral Daily     sodium chloride (PF)  3 mL Intracatheter Q8H

## 2022-10-20 NOTE — PROGRESS NOTES
Blood sugar is 336.  Will order Lantus 12 units at night and increase the intensity of sliding scale to medium.    Washington Lorenzana

## 2022-10-20 NOTE — PLAN OF CARE
AOx4. [place]. 4 L oxymask. Occasionally wheezy. PW in place. BM bedside commode. Ax 2 walker GB. Azithromycin and ceftriaxone prescribed.  received lantus

## 2022-10-20 NOTE — PROGRESS NOTES
Cross cover note.    Notified of continued significant hypertension.    Chart reviewed.    Add IV labetalol 10 mg every 2 hours as needed for systolic pressure greater than 180.

## 2022-10-20 NOTE — CONSULTS
Care Management Initial Consult    General Information  Assessment completed with: Patient, Care Team Member, -chart review, Patient  Type of CM/SW Visit: Initial Assessment    Primary Care Provider verified and updated as needed: No   Readmission within the last 30 days: no previous admission in last 30 days      Reason for Consult: discharge planning  Advance Care Planning: Advance Care Planning Reviewed: present on chart          Communication Assessment  Patient's communication style: spoken language (English or Bilingual)    Hearing Difficulty or Deaf: yes   Wear Glasses or Blind: yes    Cognitive  Cognitive/Neuro/Behavioral: WDL                      Living Environment:   People in home: alone     Current living Arrangements: assisted living  Name of [facility]: [facility]  [phone])   Able to return to prior arrangements: yes       Family/Social Support:  Care provided by: self, other (see comments) (facility staff)  Provides care for:    Marital Status: Single  Children          Description of Support System: Supportive, Involved    Support Assessment: Adequate family and caregiver support, Patient communicates needs well met    Current Resources:   Patient receiving home care services: No     Community Resources: DME  Equipment currently used at home:    Supplies currently used at home: Oxygen Tubing/Supplies    Employment/Financial:  Employment Status: retired        Financial Concerns: No concerns identified   Referral to Financial Worker: No       Lifestyle & Psychosocial Needs:  Social Determinants of Health     Tobacco Use: Low Risk      Smoking Tobacco Use: Never     Smokeless Tobacco Use: Never     Passive Exposure: Not on file   Alcohol Use: Not on file   Financial Resource Strain: Not on file   Food Insecurity: Not on file   Transportation Needs: Not on file   Physical Activity: Not on file   Stress: Not on file   Social Connections: Not on file   Intimate Partner Violence: Not on file    Depression: Not at risk     PHQ-2 Score: 0   Housing Stability: Not on file       Functional Status:  Prior to admission patient needed assistance:   Dependent ADLs:: Ambulation-walker, Wheelchair-independent, Dressing, Bathing  Dependent IADLs:: Meal Preparation, Medication Management  Assesssment of Functional Status: Has complex medical needs, requires placement in a facility, Needs assistance with laundry/houskeeping, Needs assistance with meals, Needs assistance with medications, Needs assistive devices (DME), Needs assistance with transportation    Mental Health Status:  Mental Health Status: No Current Concerns       Chemical Dependency Status:  Chemical Dependency Status: No Current Concerns           Care Management Discharge Note    Discharge Date: 10/21/2022       Discharge Disposition: Assisted Living    Discharge Services: None    Discharge DME: None    Discharge Transportation: health plan transportation    Private pay costs discussed: Not applicable    PAS Confirmation Code:    Patient/family educated on Medicare website which has current facility and service quality ratings: no    Additional Information:  Patient has a high URR and a [facility] PCP. SW met with patient at bedside. Patient lives at [facility] [phone], Fax:[phone].  Patient gave  permission to call.     Patient gets assistance with meals, cleaning, laundry, and medication management.  She gets shower assistance as well. She is able to transfer on her own and ambulate with a walker. She ha a wheelchair for long distances that she is able to do on her own.  [facility] verfied services. Patient states she does not have home care anymore but had it after her hospital stay around Labor Day.     Patient has 2 daughters that are supportive of her. Patient is socially active at her [facility].     Patient is on 4L O2 at baseline through [facility].     Reviewed out of pocket cost for [employer] transport, $81.80 for base rate  and $5.26 per mile to the destination. Spoke with patient, they expressed understanding and are agreeable to this.     Facility would need patient back by 1630. They take weekend returns in the morning.     KATTY Lagos, [facility]  Emergency Room   [phone]-Please contact the SW on the floor in which the patient is staying for any questions or concerns

## 2022-10-21 LAB
ANION GAP SERPL CALCULATED.3IONS-SCNC: 8 MMOL/L (ref 7–15)
BASOPHILS # BLD AUTO: 0 10E3/UL (ref 0–0.2)
BASOPHILS NFR BLD AUTO: 0 %
BUN SERPL-MCNC: 22.3 MG/DL (ref 8–23)
CALCIUM SERPL-MCNC: 9.5 MG/DL (ref 8.2–9.6)
CHLORIDE SERPL-SCNC: 103 MMOL/L (ref 98–107)
CREAT SERPL-MCNC: 0.56 MG/DL (ref 0.51–0.95)
DEPRECATED HCO3 PLAS-SCNC: 32 MMOL/L (ref 22–29)
EOSINOPHIL # BLD AUTO: 0 10E3/UL (ref 0–0.7)
EOSINOPHIL NFR BLD AUTO: 0 %
ERYTHROCYTE [DISTWIDTH] IN BLOOD BY AUTOMATED COUNT: 13.2 % (ref 10–15)
GFR SERPL CREATININE-BSD FRML MDRD: 86 ML/MIN/1.73M2
GLUCOSE BLDC GLUCOMTR-MCNC: 139 MG/DL (ref 70–99)
GLUCOSE BLDC GLUCOMTR-MCNC: 172 MG/DL (ref 70–99)
GLUCOSE BLDC GLUCOMTR-MCNC: 230 MG/DL (ref 70–99)
GLUCOSE BLDC GLUCOMTR-MCNC: 277 MG/DL (ref 70–99)
GLUCOSE BLDC GLUCOMTR-MCNC: 297 MG/DL (ref 70–99)
GLUCOSE SERPL-MCNC: 210 MG/DL (ref 70–99)
HCT VFR BLD AUTO: 45.5 % (ref 35–47)
HGB BLD-MCNC: 13.6 G/DL (ref 11.7–15.7)
IMM GRANULOCYTES # BLD: 0.1 10E3/UL
IMM GRANULOCYTES NFR BLD: 1 %
LACTATE SERPL-SCNC: 0.8 MMOL/L (ref 0.7–2)
LYMPHOCYTES # BLD AUTO: 1.2 10E3/UL (ref 0.8–5.3)
LYMPHOCYTES NFR BLD AUTO: 9 %
MAGNESIUM SERPL-MCNC: 2 MG/DL (ref 1.7–2.3)
MCH RBC QN AUTO: 28.7 PG (ref 26.5–33)
MCHC RBC AUTO-ENTMCNC: 29.9 G/DL (ref 31.5–36.5)
MCV RBC AUTO: 96 FL (ref 78–100)
MONOCYTES # BLD AUTO: 1.1 10E3/UL (ref 0–1.3)
MONOCYTES NFR BLD AUTO: 8 %
NEUTROPHILS # BLD AUTO: 11.4 10E3/UL (ref 1.6–8.3)
NEUTROPHILS NFR BLD AUTO: 82 %
NRBC # BLD AUTO: 0 10E3/UL
NRBC BLD AUTO-RTO: 0 /100
PLATELET # BLD AUTO: 297 10E3/UL (ref 150–450)
POTASSIUM SERPL-SCNC: 3.9 MMOL/L (ref 3.4–5.3)
RBC # BLD AUTO: 4.74 10E6/UL (ref 3.8–5.2)
SODIUM SERPL-SCNC: 143 MMOL/L (ref 136–145)
WBC # BLD AUTO: 13.8 10E3/UL (ref 4–11)

## 2022-10-21 PROCEDURE — 83735 ASSAY OF MAGNESIUM: CPT | Performed by: STUDENT IN AN ORGANIZED HEALTH CARE EDUCATION/TRAINING PROGRAM

## 2022-10-21 PROCEDURE — 250N000013 HC RX MED GY IP 250 OP 250 PS 637: Performed by: STUDENT IN AN ORGANIZED HEALTH CARE EDUCATION/TRAINING PROGRAM

## 2022-10-21 PROCEDURE — 36415 COLL VENOUS BLD VENIPUNCTURE: CPT | Performed by: STUDENT IN AN ORGANIZED HEALTH CARE EDUCATION/TRAINING PROGRAM

## 2022-10-21 PROCEDURE — 999N000157 HC STATISTIC RCP TIME EA 10 MIN

## 2022-10-21 PROCEDURE — 94640 AIRWAY INHALATION TREATMENT: CPT

## 2022-10-21 PROCEDURE — 250N000011 HC RX IP 250 OP 636: Performed by: STUDENT IN AN ORGANIZED HEALTH CARE EDUCATION/TRAINING PROGRAM

## 2022-10-21 PROCEDURE — 120N000001 HC R&B MED SURG/OB

## 2022-10-21 PROCEDURE — 250N000012 HC RX MED GY IP 250 OP 636 PS 637: Performed by: STUDENT IN AN ORGANIZED HEALTH CARE EDUCATION/TRAINING PROGRAM

## 2022-10-21 PROCEDURE — 94640 AIRWAY INHALATION TREATMENT: CPT | Mod: 76

## 2022-10-21 PROCEDURE — 83605 ASSAY OF LACTIC ACID: CPT | Performed by: STUDENT IN AN ORGANIZED HEALTH CARE EDUCATION/TRAINING PROGRAM

## 2022-10-21 PROCEDURE — 258N000003 HC RX IP 258 OP 636: Performed by: STUDENT IN AN ORGANIZED HEALTH CARE EDUCATION/TRAINING PROGRAM

## 2022-10-21 PROCEDURE — 94660 CPAP INITIATION&MGMT: CPT

## 2022-10-21 PROCEDURE — 5A09357 ASSISTANCE WITH RESPIRATORY VENTILATION, LESS THAN 24 CONSECUTIVE HOURS, CONTINUOUS POSITIVE AIRWAY PRESSURE: ICD-10-PCS | Performed by: STUDENT IN AN ORGANIZED HEALTH CARE EDUCATION/TRAINING PROGRAM

## 2022-10-21 PROCEDURE — 80048 BASIC METABOLIC PNL TOTAL CA: CPT | Performed by: STUDENT IN AN ORGANIZED HEALTH CARE EDUCATION/TRAINING PROGRAM

## 2022-10-21 PROCEDURE — 85025 COMPLETE CBC W/AUTO DIFF WBC: CPT | Performed by: STUDENT IN AN ORGANIZED HEALTH CARE EDUCATION/TRAINING PROGRAM

## 2022-10-21 PROCEDURE — 99232 SBSQ HOSP IP/OBS MODERATE 35: CPT | Performed by: STUDENT IN AN ORGANIZED HEALTH CARE EDUCATION/TRAINING PROGRAM

## 2022-10-21 PROCEDURE — 250N000009 HC RX 250: Performed by: STUDENT IN AN ORGANIZED HEALTH CARE EDUCATION/TRAINING PROGRAM

## 2022-10-21 RX ADMIN — GABAPENTIN 300 MG: 300 CAPSULE ORAL at 20:36

## 2022-10-21 RX ADMIN — BUDESONIDE 0.25 MG: 0.25 INHALANT RESPIRATORY (INHALATION) at 19:36

## 2022-10-21 RX ADMIN — GABAPENTIN 300 MG: 300 CAPSULE ORAL at 09:07

## 2022-10-21 RX ADMIN — PREDNISONE 40 MG: 20 TABLET ORAL at 09:08

## 2022-10-21 RX ADMIN — METOPROLOL SUCCINATE 25 MG: 25 TABLET, EXTENDED RELEASE ORAL at 09:07

## 2022-10-21 RX ADMIN — GUAIFENESIN AND DEXTROMETHORPHAN 10 ML: 100; 10 SYRUP ORAL at 10:37

## 2022-10-21 RX ADMIN — PREDNISONE 40 MG: 20 TABLET ORAL at 18:20

## 2022-10-21 RX ADMIN — AMLODIPINE BESYLATE 10 MG: 10 TABLET ORAL at 09:07

## 2022-10-21 RX ADMIN — IPRATROPIUM BROMIDE AND ALBUTEROL SULFATE 3 ML: 2.5; .5 SOLUTION RESPIRATORY (INHALATION) at 19:36

## 2022-10-21 RX ADMIN — FEXOFENADINE HYDROCHLORIDE 180 MG: 60 TABLET ORAL at 20:36

## 2022-10-21 RX ADMIN — ENOXAPARIN SODIUM 40 MG: 40 INJECTION SUBCUTANEOUS at 09:08

## 2022-10-21 RX ADMIN — IPRATROPIUM BROMIDE AND ALBUTEROL SULFATE 3 ML: 2.5; .5 SOLUTION RESPIRATORY (INHALATION) at 12:35

## 2022-10-21 RX ADMIN — IPRATROPIUM BROMIDE AND ALBUTEROL SULFATE 3 ML: 2.5; .5 SOLUTION RESPIRATORY (INHALATION) at 07:24

## 2022-10-21 RX ADMIN — AZITHROMYCIN MONOHYDRATE 500 MG: 500 INJECTION, POWDER, LYOPHILIZED, FOR SOLUTION INTRAVENOUS at 14:23

## 2022-10-21 RX ADMIN — ENOXAPARIN SODIUM 40 MG: 40 INJECTION SUBCUTANEOUS at 18:20

## 2022-10-21 RX ADMIN — DULOXETINE 60 MG: 60 CAPSULE, DELAYED RELEASE ORAL at 09:07

## 2022-10-21 RX ADMIN — LOSARTAN POTASSIUM 100 MG: 100 TABLET, FILM COATED ORAL at 09:07

## 2022-10-21 RX ADMIN — GUAIFENESIN AND DEXTROMETHORPHAN 10 ML: 100; 10 SYRUP ORAL at 20:36

## 2022-10-21 RX ADMIN — CEFTRIAXONE 1 G: 1 INJECTION, POWDER, FOR SOLUTION INTRAMUSCULAR; INTRAVENOUS at 13:12

## 2022-10-21 RX ADMIN — POLYETHYLENE GLYCOL 3350 17 G: 17 POWDER, FOR SOLUTION ORAL at 20:36

## 2022-10-21 RX ADMIN — BUDESONIDE 0.25 MG: 0.25 INHALANT RESPIRATORY (INHALATION) at 07:29

## 2022-10-21 RX ADMIN — Medication 1 DROP: at 09:08

## 2022-10-21 RX ADMIN — IPRATROPIUM BROMIDE AND ALBUTEROL SULFATE 3 ML: 2.5; .5 SOLUTION RESPIRATORY (INHALATION) at 15:53

## 2022-10-21 ASSESSMENT — ACTIVITIES OF DAILY LIVING (ADL)
ADLS_ACUITY_SCORE: 41

## 2022-10-21 NOTE — PROGRESS NOTES
[facility]    Medicine Progress Note - Hospitalist Service    Date of Admission:  10/19/2022    Assessment & Plan   Tatyana Metcalf is a 89 year old female admitted on 10/19/2022.      She lives in assisted living and has history of COPD (on oxygen at 2 L/min at baseline), KAITLIN (CPAP at night with oxygen at 4 L/min) depression and surgically treated endometrial cancer who presented from assisted living via EMS, with cough, shortness of breath and purulent looking expectoration for last 1 week.  Denies chest pain or fever.     Vitals on arrival: Temperature 97.7  F, heart rate 93/min, blood pressure 160/72, respiratory rate 20 and oxygen of 96% on 4 L via nasal cannula.  Chest x-ray showed increased nodular opacity in the right midlung which could be hilar structure versus airspace disease.  WBC count of 17,000.  CBC was significant for WBC count of 17 point 6K.  CMP was unremarkable except glucose of 230.  Venous gas showed pH/PCO2 of 7.5/40 with bicarbonate 34.     In the ER patient got a dose of Solu-Medrol 125 mg IV, ceftriaxone, azithromycin and a DuoNeb.        1. COPD exacerbation.    Started on scheduled duo nebs, as needed albuterol nebs, increase prednisone to 40 mg twice daily and continue ceftriaxone and azithromycin.  Continue Breo.  Use sliding scale insulin while on steroids.  Resume Pulmicort nebs.    Supplemental oxygen to keep oxygen saturation above 88%, baseline oxygen use is 2 L/min.  Currently needing oxygen at 5 L/min and continues to be dyspneic but feels little better than yesterday..    Has a nodular density in the right lung on chest x-ray, CT chest was obtained which did not show any hilar nodularity.     2. Sleep apnea.    CPAP overnight, uses 4 L/min.       3. Depression.    Start Cymbalta     4. Metabolic alkalosis.    Likely overdiuresis.    Patient has been taking Lasix 20 mg daily, will hold.     5. Essential hypertension     Prior to admission losartan and  metoprolol, continue.    Blood pressure was very high yesterday, with systolics above 200 likely driven due to respiratory distress.  Amlodipine was added and also given as needed IV hydralazine/labetalol.     6. Hyperglycemia.      Expected to worsen with steroids, start with sliding scale, adjust intensity according to response.    Increase Lantus to 20 units at night and added Lantus 12 units in the morning due to continued hyperglycemia.          Diet: Combination Diet Regular Diet Adult    DVT Prophylaxis: Enoxaparin (Lovenox) SQ  Chung Catheter: Not present  Central Lines: None  Cardiac Monitoring: None  Code Status: No CPR- Do NOT Intubate      Disposition Plan      Expected Discharge Date: 10/24/2022      Destination: assisted living          The patient's care was discussed with the Patient.    Washington Lorenzana MD  Hospitalist Service  [facility]  Securely message with the Vocera Web Console (learn more here)  Text page via [employer] Paging/Directory         Clinically Significant Risk Factors                       # DMII: A1C = 7.6 % (Ref range: <5.7 %) within past 3 months, PRESENT ON ADMISSION          ______________________________________________________________________    Interval History   Shortness of breath and wheezing appears to be better since yesterday but still needing oxygen at 5 L/min.  Blood sugars continue to be high.    Data reviewed today: I reviewed all medications, new labs and imaging results over the last 24 hours.   Physical Exam   Vital Signs: Temp: 97.5  F (36.4  C) Temp src: Temporal BP: (!) 164/81 Pulse: 78   Resp: 20 SpO2: 98 % O2 Device: Nasal cannula Oxygen Delivery: 5 LPM  Weight: 242 lbs 1.04 oz  General Appearance: Elderly female, appears more comfortable since yesterday.  Eyes: No icterus  HEENT: Moist mucosa  Respiratory: Bilateral wheezing  Cardiovascular: S1 and S2 is normal  GI: Soft and nontender  Lymph/Hematologic: Not examined  Genitourinary: Not  examined  Skin: No rash  Musculoskeletal: No edema  Neurologic: Nonfocal  Psychiatric: Normal mood      Data   Recent Labs   Lab 10/21/22  1203 10/21/22  0830 10/21/22  0645 10/20/22  0811 10/20/22  0721 10/19/22  1818 10/19/22  1430   WBC  --   --  13.8*  --  16.3*  --  17.6*   HGB  --   --  13.6  --  13.7  --  14.0   MCV  --   --  96  --  98  --  96   PLT  --   --  297  --  275  --  274   INR  --   --   --   --   --   --  1.13   NA  --   --  143  --  144  --  143   POTASSIUM  --   --  3.9  --  3.8  --  4.3   CHLORIDE  --   --  103  --  102  --  102   CO2  --   --  32*  --  33*  --  30*   BUN  --   --  22.3  --  21.4  --  20.4   CR  --   --  0.56  --  0.52  --  0.65   ANIONGAP  --   --  8  --  9  --  11   ISRAEL  --   --  9.5  --  9.5  --  9.7*   * 172* 210*   < > 185*   < > 230*   ALBUMIN  --   --   --   --   --   --  3.9   PROTTOTAL  --   --   --   --   --   --  7.2   BILITOTAL  --   --   --   --   --   --  0.2   ALKPHOS  --   --   --   --   --   --  106*   ALT  --   --   --   --   --   --  15   AST  --   --   --   --   --   --  19    < > = values in this interval not displayed.     No results found for this or any previous visit (from the past 24 hour(s)).  Medications       amLODIPine  10 mg Oral Daily     azithromycin  500 mg Intravenous Q24H     budesonide  0.25 mg Nebulization BID     artificial tears  1 drop Both Eyes QAM     cefTRIAXone  1 g Intravenous Q24H     DULoxetine  60 mg Oral Daily     enoxaparin ANTICOAGULANT  40 mg Subcutaneous Q12H     fexofenadine  180 mg Oral QPM     gabapentin  300 mg Oral BID     insulin aspart  1-7 Units Subcutaneous TID AC     insulin aspart  1-5 Units Subcutaneous At Bedtime     insulin glargine  12 Units Subcutaneous QAM AC     insulin glargine  20 Units Subcutaneous At Bedtime     ipratropium - albuterol 0.5 mg/2.5 mg/3 mL  3 mL Nebulization 4x daily     losartan  100 mg Oral Daily     metoprolol succinate ER  25 mg Oral Daily     pantoprazole  20 mg Oral Every  Other Day     polyethylene glycol  17 g Oral BID     predniSONE  40 mg Oral BID w/meals     sennosides  1 tablet Oral Daily     sodium chloride (PF)  3 mL Intracatheter Q8H

## 2022-10-21 NOTE — PROGRESS NOTES
A CPAP +5 @ 50% was applied to the pt via the mask for the night.  Skin is intact. Pt is tolerating it well. Will continue to monitor and assess the pt's current respiratory status and needs.    Pt placed on  home CPAP but was unable to obtain Spo2 >88% on 13L bleed in. Pt was transitioned  to our machine.    RT Cheryl on 10/21/2022 at 1:29 AM

## 2022-10-21 NOTE — PLAN OF CARE
A&Ox4. VSS except on 5L of O2. Pt's baseline is 2L of O2 during the day and 4L with the cpap at night. IV SL. Ambulates with Ax1 using the walker and gait belt. Voiding in the bathroom. Active bowel sounds; passing gas. Lungs have inspiratory and expiratory wheezes. Dyspnea on exertion.     On Rocephin and Azithromycin for abx. Took Robitussin PRN x1 today.      Goal Outcome Evaluation:     Plan of Care Reviewed With: patient     Overall Patient Progress: no change     Discharge: Pending based on pt progress.

## 2022-10-21 NOTE — CARE PLAN
Pt is A&O x4. VSS. IV SL. BG checks. On 6.5 LPM and hospital CPAP on by RT. Has purewick. Denies pain. On potassium and magnesium protocols. Sleeps between cares. From assisted living. Plan to be discharged back to assisted living.

## 2022-10-21 NOTE — PROVIDER NOTIFICATION
just put pt from 4 lpm to 5 lpm for the night (4 lpm is her normal) and she is still 83-85% of o2. can you come and see the pt, please? she has cpap on. thanks!

## 2022-10-21 NOTE — PLAN OF CARE
Goal Outcome Evaluation:      Plan of Care Reviewed With: patient, child    3-7p RN  VS monitored, HTN, able to finally decrease BP with Amlodipine/IV Hydralazine and Labatelol, 5L NC, denies pain, A1 and gb and ww, DNR/DNI, voiding, purewick at hs, , freq cough, Robitussin given, exp wheeze, labored breathing, [place], ice applied to L arm when IV infiltrated on day shift, [place], IV Rocephin/Zithromax cont, plan is to cont nebs/steroids, dtr updated at b/s.

## 2022-10-21 NOTE — PROVIDER NOTIFICATION
pt has been put on now 6 lpm, O2 is 85% average. the pt has also been boosted up. can you come to see the pt please? thanks!

## 2022-10-22 VITALS
HEART RATE: 75 BPM | SYSTOLIC BLOOD PRESSURE: 148 MMHG | OXYGEN SATURATION: 93 % | DIASTOLIC BLOOD PRESSURE: 51 MMHG | RESPIRATION RATE: 22 BRPM | BODY MASS INDEX: 47.28 KG/M2 | TEMPERATURE: 98.3 F | WEIGHT: 242.06 LBS

## 2022-10-22 LAB
ANION GAP SERPL CALCULATED.3IONS-SCNC: 8 MMOL/L (ref 7–15)
BASOPHILS # BLD AUTO: 0 10E3/UL (ref 0–0.2)
BASOPHILS NFR BLD AUTO: 0 %
BUN SERPL-MCNC: 25.5 MG/DL (ref 8–23)
CALCIUM SERPL-MCNC: 9.7 MG/DL (ref 8.2–9.6)
CHLORIDE SERPL-SCNC: 105 MMOL/L (ref 98–107)
CREAT SERPL-MCNC: 0.56 MG/DL (ref 0.51–0.95)
DEPRECATED HCO3 PLAS-SCNC: 32 MMOL/L (ref 22–29)
EOSINOPHIL # BLD AUTO: 0 10E3/UL (ref 0–0.7)
EOSINOPHIL NFR BLD AUTO: 0 %
ERYTHROCYTE [DISTWIDTH] IN BLOOD BY AUTOMATED COUNT: 13.1 % (ref 10–15)
GFR SERPL CREATININE-BSD FRML MDRD: 86 ML/MIN/1.73M2
GLUCOSE BLDC GLUCOMTR-MCNC: 154 MG/DL (ref 70–99)
GLUCOSE BLDC GLUCOMTR-MCNC: 209 MG/DL (ref 70–99)
GLUCOSE BLDC GLUCOMTR-MCNC: 372 MG/DL (ref 70–99)
GLUCOSE SERPL-MCNC: 201 MG/DL (ref 70–99)
HCT VFR BLD AUTO: 50.1 % (ref 35–47)
HGB BLD-MCNC: 14.6 G/DL (ref 11.7–15.7)
IMM GRANULOCYTES # BLD: 0.1 10E3/UL
IMM GRANULOCYTES NFR BLD: 1 %
LACTATE SERPL-SCNC: 1.8 MMOL/L (ref 0.7–2)
LYMPHOCYTES # BLD AUTO: 1.2 10E3/UL (ref 0.8–5.3)
LYMPHOCYTES NFR BLD AUTO: 9 %
MAGNESIUM SERPL-MCNC: 2.1 MG/DL (ref 1.7–2.3)
MCH RBC QN AUTO: 28.6 PG (ref 26.5–33)
MCHC RBC AUTO-ENTMCNC: 29.1 G/DL (ref 31.5–36.5)
MCV RBC AUTO: 98 FL (ref 78–100)
MONOCYTES # BLD AUTO: 0.8 10E3/UL (ref 0–1.3)
MONOCYTES NFR BLD AUTO: 7 %
NEUTROPHILS # BLD AUTO: 10.2 10E3/UL (ref 1.6–8.3)
NEUTROPHILS NFR BLD AUTO: 83 %
NRBC # BLD AUTO: 0 10E3/UL
NRBC BLD AUTO-RTO: 0 /100
PLATELET # BLD AUTO: 298 10E3/UL (ref 150–450)
POTASSIUM SERPL-SCNC: 4.4 MMOL/L (ref 3.4–5.3)
RBC # BLD AUTO: 5.11 10E6/UL (ref 3.8–5.2)
SODIUM SERPL-SCNC: 145 MMOL/L (ref 136–145)
WBC # BLD AUTO: 12.4 10E3/UL (ref 4–11)

## 2022-10-22 PROCEDURE — 250N000012 HC RX MED GY IP 250 OP 636 PS 637: Performed by: STUDENT IN AN ORGANIZED HEALTH CARE EDUCATION/TRAINING PROGRAM

## 2022-10-22 PROCEDURE — 99239 HOSP IP/OBS DSCHRG MGMT >30: CPT | Performed by: STUDENT IN AN ORGANIZED HEALTH CARE EDUCATION/TRAINING PROGRAM

## 2022-10-22 PROCEDURE — 250N000011 HC RX IP 250 OP 636: Performed by: STUDENT IN AN ORGANIZED HEALTH CARE EDUCATION/TRAINING PROGRAM

## 2022-10-22 PROCEDURE — 36415 COLL VENOUS BLD VENIPUNCTURE: CPT | Performed by: STUDENT IN AN ORGANIZED HEALTH CARE EDUCATION/TRAINING PROGRAM

## 2022-10-22 PROCEDURE — 85014 HEMATOCRIT: CPT | Performed by: STUDENT IN AN ORGANIZED HEALTH CARE EDUCATION/TRAINING PROGRAM

## 2022-10-22 PROCEDURE — 999N000157 HC STATISTIC RCP TIME EA 10 MIN

## 2022-10-22 PROCEDURE — 250N000013 HC RX MED GY IP 250 OP 250 PS 637: Performed by: STUDENT IN AN ORGANIZED HEALTH CARE EDUCATION/TRAINING PROGRAM

## 2022-10-22 PROCEDURE — 250N000009 HC RX 250: Performed by: STUDENT IN AN ORGANIZED HEALTH CARE EDUCATION/TRAINING PROGRAM

## 2022-10-22 PROCEDURE — 83735 ASSAY OF MAGNESIUM: CPT | Performed by: STUDENT IN AN ORGANIZED HEALTH CARE EDUCATION/TRAINING PROGRAM

## 2022-10-22 PROCEDURE — 80048 BASIC METABOLIC PNL TOTAL CA: CPT | Performed by: STUDENT IN AN ORGANIZED HEALTH CARE EDUCATION/TRAINING PROGRAM

## 2022-10-22 PROCEDURE — 36415 COLL VENOUS BLD VENIPUNCTURE: CPT | Performed by: INTERNAL MEDICINE

## 2022-10-22 PROCEDURE — 94640 AIRWAY INHALATION TREATMENT: CPT

## 2022-10-22 PROCEDURE — 94640 AIRWAY INHALATION TREATMENT: CPT | Mod: 76

## 2022-10-22 PROCEDURE — 83605 ASSAY OF LACTIC ACID: CPT | Performed by: INTERNAL MEDICINE

## 2022-10-22 RX ORDER — PREDNISONE 20 MG/1
20 TABLET ORAL DAILY
Qty: 3 TABLET | Refills: 0 | Status: SHIPPED | OUTPATIENT
Start: 2022-10-22

## 2022-10-22 RX ORDER — CEFDINIR 300 MG/1
300 CAPSULE ORAL 2 TIMES DAILY
Qty: 8 CAPSULE | Refills: 0 | Status: SHIPPED | OUTPATIENT
Start: 2022-10-22

## 2022-10-22 RX ORDER — AMLODIPINE BESYLATE 10 MG/1
10 TABLET ORAL DAILY
Qty: 30 TABLET | Refills: 0 | Status: SHIPPED | OUTPATIENT
Start: 2022-10-23

## 2022-10-22 RX ORDER — AMLODIPINE BESYLATE 10 MG/1
10 TABLET ORAL DAILY
Qty: 30 TABLET | Refills: 0 | Status: SHIPPED | OUTPATIENT
Start: 2022-10-23 | End: 2022-10-22

## 2022-10-22 RX ORDER — PREDNISONE 20 MG/1
20 TABLET ORAL DAILY
Qty: 3 TABLET | Refills: 0 | Status: SHIPPED | OUTPATIENT
Start: 2022-10-22 | End: 2022-10-22

## 2022-10-22 RX ORDER — CEFDINIR 300 MG/1
300 CAPSULE ORAL 2 TIMES DAILY
Qty: 8 CAPSULE | Refills: 0 | Status: SHIPPED | OUTPATIENT
Start: 2022-10-22 | End: 2022-10-22

## 2022-10-22 RX ADMIN — METOPROLOL SUCCINATE 25 MG: 25 TABLET, EXTENDED RELEASE ORAL at 08:18

## 2022-10-22 RX ADMIN — PREDNISONE 40 MG: 20 TABLET ORAL at 08:18

## 2022-10-22 RX ADMIN — DULOXETINE 60 MG: 60 CAPSULE, DELAYED RELEASE ORAL at 08:18

## 2022-10-22 RX ADMIN — PANTOPRAZOLE SODIUM 20 MG: 20 TABLET, DELAYED RELEASE ORAL at 08:18

## 2022-10-22 RX ADMIN — BUDESONIDE 0.25 MG: 0.25 INHALANT RESPIRATORY (INHALATION) at 07:37

## 2022-10-22 RX ADMIN — AMLODIPINE BESYLATE 10 MG: 10 TABLET ORAL at 08:18

## 2022-10-22 RX ADMIN — Medication 1 DROP: at 08:22

## 2022-10-22 RX ADMIN — LOSARTAN POTASSIUM 100 MG: 100 TABLET, FILM COATED ORAL at 08:18

## 2022-10-22 RX ADMIN — GUAIFENESIN AND DEXTROMETHORPHAN 10 ML: 100; 10 SYRUP ORAL at 08:46

## 2022-10-22 RX ADMIN — ACETAMINOPHEN 650 MG: 325 TABLET, FILM COATED ORAL at 08:52

## 2022-10-22 RX ADMIN — SENNOSIDES 1 TABLET: 8.6 TABLET, FILM COATED ORAL at 08:18

## 2022-10-22 RX ADMIN — IPRATROPIUM BROMIDE AND ALBUTEROL SULFATE 3 ML: 2.5; .5 SOLUTION RESPIRATORY (INHALATION) at 15:45

## 2022-10-22 RX ADMIN — IPRATROPIUM BROMIDE AND ALBUTEROL SULFATE 3 ML: 2.5; .5 SOLUTION RESPIRATORY (INHALATION) at 11:35

## 2022-10-22 RX ADMIN — ENOXAPARIN SODIUM 40 MG: 40 INJECTION SUBCUTANEOUS at 07:41

## 2022-10-22 RX ADMIN — GABAPENTIN 300 MG: 300 CAPSULE ORAL at 08:18

## 2022-10-22 RX ADMIN — IPRATROPIUM BROMIDE AND ALBUTEROL SULFATE 3 ML: 2.5; .5 SOLUTION RESPIRATORY (INHALATION) at 07:40

## 2022-10-22 ASSESSMENT — ACTIVITIES OF DAILY LIVING (ADL)
ADLS_ACUITY_SCORE: 41

## 2022-10-22 NOTE — PLAN OF CARE
Goal Outcome Evaluation:     Pt was sent back to her home via WC at 1615. Filled meds given to the patient. Pt also signed her AVS and a copy was sent to her staff at the facility. The CC had talked to the staff to inform them of her return earlier today. CPAP  and her belongings all sent with the patient. Pt did get [place] with activity to the BR but stated that is her baseline. Pt recovered quickly once she was sitting in the chair and stopped talking and took DB. Pt excited to discharge home.

## 2022-10-22 NOTE — PLAN OF CARE
A&Ox4. VSS. On 2L of O2, which is the pt's baseline. IV SL. Ambulates with SBA using the walker and gait belt. Voiding in the bathroom. Active bowel sounds; passing gas. Lungs have inspiratory and expiratory wheezes. Dyspnea on exertion.     On Rocephin and Azithromycin for abx. Took Robitussin PRN x1 today.      Goal Outcome Evaluation:     Plan of Care Reviewed With: patient     Overall Patient Progress: no change     Discharge: Discharging back to her assisted living today at 16:15 via wheelchair transport with O2.

## 2022-10-22 NOTE — PROGRESS NOTES
Care Management Discharge Note    Discharge Date: 10/22/2022       Discharge Disposition: Assisted Living    Discharge Services: None    Discharge DME: None    Discharge Transportation: health plan transportation    Private pay costs discussed: transportation costs    PAS Confirmation Code:  NA  Patient/family educated on Medicare website which has current facility and service quality ratings: no    Education Provided on the Discharge Plan:  yes  Persons Notified of Discharge Plans:   Patient/Family in Agreement with the Plan: yes    Handoff Referral Completed: No    Additional Information:  Patient discharging to [facility]. Spoke with [facility] nurse, [place], ph# [phone], [facility] can accept patient today. They would like medications filled and sent, as [facility] pharmacy is not available on the weekend.  Discharge instructions faxed to [facility] [phone], Fax:[phone]. Nursing notified.     Per family request, [facility] transportation scheduled.     Patient discharging to [facility] via [facility] at 1615.     Shahab Archibald RN Case Manager  Inpatient Care Coordination   [facility]   [phone]              Shahab Archibald, RN

## 2022-10-22 NOTE — PLAN OF CARE
Goal Outcome Evaluation:      Plan of Care Reviewed With: patient    Overall Patient Progress: improvingOverall Patient Progress: improving    3-7p RN  VS monitored, 4L NC, cpap at hs with O2 bled in, A1 w/ww and gb, voiding, lbm 10/20, exp wheezes, congested cough, wants robitussin before bed, , DNR/DNI, plan to discharge back to [facility] when cleared medically.

## 2022-10-22 NOTE — PLAN OF CARE
Up with A1 gait belt and walker. Denies pain, N/V or diarrhea. Lung sounds coarse with some expiratory wheezes. Has congested cough. PRN Robitussin x1 given. Reported SOB/DALLAS. Currently on 12L CPAP. Pt declined hospital provided CPAP and wanted to use her own. Triggered sepsis. VSS except B/P 152/61. Lactic 1.8. MD notified. No new orders. Requested Purewick overnight. Voiding without any difficulties. no BM during shift. No major event overnight. Will continue to provide supportive care. Will discharge when medically stable.

## 2022-10-22 NOTE — PROGRESS NOTES
Pt refused our CPAP overnight and insisted to be on her own home CPAP despite lower sats. 10 LPM O2 was bled-in to pt's home CPAP to maintain sats between 89 to 93%.

## 2022-10-22 NOTE — DISCHARGE SUMMARY
[facility]  Hospitalist Discharge Summary      Date of Admission:  10/19/2022  Date of Discharge:  10/22/2022  Discharging Provider: Washington Lorenzana MD  Discharge Service: Hospitalist Service    Discharge Diagnoses       Acute COPD exacerbation.    Sleep apnea.    Depression.    Metabolic alkalosis due to overdiuresis.    Essential hypertension    Steroid-induced diabetes with      Follow-ups Needed After Discharge   Follow-up Appointments     Follow-up and recommended labs and tests       Follow up with primary care provider, Jill Briscoe, within 7 days   for hospital follow- up.  The following labs/tests are recommended: CBC   and BMP.               Discharge Disposition   Discharged to home  Condition at discharge: Stable    Hospital Course   Tatyana Metcalf is a 89 year old female admitted on 10/19/2022.      She lives in assisted living and  at baseline she can walk to the bathroom but uses a wheelchair to go to the dining room.  She has history of COPD (on oxygen at 2 L/min at baseline), KAITLIN (CPAP at night with oxygen at 4 L/min) depression and surgically treated endometrial cancer who presented from assisted living via EMS, with cough, shortness of breath and purulent looking expectoration for last 1 week.  Denies chest pain or fever.     Vitals on arrival: Temperature 97.7  F, heart rate 93/min, blood pressure 160/72, respiratory rate 20 and oxygen of 96% on 4 L via nasal cannula.  Chest x-ray showed increased nodular opacity in the right midlung which could be hilar structure versus airspace disease.  WBC count of 17,000.  CBC was significant for WBC count of 17 point 6K.  CMP was unremarkable except glucose of 230.  Venous gas showed pH/PCO2 of 7.5/40 with bicarbonate 34.     In the ER patient got a dose of Solu-Medrol 125 mg IV, ceftriaxone, azithromycin and a DuoNeb.        1. COPD exacerbation.    Started on scheduled duo nebs, as needed albuterol nebs, prednisone,  ceftriaxone and azithromycin. Resume Pulmicort nebs.    Patient is still wheezing but this is her baseline and I confirmed that with patient and her daughters.  We will start her on 3 more days of prednisone 20 mg daily and finished 1 week course of cefdinir.  We will also add Incruse Ellipta on discharge.     2. Sleep apnea.    CPAP overnight, uses 4 L/min.       3. Depression.    Start Cymbalta     4. Metabolic alkalosis.    Likely overdiuresis.    Patient has been taking Lasix 20 mg daily, advised to use it as needed for weight gain.     5. Essential hypertension     Prior to admission losartan and metoprolol, continue.    Blood pressure more very high yesterday, with systolics above 200 likely driven due to respiratory distress.  Amlodipine 10 mg daily was added and blood pressure is now better controlled.       6. Hyperglycemia.      Patient had hyperglycemia with blood sugars above 200 and needed Lantus 20 units at night and 12 units in the morning to achieve normoglycemia.  As the dose of steroid will be decreased on discharge, will not send home with any insulin.      HbA1c of 7.6, will add metformin.        Consultations This Hospital Stay   CARE MANAGEMENT / SOCIAL WORK IP CONSULT    Code Status   No CPR- Do NOT Intubate    Time Spent on this Encounter   I, Washington Lorenzana MD, personally saw the patient today and spent greater than 30 minutes discharging this patient.       Washington Lorenzana MD  [facility]  [address] [place] [address]  Phone: [phone]  Fax: [phone]  ______________________________________________________________________    Physical Exam   Vital Signs: Temp: 96.9  F (36.1  C) Temp src: Temporal BP: (!) 143/89 Pulse: 72   Resp: 18 SpO2: 94 % O2 Device: Nasal cannula Oxygen Delivery: 2 LPM  Weight: 242 lbs 1.04 oz  General Appearance: Elderly female, appears more comfortable since yesterday.  Eyes: No icterus  HEENT: Moist mucosa  Respiratory:  Bilateral wheezing  Cardiovascular: S1 and S2 is normal  GI: Soft and nontender  Lymph/Hematologic: Not examined  Genitourinary: Not examined  Skin: No rash  Musculoskeletal: No edema  Neurologic: Nonfocal  Psychiatric: Normal mood          Primary Care Physician   Jill Briscoe    Discharge Orders      Reason for your hospital stay    COPD exacerbation     Follow-up and recommended labs and tests     Follow up with primary care provider, Jill Briscoe, within 7 days for hospital follow- up.  The following labs/tests are recommended: CBC and BMP.     Activity    Your activity upon discharge: activity as tolerated     Diet    Follow this diet upon discharge: Orders Placed This Encounter      Combination Diet Regular Diet Adult       Significant Results and Procedures   Most Recent 3 CBC's:Recent Labs   Lab Test 10/22/22  0548 10/21/22  0645 10/20/22  0721   WBC 12.4* 13.8* 16.3*   HGB 14.6 13.6 13.7   MCV 98 96 98    297 275     Most Recent 3 BMP's:Recent Labs   Lab Test 10/22/22  0843 10/22/22  0548 10/22/22  0148 10/21/22  0830 10/21/22  0645 10/20/22  0811 10/20/22  0721   NA  --  145  --   --  143  --  144   POTASSIUM  --  4.4  --   --  3.9  --  3.8   CHLORIDE  --  105  --   --  103  --  102   CO2  --  32*  --   --  32*  --  33*   BUN  --  25.5*  --   --  22.3  --  21.4   CR  --  0.56  --   --  0.56  --  0.52   ANIONGAP  --  8  --   --  8  --  9   ISRA[place]  --  9.7*  --   --  9.5  --  9.5   * 201* 209*   < > 210*   < > 185*    < > = values in this interval not displayed.     Most Recent 2 LFT's:Recent Labs   Lab Test 10/19/22  1430 05/30/22  1756   AST 19 23   ALT 15 29   ALKPHOS 106* 91   BILITOTAL 0.2 0.3   ,   Results for orders placed or performed during the hospital encounter of 10/19/22   XR Chest 2 Views    Narrative    CHEST TWO VIEWS  10/19/2022 3:39 PM     HISTORY: Cough, shortness of breath.    COMPARISON: Chest x-ray 5/30/2022.      Impression    IMPRESSION: Increased nodular  opacity at the right midlung is felt to  be indeterminant. This could just be hilar structures, but a focal  increasing area of airspace disease is a possibility. Consider further  evaluation with CT of the chest. Small right pleural fluid. Stable  cardiac silhouette. Left lung base atelectasis again noted.    GUNNER ANTOINE MD         SYSTEM ID:  [id number]   CT Chest w/o Contrast    Narrative    EXAM: CT CHEST W/O CONTRAST  LOCATION: [facility]  DATE/TIME: 10/19/2022 6:41 PM    INDICATION: Right hilar nodularity on chest x ray  COMPARISON: Chest x-ray dated 10/19/2022, chest CT dated 05/30/2022, chest CT dated 02/11/2020  TECHNIQUE: CT chest without IV contrast. Multiplanar reformats were obtained. Dose reduction techniques were used.  CONTRAST: None.    FINDINGS:   LUNGS AND PLEURA: Chronic lower lobe atelectasis and scarring, right greater than left, with some associated calcification is likely unchanged. There are no acute infiltrates. Central airways are patent. Trace right pleural effusion, slightly increased.    MEDIASTINUM/AXILLAE: Scattered subcentimeter mediastinal nodes. No new hilar mass. Large hiatal hernia.    CORONARY ARTERY CALCIFICATION: Mild.    UPPER ABDOMEN: 8.6 mm splenic artery aneurysm, unchanged    MUSCULOSKELETAL: Chronic bilateral posterior rib deformities. Mild spondylosis. Stable less than 50% midthoracic compression deformity.      Impression    IMPRESSION:   1.  Chronic bilateral areas of pulmonary consolidation/atelectasis, with slight increase in right pleural effusion.  2.  No evidence of new hilar mass.  3.  Additional chronic findings as above.           Discharge Medications   Current Discharge Medication List      START taking these medications    Details   amLODIPine (NORVASC) 10 MG tablet Take 1 tablet (10 mg) by mouth daily  Qty: 30 tablet, Refills: 0    Associated Diagnoses: Benign essential hypertension      cefdinir (OMNICEF) 300 MG capsule Take 1 capsule  (300 mg) by mouth 2 times daily  Qty: 8 capsule, Refills: 0    Associated Diagnoses: COPD exacerbation (H)      metFORMIN (GLUCOPHAGE) 1000 MG tablet Take 1 tablet (1,000 mg) by mouth 2 times daily (with meals)  Qty: 60 tablet, Refills: 0    Associated Diagnoses: Drug or chemical induced diabetes mellitus with other specified complication, unspecified whether long term insulin use (H)      predniSONE (DELTASONE) 20 MG tablet Take 1 tablet (20 mg) by mouth daily  Qty: 3 tablet, Refills: 0    Associated Diagnoses: COPD exacerbation (H)      umeclidinium (INCRUSE ELLIPTA) 62.5 MCG/INH inhaler Inhale 1 puff into the lungs daily  Qty: 1 each, Refills: 3    Associated Diagnoses: COPD exacerbation (H)         CONTINUE these medications which have NOT CHANGED    Details   acetaminophen (TYLENOL) 500 MG tablet Take 1,000 mg by mouth 3 times daily At 8am, 12pm, 8pm      !! albuterol (ACCUNEB) 1.25 MG/3ML neb solution Take 1.25 mg by nebulization 2 times daily      !! albuterol (ACCUNEB) 1.25 MG/3ML nebulizer solution Take 1 vial (1.25 mg) by nebulization every 4 hours as needed for shortness of breath / dyspnea or wheezing  Qty: 30 vial, Refills: 11    Associated Diagnoses: Bronchiectasis with acute exacerbation (H)      albuterol (PROAIR HFA/PROVENTIL HFA/VENTOLIN HFA) 108 (90 Base) MCG/ACT inhaler Inhale 1 puff into the lungs every 4 hours as needed for shortness of breath / dyspnea or wheezing      ARTIFICIAL TEARS 1.4 % ophthalmic solution Place 1 drop into both eyes every morning      budesonide (PULMICORT) 0.25 MG/2ML neb solution Take 0.25 mg by nebulization 2 times daily      diclofenac (VOLTAREN) 1 % topical gel Apply topically 4 times daily as needed (pain)      DULoxetine (CYMBALTA) 60 MG capsule Take 1 capsule (60 mg) by mouth daily  Qty: 90 capsule, Refills: 1    Associated Diagnoses: Moderate recurrent major depression (H); Polyarthralgia      ferrous sulfate (IRON) 325 (65 FE) MG tablet Take 325 mg by mouth  every evening       fexofenadine (ALLEGRA) 180 MG tablet Take 180 mg by mouth every evening      fluticasone (FLONASE) 50 MCG/ACT nasal spray Spray 1 spray into both nostrils daily as needed for rhinitis or allergies  Qty: 9.9 mL, Refills: 1    Associated Diagnoses: Seasonal allergic rhinitis, unspecified trigger      furosemide (LASIX) 20 MG tablet Take 1 tablet (20 mg) by mouth daily as needed (edema)  Qty: 30 tablet, Refills: 0    Associated Diagnoses: Bilateral lower extremity edema      gabapentin (NEURONTIN) 300 MG capsule Take 1 capsule (300 mg) by mouth 2 times daily  Qty: 180 capsule, Refills: 1    Associated Diagnoses: Osteoarthritis of both knees, unspecified osteoarthritis type      guaiFENesin-dextromethorphan (ROBITUSSIN DM) 100-10 MG/5ML syrup Take 10 mLs by mouth every 4 hours as needed for cough      Lactobacillus (ACIDOPHILUS PROBIOTIC PO) Take 1 capsule by mouth daily      LANsoprazole (PREVACID) 15 MG DR capsule Take 15 mg by mouth every other day      losartan (COZAAR) 100 MG tablet Take 1 tablet (100 mg) by mouth daily  Qty: 90 tablet, Refills: 3    Associated Diagnoses: Benign essential hypertension      metoprolol succinate ER (TOPROL-XL) 25 MG 24 hr tablet TAKE HALF TABLET BY MOUTH DAILY  Qty: 45 tablet, Refills: 3    Associated Diagnoses: Benign essential hypertension      nystatin (MYCOSTATIN) 335685 UNIT/GM external powder Apply topically 3 times daily as needed for dry skin (under breasts)  Qty: 60 g, Refills: 1    Associated Diagnoses: Fungal infection of skin      polyethylene glycol (MIRALAX/GLYCOLAX) powder TAKE 17GRAMS BY MOUTH TWICE DAILY AS DIRECTED.  Qty: 1020 g, Refills: 2    Associated Diagnoses: Slow transit constipation      sennosides (SENOKOT) 8.6 MG tablet Take 1 tablet by mouth daily      vitamin D3 (CHOLECALCIFEROL) 1000 units (25 mcg) tablet Take 1,000 Units by mouth every evening       !! - Potential duplicate medications found. Please discuss with provider.         Allergies   Allergies   Allergen Reactions     Penicillins      hives     Sulfa Drugs      Rash

## 2022-10-25 ENCOUNTER — PATIENT OUTREACH (OUTPATIENT)
Dept: CARE COORDINATION | Facility: CLINIC | Age: 87
End: 2022-10-25

## 2022-10-25 NOTE — PROGRESS NOTES
Clinic Care Coordination Contact  [facility]/Voicemail       Clinical Data: Care Coordinator Outreach  Outreach attempted x 2.  Left message with Patients daughter who stated her Mom lives in assisted living and wasn't feeling well but was being seen today from a inhouse DrBrissa Care Coordinator will do no further outreaches at this time.    Keely Sloan  [phone]  Care

## 2022-10-27 ENCOUNTER — LAB REQUISITION (OUTPATIENT)
Dept: LAB | Facility: CLINIC | Age: 87
End: 2022-10-27
Payer: COMMERCIAL

## 2022-10-27 ENCOUNTER — DOCUMENTATION ONLY (OUTPATIENT)
Dept: OTHER | Facility: CLINIC | Age: 87
End: 2022-10-27

## 2022-10-27 DIAGNOSIS — Z51.81 ENCOUNTER FOR THERAPEUTIC DRUG LEVEL MONITORING: ICD-10-CM

## 2022-10-27 DIAGNOSIS — J44.9 CHRONIC OBSTRUCTIVE PULMONARY DISEASE, UNSPECIFIED (H): ICD-10-CM

## 2022-11-01 LAB
ANION GAP SERPL CALCULATED.3IONS-SCNC: 16 MMOL/L (ref 7–15)
BUN SERPL-MCNC: 26.4 MG/DL (ref 8–23)
CALCIUM SERPL-MCNC: 10.1 MG/DL (ref 8.2–9.6)
CHLORIDE SERPL-SCNC: 100 MMOL/L (ref 98–107)
CREAT SERPL-MCNC: 0.58 MG/DL (ref 0.51–0.95)
DEPRECATED HCO3 PLAS-SCNC: 24 MMOL/L (ref 22–29)
ERYTHROCYTE [DISTWIDTH] IN BLOOD BY AUTOMATED COUNT: 13.5 % (ref 10–15)
GFR SERPL CREATININE-BSD FRML MDRD: 85 ML/MIN/1.73M2
GLUCOSE SERPL-MCNC: 223 MG/DL (ref 70–99)
HCT VFR BLD AUTO: 48.7 % (ref 35–47)
HGB BLD-MCNC: 14.9 G/DL (ref 11.7–15.7)
MCH RBC QN AUTO: 28.9 PG (ref 26.5–33)
MCHC RBC AUTO-ENTMCNC: 30.6 G/DL (ref 31.5–36.5)
MCV RBC AUTO: 94 FL (ref 78–100)
PLATELET # BLD AUTO: 288 10E3/UL (ref 150–450)
POTASSIUM SERPL-SCNC: 4 MMOL/L (ref 3.4–5.3)
RBC # BLD AUTO: 5.16 10E6/UL (ref 3.8–5.2)
SODIUM SERPL-SCNC: 140 MMOL/L (ref 136–145)
WBC # BLD AUTO: 13 10E3/UL (ref 4–11)

## 2022-11-01 PROCEDURE — P9604 ONE-WAY ALLOW PRORATED TRIP: HCPCS | Mod: ORL | Performed by: STUDENT IN AN ORGANIZED HEALTH CARE EDUCATION/TRAINING PROGRAM

## 2022-11-01 PROCEDURE — 85027 COMPLETE CBC AUTOMATED: CPT | Mod: ORL | Performed by: STUDENT IN AN ORGANIZED HEALTH CARE EDUCATION/TRAINING PROGRAM

## 2022-11-01 PROCEDURE — 80048 BASIC METABOLIC PNL TOTAL CA: CPT | Mod: ORL | Performed by: STUDENT IN AN ORGANIZED HEALTH CARE EDUCATION/TRAINING PROGRAM

## 2022-11-01 PROCEDURE — 36415 COLL VENOUS BLD VENIPUNCTURE: CPT | Mod: ORL | Performed by: STUDENT IN AN ORGANIZED HEALTH CARE EDUCATION/TRAINING PROGRAM

## 2023-01-01 ENCOUNTER — HEALTH MAINTENANCE LETTER (OUTPATIENT)
Age: 88
End: 2023-01-01

## 2023-01-06 ENCOUNTER — MYC MEDICAL ADVICE (OUTPATIENT)
Dept: PEDIATRICS | Facility: CLINIC | Age: 88
End: 2023-01-06

## 2023-01-09 NOTE — TELEPHONE ENCOUNTER
Diabetes education outreach.  Daughter has responded that: [facility] lives [facility] and Dr. Briscoe is no longer her PCP.  She is under the care of the [facility] provider.  Removed PCP from the care team.  Nancy Dixon RN

## 2023-01-22 ENCOUNTER — HEALTH MAINTENANCE LETTER (OUTPATIENT)
Age: 88
End: 2023-01-22

## 2023-02-21 NOTE — PLAN OF CARE
Orders have been faxed to [facility] [phone].     Problem: Patient Care Overview  Goal: Plan of Care/Patient Progress Review  Outcome: Adequate for Discharge Date Met: 12/27/17   Pt dc with daughter. Pt picking up scrips at [facility]. Pt verbalized understanding of dc instructions. Daughter present during dc teaching.

## 2023-02-26 ENCOUNTER — LAB REQUISITION (OUTPATIENT)
Dept: LAB | Facility: CLINIC | Age: 88
End: 2023-02-26
Payer: COMMERCIAL

## 2023-02-26 DIAGNOSIS — E11.9 TYPE 2 DIABETES MELLITUS WITHOUT COMPLICATIONS (H): ICD-10-CM

## 2023-03-07 LAB — HBA1C MFR BLD: 6.3 %

## 2023-03-07 PROCEDURE — 36415 COLL VENOUS BLD VENIPUNCTURE: CPT | Mod: ORL | Performed by: PHYSICIAN ASSISTANT

## 2023-03-07 PROCEDURE — P9604 ONE-WAY ALLOW PRORATED TRIP: HCPCS | Mod: ORL | Performed by: PHYSICIAN ASSISTANT

## 2023-03-07 PROCEDURE — 83036 HEMOGLOBIN GLYCOSYLATED A1C: CPT | Mod: ORL | Performed by: PHYSICIAN ASSISTANT

## 2023-04-27 DIAGNOSIS — J44.1 COPD EXACERBATION (H): ICD-10-CM

## 2023-04-28 ENCOUNTER — NURSE TRIAGE (OUTPATIENT)
Dept: PEDIATRICS | Facility: CLINIC | Age: 88
End: 2023-04-28

## 2023-04-28 NOTE — TELEPHONE ENCOUNTER
Please call the following patients and get them scheduled for AWV with me - ok to use KARTHIK slots in May, June, July.      Thanks!  Jill Briscoe

## 2023-05-01 NOTE — TELEPHONE ENCOUNTER
Patient in snf under [facility]. Shahriar Coburn PA-C.    Thank you  Waqar AUGUSTE

## 2023-07-23 ENCOUNTER — HEALTH MAINTENANCE LETTER (OUTPATIENT)
Age: 88
End: 2023-07-23

## 2023-07-26 RX ORDER — UMECLIDINIUM 62.5 UG/1
AEROSOL, POWDER ORAL
Refills: 11 | OUTPATIENT
Start: 2023-07-26

## 2023-09-25 NOTE — TELEPHONE ENCOUNTER
"Requested Prescriptions   Pending Prescriptions Disp Refills     metoprolol succinate ER (TOPROL-XL) 25 MG 24 hr tablet    Last Written Prescription Date:  7/5/2018  Last Fill Quantity: 45,  # refills: 1   Last office visit: 11/13/2018 with prescribing provider:  Ayaz Fernandez     Future Office Visit:     45 tablet 1    Beta-Blockers Protocol Passed - 1/3/2019  9:25 AM       Passed - Blood pressure under 140/90 in past 12 months    BP Readings from Last 3 Encounters:   11/13/18 116/60   01/30/18 134/83   01/03/18 124/60                Passed - Patient is age 6 or older       Passed - Recent (12 mo) or future (30 days) visit within the authorizing provider's specialty    Patient had office visit in the last 12 months or has a visit in the next 30 days with authorizing provider or within the authorizing provider's specialty.  See \"Patient Info\" tab in inbasket, or \"Choose Columns\" in Meds & Orders section of the refill encounter.                " Operative Note :  Leny Kendrick MD      Diane Ruth  1977    Procedure Date: 09/25/23    Pre-op Diagnosis:  Screening for colon cancer [Z12.11]    Post-Operative Diagnosis:  Poor bowel prep    Procedure:   Flexible colonoscopy to the cecum    Surgeon: Leny Kendrick MD    Assistant: None    Anesthesia:  MAC (monitored anesthetic care)    Estimated Blood Loss: Minimal    Specimens: None    Complications: None    Indications:  Mrs. Ruth is a 46-year-old lady here for routine screening colonoscopy.  She has been counseled on the risks of the procedure to include bleeding, colon perforation, and missed pathology.  Despite these risks, she has consented to proceed.    Findings: Poor bowel prep    Description of procedure:  The patient was brought to the endoscopy suite and aura in the left lateral decubitus position.  Continuous propofol anesthesia was administered.  A surgical timeout was completed.  A digital rectal exam was performed, revealing no abnormalities.  An adult colonoscope was then inserted through the anus and passed under direct visualization to the level of the cecum.  The cecum was identified via the ileocecal valve as well as the appendiceal orifice.  The scope was then slowly withdrawn, examining all circumferential walls of the ascending, transverse, descending, and sigmoid colon.  There were no gross abnormalities at the colon, but she had a poor prep with significant liquid stool that was eventually able to be copiously irrigated and suctioned away to reveal the majority of the colonic mucosa.  There were no clear polyps identified, no evidence of malignancy, and no signs of diverticulosis.  Within the rectum the scope was retroflexed and showed no signs of hemorrhoidal disease.  The scope was then withdrawn and the colon desufflated.  The patient was transferred to the recovery area in stable condition.     Recommendations:  Repeat colonoscopy in 10 years for screening.   Despite the poor prep, I was able to irrigate away all of the adherent stool and visualized the majority of her colonic mucosa.    Leny Kendrick MD  General, Robotic, and Endoscopic Surgery  [facility]    [address], [address]  [place] [address]  P: [phone]  F: [phone]

## 2024-01-01 ENCOUNTER — LAB REQUISITION (OUTPATIENT)
Dept: LAB | Facility: CLINIC | Age: 89
End: 2024-01-01
Payer: COMMERCIAL

## 2024-01-01 ENCOUNTER — HEALTH MAINTENANCE LETTER (OUTPATIENT)
Age: 89
End: 2024-01-01

## 2024-01-01 DIAGNOSIS — J44.9 CHRONIC OBSTRUCTIVE PULMONARY DISEASE, UNSPECIFIED (H): ICD-10-CM

## 2024-01-01 LAB
ANION GAP SERPL CALCULATED.3IONS-SCNC: 15 MMOL/L (ref 7–15)
ANION GAP SERPL CALCULATED.3IONS-SCNC: 17 MMOL/L (ref 7–15)
BUN SERPL-MCNC: 17.1 MG/DL (ref 8–23)
BUN SERPL-MCNC: 27.7 MG/DL (ref 8–23)
CALCIUM SERPL-MCNC: 9.6 MG/DL (ref 8.8–10.4)
CALCIUM SERPL-MCNC: 9.8 MG/DL (ref 8.2–9.6)
CHLORIDE SERPL-SCNC: 96 MMOL/L (ref 98–107)
CHLORIDE SERPL-SCNC: 99 MMOL/L (ref 98–107)
CREAT SERPL-MCNC: 0.7 MG/DL (ref 0.51–0.95)
CREAT SERPL-MCNC: 0.86 MG/DL (ref 0.51–0.95)
DEPRECATED HCO3 PLAS-SCNC: 26 MMOL/L (ref 22–29)
EGFRCR SERPLBLD CKD-EPI 2021: 63 ML/MIN/1.73M2
EGFRCR SERPLBLD CKD-EPI 2021: 81 ML/MIN/1.73M2
GLUCOSE SERPL-MCNC: 247 MG/DL (ref 70–99)
GLUCOSE SERPL-MCNC: 368 MG/DL (ref 70–99)
HCO3 SERPL-SCNC: 29 MMOL/L (ref 22–29)
POTASSIUM SERPL-SCNC: 3.9 MMOL/L (ref 3.4–5.3)
POTASSIUM SERPL-SCNC: 4.5 MMOL/L (ref 3.4–5.3)
SODIUM SERPL-SCNC: 140 MMOL/L (ref 135–145)
SODIUM SERPL-SCNC: 142 MMOL/L (ref 135–145)

## 2024-01-01 PROCEDURE — 36415 COLL VENOUS BLD VENIPUNCTURE: CPT | Mod: ORL | Performed by: PHYSICIAN ASSISTANT

## 2024-01-01 PROCEDURE — 80048 BASIC METABOLIC PNL TOTAL CA: CPT | Mod: ORL | Performed by: PHYSICIAN ASSISTANT

## 2024-01-01 PROCEDURE — 80048 BASIC METABOLIC PNL TOTAL CA: CPT | Mod: ORL

## 2024-01-01 PROCEDURE — 36415 COLL VENOUS BLD VENIPUNCTURE: CPT | Mod: ORL

## 2024-01-01 PROCEDURE — P9603 ONE-WAY ALLOW PRORATED MILES: HCPCS | Mod: ORL | Performed by: PHYSICIAN ASSISTANT

## 2024-01-01 PROCEDURE — P9604 ONE-WAY ALLOW PRORATED TRIP: HCPCS | Mod: ORL

## (undated) RX ORDER — TRIAMCINOLONE ACETONIDE 40 MG/ML
INJECTION, SUSPENSION INTRA-ARTICULAR; INTRAMUSCULAR
Status: DISPENSED
Start: 2017-12-26

## (undated) RX ORDER — LIDOCAINE HYDROCHLORIDE 10 MG/ML
INJECTION, SOLUTION INFILTRATION; PERINEURAL
Status: DISPENSED
Start: 2017-12-26